# Patient Record
Sex: MALE | Race: ASIAN | NOT HISPANIC OR LATINO | Employment: UNEMPLOYED | URBAN - METROPOLITAN AREA
[De-identification: names, ages, dates, MRNs, and addresses within clinical notes are randomized per-mention and may not be internally consistent; named-entity substitution may affect disease eponyms.]

---

## 2018-07-21 ENCOUNTER — HOSPITAL ENCOUNTER (EMERGENCY)
Facility: HOSPITAL | Age: 48
Discharge: HOME | End: 2018-07-21
Attending: EMERGENCY MEDICINE | Admitting: EMERGENCY MEDICINE
Payer: COMMERCIAL

## 2018-07-21 VITALS
TEMPERATURE: 98.6 F | OXYGEN SATURATION: 97 % | HEART RATE: 97 BPM | RESPIRATION RATE: 18 BRPM | SYSTOLIC BLOOD PRESSURE: 126 MMHG | DIASTOLIC BLOOD PRESSURE: 87 MMHG

## 2018-07-21 DIAGNOSIS — F32.A DEPRESSION: Primary | ICD-10-CM

## 2018-07-21 PROCEDURE — 99284 EMERGENCY DEPT VISIT MOD MDM: CPT

## 2018-07-21 NOTE — DISCHARGE INSTRUCTIONS
Depression, Ambulatory Care   GENERAL INFORMATION:   Depression  is a medical condition that causes feelings of sadness or hopelessness that do not go away  Depression may cause you to lose interest in things you used to enjoy  These feelings may interfere with your daily life  Common symptoms include the following:   · Appetite changes, or weight gain or loss    · Trouble going to sleep or staying asleep, or sleeping too much    · Fatigue or lack of energy    · Feeling restless, irritable, or withdrawn    · Feeling worthless, hopeless, discouraged, or very guilty    · Trouble concentrating, remembering things, doing daily tasks, or making decisions    · Thoughts about hurting or killing yourself  Seek immediate care for the following symptoms:   · You think about harming yourself or someone else  Treatment for depression  may include medicine to improve or balance your mood  Therapy may also be used to treat your depression  A therapist will help you learn to cope with your thoughts and feelings  Therapy can be done alone or in a group  It may also be done with family members or a significant other  Manage depression:   · Get regular physical activity  Try to exercise for 30 minutes, 3 to 5 days a week  Work with your healthcare provider to develop an exercise plan that you enjoy  · Get enough sleep  Create a routine to help you relax before bed  Try to go to bed and wake up at the same time every day  Sleep is important for emotional health  · Eat a variety of healthy foods  Healthy foods include fruits, vegetables, whole-grain breads, low-fat dairy products, beans, lean meats, and fish  A healthy meal plan is low in fat, salt, and added sugar  · Avoid or limit alcohol  Ask your healthcare provider how much alcohol is safe for you to drink  A drink of alcohol is 12 ounces of beer, 5 ounces of wine, or 1½ ounces of liquor  Follow up with your healthcare provider as directed:   You will need to return so your healthcare provider can monitor your progress  Write down your questions so you remember to ask them during your visits  CARE AGREEMENT:   You have the right to help plan your care  Learn about your health condition and how it may be treated  Discuss treatment options with your caregivers to decide what care you want to receive  You always have the right to refuse treatment  The above information is an  only  It is not intended as medical advice for individual conditions or treatments  Talk to your doctor, nurse or pharmacist before following any medical regimen to see if it is safe and effective for you  © 2014 [address] is for End User's use only and may not be sold, redistributed or otherwise used for commercial purposes  All illustrations and images included in CareNotes® are the copyrighted property of [employer] D [employer]  or Dhiraj Simon

## 2018-07-21 NOTE — ED PROVIDER NOTES
History  No chief complaint on file  History provided by:  Patient   used: No    Psychiatric Evaluation   Presenting symptoms: depression    Presenting symptoms: no hallucinations, no homicidal ideas, no paranoid behavior, no suicidal thoughts and no suicidal threats    Patient accompanied by: self  Degree of incapacity (severity):  Mild  Onset quality:  Gradual  Timing:  Intermittent  Progression:  Waxing and waning  Chronicity:  New  Context: alcohol use    Treatment compliance:  Untreated  Relieved by:  Nothing  Worsened by:  Nothing  Ineffective treatments:  None tried      Prior to Admission Medications   Prescriptions Last Dose Informant Patient Reported? Taking?   folic acid (FOLVITE) 1 mg tablet   No No   Sig: Take 1 tablet (1 mg total) by mouth daily Indications: Deficiency of Folic Acid in the Diet  naltrexone (REVIA) 50 mg tablet   No No   Sig: Take 0 5 tablets (25 mg total) by mouth daily Indications: Excessive Use of Alcohol    thiamine 100 MG tablet   No No   Sig: Take 1 tablet (100 mg total) by mouth daily at bedtime Indications: Deficiency in Thiamine or Vitamin B1    topiramate (TOPAMAX) 25 mg tablet   No No   Sig: Take 1 tablet (25 mg total) by mouth 2 (two) times a day Indications: Excessive Use of Alcohol, Bipolar Disorder  Facility-Administered Medications: None       Past Medical History:   Diagnosis Date    Alcohol abuse     Alcoholism ([facility] [address] )     Anxiety     Depression        No past surgical history on file  No family history on file  I have reviewed and agree with the history as documented  Social History   Substance Use Topics    Smoking status: Current Every Day Smoker     Packs/day: 0 50     Years: 5 00    Smokeless tobacco: Not on file    Alcohol use Yes      Comment: 1/2-1 bottle of liquor daily  Review of Systems   Psychiatric/Behavioral: Negative for hallucinations, homicidal ideas, paranoia and suicidal ideas         Physical Exam  Physical Exam    Vital Signs  ED Triage Vitals   Temp Pulse Resp BP SpO2   -- -- -- -- --      Temp src Heart Rate Source Patient Position - Orthostatic VS BP Location FiO2 (%)   -- -- -- -- --      Pain Score       --           There were no vitals filed for this visit  Visual Acuity      ED Medications  Medications - No data to display    Diagnostic Studies  Results Reviewed     None                 No orders to display              Procedures  Procedures       Phone Contacts  ED Phone Contact    ED Course                               [facility]  CritCare Time    Disposition  Final diagnoses:   None     ED Disposition     None      Follow-up Information    None         Patient's Medications   Discharge Prescriptions    No medications on file     No discharge procedures on file      ED Provider  Electronically Signed by

## 2018-07-21 NOTE — ED NOTES
Pt wanting to leave and is denying any suicidal ideations at this time  Pt has called family member to be picked up and is refusing services at this time  Dr Johnnie Cooney to see patient        Rocco Mendosa RN  07/21/18 1070

## 2018-07-21 NOTE — ED NOTES
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 and having various stressors related to not having a settled lifestyle  Patient denies any suicidal ideations at this time, as well as any history of such  Patient denies any mental health history  Patient does attend outpatient services with [facility] 2xweekly as per his [place]  Patient identifies that he has a few more months on parole and has no interest in messing things up at this point  Patient denies homicidal ideations and hallucinations; he expresses hope for the future and his goals and feels safe to return home  Patient confirmed that he has the contact information for county crisis and can reach out to additional supports as needed       ANTHONY Chirinos  07/21/18   0130

## 2018-07-21 NOTE — ED NOTES
Pt  Refusing triage at this time   States "My brother is coming to get me I want to leave"     Karol Brenner, RN  07/21/18 013

## 2018-08-27 NOTE — ED PROVIDER NOTES
History  Chief Complaint   Patient presents with    Personal Problem     Pt  states his family wanted him to come get checked out because he seemed more down lately  Pt denies SI and HI       History provided by:  Patient   used: No    Psychiatric Evaluation   Presenting symptoms: depression    Presenting symptoms: no aggressive behavior, no agitation, no bizarre behavior, no delusions, no disorganized thought process, no hallucinations, no homicidal ideas, no self-mutilation, no suicidal thoughts, no suicidal threats and no suicide attempt    Patient accompanied by: self  Degree of incapacity (severity):  Mild  Onset quality:  Gradual  Timing:  Intermittent  Progression:  Waxing and waning  Chronicity:  New  Context: alcohol use    Treatment compliance:  Some of the time  Relieved by:  Nothing  Worsened by:  Alcohol  Ineffective treatments:  None tried  Associated symptoms: no abdominal pain, no appetite change, no chest pain, no fatigue and no headaches        Prior to Admission Medications   Prescriptions Last Dose Informant Patient Reported? Taking?   folic acid (FOLVITE) 1 mg tablet   No No   Sig: Take 1 tablet (1 mg total) by mouth daily Indications: Deficiency of Folic Acid in the Diet  naltrexone (REVIA) 50 mg tablet   No No   Sig: Take 0 5 tablets (25 mg total) by mouth daily Indications: Excessive Use of Alcohol    thiamine 100 MG tablet   No No   Sig: Take 1 tablet (100 mg total) by mouth daily at bedtime Indications: Deficiency in Thiamine or Vitamin B1    topiramate (TOPAMAX) 25 mg tablet   No No   Sig: Take 1 tablet (25 mg total) by mouth 2 (two) times a day Indications: Excessive Use of Alcohol, Bipolar Disorder  Facility-Administered Medications: None       Past Medical History:   Diagnosis Date    Alcohol abuse     Alcoholism ([facility] [address] )     Anxiety     Depression        History reviewed  No pertinent surgical history  History reviewed  No pertinent family history    I have reviewed and agree with the history as documented  Social History   Substance Use Topics    Smoking status: Current Every Day Smoker     Packs/day: 0 50     Years: 5 00    Smokeless tobacco: Never Used    Alcohol use Yes      Comment: 1/2-1 bottle of liquor daily  Review of Systems   Constitutional: Negative for activity change, appetite change, diaphoresis, fatigue and fever  HENT: Negative for sore throat and trouble swallowing  Eyes: Negative for pain and visual disturbance  Respiratory: Negative for apnea, cough, chest tightness and shortness of breath  Cardiovascular: Negative for chest pain  Gastrointestinal: Negative for abdominal pain, diarrhea, nausea and vomiting  Endocrine: Negative for polyphagia and polyuria  Genitourinary: Negative for dysuria, flank pain, frequency, hematuria and urgency  Musculoskeletal: Negative for back pain, gait problem, neck pain and neck stiffness  Skin: Negative for color change and rash  Allergic/Immunologic: Negative for immunocompromised state  Neurological: Negative for dizziness, speech difficulty, numbness and headaches  Hematological: Does not bruise/bleed easily  Psychiatric/Behavioral: Positive for dysphoric mood  Negative for agitation, confusion, hallucinations, homicidal ideas, self-injury and suicidal ideas  Physical Exam  Physical Exam   Constitutional: He is oriented to person, place, and time  He appears well-developed and well-nourished  HENT:   Head: Normocephalic  Eyes: Conjunctivae and EOM are normal  Pupils are equal, round, and reactive to light  No scleral icterus  Neck: Normal range of motion  Neck supple  No JVD present  Cardiovascular: Normal rate and regular rhythm  Pulmonary/Chest: Effort normal and breath sounds normal  No respiratory distress  Abdominal: Soft  Bowel sounds are normal  He exhibits no distension  There is no tenderness  There is no rebound and no guarding  Musculoskeletal: Normal range of motion  He exhibits no tenderness or deformity  Lymphadenopathy:     He has no cervical adenopathy  Neurological: He is alert and oriented to person, place, and time  Coordination normal    Skin: Skin is warm and dry  He is not diaphoretic  Psychiatric: He has a normal mood and affect  Vitals reviewed  Vital Signs  ED Triage Vitals [07/21/18 0147]   Temperature Pulse Respirations Blood Pressure SpO2   98 6 °F (37 °C) 97 18 126/87 97 %      Temp Source Heart Rate Source Patient Position - Orthostatic VS BP Location FiO2 (%)   Oral Monitor Sitting Right arm --      Pain Score       No Pain           Vitals:    07/21/18 0147   BP: 126/87   Pulse: 97   Patient Position - Orthostatic VS: Sitting       Visual Acuity      ED Medications  Medications - No data to display    Diagnostic Studies  Results Reviewed     None                 No orders to display              Procedures  Procedures       Phone Contacts  ED Phone Contact    ED Course                               MDM  Number of Diagnoses or Management Options  Depression: new and requires workup  Diagnosis management comments: 48 yo M presented for psych eval  Pt with no SI/HI/AH/VH  States he has been feeling a little more "down" recently and admits to sometimes self medicating with alcohol  Pt states that today family noticed that he seemed somewhat depressed and suggested that he "get checked out " pt reports some sources of personal stress/triggers but reports that overall he is optimistic about the future and feels that he has a strong support system  No physical c/o  Was seen also by ED crisis worker, plan is to d/c home with OP resources, return to ED for new/worsening sx          Amount and/or Complexity of Data Reviewed  Clinical lab tests: reviewed and ordered  Review and summarize past medical records: yes      CritCare Time    Disposition  Final diagnoses:   Depression     Time reflects when diagnosis was documented in both MDM as applicable and the Disposition within this note     Time User Action Codes Description Comment    7/21/2018  1:43 AM Yenni Balderrama Add [F32 9] Depression       ED Disposition     ED Disposition Condition Comment    Discharge  [facility] discharge to home/self care  Condition at discharge: Good        Follow-up Information     Follow up With Specialties Details Why Contact Info    PCP   Please arrange for follow-up with your primary care provider and/or counseling team   If you have new or worsening symptoms please return to the emergency department for re-evaluation  Discharge Medication List as of 7/21/2018  1:45 AM      CONTINUE these medications which have NOT CHANGED    Details   folic acid (FOLVITE) 1 mg tablet Take 1 tablet (1 mg total) by mouth daily Indications: Deficiency of Folic Acid in the Diet , Starting 2/29/2016, Until Discontinued, Print      naltrexone (REVIA) 50 mg tablet Take 0 5 tablets (25 mg total) by mouth daily Indications: Excessive Use of Alcohol , Starting 2/29/2016, Until Discontinued, Print      thiamine 100 MG tablet Take 1 tablet (100 mg total) by mouth daily at bedtime Indications: Deficiency in Thiamine or Vitamin B1 , Starting 2/29/2016, Until Discontinued, Print      topiramate (TOPAMAX) 25 mg tablet Take 1 tablet (25 mg total) by mouth 2 (two) times a day Indications: Excessive Use of Alcohol, Bipolar Disorder , Starting 2/29/2016, Until Discontinued, Print           No discharge procedures on file      ED Provider  Electronically Signed by           Ruth Crenshaw MD  08/27/18 7642

## 2018-09-04 ENCOUNTER — HOSPITAL ENCOUNTER (EMERGENCY)
Facility: HOSPITAL | Age: 48
Discharge: HOME | End: 2018-09-04
Attending: EMERGENCY MEDICINE | Admitting: EMERGENCY MEDICINE
Payer: COMMERCIAL

## 2018-09-04 VITALS
HEART RATE: 68 BPM | DIASTOLIC BLOOD PRESSURE: 78 MMHG | RESPIRATION RATE: 16 BRPM | OXYGEN SATURATION: 99 % | TEMPERATURE: 98.4 F | SYSTOLIC BLOOD PRESSURE: 146 MMHG

## 2018-09-04 DIAGNOSIS — F14.10 COCAINE ABUSE (HCC): ICD-10-CM

## 2018-09-04 DIAGNOSIS — F10.10 ALCOHOL ABUSE: Primary | ICD-10-CM

## 2018-09-04 LAB
ALBUMIN SERPL BCP-MCNC: 3.9 G/DL (ref 3.5–5)
ALP SERPL-CCNC: 58 U/L (ref 46–116)
ALT SERPL W P-5'-P-CCNC: 37 U/L (ref 12–78)
AMPHETAMINES SERPL QL SCN: NEGATIVE
ANION GAP SERPL CALCULATED.3IONS-SCNC: 12 MMOL/L (ref 4–13)
APAP SERPL-MCNC: <2 UG/ML (ref 10–30)
AST SERPL W P-5'-P-CCNC: 23 U/L (ref 5–45)
ATRIAL RATE: 82 BPM
BARBITURATES UR QL: NEGATIVE
BASOPHILS # BLD AUTO: 0.07 THOUSANDS/ΜL (ref 0–0.1)
BASOPHILS NFR BLD AUTO: 1 % (ref 0–1)
BENZODIAZ UR QL: NEGATIVE
BILIRUB SERPL-MCNC: 0.2 MG/DL (ref 0.2–1)
BUN SERPL-MCNC: 13 MG/DL (ref 5–25)
CALCIUM SERPL-MCNC: 8.9 MG/DL (ref 8.3–10.1)
CHLORIDE SERPL-SCNC: 110 MMOL/L (ref 100–108)
CO2 SERPL-SCNC: 26 MMOL/L (ref 21–32)
COCAINE UR QL: POSITIVE
CREAT SERPL-MCNC: 1.03 MG/DL (ref 0.6–1.3)
EOSINOPHIL # BLD AUTO: 0.28 THOUSAND/ΜL (ref 0–0.61)
EOSINOPHIL NFR BLD AUTO: 4 % (ref 0–6)
ERYTHROCYTE [DISTWIDTH] IN BLOOD BY AUTOMATED COUNT: 12.6 % (ref 11.6–15.1)
ETHANOL EXG-MCNC: 0 MG/DL
ETHANOL EXG-MCNC: 0.07 MG/DL
ETHANOL SERPL-MCNC: 147 MG/DL (ref 0–3)
GFR SERPL CREATININE-BSD FRML MDRD: 85 ML/MIN/1.73SQ M
GLUCOSE SERPL-MCNC: 105 MG/DL (ref 65–140)
HCT VFR BLD AUTO: 49.8 % (ref 36.5–49.3)
HGB BLD-MCNC: 17.1 G/DL (ref 12–17)
IMM GRANULOCYTES # BLD AUTO: 0 THOUSAND/UL (ref 0–0.2)
IMM GRANULOCYTES NFR BLD AUTO: 0 % (ref 0–2)
LYMPHOCYTES # BLD AUTO: 2.97 THOUSANDS/ΜL (ref 0.6–4.47)
LYMPHOCYTES NFR BLD AUTO: 46 % (ref 14–44)
MCH RBC QN AUTO: 33.6 PG (ref 26.8–34.3)
MCHC RBC AUTO-ENTMCNC: 34.3 G/DL (ref 31.4–37.4)
MCV RBC AUTO: 98 FL (ref 82–98)
METHADONE UR QL: NEGATIVE
MONOCYTES # BLD AUTO: 0.43 THOUSAND/ΜL (ref 0.17–1.22)
MONOCYTES NFR BLD AUTO: 7 % (ref 4–12)
NEUTROPHILS # BLD AUTO: 2.66 THOUSANDS/ΜL (ref 1.85–7.62)
NEUTS SEG NFR BLD AUTO: 42 % (ref 43–75)
NRBC BLD AUTO-RTO: 0 /100 WBCS
OPIATES UR QL SCN: NEGATIVE
P AXIS: 77 DEGREES
PCP UR QL: NEGATIVE
PLATELET # BLD AUTO: 314 THOUSANDS/UL (ref 149–390)
PMV BLD AUTO: 8.3 FL (ref 8.9–12.7)
POTASSIUM SERPL-SCNC: 3.8 MMOL/L (ref 3.5–5.3)
PR INTERVAL: 154 MS
PROT SERPL-MCNC: 7.5 G/DL (ref 6.4–8.2)
QRS AXIS: -56 DEGREES
QRSD INTERVAL: 114 MS
QT INTERVAL: 368 MS
QTC INTERVAL: 429 MS
RBC # BLD AUTO: 5.09 MILLION/UL (ref 3.88–5.62)
SALICYLATES SERPL-MCNC: 3.5 MG/DL (ref 3–20)
SODIUM SERPL-SCNC: 148 MMOL/L (ref 136–145)
T WAVE AXIS: 56 DEGREES
THC UR QL: NEGATIVE
TSH SERPL DL<=0.05 MIU/L-ACNC: 2.52 UIU/ML (ref 0.36–3.74)
VENTRICULAR RATE: 82 BPM
WBC # BLD AUTO: 6.41 THOUSAND/UL (ref 4.31–10.16)

## 2018-09-04 PROCEDURE — 93010 ELECTROCARDIOGRAM REPORT: CPT | Performed by: INTERNAL MEDICINE

## 2018-09-04 PROCEDURE — 80329 ANALGESICS NON-OPIOID 1 OR 2: CPT | Performed by: EMERGENCY MEDICINE

## 2018-09-04 PROCEDURE — 80053 COMPREHEN METABOLIC PANEL: CPT | Performed by: EMERGENCY MEDICINE

## 2018-09-04 PROCEDURE — 36415 COLL VENOUS BLD VENIPUNCTURE: CPT | Performed by: EMERGENCY MEDICINE

## 2018-09-04 PROCEDURE — 85025 COMPLETE CBC W/AUTO DIFF WBC: CPT | Performed by: EMERGENCY MEDICINE

## 2018-09-04 PROCEDURE — 82075 ASSAY OF BREATH ETHANOL: CPT | Performed by: EMERGENCY MEDICINE

## 2018-09-04 PROCEDURE — 99284 EMERGENCY DEPT VISIT MOD MDM: CPT

## 2018-09-04 PROCEDURE — 80320 DRUG SCREEN QUANTALCOHOLS: CPT | Performed by: EMERGENCY MEDICINE

## 2018-09-04 PROCEDURE — 93005 ELECTROCARDIOGRAM TRACING: CPT

## 2018-09-04 PROCEDURE — 80307 DRUG TEST PRSMV CHEM ANLYZR: CPT | Performed by: EMERGENCY MEDICINE

## 2018-09-04 PROCEDURE — 84443 ASSAY THYROID STIM HORMONE: CPT | Performed by: EMERGENCY MEDICINE

## 2018-09-04 RX ORDER — CHLORDIAZEPOXIDE HYDROCHLORIDE 25 MG/1
25 CAPSULE, GELATIN COATED ORAL ONCE
Status: COMPLETED | OUTPATIENT
Start: 2018-09-04 | End: 2018-09-04

## 2018-09-04 RX ADMIN — CHLORDIAZEPOXIDE HYDROCHLORIDE 25 MG: 25 CAPSULE ORAL at 05:13

## 2018-09-04 NOTE — ED NOTES
Pt resting comfortably in bed at this time   Call bell within reach       Jael Langston RN  09/04/18 0056

## 2018-09-04 NOTE — ED NOTES
Patient awake and alert  No distress noted  No further questions upon discharge       Carlitos Ma RN  09/04/18 8753

## 2018-09-04 NOTE — ED PROVIDER NOTES
History  Chief Complaint   Patient presents with    Addiction Problem     Patient seeking help for drug and alcohol addiction  Drug of choice is cocaine  Patient is a 49year old male with worsening cocaine and alcohol abuse since thanksgiving last year and got out of detention last October  Denies depression or SI  No N/V  No fever  Wants detox  Was last seen at [facility] ED on 7/21/18 for depression  [facility] website checked on this patient and last Rx filled was on 3/15/18 for percocet for 2 day supply  States he has not eaten for 2 days  Has tried outpatient tx at [facility] in [facility]  History provided by:  Patient   used: No    Addiction Problem   Associated symptoms: no nausea, no suicidal ideation and no vomiting        None       Past Medical History:   Diagnosis Date    Alcohol abuse     Alcoholism ([facility] [address] )     Anxiety     Depression        History reviewed  No pertinent surgical history  History reviewed  No pertinent family history  I have reviewed and agree with the history as documented  Social History   Substance Use Topics    Smoking status: Current Every Day Smoker     Packs/day: 0 50     Years: 5 00    Smokeless tobacco: Never Used    Alcohol use Yes      Comment: 1/2-1 bottle of liquor daily  Review of Systems   Constitutional: Negative for fever  Gastrointestinal: Negative for nausea and vomiting  Psychiatric/Behavioral: Negative for dysphoric mood and suicidal ideas  All other systems reviewed and are negative  Physical Exam  Physical Exam   Constitutional: He is oriented to person, place, and time  He appears well-developed and well-nourished  He appears distressed (mild)  HENT:   Head: Normocephalic and atraumatic  Mouth/Throat: Oropharynx is clear and moist    Eyes: EOM are normal  Pupils are equal, round, and reactive to light  No scleral icterus  Neck: Normal range of motion  Neck supple     Cardiovascular: Normal rate, regular rhythm and normal heart sounds  No murmur heard  Pulmonary/Chest: Effort normal and breath sounds normal  No stridor  No respiratory distress  Abdominal: Soft  Bowel sounds are normal  There is no tenderness  Musculoskeletal: He exhibits no edema or deformity  Neurological: He is alert and oriented to person, place, and time  Skin: Skin is warm and dry  No rash noted  Psychiatric: He has a normal mood and affect  Nursing note and vitals reviewed  Vital Signs  ED Triage Vitals   Temperature Pulse Respirations Blood Pressure SpO2   09/04/18 0015 09/04/18 0012 09/04/18 0012 09/04/18 0012 09/04/18 0012   98 4 °F (36 9 °C) 95 20 117/71 99 %      Temp Source Heart Rate Source Patient Position - Orthostatic VS BP Location FiO2 (%)   09/04/18 0015 09/04/18 0012 09/04/18 0012 09/04/18 0012 --   Oral Monitor Sitting Left arm       Pain Score       09/04/18 0342       No Pain           Vitals:    09/04/18 0012 09/04/18 0342   BP: 117/71 124/75   Pulse: 95 85   Patient Position - Orthostatic VS: Sitting Sitting       Visual Acuity      ED Medications  Medications   chlordiazePOXIDE (LIBRIUM) capsule 25 mg (25 mg Oral Given 9/4/18 0513)       Diagnostic Studies  Results Reviewed     Procedure Component Value Units Date/Time    POCT alcohol breath test [[id number]]  (Normal) Resulted:  09/04/18 0340    Lab Status:  Final result Updated:  09/04/18 0340     EXTBreath Alcohol 0 066    TSH [[id number]]  (Normal) Collected:  09/04/18 0102    Lab Status:  Final result Specimen:  Blood from Arm, Left Updated:  09/04/18 0139     TSH 3RD GENERATON 2 525 uIU/mL     Narrative:         Patients undergoing fluorescein dye angiography may retain small amounts of fluorescein in the body for 48-72 hours post procedure  Samples containing fluorescein can produce falsely depressed TSH values  If the patient had this procedure,a specimen should be resubmitted post fluorescein clearance      Salicylate level [[id number]] (Normal) Collected:  09/04/18 0102    Lab Status:  Final result Specimen:  Blood from Arm, Left Updated:  66/70/87 1814     Salicylate Lvl 3 5 mg/dL     Acetaminophen level [[id number]]  (Abnormal) Collected:  09/04/18 0102    Lab Status:  Final result Specimen:  Blood from Arm, Left Updated:  09/04/18 0139     Acetaminophen Level <2 (L) ug/mL     Comprehensive metabolic panel [[id number]]  (Abnormal) Collected:  09/04/18 0102    Lab Status:  Final result Specimen:  Blood from Arm, Left Updated:  09/04/18 0130     Sodium 148 (H) mmol/L      Potassium 3 8 mmol/L      Chloride 110 (H) mmol/L      CO2 26 mmol/L      ANION GAP 12 mmol/L      BUN 13 mg/dL      Creatinine 1 03 mg/dL      Glucose 105 mg/dL      Calcium 8 9 mg/dL      AST 23 U/L      ALT 37 U/L      Alkaline Phosphatase 58 U/L      Total Protein 7 5 g/dL      Albumin 3 9 g/dL      Total Bilirubin 0 20 mg/dL      eGFR 85 ml/min/1 73sq m     Narrative:         National Kidney Disease Education Program recommendations are as follows:  GFR calculation is accurate only with a steady state creatinine  Chronic Kidney disease less than 60 ml/min/1 73 sq  meters  Kidney failure less than 15 ml/min/1 73 sq  meters      Ethanol [[id number]]  (Abnormal) Collected:  09/04/18 0102    Lab Status:  Final result Specimen:  Blood from Arm, Left Updated:  09/04/18 0124     Ethanol Lvl 147 (H) mg/dL     CBC and differential [[id number]]  (Abnormal) Collected:  09/04/18 0102    Lab Status:  Final result Specimen:  Blood from Arm, Left Updated:  09/04/18 0108     WBC 6 41 Thousand/uL      RBC 5 09 Million/uL      Hemoglobin 17 1 (H) g/dL      Hematocrit 49 8 (H) %      MCV 98 fL      MCH 33 6 pg      MCHC 34 3 g/dL      RDW 12 6 %      MPV 8 3 (L) fL      Platelets 878 Thousands/uL      nRBC 0 /100 WBCs      Neutrophils Relative 42 (L) %      Immat GRANS % 0 %      Lymphocytes Relative 46 (H) %      Monocytes Relative 7 %      Eosinophils Relative 4 %      Basophils Relative 1 % Neutrophils Absolute 2 66 Thousands/µL      Immature Grans Absolute 0 00 Thousand/uL      Lymphocytes Absolute 2 97 Thousands/µL      Monocytes Absolute 0 43 Thousand/µL      Eosinophils Absolute 0 28 Thousand/µL      Basophils Absolute 0 07 Thousands/µL     Rapid drug screen, urine [[id number]]  (Abnormal) Collected:  09/04/18 0053    Lab Status:  Final result Specimen:  Urine from Urine, Clean Catch Updated:  09/04/18 0108     Amph/Meth UR Negative     Barbiturate Ur Negative     Benzodiazepine Urine Negative     Cocaine Urine Positive (A)     Methadone Urine Negative     Opiate Urine Negative     PCP Ur Negative     THC Urine Negative    Narrative:         Presumptive report  If requested, specimen will be sent to reference lab for confirmation  FOR MEDICAL PURPOSES ONLY  IF CONFIRMATION NEEDED PLEASE CONTACT THE LAB WITHIN 5 DAYS  Drug Screen Cutoff Levels:  AMPHETAMINE/METHAMPHETAMINES  1000 ng/mL  BARBITURATES     200 ng/mL  BENZODIAZEPINES     200 ng/mL  COCAINE      300 ng/mL  METHADONE      300 ng/mL  OPIATES      300 ng/mL  PHENCYCLIDINE     25 ng/mL  THC       50 ng/mL                 No orders to display              Procedures  ECG 12 Lead Documentation  Date/Time: 9/4/2018 12:48 AM  Performed by: Jimbo Cates  Authorized by: Jimbo Cates     Indications / Diagnosis:  Alcohol/cocaine abuse  ECG reviewed by me, the ED Provider: yes    Patient location:  ED  Previous ECG:     Previous ECG:  Compared to current    Comparison ECG info:  6/5/12    Similarity:  No change  Rate:     ECG rate:  82    ECG rate assessment: normal    Rhythm:     Rhythm: sinus rhythm    Ectopy:     Ectopy: none    QRS:     QRS axis:  Left  Conduction:     Conduction: abnormal      Abnormal conduction: incomplete RBBB    ST segments:     ST segments:  Normal  T waves:     T waves: normal    Comments:      Minimal voltage criteria for LVH, may be normal variant              Phone Contacts  ED Phone Contact    ED Course  ED Course as of Sep 04 0841   Tue Sep 04, 2018   [email or web address] d/w patient  Patient ate and drank fluid here  Will BAT at 0330      0342 BAT=0 066 and crisis called  Patient is medically acceptable for crisis evaluation/dispositioning  36 D/w crisis worker who will do HOST referral for patient  0403 Patient wants medication to sleep so librium ordered which should help prevent alcohol withdrawal sx      6475 Signed out to Dr Emmanuelle Rosas this AM that patient is pending HOST evaluation for detox placement  MDM  Number of Diagnoses or Management Options  Diagnosis management comments: DDx including but not limited to: Alcohol intoxication, metabolic abnormality, doubt intracranial process, overdose, substance abuse, withdrawal, request for detox  Amount and/or Complexity of Data Reviewed  Clinical lab tests: ordered and reviewed  Decide to obtain previous medical records or to obtain history from someone other than the patient: yes  Review and summarize past medical records: yes  Independent visualization of images, tracings, or specimens: yes      CritCare Time    Disposition  Final diagnoses:   Alcohol abuse   Cocaine abuse     Time reflects when diagnosis was documented in both MDM as applicable and the Disposition within this note     Time User Action Codes Description Comment    9/4/2018  3:48 AM Carlos Manuel Soirene Add [F10 10] Alcohol abuse     9/4/2018  3:48 AM Carlos Manuel Sovereign Add [F14 10] Cocaine abuse       ED Disposition     None      Follow-up Information     Follow up With Specialties Details Why [address]  Call Follow up with [place] for substance use treatment  [address], [address]    Phone: [phone] ext  [phone]          Patient's Medications   Discharge Prescriptions    No medications on file     No discharge procedures on file      ED Provider  Electronically Signed by Nicholas Merritt MD  09/04/18 1971

## 2018-09-04 NOTE — DISCHARGE INSTRUCTIONS
Abuse of Alcohol   WHAT YOU NEED TO KNOW:   · Alcohol abuse is unhealthy drinking behavior  You may drink too much at one time once a week, or continue to drink too much daily  You continue to drink even though it causes problems  The problems can be alcohol related legal problems, or problems with work or relationships with family  · If you drink too much at one time, you are binge drinking  Binge drinking is when you have a large amount of alcohol in a short time  Your blood alcohol concentrations (NANO) goes above 0 08 g/dLlevel during binge drinking  For men, this usually happens with more than 4 drinks in 2 hours  For women, it is more than 3 drinks in 2 hours  A drink is 12 ounces of beer, 4 ounces of wine, or 1½ ounces of liquor  DISCHARGE INSTRUCTIONS:   Call 911 for the following:   · You have sudden chest pain or trouble breathing  · You have a seizure or have shaking or trembling  · You were in an accident because of alcohol  Seek care immediately if:   · You want to harm yourself or others  · You have hallucinations (you see or hear things that are not real)  · You cannot stop vomiting or you vomit blood  Contact your healthcare provider if:   · You need help to stop drinking alcohol  · You have questions or concerns about your condition or care  Medicines:   · Vitamin supplements  may be given to treat low vitamin levels  Alcohol can make it hard for your body to absorb enough vitamins such as B1  Vitamin supplements may also be given to prevent alcohol related brain damage  · Take your medicine as directed  Contact your healthcare provider if you think your medicine is not helping or if you have side effects  Tell him or her if you are allergic to any medicine  Keep a list of the medicines, vitamins, and herbs you take  Include the amounts, and when and why you take them  Bring the list or the pill bottles to follow-up visits   Carry your medicine list with you in case of an emergency  Treatments or therapies you may need:   · Detoxification (detox) and withdrawal  is a program that helps you to safely get alcohol out of your body  Detox can also help get rid of the physical need to drink  Healthcare providers monitor the physical symptoms of withdrawal  They may give you medicines to help decrease nausea, dehydration, and seizures  Healthcare providers will also monitor your blood pressure, heart and breathing rates, and your temperature  Symptoms of anxiety, depression, and suicidal thoughts are also monitored and managed during detox  Healthcare providers may give you medicines for these symptoms and therapy sessions will be available to you  Detox is usually done at a detox center or in a hospital  Healthcare providers do not recommend that you try to detox at home or by yourself  Withdrawal symptoms may become life-threatening  The center can help you find 12 step programs or an individual therapist to help with emotional support after detox  · Inpatient and outpatient treatment  focus on your personal needs to help you stop drinking  Treatment helps you understand the reasons you abuse alcohol  Counselors and therapists provide you with support and help you find ways to cope instead of drinking  You may need inpatient treatment to provide a controlled environment  You may need outpatient treatment after your inpatient treatment is complete  · Alcohol aversion therapy  takes away the desire to drink by causing a negative reaction when you drink  Healthcare providers may give you medicines that cause nausea and vomiting when you drink alcohol  They may instead give you a medicine that decreases your urge to drink alcohol  These medicines are used to help you stop drinking or reduce the amount you drink  They can also help you avoid relapse  Follow up with your healthcare provider as directed:  Write down your questions so you remember to ask them during your visits    Avoid alcohol:  You should stop drinking entirely  Alcohol can damage your brain, heart, and liver  It also increases your risk for injury, high blood pressure, and certain types of cancer  Alcohol is dangerous when you combine it with certain medicines  Do not drive if you drink alcohol:  Make sure someone who has not been drinking can help you get home  Get support:  Most people need support to stop drinking alcohol  Mental health providers, support groups, rehabilitation centers, and your healthcare provider can provide support  For more information:   · Alcoholics Anonymous  Web Address: http://[employer]/  · Substance Abuse and [place] [address] , [address] [place]  Web Address: https://[employer]/  © 2017 [facility] Information is for End User's use only and may not be sold, redistributed or otherwise used for commercial purposes  All illustrations and images included in CareNotes® are the copyrighted property of A D A M , Inc  or Dhiraj Simon  The above information is an  only  It is not intended as medical advice for individual conditions or treatments  Talk to your doctor, nurse or pharmacist before following any medical regimen to see if it is safe and effective for you  Polysubstance Abuse   WHAT YOU NEED TO KNOW:   Polysubstance abuse is the abuse of 2 or more drugs that cause impairment or distress  Examples include alcohol, nicotine, marijuana, cocaine, heroin, methamphetamine, hallucinogens such as mushrooms, or inhalants such as paint thinner  Prescribed medicines, such as opioids for pain or benzodiazepines for anxiety, are also commonly abused  DISCHARGE INSTRUCTIONS:   Call 911 for any of the following:   · You feel you might harm yourself or others  Return to the emergency department if:   · You have a seizure       · You have chest pain and your heart is beating faster than usual      · You have new shortness of breath  · You are dizzy and lightheaded  Contact your healthcare provider or therapist if:   · You are using drugs and think you are pregnant  · You have withdrawal symptoms and want to start using drugs again  · You have questions or concerns about your condition or care  Risks of polysubstance abuse:   · Drug dependence  is when you continue to use drugs, even when you know the risks  Polysubstance abuse can damage your heart, brain, lungs, liver, and gastrointestinal tract  You continue even when it causes problems with work, school, or relationships  You may have difficulty finding or keeping a job because of your drug dependence  · Drug tolerance  is when you need to use more drugs, or use them more often, to get the effects you want  You may not be able to stop using the drugs  When you try to stop, you may have withdrawal symptoms and strong cravings for the drugs  · Drug overdose  can occur when you take more drugs than your body can handle  This may be a small amount or a large amount  You can lose consciousness or have a seizure or stroke  Your heart can stop beating, or you can stop breathing  You may die from a drug overdose  Medicines:   · Withdrawal medicines  may be given according to the types of drugs you are abusing  Withdrawal from drugs can cause serious, life-threatening side effects  Certain medicines can help decrease your withdrawal symptoms and your desire for the drug  Ask for more information about the withdrawal medicines you may need  · Mood stabilizers  may be given to help prevent or treat depression or anxiety caused by drug abuse and withdrawal      · Take your medicine as directed  Contact your healthcare provider if you think your medicine is not helping or if you have side effects  Tell him or her if you are allergic to any medicine  Keep a list of the medicines, vitamins, and herbs you take   Include the amounts, and when and why you take them  Bring the list or the pill bottles to follow-up visits  Carry your medicine list with you in case of an emergency  Follow up with your healthcare provider as directed: You may be referred to a specialist to treat health conditions caused by your drug use  This includes mental health, heart, or lung specialists  Write down your questions so you remember to ask them during your visits  Therapy:  You may need therapy and support to stop using drugs:  · Cognitive and behavioral therapy  helps you change your thinking and behavior  It can help you develop plans to avoid the situations that make you want to use drugs  It also helps you cope with the feelings of wanting to use drugs  You may have individual or group therapy  · Contingency management  helps you set drug-free goals with a therapist  Adriana Gillette will decide ways to celebrate your success when you reach a goal      · Family therapy and support groups  allow you and your family members to talk to and be encouraged by other people affected by drug abuse  You and your family members may attend together or separately  Ask your healthcare provider for information about programs in your area  How polysubstance abuse affects unborn or  babies:   · If you are pregnant or get pregnant while using drugs, you may have a miscarriage or give birth early  Your baby may be born addicted to the drugs  · Do not breastfeed your baby if you use drugs  Drugs pass from your bloodstream into your breast milk and affect your baby's health  Talk with your healthcare provider if you are using drugs and breastfeeding  For support and more information:   · Alcoholics Anonymous  Web Address: http://[employer]/  · ENRICO Leon on Drug and Alcohol Information  Phone: 3- 174 - 9140741  Web Address: [employer]  · [employer] on Alcoholism and Drug Dependence  [address] [address]  Phone: 6- 401 - 636-9487  Phone: 8- 800 - 632-6828  Web Address: [employer]  org  © 2017 [address]  Information is for End User's use only and may not be sold, redistributed or otherwise used for commercial purposes  All illustrations and images included in CareNotes® are the copyrighted property of A D A M , Inc  or Dhiraj Simon  The above information is an  only  It is not intended as medical advice for individual conditions or treatments  Talk to your doctor, nurse or pharmacist before following any medical regimen to see if it is safe and effective for you

## 2018-09-04 NOTE — ED NOTES
Pt comes to the ER with alcohol and drug abuse issues  His drug of choice is cocaine  Pt denies any psychiatric symptoms, has no SI/HI, and is seeking rehab    HOST referral was done at 4:47 am

## 2018-09-04 NOTE — ED NOTES
CM received call back from Jose at [facility]  There are a few other referrals that must be seen first but Jose is hopeful that patient will be seen prior to noon

## 2018-09-04 NOTE — ED CARE HANDOFF
Emergency Department Sign Out Note        Sign out and transfer of care from Dr Annabelle Barahona  See Separate Emergency Department note  The patient, Pepe Oneil, was evaluated by the previous provider for substance abuse and desire for detox  Workup Completed:  Yes    ED Course / Workup Pending (followup):  Evaluation by HOST representative                       ED Course as of Sep 04 1508   Tue Sep 04, 2018   1505 HOST representative has met with patient  He will be discharged home from the emergency department tonight and will report to location she has designated to him tomorrow morning  Procedures  MDM  CritCare Time      Disposition  Final diagnoses:   Alcohol abuse   Cocaine abuse     Time reflects when diagnosis was documented in both MDM as applicable and the Disposition within this note     Time User Action Codes Description Comment    9/4/2018  3:48 AM Mario Child Add [F10 10] Alcohol abuse     9/4/2018  3:48 AM Mario Child Add [F14 10] Cocaine abuse       ED Disposition     None      Follow-up Information     Follow up With Specialties Details Why [address]  Call Follow up with [place] for substance use treatment  [address] [address]    Phone: [phone] ext  [phone]        Patient's Medications   Discharge Prescriptions    No medications on file     No discharge procedures on file         ED Provider  Electronically Signed by     Shade Hermosillo MD  09/04/18 0396

## 2018-11-10 ENCOUNTER — HOSPITAL ENCOUNTER (EMERGENCY)
Facility: HOSPITAL | Age: 48
Discharge: HOME | End: 2018-11-10
Attending: EMERGENCY MEDICINE
Payer: COMMERCIAL

## 2018-11-10 VITALS
SYSTOLIC BLOOD PRESSURE: 142 MMHG | OXYGEN SATURATION: 99 % | TEMPERATURE: 98.8 F | DIASTOLIC BLOOD PRESSURE: 66 MMHG | HEART RATE: 100 BPM | RESPIRATION RATE: 18 BRPM

## 2018-11-10 DIAGNOSIS — M54.2 NECK PAIN: Primary | ICD-10-CM

## 2018-11-10 PROCEDURE — 99283 EMERGENCY DEPT VISIT LOW MDM: CPT

## 2018-11-10 RX ORDER — PREDNISONE 20 MG/1
40 TABLET ORAL DAILY
Qty: 10 TABLET | Refills: 0 | Status: SHIPPED | OUTPATIENT
Start: 2018-11-10 | End: 2018-11-15

## 2018-11-10 RX ORDER — PREDNISONE 20 MG/1
60 TABLET ORAL ONCE
Status: COMPLETED | OUTPATIENT
Start: 2018-11-10 | End: 2018-11-10

## 2018-11-10 RX ORDER — METHOCARBAMOL 750 MG/1
750 TABLET, FILM COATED ORAL 3 TIMES DAILY
Qty: 9 TABLET | Refills: 0 | Status: SHIPPED | OUTPATIENT
Start: 2018-11-10 | End: 2018-11-14

## 2018-11-10 RX ADMIN — PREDNISONE 60 MG: 20 TABLET ORAL at 19:37

## 2018-11-11 NOTE — ED PROVIDER NOTES
History  Chief Complaint   Patient presents with    Neck Pain     pt reports having pain in neck for "a while", has seen PCP and referred to spine specialist, pt has not made appt with spine specialist yet, neck pain increasing over past few day     47yo male who presents with bilateral neck pain  States this has been ongoing for many months  He has had xrasy done and told it was arthritis, he took NSAIDS without relief  Last week he went for an MRI and he received call referring him to spine specialist  States he has not seen them yet  States his right arm went numb yesterday  Denies chest pain or sob  Denies fever or rash  Denies neck injury  Patient is a   Pain radiates into his head  Pain is worse with movement  States he cannot turn his head completely  History provided by:  Patient      None       Past Medical History:   Diagnosis Date    Alcohol abuse     Alcoholism ([facility] [address] )     Anxiety     Depression        History reviewed  No pertinent surgical history  History reviewed  No pertinent family history  I have reviewed and agree with the history as documented  Social History   Substance Use Topics    Smoking status: Current Every Day Smoker     Packs/day: 0 50     Years: 5 00    Smokeless tobacco: Never Used    Alcohol use Yes      Comment: 1/2-1 bottle of liquor daily  Pt reports 1 pint of liquor a day        Review of Systems   Constitutional: Negative for chills and fever  HENT: Negative for congestion  Respiratory: Negative for shortness of breath  Cardiovascular: Negative for chest pain  Gastrointestinal: Negative for nausea and vomiting  Musculoskeletal: Positive for neck pain and neck stiffness  Negative for back pain  Skin: Negative for rash and wound  Neurological: Positive for numbness and headaches  Physical Exam  Physical Exam   Constitutional: He appears well-developed and well-nourished  HENT:   Head: Normocephalic and atraumatic  Mouth/Throat: Oropharynx is clear and moist    Eyes: Conjunctivae are normal    Neck: Neck supple  Cardiovascular: Normal rate, regular rhythm and normal heart sounds  Pulmonary/Chest: Effort normal and breath sounds normal    Musculoskeletal:        Right shoulder: Normal         Left shoulder: Normal         Cervical back: He exhibits decreased range of motion, tenderness and pain  He exhibits no bony tenderness, no swelling and no deformity  Thoracic back: Normal         Right hand: Normal  Normal sensation noted  Normal strength noted  Left hand: Normal  Normal sensation noted  Normal strength noted  Lymphadenopathy:     He has no cervical adenopathy  Skin: Skin is dry  Psychiatric: He has a normal mood and affect  Nursing note and vitals reviewed  Vital Signs  ED Triage Vitals [11/10/18 1807]   Temperature Pulse Respirations Blood Pressure SpO2   98 8 °F (37 1 °C) 100 18 142/66 99 %      Temp Source Heart Rate Source Patient Position - Orthostatic VS BP Location FiO2 (%)   Oral Monitor Sitting Left arm --      Pain Score       Worst Possible Pain           Vitals:    11/10/18 1807   BP: 142/66   Pulse: 100   Patient Position - Orthostatic VS: Sitting       Visual Acuity      ED Medications  Medications   predniSONE tablet 60 mg (60 mg Oral Given 11/10/18 1937)       Diagnostic Studies  Results Reviewed     None                 No orders to display              Procedures  Procedures       Phone Contacts  ED Phone Contact    ED Course                               MDM  Number of Diagnoses or Management Options  Neck pain:   Risk of Complications, Morbidity, and/or Mortality  General comments: Discussed with patient importance of f/u with spine specialist as he may have bulging disc given recent MRI and referral  He understands and agrees to do so      Patient Progress  Patient progress: stable    CritCare Time    Disposition  Final diagnoses:   Neck pain     Time reflects when diagnosis was documented in both MDM as applicable and the Disposition within this note     Time User Action Codes Description Comment    11/10/2018  7:31 PM Stevo Bagley Add [M54 2] Neck pain       ED Disposition     ED Disposition Condition Comment    Discharge  [facility] discharge to home/self care  Condition at discharge: Good        Follow-up Information     Follow up With Specialties Details Why Contact Info Additional [address] [facility] [place] Radiology In 3 days  [address] [facility], [facility]  [phone]  AN [address], [address] [facility], [address], [place], [address], [address]  Please report to [address] located on the 2nd floor to check in, however if your appointment is for an EMG, please register in Out Patient Registration located on the 1st floor  [facility] Emergency Medicine  If symptoms worsen [address] [facility] [address] AN ED, [address], [place], [address] [facility], [address]          Discharge Medication List as of 11/10/2018  7:33 PM      START taking these medications    Details   methocarbamol (ROBAXIN) 750 mg tablet Take 1 tablet (750 mg total) by mouth 3 (three) times a day for 3 days, Starting Sat 11/10/2018, Until Tue 11/13/2018, Normal      predniSONE 20 mg tablet Take 2 tablets (40 mg total) by mouth daily for 5 days, Starting Sat 11/10/2018, Until Thu 11/15/2018, Normal           No discharge procedures on file      ED Provider  Electronically Signed by           Warden Bryn PA-C  11/10/18 9255

## 2018-11-11 NOTE — DISCHARGE INSTRUCTIONS
Chronic Neck Pain   WHAT YOU NEED TO KNOW:   Chronic neck pain may start to build slowly over time  Neck pain is chronic if it lasts longer than 3 months  The pain may come and go, or be worse with certain movements  The pain may be only in your neck, or it may move to your arms, back, or shoulders  You may have pain that starts in another body area and moves to your neck  You may have neck pain for years  Some types of neck pain can be permanent  DISCHARGE INSTRUCTIONS:   Seek care immediately if:   · You have an injury that causes neck pain and shooting pain down your arms or legs  · Your neck pain suddenly becomes severe  · You have neck pain along with numbness, tingling, or weakness in your arms or legs  · You have a stiff neck, a headache, and a fever  Contact your healthcare provider if:   · You have new or worsening symptoms  · Your symptoms continue even after treatment  · You have questions or concerns about your condition or care  Medicines: You may need any of the following:  · NSAIDs , such as ibuprofen, help decrease swelling, pain, and fever  This medicine is available without a doctor's order  Ask your healthcare provider which medicine to take and how often to take it  Follow directions  NSAIDs can cause stomach bleeding or kidney problems if not taken correctly  If you take blood thinner medicine, always ask if NSAIDs are safe for you  · Acetaminophen  helps decrease pain and fever  Ask your healthcare provider how much to take and how often to take it  Follow directions  Acetaminophen can cause liver damage if not taken correctly  · Steroid medicine  may be used to reduce inflammation  This can help relieve pain caused by swelling  · Take your medicine as directed  Contact your healthcare provider if you think your medicine is not helping or if you have side effects  Tell him or her if you are allergic to any medicine   Keep a list of the medicines, vitamins, and herbs you take  Include the amounts, and when and why you take them  Bring the list or the pill bottles to follow-up visits  Carry your medicine list with you in case of an emergency  Manage or prevent chronic neck pain:   · Rest your neck as directed  Do not make sudden movements, such as turning your head quickly  Your healthcare provider may recommend you wear a cervical collar for a short time  The collar will prevent you from moving your head  This will give your neck time to heal if an injury is causing your neck pain  Ask your healthcare provider when you can return to sports or other normal daily activities  · Apply heat as directed  Heat helps relieve pain and swelling  Use a heat wrap, or soak a small towel in warm water  Wring out the extra water  Apply the heat wrap or towel for 20 minutes every hour, or as directed  · Apply ice as directed  Ice helps relieve pain and swelling, and can help prevent tissue damage  Use an ice pack, or put ice in a bag  Cover the ice pack or back with a towel before you apply it to your neck  Apply the ice pack or ice for 15 minutes every hour, or as directed  Your healthcare provider can tell you how often to apply ice  · Do neck exercises as directed  Neck exercises help strengthen the muscles and increase range of motion  Your healthcare provider will tell you which exercises are right for you  He may give you instructions, or he may recommend that you work with a physical therapist  Your healthcare provider or therapist can make sure you are doing the exercises correctly  · Maintain good posture  Try to keep your head and shoulders lifted when you sit  If you work in front of a computer, make sure the monitor is at the right level  You should not need to look up down to see the screen  You should also not have to lean forward to be able to read what is on the screen   Make sure your keyboard, mouse, and other computer items are placed where you do not have to extend your shoulder to reach them  Get up often if you work in front of a computer or sit for long periods of time  Stretch or walk around to keep your neck muscles loose  Follow up with your healthcare provider as directed: Your healthcare provider may refer you to a specialist if your pain does not get better with treatment  Write down your questions so you remember to ask them during your visits  © 2017 [address] Information is for End User's use only and may not be sold, redistributed or otherwise used for commercial purposes  All illustrations and images included in CareNotes® are the copyrighted property of [employer]  or Dhiraj Simon  The above information is an  only  It is not intended as medical advice for individual conditions or treatments  Talk to your doctor, nurse or pharmacist before following any medical regimen to see if it is safe and effective for you

## 2018-11-14 ENCOUNTER — HOSPITAL ENCOUNTER (EMERGENCY)
Facility: HOSPITAL | Age: 48
Discharge: HOME | End: 2018-11-15
Attending: EMERGENCY MEDICINE | Admitting: EMERGENCY MEDICINE
Payer: COMMERCIAL

## 2018-11-14 DIAGNOSIS — F10.929 ALCOHOL INTOXICATION (HCC): Primary | ICD-10-CM

## 2018-11-14 DIAGNOSIS — F10.20 ALCOHOLISM (HCC): ICD-10-CM

## 2018-11-14 LAB
AMPHETAMINES SERPL QL SCN: NEGATIVE
BARBITURATES UR QL: NEGATIVE
BENZODIAZ UR QL: NEGATIVE
COCAINE UR QL: NEGATIVE
ETHANOL EXG-MCNC: 0.21 MG/DL
METHADONE UR QL: NEGATIVE
OPIATES UR QL SCN: NEGATIVE
PCP UR QL: NEGATIVE
THC UR QL: NEGATIVE

## 2018-11-14 PROCEDURE — 82075 ASSAY OF BREATH ETHANOL: CPT | Performed by: EMERGENCY MEDICINE

## 2018-11-14 PROCEDURE — 80307 DRUG TEST PRSMV CHEM ANLYZR: CPT | Performed by: EMERGENCY MEDICINE

## 2018-11-14 PROCEDURE — 99284 EMERGENCY DEPT VISIT MOD MDM: CPT

## 2018-11-15 VITALS
DIASTOLIC BLOOD PRESSURE: 92 MMHG | BODY MASS INDEX: 25.73 KG/M2 | RESPIRATION RATE: 18 BRPM | OXYGEN SATURATION: 98 % | TEMPERATURE: 98.5 F | HEART RATE: 100 BPM | WEIGHT: 195 LBS | SYSTOLIC BLOOD PRESSURE: 160 MMHG

## 2018-11-15 LAB
ETHANOL EXG-MCNC: 0.1 MG/DL
ETHANOL EXG-MCNC: NORMAL MG/DL

## 2018-11-15 PROCEDURE — 82075 ASSAY OF BREATH ETHANOL: CPT | Performed by: EMERGENCY MEDICINE

## 2018-11-15 RX ORDER — DISULFIRAM 250 MG/1
250 TABLET ORAL DAILY
Qty: 20 TABLET | Refills: 0 | Status: SHIPPED | OUTPATIENT
Start: 2018-11-15 | End: 2019-02-09

## 2018-11-15 NOTE — ED CARE HANDOFF
Emergency Department Sign Out Note        Sign out and transfer of care from Dr Shane Weber al  See Separate Emergency Department note  The patient, Rolanda Webb, was evaluated by the previous provider for alcohol abuse  Workup Completed:  yes    ED Course / Workup Pending (followup):  Pending repeat BAT less than 0 800 and then patient can be discharged since patient drove here and discharge Rx and instructions already written  Repeat BAT=0 so patient discharged  Procedures  MDM  CritCare Time      Disposition  Final diagnoses:   Alcohol intoxication ([facility] [address] )   Alcoholism ([facility] [address] )     Time reflects when diagnosis was documented in both MDM as applicable and the Disposition within this note     Time User Action Codes Description Comment    11/14/2018  9:03 PM Luan Jameson Add [F10 929] Alcohol intoxication ([facility] [address] )     11/14/2018  9:03 PM Luan Jameson Add [F10 20] Alcoholism [facility])       ED Disposition     ED Disposition Condition Comment    Discharge  Rolanda Webb discharge to home/self care  Condition at discharge: Stable        Follow-up Information     Follow up With Specialties Details Why [address] [place] [address] Emergency Department Emergency Medicine  If symptoms worsen [address] [facility]  AN ED, [address], [place], [place], [address] [facility]   [address] [facility] Male [address] [place] [address]  [phone]           Patient's Medications   Discharge Prescriptions    DISULFIRAM (ANTABUSE) 250 MG TABLET    Take 1 tablet (250 mg total) by mouth daily       Start Date: 11/15/2018End Date: --       Order Dose: 250 mg       Quantity: 20 tablet    Refills: 0     No discharge procedures on file         ED Provider  Electronically Signed by     Francia Wright MD  11/15/18 MD Zainab  11/15/18 6309

## 2018-11-15 NOTE — ED NOTES
Pt sitting up on bed eating  Light off  TV off  Will continue to monitor pt        Ralph Olszewski Z Villafane  11/15/18 0111

## 2018-11-15 NOTE — ED NOTES
Report taken from previous RN, Rosezetta Rubinstein  Pt will be re-breathlized in the morning and can go home when no longer intoxicated per MD Miguel Vale, ED Tech, will continue to monitor pt at this time  Pt remains sleeping, no distress noted       Teresa Philip RN  11/15/18 0116

## 2018-11-15 NOTE — ED NOTES
Pt sleeping on bed  Light on and TV off  Will continue to monitor        Douglas Lewis  11/15/18 0010

## 2018-11-15 NOTE — ED PROVIDER NOTES
History  Chief Complaint   Patient presents with    Alcohol Intoxication     Pt is requesting "rehab"  Reports he drinks "too much" alcohol a day and is "done"  Denies drug use  Last drank PTA (arrived with beer in his jacket, disposed of via sink)   Depression     Pt reports feeling "extreme " depression but "would not ever" hurt himself  Patient is a 65-year-old male who presents to the emergency department via private vehicle  He tells me "help" When I enter the room  He then tells me "I am at the end of my rope "  He then shares that "I have no money "  He relates that he has no job, had his vehicle repositioned and no longer is able to pay his rent  He relates that just a short time ago "[place] was at [place] a 1000 dollars a week "  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  He admits to having a problem "with addiction "  He relates that recently he has a problem only with alcohol  When questioned if he had problems with any other substances either recently in the past he denied this  When I mentioned that I had seen cocaine listed from a prior visit he then admitted that he had had a problem with that  He relates that his last use was 2 to 3 weeks ago  He verbalized a desire to enter rehab for his alcohol problem  He denies any desire to harm himself or others  He denies any history of mental health conditions  He does tell me he has chronic neck pain but denies use of any medication for this  He denies having any other medical conditions  He last alcohol use was as he was coming into the hospital     Prior records reviewed  Seen in the ED on 9/24/18  Seen by HOST & arrangements were made for DC from the hospital   Pt  Was to self report for detox/rehab  (He informed me he did not go to the appointment as scheduled)  Prior to Admission Medications   Prescriptions Last Dose Informant Patient Reported?  Taking?   predniSONE 20 mg tablet   No Yes   Sig: Take 2 tablets (40 mg total) by mouth daily for 5 days      Facility-Administered Medications: None       Past Medical History:   Diagnosis Date    Alcohol abuse     Alcoholism ([address] )     Anxiety     Depression        Past Surgical History:   Procedure Laterality Date    HERNIA REPAIR         History reviewed  No pertinent family history  I have reviewed and agree with the history as documented  Social History   Substance Use Topics    Smoking status: Current Every Day Smoker     Packs/day: 0 50     Years: 5 00    Smokeless tobacco: Never Used    Alcohol use Yes      Comment: 1/2-1 bottle of liquor daily  Pt reports 1 pint of liquor a day        Review of Systems   Constitutional: Negative for fever  Respiratory: Negative for shortness of breath  Cardiovascular: Negative for chest pain  Skin: Negative for rash  Neurological: Negative for headaches  All other systems reviewed and are negative  Physical Exam  Physical Exam   Constitutional: He is oriented to person, place, and time  He appears well-developed and well-nourished  Patient resting on stretcher  Food wrappers scattered on floor  He has mild speech slurring/evidence of intoxication   HENT:   Head: Normocephalic  Eyes: EOM are normal    Conjunctiva injected   Cardiovascular: Normal rate and regular rhythm  Pulmonary/Chest: Effort normal and breath sounds normal    Musculoskeletal: Normal range of motion  Neurological: He is alert and oriented to person, place, and time  Skin: Skin is warm and dry  Psychiatric: He is not actively hallucinating  He expresses no homicidal and no suicidal ideation  Nursing note and vitals reviewed        Vital Signs  ED Triage Vitals   Temperature Pulse Respirations Blood Pressure SpO2   11/14/18 1942 11/14/18 1942 11/14/18 1942 11/14/18 1942 11/14/18 1942   98 5 °F (36 9 °C) 102 16 135/91 99 %      Temp Source Heart Rate Source Patient Position - Orthostatic VS BP Location FiO2 (%)   11/14/18 1942 11/15/18 0018 11/15/18 0018 11/15/18 0018 --   Oral Monitor Lying Left arm       Pain Score       11/14/18 1942       No Pain           Vitals:    11/14/18 1942 11/15/18 0018 11/15/18 0655   BP: 135/91 122/60 160/92   Pulse: 102 86 100   Patient Position - Orthostatic VS:  Lying Sitting       Visual Acuity      ED Medications  Medications - No data to display    Diagnostic Studies  Results Reviewed     Procedure Component Value Units Date/Time    POCT alcohol breath test [[id number]]  (Normal) Resulted:  11/15/18 0650    Lab Status:  Final result Updated:  11/15/18 0655     EXTBreath Alcohol 0%    POCT alcohol breath test [[id number]]  (Normal) Resulted:  11/15/18 0018    Lab Status:  Final result Updated:  11/15/18 0018     EXTBreath Alcohol 0 101    Rapid drug screen, urine [[id number]]  (Normal) Collected:  11/14/18 2114    Lab Status:  Final result Specimen:  Urine from Urine, Clean Catch Updated:  11/14/18 2147     Amph/Meth UR Negative     Barbiturate Ur Negative     Benzodiazepine Urine Negative     Cocaine Urine Negative     Methadone Urine Negative     Opiate Urine Negative     PCP Ur Negative     THC Urine Negative    Narrative:         FOR MEDICAL PURPOSES ONLY  IF CONFIRMATION NEEDED PLEASE CONTACT THE LAB WITHIN 5 DAYS  Drug Screen Cutoff Levels:  AMPHETAMINE/METHAMPHETAMINES  1000 ng/mL  BARBITURATES     200 ng/mL  BENZODIAZEPINES     200 ng/mL  COCAINE      300 ng/mL  METHADONE      300 ng/mL  OPIATES      300 ng/mL  PHENCYCLIDINE     25 ng/mL  THC       50 ng/mL    POCT alcohol breath test [[id number]]  (Normal) Resulted:  11/14/18 2005    Lab Status:  Final result Updated:  11/14/18 2114     EXTBreath Alcohol 0 213                 No orders to display              Procedures  Procedures       Phone Contacts  ED Phone Contact    ED Course  ED Course as of Nov 15 1325   Wed Nov 14, 2018   2113 Patient presents with elevated alcohol level    Have a suspicion that this may elevate further as patient consumed alcohol just prior to initiating treatment  Alcohol level will need to be lower prior to pursuit of alcohol detox/rehab  Care has been transferred to [address]  CritCare Time    Disposition  Final diagnoses:   Alcohol intoxication ([facility] [address] )   Alcoholism ([facility] [address] )     Time reflects when diagnosis was documented in both MDM as applicable and the Disposition within this note     Time User Action Codes Description Comment    11/14/2018  9:03 PM Ephriam Risa Add [F10 929] Alcohol intoxication ([facility] [address] )     11/14/2018  9:03 PM Ephriam Risa Add [F10 20] Alcoholism [facility])       ED Disposition     ED Disposition Condition Comment    Discharge  [facility] discharge to home/self care  Condition at discharge: Stable        Follow-up Information     Follow up With Specialties Details Why Contact Info Additional [address] [facility] Emergency Department Emergency Medicine  If symptoms worsen [address] [address]  [phone] AN ED, [address], [place], [place], [facility] [address] [facility]   [address] [facility] Male [address] [facility] [address]  [phone]             Discharge Medication List as of 11/15/2018  1:17 AM      START taking these medications    Details   disulfiram (ANTABUSE) 250 mg tablet Take 1 tablet (250 mg total) by mouth daily, Starting Thu 11/15/2018, Print         CONTINUE these medications which have NOT CHANGED    Details   predniSONE 20 mg tablet Take 2 tablets (40 mg total) by mouth daily for 5 days, Starting Sat 11/10/2018, Until Thu 11/15/2018, Normal           No discharge procedures on file      ED Provider  Electronically Signed by           [address] [place] [address],[address]  11/15/18 4488

## 2018-11-15 NOTE — ED NOTES
Patient sleeping with lights on and tv off  No wants or complaints at this time  Will continue to monitor       Mu Jan 11/14/18 2106

## 2018-11-15 NOTE — DISCHARGE INSTRUCTIONS
Abuse of Alcohol   WHAT YOU NEED TO KNOW:   · Alcohol abuse is unhealthy drinking behavior  You may drink too much at one time once a week, or continue to drink too much daily  You continue to drink even though it causes problems  The problems can be alcohol related legal problems, or problems with work or relationships with family  · If you drink too much at one time, you are binge drinking  Binge drinking is when you have a large amount of alcohol in a short time  Your blood alcohol concentrations (NNAO) goes above 0 08 g/dLlevel during binge drinking  For men, this usually happens with more than 4 drinks in 2 hours  For women, it is more than 3 drinks in 2 hours  A drink is 12 ounces of beer, 4 ounces of wine, or 1½ ounces of liquor  DISCHARGE INSTRUCTIONS:   Call 911 for any of the following:   · You have sudden chest pain or trouble breathing  · You have a seizure or have shaking or trembling  · You were in an accident because of alcohol  Return to the emergency department if:   · You want to harm yourself or others  · You have hallucinations (you see or hear things that are not real)  · You cannot stop vomiting or you vomit blood  Contact your healthcare provider if:   · You need help to stop drinking alcohol  · You have questions or concerns about your condition or care  Medicines:   · Vitamin supplements  may be given to treat low vitamin levels  Alcohol can make it hard for your body to absorb enough vitamins such as B1  Vitamin supplements may also be given to prevent alcohol related brain damage  · Take your medicine as directed  Contact your healthcare provider if you think your medicine is not helping or if you have side effects  Tell him or her if you are allergic to any medicine  Keep a list of the medicines, vitamins, and herbs you take  Include the amounts, and when and why you take them  Bring the list or the pill bottles to follow-up visits   Carry your medicine list with you in case of an emergency  Treatments or therapies you may need:   · Detoxification (detox) and withdrawal  is a program that helps you to safely get alcohol out of your body  Detox can also help get rid of the physical need to drink  Healthcare providers monitor the physical symptoms of withdrawal  They may give you medicines to help decrease nausea, dehydration, and seizures  Healthcare providers will also monitor your blood pressure, heart and breathing rates, and your temperature  Symptoms of anxiety, depression, and suicidal thoughts are also monitored and managed during detox  Healthcare providers may give you medicines for these symptoms and therapy sessions will be available to you  Detox is usually done at a detox center or in a hospital  Healthcare providers do not recommend that you try to detox at home or by yourself  Withdrawal symptoms may become life-threatening  The center can help you find 12 step programs or an individual therapist to help with emotional support after detox  · Inpatient and outpatient treatment  focus on your personal needs to help you stop drinking  Treatment helps you understand the reasons you abuse alcohol  Counselors and therapists provide you with support and help you find ways to cope instead of drinking  You may need inpatient treatment to provide a controlled environment  You may need outpatient treatment after your inpatient treatment is complete  · Alcohol aversion therapy  takes away the desire to drink by causing a negative reaction when you drink  Healthcare providers may give you medicines that cause nausea and vomiting when you drink alcohol  They may instead give you a medicine that decreases your urge to drink alcohol  These medicines are used to help you stop drinking or reduce the amount you drink  They can also help you avoid relapse    Follow up with your healthcare provider as directed:  Write down your questions so you remember to ask them during your visits  Avoid alcohol:  You should stop drinking entirely  Alcohol can damage your brain, heart, and liver  It also increases your risk for injury, high blood pressure, and certain types of cancer  Alcohol is dangerous when you combine it with certain medicines  Do not drive if you have had alcohol:  Make sure someone who has not been drinking can help you get home  Get support:  Most people need support to stop drinking alcohol  Mental health providers, support groups, rehabilitation centers, and your healthcare provider can provide support  For more information:   · Alcoholics Anonymous  Web Address: http://www [employer] [place]  · Substance Abuse and [facility] [address] [place]  Web Address: https://[employer]/  © 2017 [address] Information is for End User's use only and may not be sold, redistributed or otherwise used for commercial purposes  All illustrations and images included in CareNotes® are the copyrighted property of [employer]  or Dhiraj Simon  The above information is an  only  It is not intended as medical advice for individual conditions or treatments  Talk to your doctor, nurse or pharmacist before following any medical regimen to see if it is safe and effective for you

## 2018-11-15 NOTE — ED NOTES
Patient sleeping with lights on and tv off in room  No wants or complaints at this time  Will continue to monitor       Sinai Dailey  11/14/18 3287

## 2018-11-19 ENCOUNTER — HOSPITAL ENCOUNTER (EMERGENCY)
Facility: HOSPITAL | Age: 48
End: 2018-11-20
Attending: EMERGENCY MEDICINE | Admitting: EMERGENCY MEDICINE
Payer: COMMERCIAL

## 2018-11-19 DIAGNOSIS — F10.229: Primary | ICD-10-CM

## 2018-11-19 PROCEDURE — 99285 EMERGENCY DEPT VISIT HI MDM: CPT

## 2018-11-20 VITALS
DIASTOLIC BLOOD PRESSURE: 72 MMHG | RESPIRATION RATE: 16 BRPM | HEART RATE: 82 BPM | TEMPERATURE: 98.8 F | OXYGEN SATURATION: 99 % | SYSTOLIC BLOOD PRESSURE: 128 MMHG

## 2018-11-20 LAB
ALBUMIN SERPL BCP-MCNC: 4.2 G/DL (ref 3.5–5)
ALP SERPL-CCNC: 56 U/L (ref 46–116)
ALT SERPL W P-5'-P-CCNC: 36 U/L (ref 12–78)
AMPHETAMINES SERPL QL SCN: NEGATIVE
ANION GAP SERPL CALCULATED.3IONS-SCNC: 15 MMOL/L (ref 4–13)
APAP SERPL-MCNC: <2 UG/ML (ref 10–30)
APTT PPP: 28 SECONDS (ref 26–38)
AST SERPL W P-5'-P-CCNC: 23 U/L (ref 5–45)
ATRIAL RATE: 83 BPM
BARBITURATES UR QL: NEGATIVE
BASOPHILS # BLD AUTO: 0.12 THOUSANDS/ΜL (ref 0–0.1)
BASOPHILS NFR BLD AUTO: 1 % (ref 0–1)
BENZODIAZ UR QL: NEGATIVE
BILIRUB SERPL-MCNC: 0.5 MG/DL (ref 0.2–1)
BUN SERPL-MCNC: 13 MG/DL (ref 5–25)
CALCIUM SERPL-MCNC: 9.4 MG/DL (ref 8.3–10.1)
CHLORIDE SERPL-SCNC: 103 MMOL/L (ref 100–108)
CO2 SERPL-SCNC: 22 MMOL/L (ref 21–32)
COCAINE UR QL: NEGATIVE
CREAT SERPL-MCNC: 0.87 MG/DL (ref 0.6–1.3)
EOSINOPHIL # BLD AUTO: 0.29 THOUSAND/ΜL (ref 0–0.61)
EOSINOPHIL NFR BLD AUTO: 2 % (ref 0–6)
ERYTHROCYTE [DISTWIDTH] IN BLOOD BY AUTOMATED COUNT: 12.1 % (ref 11.6–15.1)
ETHANOL EXG-MCNC: 0.07 MG/DL
ETHANOL SERPL-MCNC: 193 MG/DL (ref 0–3)
GFR SERPL CREATININE-BSD FRML MDRD: 102 ML/MIN/1.73SQ M
GLUCOSE SERPL-MCNC: 76 MG/DL (ref 65–140)
HCT VFR BLD AUTO: 48.6 % (ref 36.5–49.3)
HGB BLD-MCNC: 17 G/DL (ref 12–17)
IMM GRANULOCYTES # BLD AUTO: 0.04 THOUSAND/UL (ref 0–0.2)
IMM GRANULOCYTES NFR BLD AUTO: 0 % (ref 0–2)
INR PPP: 0.99 (ref 0.86–1.17)
LYMPHOCYTES # BLD AUTO: 4.34 THOUSANDS/ΜL (ref 0.6–4.47)
LYMPHOCYTES NFR BLD AUTO: 37 % (ref 14–44)
MCH RBC QN AUTO: 33.6 PG (ref 26.8–34.3)
MCHC RBC AUTO-ENTMCNC: 35 G/DL (ref 31.4–37.4)
MCV RBC AUTO: 96 FL (ref 82–98)
METHADONE UR QL: NEGATIVE
MONOCYTES # BLD AUTO: 0.79 THOUSAND/ΜL (ref 0.17–1.22)
MONOCYTES NFR BLD AUTO: 7 % (ref 4–12)
NEUTROPHILS # BLD AUTO: 6.3 THOUSANDS/ΜL (ref 1.85–7.62)
NEUTS SEG NFR BLD AUTO: 53 % (ref 43–75)
NRBC BLD AUTO-RTO: 0 /100 WBCS
OPIATES UR QL SCN: NEGATIVE
P AXIS: 71 DEGREES
PCP UR QL: NEGATIVE
PLATELET # BLD AUTO: 326 THOUSANDS/UL (ref 149–390)
PMV BLD AUTO: 8.3 FL (ref 8.9–12.7)
POTASSIUM SERPL-SCNC: 3.8 MMOL/L (ref 3.5–5.3)
PR INTERVAL: 152 MS
PROT SERPL-MCNC: 7.4 G/DL (ref 6.4–8.2)
PROTHROMBIN TIME: 12.8 SECONDS (ref 11.8–14.2)
QRS AXIS: -56 DEGREES
QRSD INTERVAL: 110 MS
QT INTERVAL: 366 MS
QTC INTERVAL: 430 MS
RBC # BLD AUTO: 5.06 MILLION/UL (ref 3.88–5.62)
SALICYLATES SERPL-MCNC: <3 MG/DL (ref 3–20)
SODIUM SERPL-SCNC: 140 MMOL/L (ref 136–145)
T WAVE AXIS: 58 DEGREES
THC UR QL: NEGATIVE
TSH SERPL DL<=0.05 MIU/L-ACNC: 1.81 UIU/ML (ref 0.36–3.74)
VENTRICULAR RATE: 83 BPM
WBC # BLD AUTO: 11.88 THOUSAND/UL (ref 4.31–10.16)

## 2018-11-20 PROCEDURE — 93005 ELECTROCARDIOGRAM TRACING: CPT

## 2018-11-20 PROCEDURE — 96365 THER/PROPH/DIAG IV INF INIT: CPT

## 2018-11-20 PROCEDURE — 80329 ANALGESICS NON-OPIOID 1 OR 2: CPT | Performed by: EMERGENCY MEDICINE

## 2018-11-20 PROCEDURE — 96361 HYDRATE IV INFUSION ADD-ON: CPT

## 2018-11-20 PROCEDURE — 85025 COMPLETE CBC W/AUTO DIFF WBC: CPT | Performed by: EMERGENCY MEDICINE

## 2018-11-20 PROCEDURE — 80307 DRUG TEST PRSMV CHEM ANLYZR: CPT | Performed by: EMERGENCY MEDICINE

## 2018-11-20 PROCEDURE — 82075 ASSAY OF BREATH ETHANOL: CPT | Performed by: EMERGENCY MEDICINE

## 2018-11-20 PROCEDURE — 85610 PROTHROMBIN TIME: CPT | Performed by: EMERGENCY MEDICINE

## 2018-11-20 PROCEDURE — 80053 COMPREHEN METABOLIC PANEL: CPT | Performed by: EMERGENCY MEDICINE

## 2018-11-20 PROCEDURE — 84443 ASSAY THYROID STIM HORMONE: CPT | Performed by: EMERGENCY MEDICINE

## 2018-11-20 PROCEDURE — 85730 THROMBOPLASTIN TIME PARTIAL: CPT | Performed by: EMERGENCY MEDICINE

## 2018-11-20 PROCEDURE — 80320 DRUG SCREEN QUANTALCOHOLS: CPT | Performed by: EMERGENCY MEDICINE

## 2018-11-20 PROCEDURE — 93010 ELECTROCARDIOGRAM REPORT: CPT | Performed by: INTERNAL MEDICINE

## 2018-11-20 PROCEDURE — 36415 COLL VENOUS BLD VENIPUNCTURE: CPT | Performed by: EMERGENCY MEDICINE

## 2018-11-20 RX ORDER — CHLORDIAZEPOXIDE HYDROCHLORIDE 25 MG/1
25 CAPSULE, GELATIN COATED ORAL ONCE
Status: COMPLETED | OUTPATIENT
Start: 2018-11-20 | End: 2018-11-20

## 2018-11-20 RX ORDER — SODIUM CHLORIDE 9 MG/ML
125 INJECTION, SOLUTION INTRAVENOUS CONTINUOUS
Status: DISCONTINUED | OUTPATIENT
Start: 2018-11-20 | End: 2018-11-20 | Stop reason: HOSPADM

## 2018-11-20 RX ADMIN — THIAMINE HYDROCHLORIDE 100 MG: 100 INJECTION, SOLUTION INTRAMUSCULAR; INTRAVENOUS at 01:01

## 2018-11-20 RX ADMIN — SODIUM CHLORIDE 1000 ML: 0.9 INJECTION, SOLUTION INTRAVENOUS at 00:25

## 2018-11-20 RX ADMIN — SODIUM CHLORIDE 125 ML/HR: 0.9 INJECTION, SOLUTION INTRAVENOUS at 01:47

## 2018-11-20 RX ADMIN — CHLORDIAZEPOXIDE HYDROCHLORIDE 25 MG: 25 CAPSULE ORAL at 07:50

## 2018-11-20 NOTE — DISCHARGE INSTRUCTIONS
Alcohol Dependence   WHAT YOU NEED TO KNOW:   Alcohol dependence is the need to drink alcohol often to function in your daily life  You often drink large amounts of alcohol  Alcohol dependence is also known as alcoholism or alcohol use disorder  Alcoholism is a disease that can affect almost every part of your body  DISCHARGE INSTRUCTIONS:   Follow up with your healthcare provider as directed:  Do not try to stop drinking on your own  Your healthcare provider will help you withdraw from alcohol safely  He may need to admit you to the hospital  You may also need any of the following treatments:  · Medicines to decrease your craving for alcohol    · Support groups such as Alcoholics Anonymous     · Psychiatrist or psychologist for therapy     · Admission to an inpatient facility for treatment for severe dependence  Return to the emergency department if:   · Your heart is beating faster than normal     · You have hallucinations  · You cannot remember what happens while you are drinking  · You have seizures  Contact your healthcare provider if:   · You are anxious and have nausea  · Your hands are shaky and you are sweating heavily  · You have questions or concerns about your condition or care  Common behaviors of alcohol dependence:   · You keep drinking alcohol even if you know it increases your risk for health problems  Health problems include liver problems, stomach ulcers, high blood pressure, and stroke  · You develop tolerance for alcohol  Tolerance means the amount of alcohol you usually drink no longer causes the effects you may desire  You may need to drink even more alcohol to get its previous effects  · You put extra effort and time into drinking alcohol  You may often go to events or activities that will include drinking  You may also spend much of your time drinking alcohol or being with people who also drink      · You have withdrawal (physical or mental) symptoms after not drinking for a short period  The same amount of alcohol may be needed to relieve or prevent withdrawal symptoms  You may also have to drink to stop tremors (shakes) or to cure a hangover  · You crave alcohol  You may have a desire to drink more frequently and to drink larger amounts of alcohol  · You have problems decreasing or controlling alcohol use  You are not able to control your drinking habits  You keep going back to drinking even after you quit  · You spend less time doing more important things  You have trouble with social or daily activities at school, work, or home  For more information:   ·   [employer] on Alcoholism and Drug Dependence  Ismashilpa Xuan [address] [address]  Phone: 0- [phone] - [phone]  Phone: 6- 129 - [phone]  Web Address: [employer] network      ·   ¨ Alcoholics Anonymous  Web Address: http://[employer]/  © 2017 [address] Information is for End User's use only and may not be sold, redistributed or otherwise used for commercial purposes  All illustrations and images included in CareNotes® are the copyrighted property of [employer]  or Dhiraj Simon  The above information is an  only  It is not intended as medical advice for individual conditions or treatments  Talk to your doctor, nurse or pharmacist before following any medical regimen to see if it is safe and effective for you  Abuse of Alcohol   WHAT YOU NEED TO KNOW:   What is alcohol abuse? · Alcohol abuse is unhealthy drinking behavior  You may drink too much once a week, or continue to drink too much daily  You continue to drink even though it causes problems  The problems may include legal problems, problems at work, or problems with relationships  · If you drink too much at one time, you are binge drinking  Binge drinking is when you have a large amount of alcohol in a short time   Your blood alcohol concentrations (NANO) goes above 0 08 g/dLlevel during binge drinking  For men, this usually happens with more than 4 drinks in 2 hours  For women, it is more than 3 drinks in 2 hours  A drink is 12 ounces of beer, 4 ounces of wine, or 1½ ounces of liquor  What are the signs and symptoms of alcohol abuse? Each person that abuses alcohol may have different symptoms  The following are common signs and symptoms of alcohol abuse:  · Loss of interest in activities, work, and school    · Decreased interest in family and friends    · Depression    · Constant thoughts about drinking    · Not able to control the amount you drink    · Restlessness, or erratic and violent behavior  What are the long-term effects of alcohol abuse? · Blackouts    · Memory loss    · Dementia    · Liver disease    · Thiamine (vitamin B1) deficiency  What treatments or therapies are used to treat alcohol abuse? · Detoxification (detox) and withdrawal  is a program that helps you to safely get alcohol out of your body  Detox can also help get rid of the physical need to drink  Healthcare providers monitor the physical symptoms of withdrawal  They may give you medicines to help decrease nausea, dehydration, and seizures  Healthcare providers will also monitor your blood pressure, heart and breathing rates, and your temperature  Symptoms of anxiety, depression, and suicidal thoughts are also monitored and managed during detox  Healthcare providers may give you medicines for these symptoms and therapy sessions will be available to you  Detox is usually done at a detox center or in a hospital  Healthcare providers do not recommend that you try to detox at home or by yourself  Withdrawal symptoms may become life-threatening  The center can help you find 12 step programs or an individual therapist to help with emotional support after detox  · Inpatient and outpatient treatment  focus on your personal needs to help you stop drinking   Treatment helps you understand the reasons you abuse alcohol  Counselors and therapists provide you with support and help you find ways to cope instead of drinking  You may need inpatient treatment to provide a controlled environment  You may need outpatient treatment after your inpatient treatment is complete  · Alcohol aversion therapy  takes away the desire to drink by causing a negative reaction when you drink  Healthcare providers may give you medicines that cause nausea and vomiting when you drink alcohol  They may instead give you a medicine that decreases your urge to drink alcohol  These medicines are used to help you stop drinking or reduce the amount you drink  They can also help you avoid relapse  What are the risks of alcohol abuse? Alcohol abuse increases your risk for gastrointestinal cancers, brain damage and problems with your immune system  It also increases your risk for heart, kidney, and lung damage  The risk of stroke increases with alcohol abuse  If you are pregnant and drink alcohol, you and your baby are at risk for serious health problems  Where can I find support and more information? · Alcoholics Anonymous  Web Address: http://[employer]/  · Substance Abuse and [address] [address]  Web Address: https://[employer]/  Call 870 for the following:   · You have sudden chest pain or trouble breathing  · You want to harm yourself or others  · You have a seizure or have shaking or trembling  When should I seek immediate care? · You have hallucinations (you see or hear things that are not real)  · You cannot stop vomiting or you vomit blood  When should I contact my healthcare provider? · You need help to stop drinking alcohol  · You have questions or concerns about your condition or care  CARE AGREEMENT:   You have the right to help plan your care  Learn about your health condition and how it may be treated   Discuss treatment options with your caregivers to decide what care you want to receive  You always have the right to refuse treatment  The above information is an  only  It is not intended as medical advice for individual conditions or treatments  Talk to your doctor, nurse or pharmacist before following any medical regimen to see if it is safe and effective for you  © 2017 [address] Information is for End User's use only and may not be sold, redistributed or otherwise used for commercial purposes  All illustrations and images included in CareNotes® are the copyrighted property of A [employer] Inc  or Dhiraj Simon

## 2018-11-20 NOTE — ED PROVIDER NOTES
History  Chief Complaint   Patient presents with    Alcohol Problem     pt states "I have been abusing alcohol since 2017"  Pt wants to be admitted to inpatient detox/rehab  HPI    Prior to Admission Medications   Prescriptions Last Dose Informant Patient Reported? Taking?   disulfiram (ANTABUSE) 250 mg tablet 11/20/2018 at Unknown time  No Yes   Sig: Take 1 tablet (250 mg total) by mouth daily      Facility-Administered Medications: None       Past Medical History:   Diagnosis Date    Alcohol abuse     Alcoholism ([address] )     Anxiety     Depression        Past Surgical History:   Procedure Laterality Date    HERNIA REPAIR         History reviewed  No pertinent family history  I have reviewed and agree with the history as documented  Social History   Substance Use Topics    Smoking status: Current Every Day Smoker     Packs/day: 0 50     Years: 5 00    Smokeless tobacco: Never Used    Alcohol use Yes      Comment: 1/2-1 bottle of liquor daily   Pt reports 1 pint of liquor a day        Review of Systems    Physical Exam  Physical Exam    Vital Signs  ED Triage Vitals   Temperature Pulse Respirations Blood Pressure SpO2   11/19/18 2333 11/19/18 2333 11/19/18 2333 11/20/18 0007 11/19/18 2333   98 8 °F (37 1 °C) 100 20 130/58 100 %      Temp Source Heart Rate Source Patient Position - Orthostatic VS BP Location FiO2 (%)   11/19/18 2333 11/20/18 0130 11/19/18 2333 11/19/18 2333 --   Oral Monitor Sitting Right arm       Pain Score       11/19/18 2333       No Pain           Vitals:    11/20/18 0130 11/20/18 0200 11/20/18 0300 11/20/18 0645   BP: 132/76 107/67 108/69 130/78   Pulse: 86 78 76 92   Patient Position - Orthostatic VS: Sitting Sitting Lying Lying       Visual Acuity      ED Medications  Medications   sodium chloride 0 9 % infusion (125 mL/hr Intravenous New Bag 11/20/18 0147)   sodium chloride 0 9 % bolus 1,000 mL (0 mL Intravenous Stopped 11/20/18 0145)   thiamine (VITAMIN B1) 100 mg in sodium chloride 0 9 % 50 mL IVPB (0 mg Intravenous Stopped 11/20/18 0145)   chlordiazePOXIDE (LIBRIUM) capsule 25 mg (25 mg Oral Given 11/20/18 2210)       Diagnostic Studies  Results Reviewed     Procedure Component Value Units Date/Time    POCT alcohol breath test [[id number]]  (Normal) Resulted:  11/20/18 0410    Lab Status:  Final result Updated:  11/20/18 0410     EXTBreath Alcohol 0 069    TSH [[id number]]  (Normal) Collected:  11/20/18 0025    Lab Status:  Final result Specimen:  Blood from Arm, Left Updated:  11/20/18 0057     TSH 3RD GENERATON 1 808 uIU/mL     Narrative:         Patients undergoing fluorescein dye angiography may retain small amounts of fluorescein in the body for 48-72 hours post procedure  Samples containing fluorescein can produce falsely depressed TSH values  If the patient had this procedure,a specimen should be resubmitted post fluorescein clearance      Salicylate level [[id number]]  (Abnormal) Collected:  11/20/18 0025    Lab Status:  Final result Specimen:  Blood from Arm, Left Updated:  36/91/32 6855     Salicylate Lvl <3 (L) mg/dL     Acetaminophen level [[id number]]  (Abnormal) Collected:  11/20/18 0025    Lab Status:  Final result Specimen:  Blood from Arm, Left Updated:  11/20/18 0057     Acetaminophen Level <2 (L) ug/mL     Comprehensive metabolic panel [[id number]]  (Abnormal) Collected:  11/20/18 0025    Lab Status:  Final result Specimen:  Blood from Arm, Left Updated:  11/20/18 0053     Sodium 140 mmol/L      Potassium 3 8 mmol/L      Chloride 103 mmol/L      CO2 22 mmol/L      ANION GAP 15 (H) mmol/L      BUN 13 mg/dL      Creatinine 0 87 mg/dL      Glucose 76 mg/dL      Calcium 9 4 mg/dL      AST 23 U/L      ALT 36 U/L      Alkaline Phosphatase 56 U/L      Total Protein 7 4 g/dL      Albumin 4 2 g/dL      Total Bilirubin 0 50 mg/dL      eGFR 102 ml/min/1 73sq m     Narrative:         National Kidney Disease Education Program recommendations are as follows:  GFR calculation is accurate only with a steady state creatinine  Chronic Kidney disease less than 60 ml/min/1 73 sq  meters  Kidney failure less than 15 ml/min/1 73 sq  meters      Protime-INR [[id number]]  (Normal) Collected:  11/20/18 0025    Lab Status:  Final result Specimen:  Blood from Arm, Left Updated:  11/20/18 0044     Protime 12 8 seconds      INR 0 99    APTT [[id number]]  (Normal) Collected:  11/20/18 0025    Lab Status:  Final result Specimen:  Blood from Arm, Left Updated:  11/20/18 0044     PTT 28 seconds     Ethanol [[id number]]  (Abnormal) Collected:  11/20/18 0025    Lab Status:  Final result Specimen:  Blood from Arm, Left Updated:  11/20/18 0044     Ethanol Lvl 193 (H) mg/dL     CBC and differential [[id number]]  (Abnormal) Collected:  11/20/18 0025    Lab Status:  Final result Specimen:  Blood from Arm, Left Updated:  11/20/18 0032     WBC 11 88 (H) Thousand/uL      RBC 5 06 Million/uL      Hemoglobin 17 0 g/dL      Hematocrit 48 6 %      MCV 96 fL      MCH 33 6 pg      MCHC 35 0 g/dL      RDW 12 1 %      MPV 8 3 (L) fL      Platelets 341 Thousands/uL      nRBC 0 /100 WBCs      Neutrophils Relative 53 %      Immat GRANS % 0 %      Lymphocytes Relative 37 %      Monocytes Relative 7 %      Eosinophils Relative 2 %      Basophils Relative 1 %      Neutrophils Absolute 6 30 Thousands/µL      Immature Grans Absolute 0 04 Thousand/uL      Lymphocytes Absolute 4 34 Thousands/µL      Monocytes Absolute 0 79 Thousand/µL      Eosinophils Absolute 0 29 Thousand/µL      Basophils Absolute 0 12 (H) Thousands/µL     Rapid drug screen, urine [[id number]]  (Normal) Collected:  11/20/18 0008    Lab Status:  Final result Specimen:  Urine from Urine, Clean Catch Updated:  11/20/18 0024     Amph/Meth UR Negative     Barbiturate Ur Negative     Benzodiazepine Urine Negative     Cocaine Urine Negative     Methadone Urine Negative     Opiate Urine Negative     PCP Ur Negative     THC Urine Negative    Narrative:         FOR MEDICAL PURPOSES ONLY    IF CONFIRMATION NEEDED PLEASE CONTACT THE LAB WITHIN 5 DAYS  Drug Screen Cutoff Levels:  AMPHETAMINE/METHAMPHETAMINES  1000 ng/mL  BARBITURATES     200 ng/mL  BENZODIAZEPINES     200 ng/mL  COCAINE      300 ng/mL  METHADONE      300 ng/mL  OPIATES      300 ng/mL  PHENCYCLIDINE     25 ng/mL  THC       50 ng/mL                 No orders to display              Procedures  Procedures       Phone Contacts  ED Phone Contact    ED Course  ED Course as of Nov 20 1101   Tue Nov 20, 2018   1059 Patient willBe picked up by host at 3:30 p m  This afternoon  Patient is stable at this time  MDM  CritCare Time    Disposition  Final diagnoses:   Acute alcoholic intoxication in alcoholism (blood level 0 08-0 29) ([address] )     Time reflects when diagnosis was documented in both MDM as applicable and the Disposition within this note     Time User Action Codes Description Comment    11/20/2018  8:19 AM Pavel Mitchell Add [[id number]] Acute alcoholic intoxication in alcoholism (blood level 0 08-0 29) [facility])       ED Disposition     ED Disposition Condition Comment    Discharge  [facility] discharge to home/self care  Condition at discharge: Stable        MD Documentation      Most Recent Value   Sending MD Daysi Luke      Follow-up Information     Follow up With Specialties Details Why [address]  Call Call to self-refer for inpatient mental health treatment [phone] option 2, option [facility]  Call Call to access [facility] [address], [address] [facility]  [phone]       As per Host           Patient's Medications   Discharge Prescriptions    No medications on file     No discharge procedures on file      ED Provider  Electronically Signed by           Vi Melton MD  11/20/18 1101

## 2018-11-20 NOTE — ED NOTES
Pt came to the ED after drinking because he been drinking a bottle of liquor a day  He states he has been drinking more since october of this year  He has never drank this much before  He is requesting to talk to the [facility] program since he had already worked with them previously  Pt denies SI/HI/AH/VH

## 2018-11-20 NOTE — ED NOTES
CM placed call to REBECCAΤΙ at [facility] to follow up on referral  ΣKAREN stated she never received any voicemail or referral re: patient, however, he was assessed at [facility] this weekend  [facility] also has releases signed by patient from a previous assessment at [facility] and are able to utilize these to conduct a bed search for patient  ΣKAREN stated that they have been unable to follow up with patient in the community as he does not have a cell phone at this time  ΣHOWARDELMER provided contact number for [facility] as [phone] option 2, option 1 and stated that patient is also able to self refer himself for their services  ΣCANDIDOΙ will check to see if she needs to come and assess patient or if she can conduct bed search based on previous assessments  CM will continue to follow patient and will update ED treatment team     CM received return call from NAIFΑECTORΙ at [facility]  ΣCANDIDOΙ stated that there is a bed on hold for patient at [facility] and that someone from [facility] will be out shortly to meet with patient to complete funding paperwork, as it appears his Kristy has termed according to [facility]  ΣKAREN stated someone should be out to [facility] within 20 minutes to meet with patient  ED RN made aware  CM will continue to follow patient

## 2018-11-20 NOTE — ED PROVIDER NOTES
History  Chief Complaint   Patient presents with    Alcohol Problem     pt states "I have been abusing alcohol since 2017"  Pt wants to be admitted to inpatient detox/rehab  Patient is a 49year old male with worsening alcohol abuse since 2017 after [facility] of penitent[facility]  Was last seen in this ED on 11/14/18 for alcohol intoxication and abuse  States he was at [facility] this past weekend and lost the HOST referral  (+) depressed  No SI  No N/V  States he drove here  Wants inpatient detox  Wants something to eat and states he has not eaten for past few days  Tried antabuse without success  History provided by:  Patient   used: No    Alcohol Problem   Associated symptoms: no fever, no nausea and no vomiting        Prior to Admission Medications   Prescriptions Last Dose Informant Patient Reported? Taking?   disulfiram (ANTABUSE) 250 mg tablet 11/20/2018 at Unknown time  No Yes   Sig: Take 1 tablet (250 mg total) by mouth daily      Facility-Administered Medications: None       Past Medical History:   Diagnosis Date    Alcohol abuse     Alcoholism ([facility] [address] )     Anxiety     Depression        Past Surgical History:   Procedure Laterality Date    HERNIA REPAIR         History reviewed  No pertinent family history  I have reviewed and agree with the history as documented  Social History   Substance Use Topics    Smoking status: Current Every Day Smoker     Packs/day: 0 50     Years: 5 00    Smokeless tobacco: Never Used    Alcohol use Yes      Comment: 1/2-1 bottle of liquor daily  Pt reports 1 pint of liquor a day        Review of Systems   Constitutional: Negative for fever  Gastrointestinal: Negative for nausea and vomiting  Psychiatric/Behavioral: Positive for dysphoric mood  Negative for suicidal ideas  All other systems reviewed and are negative  Physical Exam  Physical Exam   Constitutional: He is oriented to person, place, and time   He appears distressed (moderate)  HENT:   Head: Normocephalic and atraumatic  Mucous membranes somewhat moist     Eyes: No scleral icterus  Bilateral conjunctival injection   Neck: No tracheal deviation present  Cardiovascular: Regular rhythm and normal heart sounds  No murmur heard  Tachycardia  Pulmonary/Chest: Effort normal and breath sounds normal  No stridor  No respiratory distress  Abdominal: Soft  Bowel sounds are normal  He exhibits no distension  There is no tenderness  Musculoskeletal: He exhibits no edema or deformity  Neurological: He is alert and oriented to person, place, and time  No focal deficits  Skin: Skin is warm and dry  No rash noted  Psychiatric:   Somewhat flat affect  Nursing note and vitals reviewed        Vital Signs  ED Triage Vitals   Temperature Pulse Respirations Blood Pressure SpO2   11/19/18 2333 11/19/18 2333 11/19/18 2333 11/20/18 0007 11/19/18 2333   98 8 °F (37 1 °C) 100 20 130/58 100 %      Temp Source Heart Rate Source Patient Position - Orthostatic VS BP Location FiO2 (%)   11/19/18 2333 11/20/18 0130 11/19/18 2333 11/19/18 2333 --   Oral Monitor Sitting Right arm       Pain Score       11/19/18 2333       No Pain           Vitals:    11/20/18 0130 11/20/18 0200 11/20/18 0300 11/20/18 0645   BP: 132/76 107/67 108/69 130/78   Pulse: 86 78 76 92   Patient Position - Orthostatic VS: Sitting Sitting Lying Lying       Visual Acuity      ED Medications  Medications   sodium chloride 0 9 % infusion (125 mL/hr Intravenous New Bag 11/20/18 0147)   sodium chloride 0 9 % bolus 1,000 mL (0 mL Intravenous Stopped 11/20/18 0145)   thiamine (VITAMIN B1) 100 mg in sodium chloride 0 9 % 50 mL IVPB (0 mg Intravenous Stopped 11/20/18 0145)   chlordiazePOXIDE (LIBRIUM) capsule 25 mg (25 mg Oral Given 11/20/18 0750)       Diagnostic Studies  Results Reviewed     Procedure Component Value Units Date/Time    POCT alcohol breath test [[id number]]  (Normal) Resulted:  11/20/18 0410    Lab Status:  Final result Updated:  11/20/18 0410     EXTBreath Alcohol 0 069    TSH [[id number]]  (Normal) Collected:  11/20/18 0025    Lab Status:  Final result Specimen:  Blood from Arm, Left Updated:  11/20/18 0057     TSH 3RD GENERATON 1 808 uIU/mL     Narrative:         Patients undergoing fluorescein dye angiography may retain small amounts of fluorescein in the body for 48-72 hours post procedure  Samples containing fluorescein can produce falsely depressed TSH values  If the patient had this procedure,a specimen should be resubmitted post fluorescein clearance  Salicylate level [[id number]]  (Abnormal) Collected:  11/20/18 0025    Lab Status:  Final result Specimen:  Blood from Arm, Left Updated:  58/55/38 2128     Salicylate Lvl <3 (L) mg/dL     Acetaminophen level [[id number]]  (Abnormal) Collected:  11/20/18 0025    Lab Status:  Final result Specimen:  Blood from Arm, Left Updated:  11/20/18 0057     Acetaminophen Level <2 (L) ug/mL     Comprehensive metabolic panel [[id number]]  (Abnormal) Collected:  11/20/18 0025    Lab Status:  Final result Specimen:  Blood from Arm, Left Updated:  11/20/18 0053     Sodium 140 mmol/L      Potassium 3 8 mmol/L      Chloride 103 mmol/L      CO2 22 mmol/L      ANION GAP 15 (H) mmol/L      BUN 13 mg/dL      Creatinine 0 87 mg/dL      Glucose 76 mg/dL      Calcium 9 4 mg/dL      AST 23 U/L      ALT 36 U/L      Alkaline Phosphatase 56 U/L      Total Protein 7 4 g/dL      Albumin 4 2 g/dL      Total Bilirubin 0 50 mg/dL      eGFR 102 ml/min/1 73sq m     Narrative:         National Kidney Disease Education Program recommendations are as follows:  GFR calculation is accurate only with a steady state creatinine  Chronic Kidney disease less than 60 ml/min/1 73 sq  meters  Kidney failure less than 15 ml/min/1 73 sq  meters      Protime-INR [[id number]]  (Normal) Collected:  11/20/18 0025    Lab Status:  Final result Specimen:  Blood from Arm, Left Updated:  11/20/18 0044     Protime 12 8 seconds      INR 0 99    APTT [[id number]]  (Normal) Collected:  11/20/18 0025    Lab Status:  Final result Specimen:  Blood from Arm, Left Updated:  11/20/18 0044     PTT 28 seconds     Ethanol [[id number]]  (Abnormal) Collected:  11/20/18 0025    Lab Status:  Final result Specimen:  Blood from Arm, Left Updated:  11/20/18 0044     Ethanol Lvl 193 (H) mg/dL     CBC and differential [[id number]]  (Abnormal) Collected:  11/20/18 0025    Lab Status:  Final result Specimen:  Blood from Arm, Left Updated:  11/20/18 0032     WBC 11 88 (H) Thousand/uL      RBC 5 06 Million/uL      Hemoglobin 17 0 g/dL      Hematocrit 48 6 %      MCV 96 fL      MCH 33 6 pg      MCHC 35 0 g/dL      RDW 12 1 %      MPV 8 3 (L) fL      Platelets 646 Thousands/uL      nRBC 0 /100 WBCs      Neutrophils Relative 53 %      Immat GRANS % 0 %      Lymphocytes Relative 37 %      Monocytes Relative 7 %      Eosinophils Relative 2 %      Basophils Relative 1 %      Neutrophils Absolute 6 30 Thousands/µL      Immature Grans Absolute 0 04 Thousand/uL      Lymphocytes Absolute 4 34 Thousands/µL      Monocytes Absolute 0 79 Thousand/µL      Eosinophils Absolute 0 29 Thousand/µL      Basophils Absolute 0 12 (H) Thousands/µL     Rapid drug screen, urine [[id number]]  (Normal) Collected:  11/20/18 0008    Lab Status:  Final result Specimen:  Urine from Urine, Clean Catch Updated:  11/20/18 0024     Amph/Meth UR Negative     Barbiturate Ur Negative     Benzodiazepine Urine Negative     Cocaine Urine Negative     Methadone Urine Negative     Opiate Urine Negative     PCP Ur Negative     THC Urine Negative    Narrative:         FOR MEDICAL PURPOSES ONLY  IF CONFIRMATION NEEDED PLEASE CONTACT THE LAB WITHIN 5 DAYS      Drug Screen Cutoff Levels:  AMPHETAMINE/METHAMPHETAMINES  1000 ng/mL  BARBITURATES     200 ng/mL  BENZODIAZEPINES     200 ng/mL  COCAINE      300 ng/mL  METHADONE      300 ng/mL  OPIATES      300 ng/mL  PHENCYCLIDINE     25 ng/mL  THC       50 ng/mL                 No orders to display              Procedures  ECG 12 Lead Documentation  Date/Time: 11/20/2018 12:18 AM  Performed by: Sandra Mosley  Authorized by: Sandra Mosley     Indications / Diagnosis:  Alcohol abuse  ECG reviewed by me, the ED Provider: yes    Patient location:  ED  Previous ECG:     Previous ECG:  Compared to current    Comparison ECG info:  9/4/18    Similarity:  No change  Rate:     ECG rate:  83    ECG rate assessment: normal    Rhythm:     Rhythm: sinus rhythm    Ectopy:     Ectopy: none    QRS:     QRS axis:  Left  Conduction:     Conduction: abnormal      Abnormal conduction: incomplete RBBB    ST segments:     ST segments:  Normal  T waves:     T waves: normal    Comments:      I do not agree with computer reading of septal infarct  Phone Contacts  ED Phone Contact    ED Course  ED Course as of Nov 20 0824 Tue Nov 20, 2018   0125 Labs d/w patient  Patient ate and had diarrhea  Will BAT at 0400      0409 BAT=0 069  Patient is medically acceptable for crisis evaluation/dispositioning  1 D/w crisis worker who will do phone interview  1616 Crisis worker wants HOST to evaluate patient in the morning  0818 Librium po ordered to prevent withdrawal      0818 Signed out to Dr January Silver this AM that patient is pending HOST evaluation and dispositioning  MDM  Number of Diagnoses or Management Options  Diagnosis management comments: DDx including but not limited to: Alcohol intoxication, metabolic abnormality, intracranial process, requesting detox, substance abuse          Amount and/or Complexity of Data Reviewed  Clinical lab tests: ordered and reviewed  Decide to obtain previous medical records or to obtain history from someone other than the patient: yes  Review and summarize past medical records: yes  Independent visualization of images, tracings, or specimens: yes      CritCare Time    Disposition  Final diagnoses:   Acute alcoholic intoxication in alcoholism (blood level 0 08-0 29) ([address] )     Time reflects when diagnosis was documented in both MDM as applicable and the Disposition within this note     Time User Action Codes Description Comment    11/20/2018  8:19 AM Alex Enriquez Add [R93 548] Acute alcoholic intoxication in alcoholism (blood level 0 08-0 29) [facility])       ED Disposition     None      MD Documentation      Most Recent Value   Sending MD Leighann Bradford      Follow-up Information    None         Patient's Medications   Discharge Prescriptions    No medications on file     No discharge procedures on file      ED Provider  Electronically Signed by           Lucy Guevara MD  11/20/18 0638

## 2018-11-20 NOTE — ED NOTES
[employer] from crisis on the phone to do phone interview with pt at this time        Isaac Nicholas RN  11/20/18 7795

## 2018-11-20 NOTE — ED NOTES
Pt awaiting HOST program to see him around 0800 per crisis  Pt is aware        Sheryl Lacey RN  11/20/18 5459

## 2018-11-20 NOTE — ED NOTES
CM received call from Jeremi Ugarte at [facility] will be picking patient up at 3:30 pm in the waiting room and patient is aware  ED provider and RN made aware of discharge dispo as well

## 2018-11-20 NOTE — ED NOTES
CM received call from Marty Goldstein with HOST  She stated that they were able to complete paperwork and obtain funding for patient for rehab  Patient is on the phone with [facility] now completing his pre-screen  Marty Goldstein stated that [facility] will be able to transport patient to treatment and she will keep me updated with plan of care  CM will continue to follow patient

## 2019-01-29 ENCOUNTER — APPOINTMENT (EMERGENCY)
Dept: RADIOLOGY | Facility: HOSPITAL | Age: 49
End: 2019-01-29
Payer: COMMERCIAL

## 2019-01-29 ENCOUNTER — HOSPITAL ENCOUNTER (EMERGENCY)
Facility: HOSPITAL | Age: 49
Discharge: HOME | End: 2019-01-30
Attending: EMERGENCY MEDICINE | Admitting: EMERGENCY MEDICINE
Payer: COMMERCIAL

## 2019-01-29 DIAGNOSIS — F10.229: Primary | ICD-10-CM

## 2019-01-29 LAB
AMPHETAMINES SERPL QL SCN: NEGATIVE
BARBITURATES UR QL: NEGATIVE
BENZODIAZ UR QL: NEGATIVE
COCAINE UR QL: NEGATIVE
ETHANOL EXG-MCNC: 0.16 MG/DL
METHADONE UR QL: NEGATIVE
OPIATES UR QL SCN: NEGATIVE
PCP UR QL: NEGATIVE
THC UR QL: NEGATIVE

## 2019-01-29 PROCEDURE — 80307 DRUG TEST PRSMV CHEM ANLYZR: CPT | Performed by: EMERGENCY MEDICINE

## 2019-01-29 PROCEDURE — 82075 ASSAY OF BREATH ETHANOL: CPT | Performed by: EMERGENCY MEDICINE

## 2019-01-29 PROCEDURE — 99285 EMERGENCY DEPT VISIT HI MDM: CPT

## 2019-01-29 PROCEDURE — 73080 X-RAY EXAM OF ELBOW: CPT

## 2019-01-30 VITALS
WEIGHT: 203.71 LBS | RESPIRATION RATE: 18 BRPM | TEMPERATURE: 98.5 F | OXYGEN SATURATION: 94 % | DIASTOLIC BLOOD PRESSURE: 71 MMHG | HEART RATE: 88 BPM | BODY MASS INDEX: 26.88 KG/M2 | SYSTOLIC BLOOD PRESSURE: 122 MMHG

## 2019-01-30 LAB
ETHANOL EXG-MCNC: 0 MG/DL
ETHANOL EXG-MCNC: 0.13 MG/DL

## 2019-01-30 PROCEDURE — 82075 ASSAY OF BREATH ETHANOL: CPT | Performed by: EMERGENCY MEDICINE

## 2019-01-30 RX ORDER — ACETAMINOPHEN 325 MG/1
650 TABLET ORAL ONCE
Status: COMPLETED | OUTPATIENT
Start: 2019-01-30 | End: 2019-01-30

## 2019-01-30 RX ORDER — CHLORDIAZEPOXIDE HYDROCHLORIDE 25 MG/1
25 CAPSULE, GELATIN COATED ORAL ONCE
Status: COMPLETED | OUTPATIENT
Start: 2019-01-30 | End: 2019-01-30

## 2019-01-30 RX ORDER — IBUPROFEN 400 MG/1
400 TABLET ORAL ONCE
Status: COMPLETED | OUTPATIENT
Start: 2019-01-30 | End: 2019-01-30

## 2019-01-30 RX ADMIN — IBUPROFEN 400 MG: 400 TABLET ORAL at 03:33

## 2019-01-30 RX ADMIN — ACETAMINOPHEN 650 MG: 325 TABLET ORAL at 00:51

## 2019-01-30 RX ADMIN — CHLORDIAZEPOXIDE HYDROCHLORIDE 25 MG: 25 CAPSULE ORAL at 06:45

## 2019-01-30 NOTE — ED CARE HANDOFF
Emergency Department Sign Out Note        Sign out and transfer of care from Dr Agustin Butler  See Separate Emergency Department note  The patient, Luis Enrique Cardenas, was evaluated by the previous provider for alcohol intoxication/abuse and wanting detox  Workup Completed:  Yes except pending repeat BAT  ED Course / Workup Pending (followup): When BAT less than 0 100 patient can be discharged and has outpatient detox referral as per Dr Agustin Butler and crisis worker  Repeat BAT=0  Librium ordered to prevent alcohol withdrawal  Patient awaiting ride this AM from brother so patient can go to [address]  discharge instructions written by Dr Agustin Butler already  Procedures  MDM    Disposition  Final diagnoses:   Acute alcohol intoxication in patient with alcoholism with blood alcohol level 0 08 to 0 29 [facility])     Time reflects when diagnosis was documented in both MDM as applicable and the Disposition within this note     Time User Action Codes Description Comment    1/29/2019 11:46 PM Rozina MARMOLEJO Add [F10 [id number]] Acute alcohol intoxication in patient with alcoholism with blood alcohol level 0 08 to 0 29 [facility])       ED Disposition     None      Follow-up Information     Follow up With Specialties Details Why [address] drug and alcohol intake  Go to This morning after 830 for evaluation and treatemnt of your alcohol abuse  Address/ phone number on separate discharge paper highlighted in green         Patient's Medications   Discharge Prescriptions    No medications on file     No discharge procedures on file         ED Provider  Electronically Signed by     Aniyah Ness MD  01/30/19 7433       Aniyah Ness MD  01/30/19 8933

## 2019-01-30 NOTE — ED NOTES
Pt resting comfortably at this time and appears to be in no acute distress  Pt given cup of ice water  Call bell within reach  VS stable  Will continue to monitor pt        Shanna Leyden, RN  01/29/19 3632

## 2019-01-30 NOTE — DISCHARGE INSTRUCTIONS
Abuse of Alcohol   WHAT YOU NEED TO KNOW:   · Alcohol abuse is unhealthy drinking behavior  You may drink too much at one time once a week, or continue to drink too much daily  You continue to drink even though it causes problems  The problems can be alcohol related legal problems, or problems with work or relationships with family  · If you drink too much at one time, you are binge drinking  Binge drinking is when you have a large amount of alcohol in a short time  Your blood alcohol concentrations (NANO) goes above 0 08 g/dLlevel during binge drinking  For men, this usually happens with more than 4 drinks in 2 hours  For women, it is more than 3 drinks in 2 hours  A drink is 12 ounces of beer, 4 ounces of wine, or 1½ ounces of liquor  DISCHARGE INSTRUCTIONS:   Call 911 for the following:   · You have sudden chest pain or trouble breathing  · You have a seizure or have shaking or trembling  · You were in an accident because of alcohol  Seek care immediately if:   · You want to harm yourself or others  · You have hallucinations (you see or hear things that are not real)  · You cannot stop vomiting or you vomit blood  Contact your healthcare provider if:   · You need help to stop drinking alcohol  · You have questions or concerns about your condition or care  Medicines:   · Vitamin supplements  may be given to treat low vitamin levels  Alcohol can make it hard for your body to absorb enough vitamins such as B1  Vitamin supplements may also be given to prevent alcohol related brain damage  · Take your medicine as directed  Contact your healthcare provider if you think your medicine is not helping or if you have side effects  Tell him or her if you are allergic to any medicine  Keep a list of the medicines, vitamins, and herbs you take  Include the amounts, and when and why you take them  Bring the list or the pill bottles to follow-up visits   Carry your medicine list with you in case of an emergency  Treatments or therapies you may need:   · Detoxification (detox) and withdrawal  is a program that helps you to safely get alcohol out of your body  Detox can also help get rid of the physical need to drink  Healthcare providers monitor the physical symptoms of withdrawal  They may give you medicines to help decrease nausea, dehydration, and seizures  Healthcare providers will also monitor your blood pressure, heart and breathing rates, and your temperature  Symptoms of anxiety, depression, and suicidal thoughts are also monitored and managed during detox  Healthcare providers may give you medicines for these symptoms and therapy sessions will be available to you  Detox is usually done at a detox center or in a hospital  Healthcare providers do not recommend that you try to detox at home or by yourself  Withdrawal symptoms may become life-threatening  The center can help you find 12 step programs or an individual therapist to help with emotional support after detox  · Inpatient and outpatient treatment  focus on your personal needs to help you stop drinking  Treatment helps you understand the reasons you abuse alcohol  Counselors and therapists provide you with support and help you find ways to cope instead of drinking  You may need inpatient treatment to provide a controlled environment  You may need outpatient treatment after your inpatient treatment is complete  · Alcohol aversion therapy  takes away the desire to drink by causing a negative reaction when you drink  Healthcare providers may give you medicines that cause nausea and vomiting when you drink alcohol  They may instead give you a medicine that decreases your urge to drink alcohol  These medicines are used to help you stop drinking or reduce the amount you drink  They can also help you avoid relapse  Follow up with your healthcare provider as directed:  Write down your questions so you remember to ask them during your visits    Avoid alcohol:  You should stop drinking entirely  Alcohol can damage your brain, heart, and liver  It also increases your risk for injury, high blood pressure, and certain types of cancer  Alcohol is dangerous when you combine it with certain medicines  Do not drive if you drink alcohol:  Make sure someone who has not been drinking can help you get home  Get support:  Most people need support to stop drinking alcohol  Mental health providers, support groups, rehabilitation centers, and your healthcare provider can provide support  For more information:   · Alcoholics Anonymous  Web Address: http://www [employer]/  · Sub[facility] [address]  Web Address: https://[employer]/  © 2017 [address] Information is for End User's use only and may not be sold, redistributed or otherwise used for commercial purposes  All illustrations and images included in CareNotes® are the copyrighted property of [employer]  or Dhiraj Simon  The above information is an  only  It is not intended as medical advice for individual conditions or treatments  Talk to your doctor, nurse or pharmacist before following any medical regimen to see if it is safe and effective for you

## 2019-01-30 NOTE — ED PROVIDER NOTES
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 "  He describes the alcohol interfering with his ability to accomplish his goals  He does admit to having consumed vodka today  He initially indicated that he only consumes alcohol 2 to 3 days a week  He later admitted that he has been drinking daily  He reports feeling well physically with the exception of right elbow pain  He thinks he may have banged the elbow on something 1 to 2 weeks ago  He gestures over the olecranon as the site of discomfort and states that he thinks he chipped it    He denies having had thoughts harming himself or others  Denies having had any nausea or vomiting  He describes self reflection and discussion with his brother were what made him decide to seek the treatment today  He does admit to daily use of cigarettes  He denies use of any other substances  When questioned when he had last gone to rehab he stated it was when he was in [facility]   He relates that this was 2017  He then tells me that he was here for the same thing for rehab and I commented on the visits having been in November of 2018  He then relates that he had gone to rehab but did not follow up  He states that he needs to follow up afterwards  Prior to Admission Medications   Prescriptions Last Dose Informant Patient Reported?  Taking?   disulfiram (ANTABUSE) 250 mg tablet   No No   Sig: Take 1 tablet (250 mg total) by mouth daily      Facility-Administered Medications: None       Past Medical History:   Diagnosis Date    Alcohol abuse     Alcoholism ([facility] [address] )     Anxiety     Depression        Past Surgical History: Procedure Laterality Date    HERNIA REPAIR         History reviewed  No pertinent family history  I have reviewed and agree with the history as documented  Social History   Substance Use Topics    Smoking status: Current Every Day Smoker     Packs/day: 0 50     Years: 5 00    Smokeless tobacco: Never Used    Alcohol use Yes      Comment: 1/2-1 bottle of liquor daily  Pt reports 1 pint of liquor a day        Review of Systems   All other systems reviewed and are negative  Physical Exam  Physical Exam   Constitutional: He is oriented to person, place, and time  He appears well-developed and well-nourished  Patient with mild to moderate speech slurring  He is tangential in his thoughts/ expressions  Eyes: Conjunctivae and EOM are normal    Cardiovascular: Normal rate and regular rhythm  Pulmonary/Chest: Effort normal and breath sounds normal    Musculoskeletal: Normal range of motion  Slight soft tissue swelling over the right olecranon which is tender  No discoloration or increased warmth  He ranges the elbow well and has full strength in the right upper extremity with biceps and triceps movement against resistance, hand , finger extension, pincer grasp, volar and dorsiflexion  +2 R radial & ulnar pulses  Neurological: He is alert and oriented to person, place, and time  Skin: Skin is warm and dry  Nursing note and vitals reviewed        Vital Signs  ED Triage Vitals   Temperature Pulse Respirations Blood Pressure SpO2   01/29/19 2139 01/29/19 2139 01/29/19 2139 01/29/19 2139 01/29/19 2139   98 5 °F (36 9 °C) 89 18 144/97 97 %      Temp Source Heart Rate Source Patient Position - Orthostatic VS BP Location FiO2 (%)   01/29/19 2139 01/29/19 2139 01/29/19 2139 01/29/19 2139 --   Oral Monitor Sitting Right arm       Pain Score       01/29/19 2300       No Pain           Vitals:    01/30/19 0332 01/30/19 0430 01/30/19 0500 01/30/19 0600   BP: 141/90 148/79 156/80 122/71   Pulse: (!) 110 88 91 88   Patient Position - Orthostatic VS: Sitting Lying Lying Lying       Visual Acuity      ED Medications  Medications   acetaminophen (TYLENOL) tablet 650 mg (650 mg Oral Given 1/30/19 0051)   ibuprofen (MOTRIN) tablet 400 mg (400 mg Oral Given 1/30/19 0333)   chlordiazePOXIDE (LIBRIUM) capsule 25 mg (25 mg Oral Given 1/30/19 0645)       Diagnostic Studies  Results Reviewed     Procedure Component Value Units Date/Time    POCT alcohol breath test [[id number]]  (Normal) Resulted:  01/30/19 0636    Lab Status:  Final result Updated:  01/30/19 0636     EXTBreath Alcohol 0 00    POCT alcohol breath test [[id number]]  (Normal) Resulted:  01/30/19 0002    Lab Status:  Final result Updated:  01/30/19 0003     EXTBreath Alcohol 0 128    Rapid drug screen, urine [[id number]]  (Normal) Collected:  01/29/19 2206    Lab Status:  Final result Specimen:  Urine from Urine, Clean Catch Updated:  01/29/19 2219     Amph/Meth UR Negative     Barbiturate Ur Negative     Benzodiazepine Urine Negative     Cocaine Urine Negative     Methadone Urine Negative     Opiate Urine Negative     PCP Ur Negative     THC Urine Negative    Narrative:         FOR MEDICAL PURPOSES ONLY  IF CONFIRMATION NEEDED PLEASE CONTACT THE LAB WITHIN 5 DAYS  Drug Screen Cutoff Levels:  AMPHETAMINE/METHAMPHETAMINES  1000 ng/mL  BARBITURATES     200 ng/mL  BENZODIAZEPINES     200 ng/mL  COCAINE      300 ng/mL  METHADONE      300 ng/mL  OPIATES      300 ng/mL  PHENCYCLIDINE     25 ng/mL  THC       50 ng/mL    POCT alcohol breath test [[id number]]  (Normal) Resulted:  01/29/19 2158    Lab Status:  Final result Updated:  01/29/19 2158     EXTBreath Alcohol 0 16                 XR elbow 3+ views RIGHT   ED Interpretation by Ally Rubin MD (01/29 5833)   Olcranon spur  No fx  Final Result by Lisa Eng MD (01/30 0732)      No acute osseous abnormality  Please see discussion              Workstation performed: [id number] Procedures  Procedures       Phone Contacts  ED Phone Contact    ED Course  ED Course as of Feb 01 0701   Tue Jan 29, 2019   2317 Care will be transferred at 0000 to Dr Magdy Barone  If pt  Continues to desire rehab after BAT less than 0 1 crisis to be contacted  1 RN received call from Crisis staff  Paper faxed w/ info for the [address]  Pt  May report there at 830 AM for care  He does not have active insurance & transfer to an inpatient center is not possible  Will recheck alcohol level     Wed Jan 30, 2019   0019 Patient alcohol level is still elevated  I did discuss disposition and recommendation to follow up with the [facility]  Patient immediately expressed from iliac artery with this in interest in going there  He did phone his brother who as the patient expected was unable to provide him with a ride this night  Charge nurse aware of situation  Patient can remain in the ED at this point  Upon alcohol lowering should bed P required by another patient he will be discharged to the waiting area  Brother indicated that he would likely be able to provide the patient with a ride in the morning  Patient is aware that he needs to contact him for such ride  Pt  Reports having a HA  Acetaminophen to be ordered  MDM    Disposition  Final diagnoses:   Acute alcohol intoxication in patient with alcoholism with blood alcohol level 0 08 to 0 [facility])     Time reflects when diagnosis was documented in both MDM as applicable and the Disposition within this note     Time User Action Codes Description Comment    1/29/2019 11:46 PM Katharine Phillips A Add [F10 054] Acute alcohol intoxication in patient with alcoholism with blood alcohol level 0 08 to 0 [facility])       ED Disposition     ED Disposition Condition Date/Time Comment    Discharge  Wed Jan 30, 2019  6:42 AM Teddyst Divers discharge to home/self care      Condition at discharge: Stable Follow-up Information     Follow up With Specialties Details Why [address] drug and alcohol intake  Go to This morning after 830 for evaluation and treatemnt of your alcohol abuse  Address/ phone number on separate discharge paper highlighted in green           Discharge Medication List as of 1/30/2019 12:04 AM      CONTINUE these medications which have NOT CHANGED    Details   disulfiram (ANTABUSE) 250 mg tablet Take 1 tablet (250 mg total) by mouth daily, Starting Thu 11/15/2018, Print           No discharge procedures on file      ED Provider  Electronically Signed by           Hayden Michele MD  02/01/19 5651

## 2019-01-30 NOTE — ED NOTES
EVS indicates the patient is no longer eligible for MA, therefore he has no insurance  Faxed contact address, phone and instructions for how, where and when to either schedule an appointment with [facility], or go during walk in hours  Spoke to Chris pierre, patient's RN, to make her aware of all this

## 2019-01-30 NOTE — ED NOTES
Pt resting comfortably at this time and appears to be in no acute distress  VS stable  Pt offered snacks and ginger ale/water  Call bell within reach  Will continue to monitor        Asif Wilson RN  01/30/19 5182

## 2019-02-09 ENCOUNTER — HOSPITAL ENCOUNTER (EMERGENCY)
Facility: HOSPITAL | Age: 49
Discharge: HOME | End: 2019-02-10
Attending: EMERGENCY MEDICINE | Admitting: EMERGENCY MEDICINE
Payer: COMMERCIAL

## 2019-02-09 VITALS
RESPIRATION RATE: 20 BRPM | TEMPERATURE: 98.9 F | DIASTOLIC BLOOD PRESSURE: 102 MMHG | OXYGEN SATURATION: 100 % | HEART RATE: 68 BPM | SYSTOLIC BLOOD PRESSURE: 160 MMHG

## 2019-02-09 DIAGNOSIS — F10.20 ALCOHOLISM (HCC): Primary | ICD-10-CM

## 2019-02-09 LAB
AMPHETAMINES SERPL QL SCN: NEGATIVE
BARBITURATES UR QL: NEGATIVE
BENZODIAZ UR QL: NEGATIVE
COCAINE UR QL: POSITIVE
ETHANOL EXG-MCNC: 0.02 MG/DL
METHADONE UR QL: NEGATIVE
OPIATES UR QL SCN: NEGATIVE
PCP UR QL: NEGATIVE
THC UR QL: NEGATIVE

## 2019-02-09 PROCEDURE — 99284 EMERGENCY DEPT VISIT MOD MDM: CPT

## 2019-02-09 PROCEDURE — 82075 ASSAY OF BREATH ETHANOL: CPT | Performed by: EMERGENCY MEDICINE

## 2019-02-09 PROCEDURE — 80307 DRUG TEST PRSMV CHEM ANLYZR: CPT | Performed by: EMERGENCY MEDICINE

## 2019-02-09 NOTE — ED PROVIDER NOTES
History  Chief Complaint   Patient presents with    Alcohol Problem     Patient presents to ED seeking help for issues with alcohol  Seen at [facility] last night and was supposed to go to [facility], but "I opted out last minute " Last drink at 1500   Psychiatric Evaluation     Patient reports being depressed  "I don't want to kill myself or anything, but I am just depressed " Denies HI  Patient presents to the emergency department seeking alcohol rehab  He is admittedly an alcoholic and states that he desires to be admitted for treatment  He was seen at [facility] last night and he was about to be admitted and he changed his mind  He admittedly drank alcohol this afternoon  He admits to depression but denies suicidal homicidal ideation  He denies other physical complaints at this time  He denies other drug use  None       Past Medical History:   Diagnosis Date    Alcohol abuse     Alcoholism ([facility] [address] )     Anxiety     Depression        Past Surgical History:   Procedure Laterality Date    HERNIA REPAIR         History reviewed  No pertinent family history  I have reviewed and agree with the history as documented  Social History     Tobacco Use    Smoking status: Current Every Day Smoker     Packs/day: 0 50     Years: 5 00     Pack years: 2 50    Smokeless tobacco: Never Used   Substance Use Topics    Alcohol use: Yes     Comment: 1/2-1 bottle of liquor daily  Pt reports 1 pint of liquor a day    Drug use: Yes     Types: Cocaine        Review of Systems   Constitutional: Negative  Negative for activity change, appetite change, fatigue and fever  HENT: Negative  Negative for congestion, drooling, sinus pressure, sneezing, sore throat, trouble swallowing and voice change  Eyes: Negative  Negative for photophobia and visual disturbance  Respiratory: Negative  Negative for cough, choking, chest tightness, shortness of breath, wheezing and stridor  Cardiovascular: Negative  Negative for chest pain, palpitations and leg swelling  Gastrointestinal: Negative  Negative for abdominal distention, abdominal pain, blood in stool, diarrhea, nausea and vomiting  Endocrine: Negative  Genitourinary: Negative  Negative for difficulty urinating, discharge, dysuria, frequency, hematuria, penile swelling, scrotal swelling, testicular pain and urgency  Musculoskeletal: Negative  Negative for back pain, gait problem, neck pain and neck stiffness  Skin: Negative  Negative for rash and wound  Allergic/Immunologic: Negative  Neurological: Negative  Negative for dizziness, tremors, seizures, syncope, facial asymmetry, speech difficulty, weakness, light-headedness, numbness and headaches  Hematological: Negative  Does not bruise/bleed easily  Psychiatric/Behavioral: Positive for dysphoric mood  Negative for agitation, behavioral problems, confusion, hallucinations, self-injury, sleep disturbance and suicidal ideas  The patient is not nervous/anxious and is not hyperactive  Physical Exam  Physical Exam   Constitutional: He is oriented to person, place, and time  He appears well-developed and well-nourished  Nontoxic appearance  No respiratory distress  Patient looks comfortable sitting upright in the stretcher  He is able to engage in normal conversation and answer simple questions appropriately  HENT:   Head: Normocephalic and atraumatic  Right Ear: External ear normal    Left Ear: External ear normal    Mouth/Throat: Oropharynx is clear and moist    Eyes: Pupils are equal, round, and reactive to light  Conjunctivae and EOM are normal    Neck: Normal range of motion  Neck supple  Cardiovascular: Normal rate, regular rhythm, normal heart sounds and intact distal pulses  Pulmonary/Chest: Effort normal and breath sounds normal  No respiratory distress  He has no wheezes  He has no rales  He exhibits no tenderness  Abdominal: Soft  Bowel sounds are normal  He exhibits no distension and no mass  There is no tenderness  There is no rebound and no guarding  No hernia  Musculoskeletal: Normal range of motion  He exhibits no edema, tenderness or deformity  Neurological: He is alert and oriented to person, place, and time  He has normal reflexes  He displays normal reflexes  No cranial nerve deficit or sensory deficit  He exhibits normal muscle tone  Coordination normal    Skin: Skin is warm and dry  No rash noted  No erythema  No pallor  Psychiatric: He has a normal mood and affect  His behavior is normal  Judgment and thought content normal    Nursing note and vitals reviewed  Vital Signs  ED Triage Vitals   Temperature Pulse Respirations Blood Pressure SpO2   02/09/19 1554 02/09/19 1551 02/09/19 1551 02/09/19 1551 02/09/19 1551   98 9 °F (37 2 °C) 68 20 (!) 160/102 100 %      Temp Source Heart Rate Source Patient Position - Orthostatic VS BP Location FiO2 (%)   02/09/19 1554 02/09/19 1551 02/09/19 1551 02/09/19 1551 --   Oral Monitor Sitting Left arm       Pain Score       02/09/19 1834       No Pain           Vitals:    02/09/19 1551   BP: (!) 160/102   Pulse: 68   Patient Position - Orthostatic VS: Sitting       Visual Acuity      ED Medications  Medications - No data to display    Diagnostic Studies  Results Reviewed     Procedure Component Value Units Date/Time    Rapid drug screen, urine [[id number]]  (Abnormal) Collected:  02/09/19 1827    Lab Status:  Final result Specimen:  Urine, Clean Catch Updated:  02/09/19 1842     Amph/Meth UR Negative     Barbiturate Ur Negative     Benzodiazepine Urine Negative     Cocaine Urine Positive     Methadone Urine Negative     Opiate Urine Negative     PCP Ur Negative     THC Urine Negative    Narrative:         Presumptive report  If requested, specimen will be sent to reference lab for confirmation  FOR MEDICAL PURPOSES ONLY     IF CONFIRMATION NEEDED PLEASE CONTACT THE LAB WITHIN 5 DAYS     Drug Screen Cutoff Levels:  AMPHETAMINE/METHAMPHETAMINES  1000 ng/mL  BARBITURATES     200 ng/mL  BENZODIAZEPINES     200 ng/mL  COCAINE      300 ng/mL  METHADONE      300 ng/mL  OPIATES      300 ng/mL  PHENCYCLIDINE     25 ng/mL  THC       50 ng/mL    POCT alcohol breath test [[id number]]  (Normal) Resulted:  02/09/19 1759    Lab Status:  Final result Updated:  02/09/19 1800     EXTBreath Alcohol 0 020                 No orders to display              Procedures  Procedures       Phone Contacts  ED Phone Contact    ED Course  ED Course as of Feb 10 1116   Sat Feb 09, 2019   1820 Crisis will be consulted to see this patient in the emergency department  2358 Host evaluating patient  Disposition pending  MDM    Disposition  Final diagnoses:   Alcoholism ([address] )     Time reflects when diagnosis was documented in both MDM as applicable and the Disposition within this note     Time User Action Codes Description Comment    2/10/2019 11:15 AM Sebastien Rolon Add [F10 20] Alcoholism [facility])       ED Disposition     ED Disposition Condition Date/Time Comment    Discharge  Sun Feb 10, 2019  2:54 AM Rolanda Webb discharge to home/self care  Condition at discharge: Stable        MD Documentation      Most Recent Value   Sending MD Dr Matthew Mar    None         There are no discharge medications for this patient  No discharge procedures on file      ED Provider  Electronically Signed by           Amandeep Awad MD  02/09/19 6872 Barbara Rushing MD  02/10/19 3377

## 2019-02-10 NOTE — ED NOTES
Call placed to HOST, spoke with Re Solis who states that the worker that will be in to meet with the patient is finishing up an evaluation now and will hopefully be in within the hour       ANTHONY Blunt  02/09/19   8108

## 2019-02-10 NOTE — ED NOTES
Pt is a 51 y o  male who was brought to the ED requesting detox treatment for alcohol use  Patient was seen by HOST yesterday while at [facility] and was scheduled to go for treatment, however changed his mind and drank today  Patient states that his drinking has caused him to lose everything and he knows he needs to do something about it  Patient states that he has been in treatment several times, however does not follow up with outpatient treatment  Patient reports drinking about a pint of various alcohol daily, however states that sometimes he will go about a month without drinking and then will think he can have a drink or two and it gets out of hand  Patient also uses cocaine occassionally and says he last used 3 days ago  Patient reports poor sleep and appetite related to his alcohol use  Patient denies suicidal/homicidal ideations and auditory/visual hallucinations  Patient denies any history of withdrawal seizures or DT's  He reports that he typically only feels nauseous when he doesn't drink and had no other withdrawal complaints  Patient does report feeling depressed over his current situation  He states that he cannot return to his house because he hasn't paid rent due to not having a job  He also expressed that his truck is falling apart and he has no money to fix anything  Chief Complaint   Patient presents with    Alcohol Problem     Patient presents to ED seeking help for issues with alcohol  Seen at [facility] last night and was supposed to go to [address] [place] [address], but "I opted out last minute " Last drink at 1500   Psychiatric Evaluation     Patient reports being depressed  "I don't want to kill myself or anything, but I am just depressed " Denies HI        Intake Assessment completed, Safety risk Assessment completed    ANTHONY Zamorano  02/09/19   [id number]

## 2019-04-28 ENCOUNTER — HOSPITAL ENCOUNTER (EMERGENCY)
Facility: HOSPITAL | Age: 49
Discharge: HOME | End: 2019-04-28
Attending: EMERGENCY MEDICINE
Payer: COMMERCIAL

## 2019-04-28 VITALS
HEART RATE: 110 BPM | SYSTOLIC BLOOD PRESSURE: 173 MMHG | DIASTOLIC BLOOD PRESSURE: 125 MMHG | BODY MASS INDEX: 27 KG/M2 | TEMPERATURE: 98.9 F | WEIGHT: 203.71 LBS | HEIGHT: 73 IN | OXYGEN SATURATION: 98 % | RESPIRATION RATE: 18 BRPM

## 2019-04-28 DIAGNOSIS — F10.20 ALCOHOLISM (HCC): Primary | ICD-10-CM

## 2019-04-28 PROCEDURE — 82075 ASSAY OF BREATH ETHANOL: CPT

## 2019-04-28 PROCEDURE — 99283 EMERGENCY DEPT VISIT LOW MDM: CPT | Performed by: EMERGENCY MEDICINE

## 2019-04-28 PROCEDURE — 99283 EMERGENCY DEPT VISIT LOW MDM: CPT

## 2019-05-09 ENCOUNTER — HOSPITAL ENCOUNTER (EMERGENCY)
Facility: HOSPITAL | Age: 49
Discharge: LEFT BEFORE DISCHARGE | End: 2019-05-09
Attending: EMERGENCY MEDICINE | Admitting: EMERGENCY MEDICINE
Payer: COMMERCIAL

## 2019-05-09 VITALS
RESPIRATION RATE: 20 BRPM | TEMPERATURE: 98.2 F | OXYGEN SATURATION: 97 % | HEART RATE: 91 BPM | DIASTOLIC BLOOD PRESSURE: 88 MMHG | SYSTOLIC BLOOD PRESSURE: 153 MMHG

## 2019-05-09 DIAGNOSIS — F10.929 ALCOHOL INTOXICATION (HCC): Primary | ICD-10-CM

## 2019-05-09 LAB — ETHANOL EXG-MCNC: 0.11 MG/DL

## 2019-05-09 PROCEDURE — 82075 ASSAY OF BREATH ETHANOL: CPT | Performed by: EMERGENCY MEDICINE

## 2019-05-09 PROCEDURE — 99284 EMERGENCY DEPT VISIT MOD MDM: CPT

## 2019-05-09 PROCEDURE — 99282 EMERGENCY DEPT VISIT SF MDM: CPT | Performed by: EMERGENCY MEDICINE

## 2019-05-16 ENCOUNTER — APPOINTMENT (EMERGENCY)
Dept: RADIOLOGY | Facility: HOSPITAL | Age: 49
End: 2019-05-16
Payer: COMMERCIAL

## 2019-05-16 ENCOUNTER — HOSPITAL ENCOUNTER (EMERGENCY)
Facility: HOSPITAL | Age: 49
Discharge: HOME | End: 2019-05-16
Attending: EMERGENCY MEDICINE | Admitting: EMERGENCY MEDICINE
Payer: COMMERCIAL

## 2019-05-16 VITALS
SYSTOLIC BLOOD PRESSURE: 168 MMHG | DIASTOLIC BLOOD PRESSURE: 101 MMHG | HEART RATE: 95 BPM | BODY MASS INDEX: 30.48 KG/M2 | TEMPERATURE: 98.4 F | OXYGEN SATURATION: 97 % | RESPIRATION RATE: 16 BRPM | HEIGHT: 72 IN | WEIGHT: 225 LBS

## 2019-05-16 DIAGNOSIS — L03.011 CELLULITIS OF FINGER OF RIGHT HAND: Primary | ICD-10-CM

## 2019-05-16 PROCEDURE — 99283 EMERGENCY DEPT VISIT LOW MDM: CPT

## 2019-05-16 PROCEDURE — 73140 X-RAY EXAM OF FINGER(S): CPT

## 2019-05-16 PROCEDURE — 99283 EMERGENCY DEPT VISIT LOW MDM: CPT | Performed by: PHYSICIAN ASSISTANT

## 2019-05-16 RX ORDER — SULFAMETHOXAZOLE AND TRIMETHOPRIM 800; 160 MG/1; MG/1
1 TABLET ORAL 2 TIMES DAILY
Qty: 14 TABLET | Refills: 0 | Status: SHIPPED | OUTPATIENT
Start: 2019-05-16 | End: 2019-05-23

## 2019-05-16 RX ORDER — CEPHALEXIN 500 MG/1
500 CAPSULE ORAL EVERY 6 HOURS SCHEDULED
Qty: 28 CAPSULE | Refills: 0 | Status: SHIPPED | OUTPATIENT
Start: 2019-05-16 | End: 2019-05-23

## 2019-09-15 ENCOUNTER — APPOINTMENT (EMERGENCY)
Dept: CT IMAGING | Facility: HOSPITAL | Age: 49
End: 2019-09-15
Payer: COMMERCIAL

## 2019-09-15 ENCOUNTER — HOSPITAL ENCOUNTER (EMERGENCY)
Facility: HOSPITAL | Age: 49
Discharge: HOME | End: 2019-09-15
Attending: EMERGENCY MEDICINE | Admitting: EMERGENCY MEDICINE
Payer: COMMERCIAL

## 2019-09-15 VITALS
SYSTOLIC BLOOD PRESSURE: 125 MMHG | HEART RATE: 70 BPM | WEIGHT: 221.78 LBS | OXYGEN SATURATION: 97 % | RESPIRATION RATE: 18 BRPM | BODY MASS INDEX: 30.04 KG/M2 | TEMPERATURE: 98.8 F | HEIGHT: 72 IN | DIASTOLIC BLOOD PRESSURE: 82 MMHG

## 2019-09-15 DIAGNOSIS — M54.2 NECK PAIN: Primary | ICD-10-CM

## 2019-09-15 LAB
ANION GAP SERPL CALCULATED.3IONS-SCNC: 7 MMOL/L (ref 4–13)
ATRIAL RATE: 78 BPM
BASOPHILS # BLD AUTO: 0.05 THOUSANDS/ΜL (ref 0–0.1)
BASOPHILS NFR BLD AUTO: 1 % (ref 0–1)
BUN SERPL-MCNC: 14 MG/DL (ref 5–25)
CALCIUM SERPL-MCNC: 8.9 MG/DL (ref 8.3–10.1)
CHLORIDE SERPL-SCNC: 99 MMOL/L (ref 100–108)
CO2 SERPL-SCNC: 32 MMOL/L (ref 21–32)
CREAT SERPL-MCNC: 0.9 MG/DL (ref 0.6–1.3)
DEPRECATED D DIMER PPP: <270 NG/ML (FEU)
EOSINOPHIL # BLD AUTO: 0.28 THOUSAND/ΜL (ref 0–0.61)
EOSINOPHIL NFR BLD AUTO: 3 % (ref 0–6)
ERYTHROCYTE [DISTWIDTH] IN BLOOD BY AUTOMATED COUNT: 13.2 % (ref 11.6–15.1)
GFR SERPL CREATININE-BSD FRML MDRD: 100 ML/MIN/1.73SQ M
GLUCOSE SERPL-MCNC: 130 MG/DL (ref 65–140)
HCT VFR BLD AUTO: 46.8 % (ref 36.5–49.3)
HGB BLD-MCNC: 16 G/DL (ref 12–17)
IMM GRANULOCYTES # BLD AUTO: 0.02 THOUSAND/UL (ref 0–0.2)
IMM GRANULOCYTES NFR BLD AUTO: 0 % (ref 0–2)
LYMPHOCYTES # BLD AUTO: 2.2 THOUSANDS/ΜL (ref 0.6–4.47)
LYMPHOCYTES NFR BLD AUTO: 24 % (ref 14–44)
MCH RBC QN AUTO: 33.1 PG (ref 26.8–34.3)
MCHC RBC AUTO-ENTMCNC: 34.2 G/DL (ref 31.4–37.4)
MCV RBC AUTO: 97 FL (ref 82–98)
MONOCYTES # BLD AUTO: 0.84 THOUSAND/ΜL (ref 0.17–1.22)
MONOCYTES NFR BLD AUTO: 9 % (ref 4–12)
NEUTROPHILS # BLD AUTO: 5.66 THOUSANDS/ΜL (ref 1.85–7.62)
NEUTS SEG NFR BLD AUTO: 63 % (ref 43–75)
NRBC BLD AUTO-RTO: 0 /100 WBCS
P AXIS: 64 DEGREES
PLATELET # BLD AUTO: 298 THOUSANDS/UL (ref 149–390)
PMV BLD AUTO: 8.4 FL (ref 8.9–12.7)
POTASSIUM SERPL-SCNC: 3.6 MMOL/L (ref 3.5–5.3)
PR INTERVAL: 170 MS
QRS AXIS: -49 DEGREES
QRSD INTERVAL: 112 MS
QT INTERVAL: 358 MS
QTC INTERVAL: 408 MS
RBC # BLD AUTO: 4.84 MILLION/UL (ref 3.88–5.62)
SODIUM SERPL-SCNC: 138 MMOL/L (ref 136–145)
T WAVE AXIS: -42 DEGREES
TROPONIN I SERPL-MCNC: <0.02 NG/ML
VENTRICULAR RATE: 78 BPM
WBC # BLD AUTO: 9.05 THOUSAND/UL (ref 4.31–10.16)

## 2019-09-15 PROCEDURE — 85025 COMPLETE CBC W/AUTO DIFF WBC: CPT | Performed by: EMERGENCY MEDICINE

## 2019-09-15 PROCEDURE — 93005 ELECTROCARDIOGRAM TRACING: CPT

## 2019-09-15 PROCEDURE — 70496 CT ANGIOGRAPHY HEAD: CPT

## 2019-09-15 PROCEDURE — 80048 BASIC METABOLIC PNL TOTAL CA: CPT | Performed by: EMERGENCY MEDICINE

## 2019-09-15 PROCEDURE — 99284 EMERGENCY DEPT VISIT MOD MDM: CPT

## 2019-09-15 PROCEDURE — 96374 THER/PROPH/DIAG INJ IV PUSH: CPT

## 2019-09-15 PROCEDURE — 36415 COLL VENOUS BLD VENIPUNCTURE: CPT | Performed by: EMERGENCY MEDICINE

## 2019-09-15 PROCEDURE — 85379 FIBRIN DEGRADATION QUANT: CPT | Performed by: EMERGENCY MEDICINE

## 2019-09-15 PROCEDURE — 70498 CT ANGIOGRAPHY NECK: CPT

## 2019-09-15 PROCEDURE — 93010 ELECTROCARDIOGRAM REPORT: CPT | Performed by: INTERNAL MEDICINE

## 2019-09-15 PROCEDURE — 96372 THER/PROPH/DIAG INJ SC/IM: CPT

## 2019-09-15 PROCEDURE — 99284 EMERGENCY DEPT VISIT MOD MDM: CPT | Performed by: EMERGENCY MEDICINE

## 2019-09-15 PROCEDURE — 84484 ASSAY OF TROPONIN QUANT: CPT | Performed by: EMERGENCY MEDICINE

## 2019-09-15 RX ORDER — OLANZAPINE 10 MG/1
5 INJECTION, POWDER, LYOPHILIZED, FOR SOLUTION INTRAMUSCULAR ONCE
Status: COMPLETED | OUTPATIENT
Start: 2019-09-15 | End: 2019-09-15

## 2019-09-15 RX ORDER — ACETAMINOPHEN 325 MG/1
650 TABLET ORAL EVERY 6 HOURS PRN
Qty: 30 TABLET | Refills: 0 | Status: SHIPPED | OUTPATIENT
Start: 2019-09-15 | End: 2019-09-20

## 2019-09-15 RX ORDER — ACETAMINOPHEN 325 MG/1
975 TABLET ORAL ONCE
Status: COMPLETED | OUTPATIENT
Start: 2019-09-15 | End: 2019-09-15

## 2019-09-15 RX ORDER — KETOROLAC TROMETHAMINE 30 MG/ML
15 INJECTION, SOLUTION INTRAMUSCULAR; INTRAVENOUS ONCE
Status: COMPLETED | OUTPATIENT
Start: 2019-09-15 | End: 2019-09-15

## 2019-09-15 RX ORDER — IBUPROFEN 400 MG/1
400 TABLET ORAL EVERY 6 HOURS PRN
Qty: 30 TABLET | Refills: 0 | Status: SHIPPED | OUTPATIENT
Start: 2019-09-15 | End: 2019-12-29 | Stop reason: ALTCHOICE

## 2019-09-15 RX ORDER — OLANZAPINE 10 MG/1
5 INJECTION, POWDER, LYOPHILIZED, FOR SOLUTION INTRAMUSCULAR ONCE
Status: DISCONTINUED | OUTPATIENT
Start: 2019-09-15 | End: 2019-09-15

## 2019-09-15 RX ADMIN — KETOROLAC TROMETHAMINE 15 MG: 30 INJECTION, SOLUTION INTRAMUSCULAR at 03:08

## 2019-09-15 RX ADMIN — DICLOFENAC SODIUM 2 G: 10 GEL TOPICAL at 03:15

## 2019-09-15 RX ADMIN — OLANZAPINE 5 MG: 10 INJECTION, POWDER, FOR SOLUTION INTRAMUSCULAR at 03:10

## 2019-09-15 RX ADMIN — WATER 2.1 ML: 1 INJECTION INTRAMUSCULAR; INTRAVENOUS; SUBCUTANEOUS at 03:10

## 2019-09-15 RX ADMIN — ACETAMINOPHEN 975 MG: 325 TABLET ORAL at 03:06

## 2019-09-15 RX ADMIN — IOHEXOL 85 ML: 350 INJECTION, SOLUTION INTRAVENOUS at 04:45

## 2019-09-15 NOTE — DISCHARGE INSTRUCTIONS
YOU MUST FOLLOWUP WITH AN ENT DOCTOR REGARDING YOUR CT SCAN  IMPRESSION:  1  Unremarkable CT of the brain with and without contrast material   2   Unremarkable CTA of the neck and brain  3   Significant pansinusitis  Probable right maxillary sinus polyp, extending into the right nasal passage

## 2019-09-15 NOTE — ED PROVIDER NOTES
History  Chief Complaint   Patient presents with    Neck Pain     Pt complains of severe neck pain that started earlier this morning, Pt also states that he vomited this morning and was nauseous all day  HPI    Patient is a pleasant 51year old male that presents to the ER with neck pain, nausea, vomiting that started earlier today  Notes some chest pain and shortness of breath, mild and assoc with anxiety  No radiation to the back  No tearing pain going to the back  Normal bilat UE pulses  No pleuritic pain  No history of PE  No new unilateral leg swelling  Non exertional  No sweats  No right sided pain  No vomiting  No fever or cough to suggest pneumonia  No  vomiting or subcue crepitus  Equal breath sounds  No skin rash  MDM well appearing 51year old male, history of chronic neck pain, suspect msk pain as he has tenderness and pain with rom  Discussed the incidental findings with the patient as well as gave very strict and specific verbal followup instructions  These are my standard followup instructions given to patients with incidental findings  The patient (and any family present) understand the importance of followup and the devastating medical, social and psychological implications that failure to followup could cause  Patient understands that followup is now in their hands and it is their responsibility to followup  They were able to repeat the instructions back to me  None       Past Medical History:   Diagnosis Date    Alcohol abuse     Alcoholism ([facility] [address] )     Anxiety     Depression        Past Surgical History:   Procedure Laterality Date    HERNIA REPAIR         History reviewed  No pertinent family history  I have reviewed and agree with the history as documented      Social History     Tobacco Use    Smoking status: Current Every Day Smoker     Packs/day: 0 50     Years: 5 00     Pack years: 2 50    Smokeless tobacco: Never Used   Substance Use Topics    Alcohol use: Yes     Comment: Once a week    Drug use: Not Currently        Review of Systems   Musculoskeletal: Positive for neck pain  All other systems reviewed and are negative  Physical Exam  Physical Exam   Constitutional: He is oriented to person, place, and time  He appears well-developed and well-nourished  HENT:   Head: Normocephalic and atraumatic  Eyes: Pupils are equal, round, and reactive to light  EOM are normal    Neck: Normal range of motion  Neck supple  Cardiovascular: Normal rate, regular rhythm and normal heart sounds  No murmur heard  Pulmonary/Chest: Effort normal and breath sounds normal  No respiratory distress  He has no wheezes  Abdominal: Soft  Bowel sounds are normal  He exhibits no distension  There is no tenderness  Musculoskeletal: Normal range of motion  He exhibits no edema or tenderness  Neurological: He is alert and oriented to person, place, and time  No cranial nerve deficit  Coordination normal    Skin: Skin is warm and dry  He is not diaphoretic  No erythema  Psychiatric: He has a normal mood and affect  His behavior is normal    Nursing note and vitals reviewed        Vital Signs  ED Triage Vitals   Temperature Pulse Respirations Blood Pressure SpO2   09/15/19 0220 09/15/19 0216 09/15/19 0216 09/15/19 0216 09/15/19 0216   98 8 °F (37 1 °C) 87 18 (!) 191/113 99 %      Temp Source Heart Rate Source Patient Position - Orthostatic VS BP Location FiO2 (%)   09/15/19 0220 09/15/19 0216 09/15/19 0216 09/15/19 0216 --   Oral Monitor Lying Right arm       Pain Score       09/15/19 0216       Worst Possible Pain           Vitals:    09/15/19 0216 09/15/19 0301 09/15/19 0500   BP: (!) 191/113 (!) 164/107 134/84   Pulse: 87 75 72   Patient Position - Orthostatic VS: Lying Lying Lying         Visual Acuity      ED Medications  Medications   ketorolac (TORADOL) injection 15 mg (15 mg Intravenous Given 9/15/19 0308)   acetaminophen (TYLENOL) tablet 975 mg (975 mg Oral Given 9/15/19 0306)   diclofenac sodium (VOLTAREN) 1 % topical gel 2 g (2 g Topical Given 9/15/19 0315)   OLANZapine (ZyPREXA) IM injection 5 mg (5 mg Intramuscular Given 9/15/19 0310)   sterile water injection **ADS Override Pull** (2 1 mL  Given 9/15/19 0310)   iohexol (OMNIPAQUE) 350 MG/ML injection (SINGLE-DOSE) 85 mL (85 mL Intravenous Given 9/15/19 0445)       Diagnostic Studies  Results Reviewed     Procedure Component Value Units Date/Time    Troponin I [[id number]]  (Normal) Collected:  09/15/19 0303    Lab Status:  Final result Specimen:  Blood from Arm, Left Updated:  09/15/19 0326     Troponin I <0 02 ng/mL     D-dimer, quantitative [[id number]]  (Normal) Collected:  09/15/19 0303    Lab Status:  Final result Specimen:  Blood from Arm, Left Updated:  09/15/19 0326     D-Dimer, Quant <270 ng/ml (FEU)     Basic metabolic panel [[id number]]  (Abnormal) Collected:  09/15/19 0303    Lab Status:  Final result Specimen:  Blood from Arm, Left Updated:  09/15/19 0317     Sodium 138 mmol/L      Potassium 3 6 mmol/L      Chloride 99 mmol/L      CO2 32 mmol/L      ANION GAP 7 mmol/L      BUN 14 mg/dL      Creatinine 0 90 mg/dL      Glucose 130 mg/dL      Calcium 8 9 mg/dL      eGFR 100 ml/min/1 73sq m     Narrative:       [place] guidelines for Chronic Kidney Disease (CKD):     Stage 1 with normal or high GFR (GFR > 90 mL/min/1 73 square meters)    Stage 2 Mild CKD (GFR = 60-89 mL/min/1 73 square meters)    Stage 3A Moderate CKD (GFR = 45-59 mL/min/1 73 square meters)    Stage 3B Moderate CKD (GFR = 30-44 mL/min/1 73 square meters)    Stage 4 Severe CKD (GFR = 15-29 mL/min/1 73 square meters)    Stage 5 End Stage CKD (GFR <15 mL/min/1 73 square meters)  Note: GFR calculation is accurate only with a steady state creatinine    CBC and differential [[id number]]  (Abnormal) Collected:  09/15/19 0303    Lab Status:  Final result Specimen:  Blood from Arm, Left Updated: 09/15/19 0308     WBC 9 05 Thousand/uL      RBC 4 84 Million/uL      Hemoglobin 16 0 g/dL      Hematocrit 46 8 %      MCV 97 fL      MCH 33 1 pg      MCHC 34 2 g/dL      RDW 13 2 %      MPV 8 4 fL      Platelets 471 Thousands/uL      nRBC 0 /100 WBCs      Neutrophils Relative 63 %      Immat GRANS % 0 %      Lymphocytes Relative 24 %      Monocytes Relative 9 %      Eosinophils Relative 3 %      Basophils Relative 1 %      Neutrophils Absolute 5 66 Thousands/µL      Immature Grans Absolute 0 02 Thousand/uL      Lymphocytes Absolute 2 20 Thousands/µL      Monocytes Absolute 0 84 Thousand/µL      Eosinophils Absolute 0 28 Thousand/µL      Basophils Absolute 0 05 Thousands/µL                  CTA head and neck with and without contrast   Final Result by Julia Betancourt DO (09/15 5567)   1  Unremarkable CT of the brain with and without contrast material    2   Unremarkable CTA of the neck and brain  3   Significant pansinusitis  Probable right maxillary sinus polyp, extending into the right nasal passage  4   Degenerative changes of the cervical spine with significant anterior osteophytic spur formation from C3 through C6  This results in displacement of the trachea anteriorly as well as compression of the left piriform sinus  I personally discussed this study with Pauline Matt on 9/15/2019 at 5:16 AM                      Workstation performed: [id number]                    Procedures  Procedures       ED Course                               MDM    Disposition  Final diagnoses:   Neck pain     Time reflects when diagnosis was documented in both MDM as applicable and the Disposition within this note     Time User Action Codes Description Comment    9/15/2019  4:12 AM Mahesh [address] [M54 2] Neck pain       ED Disposition     ED Disposition Condition Date/Time Comment    Discharge Stable Sun Sep 15, 2019  4:12 AM Yaritza Huertas discharge to home/self care              Follow-up Information Follow up With Specialties Details Why Contact Info    Primary Care Doctor  In 2 days            Patient's Medications   Discharge Prescriptions    ACETAMINOPHEN (TYLENOL) 325 MG TABLET    Take 2 tablets (650 mg total) by mouth every 6 (six) hours as needed for mild pain for up to 5 days       Start Date: 9/15/2019 End Date: 9/20/2019       Order Dose: 650 mg       Quantity: 30 tablet    Refills: 0    IBUPROFEN (MOTRIN) 400 MG TABLET    Take 1 tablet (400 mg total) by mouth every 6 (six) hours as needed for mild pain for up to 5 days       Start Date: 9/15/2019 End Date: 9/20/2019       Order Dose: 400 mg       Quantity: 30 tablet    Refills: 0     No discharge procedures on file      ED Provider  Electronically Signed by           Jenni Alexis MD  09/15/19 9440

## 2019-12-29 ENCOUNTER — HOSPITAL ENCOUNTER (EMERGENCY)
Facility: HOSPITAL | Age: 49
Discharge: HOME | End: 2019-12-29
Attending: EMERGENCY MEDICINE | Admitting: EMERGENCY MEDICINE
Payer: COMMERCIAL

## 2019-12-29 ENCOUNTER — APPOINTMENT (EMERGENCY)
Dept: RADIOLOGY | Facility: HOSPITAL | Age: 49
End: 2019-12-29
Payer: COMMERCIAL

## 2019-12-29 VITALS
SYSTOLIC BLOOD PRESSURE: 154 MMHG | RESPIRATION RATE: 16 BRPM | HEART RATE: 89 BPM | DIASTOLIC BLOOD PRESSURE: 97 MMHG | TEMPERATURE: 98.1 F | OXYGEN SATURATION: 98 %

## 2019-12-29 DIAGNOSIS — M54.2 CERVICAL PAIN: Primary | ICD-10-CM

## 2019-12-29 PROCEDURE — 72040 X-RAY EXAM NECK SPINE 2-3 VW: CPT

## 2019-12-29 PROCEDURE — 99283 EMERGENCY DEPT VISIT LOW MDM: CPT

## 2019-12-29 PROCEDURE — 99284 EMERGENCY DEPT VISIT MOD MDM: CPT | Performed by: PHYSICIAN ASSISTANT

## 2019-12-29 PROCEDURE — 96372 THER/PROPH/DIAG INJ SC/IM: CPT

## 2019-12-29 RX ORDER — LIDOCAINE 50 MG/G
1 PATCH TOPICAL ONCE
Status: DISCONTINUED | OUTPATIENT
Start: 2019-12-29 | End: 2019-12-29 | Stop reason: HOSPADM

## 2019-12-29 RX ORDER — KETOROLAC TROMETHAMINE 30 MG/ML
15 INJECTION, SOLUTION INTRAMUSCULAR; INTRAVENOUS ONCE
Status: COMPLETED | OUTPATIENT
Start: 2019-12-29 | End: 2019-12-29

## 2019-12-29 RX ORDER — ACETAMINOPHEN 325 MG/1
650 TABLET ORAL ONCE
Status: COMPLETED | OUTPATIENT
Start: 2019-12-29 | End: 2019-12-29

## 2019-12-29 RX ADMIN — KETOROLAC TROMETHAMINE 15 MG: 30 INJECTION, SOLUTION INTRAMUSCULAR at 02:54

## 2019-12-29 RX ADMIN — ACETAMINOPHEN 650 MG: 325 TABLET, FILM COATED ORAL at 02:54

## 2019-12-29 RX ADMIN — LIDOCAINE 1 PATCH: 50 PATCH TOPICAL at 02:53

## 2019-12-29 NOTE — ED PROVIDER NOTES
History  Chief Complaint   Patient presents with    Neck Pain     Pt presents to the ED with c/o head and neck pain that started about 2 years ago   Headache      Patient is a 30-year-old male with history of alcoholism, anxiety, depression, hernia repair the presents emergency department with dull and achy nonradiating left-sided neck pain for 2 years  Patient denies associated symptomatology  Patient with recent visit to emergency department over [facility] for similar symptomatology; patient reported that x-rays were normal and was discharge with prescriptions of cyclobenzaprine and tramadol; to which patient has not obtained cyclobenzaprine and tramadol prescription medications at this time  Patient denies recent fall or recent trauma  Patient denies numbness, tingling, loss of power  Patient denies palliative factors with provocative factors of head rotation and left-sided neck pain  Patient denies not effective treatment  Patient denies headaches, tinnitus, dizziness, meningeal, and vertiginous symptoms  Patient denies fevers, chills, nausea, vomiting  Patient denies diarrhea constipation urinary symptoms  Patient denies recent fall or recent trauma  Patient denies numbness, tingling, and loss of power  Patient denies chest pain, shortness of breath, and abdominal pain        History provided by:  Patient   used: No    Neck Pain   Pain location:  R side  Quality:  Aching  Pain radiates to:  Does not radiate  Pain severity:  Mild  Pain is:  Same all the time  Onset quality:  Gradual  Duration:  24 months  Timing:  Intermittent  Progression:  Worsening  Chronicity:  Recurrent  Context comment:  Unable to specify  Relieved by:  Nothing  Worsened by:  Position and twisting  Ineffective treatments:  None tried  Associated symptoms: no bladder incontinence, no bowel incontinence, no chest pain, no fever, no headaches, no leg pain, no numbness, no photophobia, no tingling, no visual change and no weakness    Risk factors: no hx of head and neck radiation, no hx of osteoporosis, no hx of spinal trauma, no recent epidural, no recent head injury and no recurrent falls        None       Past Medical History:   Diagnosis Date    Alcohol abuse     Alcoholism ([facility] [address] )     Anxiety     Depression        Past Surgical History:   Procedure Laterality Date    HERNIA REPAIR         History reviewed  No pertinent family history  I have reviewed and agree with the history as documented  Social History     Tobacco Use    Smoking status: Current Every Day Smoker     Packs/day: 0 50     Years: 5 00     Pack years: 2 50    Smokeless tobacco: Never Used   Substance Use Topics    Alcohol use: Yes     Comment: Once a week    Drug use: Not Currently        Review of Systems   Constitutional: Negative for activity change, appetite change, chills and fever  HENT: Negative for congestion, postnasal drip, rhinorrhea, sinus pressure, sinus pain, sore throat and tinnitus  Eyes: Negative for photophobia and visual disturbance  Respiratory: Negative for cough, chest tightness and shortness of breath  Cardiovascular: Negative for chest pain and palpitations  Gastrointestinal: Negative for abdominal pain, bowel incontinence, constipation, diarrhea, nausea and vomiting  Genitourinary: Negative for bladder incontinence, difficulty urinating, dysuria, flank pain, frequency and urgency  Musculoskeletal: Positive for neck pain  Negative for back pain, gait problem and neck stiffness  Skin: Negative for pallor and rash  Allergic/Immunologic: Negative for environmental allergies and food allergies  Neurological: Negative for dizziness, tingling, weakness, numbness and headaches  Psychiatric/Behavioral: Negative for confusion  All other systems reviewed and are negative  Physical Exam  Physical Exam   Constitutional: He is oriented to person, place, and time   He appears well-developed and well-nourished  He is active and cooperative  Non-toxic appearance  He does not have a sickly appearance  He does not appear ill  HENT:   Head: Normocephalic and atraumatic  Right Ear: Hearing and external ear normal  No drainage, swelling or tenderness  No mastoid tenderness  No decreased hearing is noted  Left Ear: Hearing and external ear normal  No drainage, swelling or tenderness  No mastoid tenderness  No decreased hearing is noted  Nose: Nose normal    Mouth/Throat: Uvula is midline, oropharynx is clear and moist and mucous membranes are normal    Eyes: Pupils are equal, round, and reactive to light  Conjunctivae, EOM and lids are normal  Right eye exhibits no discharge  Left eye exhibits no discharge  Neck: Trachea normal, normal range of motion, full passive range of motion without pain and phonation normal  Neck supple  No JVD present  No tracheal tenderness, no spinous process tenderness and no muscular tenderness present  No neck rigidity  No tracheal deviation and normal range of motion present  No tenderness with passive and active cervical ROM with head turning approximately 45 degree angle to the right; with head rotation approximately 25° to the left; left-sided cervical pain reproduced with palpation and active range of motion with left ear to left shoulder flexion  No cervical tenderness with cranial axial loading    Patient has no overlying epidermal rashes, lesions, erythema, ecchymosis, or abrasion cervical region  Skin intact  Cardiovascular: Normal rate, regular rhythm, normal heart sounds, intact distal pulses and normal pulses  Pulses:       Radial pulses are 2+ on the right side, and 2+ on the left side  Posterior tibial pulses are 2+ on the right side, and 2+ on the left side  Pulmonary/Chest: Effort normal and breath sounds normal  No stridor  He has no decreased breath sounds  He has no wheezes  He has no rhonchi  He has no rales   He exhibits no tenderness, no bony tenderness and no crepitus  Abdominal: Soft  Bowel sounds are normal  He exhibits no distension  There is no tenderness  There is no rigidity, no rebound, no guarding and no CVA tenderness  Musculoskeletal: Normal range of motion  Lymphadenopathy:        Head (right side): No submental, no submandibular, no tonsillar, no preauricular, no posterior auricular and no occipital adenopathy present  Head (left side): No submental, no submandibular, no tonsillar, no preauricular, no posterior auricular and no occipital adenopathy present  He has no cervical adenopathy  Right cervical: No superficial cervical, no deep cervical and no posterior cervical adenopathy present  Left cervical: No superficial cervical, no deep cervical and no posterior cervical adenopathy present  Neurological: He is alert and oriented to person, place, and time  He has normal strength and normal reflexes  No sensory deficit  GCS eye subscore is 4  GCS verbal subscore is 5  GCS motor subscore is 6  Reflex Scores:       Patellar reflexes are 2+ on the right side and 2+ on the left side  Skin: Skin is warm and intact  Capillary refill takes less than 2 seconds  He is not diaphoretic  Psychiatric: He has a normal mood and affect  His speech is normal and behavior is normal  Judgment and thought content normal  Cognition and memory are normal    Nursing note and vitals reviewed        Vital Signs  ED Triage Vitals [12/29/19 0137]   Temperature Pulse Respirations Blood Pressure SpO2   98 1 °F (36 7 °C) 89 16 154/97 98 %      Temp Source Heart Rate Source Patient Position - Orthostatic VS BP Location FiO2 (%)   Oral Monitor Sitting Right arm --      Pain Score       --           Vitals:    12/29/19 0137   BP: 154/97   Pulse: 89   Patient Position - Orthostatic VS: Sitting         Visual Acuity      ED Medications  Medications   ketorolac (TORADOL) injection 15 mg (15 mg Intramuscular Given 12/29/19 0465)   acetaminophen (TYLENOL) tablet 650 mg (650 mg Oral Given 12/29/19 0254)       Diagnostic Studies  Results Reviewed     None                 XR spine cervical 2 or 3 vw injury    (Results Pending)              Procedures  Procedures         ED Course  ED Course as of Dec 29 1003   Sun Dec 29, 2019   0401 Patient verbalizes feeling better s/p medication delivery                                  MDM  Number of Diagnoses or Management Options  Cervical pain: new and does not require workup     Amount and/or Complexity of Data Reviewed  Tests in the radiology section of CPT®: ordered and reviewed  Review and summarize past medical records: yes  Independent visualization of images, tracings, or specimens: yes    Risk of Complications, Morbidity, and/or Mortality  Presenting problems: low  Diagnostic procedures: low  Management options: low    Patient Progress  Patient progress: stable      Patient is a 60-year-old male with history of alcoholism, anxiety, depression, hernia repair the presents emergency department with dull and achy nonradiating left-sided neck pain for 2 years  Patient denies associated symptomatology  Patient had negative Brudzinski sign; no fevers; and no nuchal rigidity with meningitis less likely  Left-sided cervical tenderness and pain was reproducible upon physical examination; with no cervical radiculopathy reproduced on physical exam   Normal normal exam  Patient recently treated for similar symptomatology had [facility] yesterday    Patient verbalizes next x-ray was normal in patient was discharge on cyclobenzaprine and tramadol  Cervical spine x-ray with no acute osseous abnormality  Move delivered Tylenol, Toradol, and Lidoderm patch; patient verbalized decrease in neck pain symptomatology status post medication delivery  Counseled patient on resuming prescription medications as indicated by ED [facility] provider  Ambulatory referral to [facility]  Follow-up with [facility]  Follow-up with PCP  Follow up emergency department symptoms persist or exacerbate  Patient demonstrates verbal understanding of all clinical imaging findings, discharge instructions, follow-up with verbalized agreement current treatment plan      Disposition  Final diagnoses:   Cervical pain - left     Time reflects when diagnosis was documented in both MDM as applicable and the Disposition within this note     Time User Action Codes Description Comment    12/29/2019  4:12 AM Toan Cruz Add [M54 2] Cervical pain     12/29/2019  4:12 AM Toan Cruz Modify [M54 2] Cervical pain left      ED Disposition     ED Disposition Condition Date/Time Comment    Discharge Stable Sun Dec 29, 2019  4:08 AM Divina Go discharge to home/self care  Follow-up Information     Follow up With Specialties Details Why Contact Info Additional Information    [facility] Physical Therapy Call in 1 week for further evaluation of symptoms     [facility] Call in 1 week for further evaluation of symptoms [address] [address]  [address] [place], [place], [address] Emergency Department Emergency Medicine Go to  As needed [address]  [phone] [facility] ED, [address], [place], [place], [address]          There are no discharge medications for this patient          ED Provider  Electronically Signed by           Vicente Walsh PA-C  12/29/19 9736

## 2019-12-29 NOTE — DISCHARGE INSTRUCTIONS
Take tramadol and cyclobenzaprine as indicated by [facility] provider  Follow-up with [facility]  Follow-up with PCP  Follow up with emergency department if symptoms persist or exacerbate

## 2019-12-30 ENCOUNTER — TELEPHONE (OUTPATIENT)
Dept: PHYSICAL THERAPY | Facility: OTHER | Age: 49
End: 2019-12-30

## 2019-12-30 ENCOUNTER — NURSE TRIAGE (OUTPATIENT)
Dept: PHYSICAL THERAPY | Facility: OTHER | Age: 49
End: 2019-12-30

## 2019-12-30 DIAGNOSIS — G89.29 CHRONIC NECK PAIN: Primary | ICD-10-CM

## 2019-12-30 DIAGNOSIS — M54.2 CHRONIC NECK PAIN: Primary | ICD-10-CM

## 2019-12-30 NOTE — TELEPHONE ENCOUNTER
Background - Initial Assessment  Clinical complaint: exacerbation of left side upper neck pain  Patient states he has had this pain for approx  1 year  Non radiating  Patient states he was in 3 ER's in the last 3 days  Patient states he was given scripts for Tramadol, Naproxen, and Flexeril  Date of onset: approx  1 year ago but got extremely worse 12/28  Frequency of pain: intermittent  Quality of pain: sharp, shooting and stabbing    Protocols used: [facility] COMPREHENSIVE SPINE PROGRAM PROTOCOL    This RN did review in detail the [facility] and what we can provide for their neck pain  Patient is agreeable to being triaged by this RN and would like to proceed with Physical Therapy  Referral was placed for Physical Therapy at the [place] site  Patients information was sent to the  to make evaluation appointment  Patient made aware that the PT office  will be calling to schedule the appointment  PM&R referral placed with Irlanda GLORIA at the [address] at the [address] [place] [address]  Pt aware that they will be receiving a phone call from that office to schedule their appointment  Pt aware of who they will be seeing and the location of that office  No further questions and/or concerns were voiced by the patient at this time  Patient states understanding of the referral that was placed

## 2020-01-08 ENCOUNTER — HOSPITAL ENCOUNTER (EMERGENCY)
Facility: HOSPITAL | Age: 50
Discharge: HOME | End: 2020-01-08
Attending: EMERGENCY MEDICINE | Admitting: EMERGENCY MEDICINE

## 2020-01-08 VITALS
RESPIRATION RATE: 16 BRPM | TEMPERATURE: 97.9 F | HEIGHT: 72 IN | HEART RATE: 83 BPM | SYSTOLIC BLOOD PRESSURE: 153 MMHG | OXYGEN SATURATION: 98 % | WEIGHT: 213.41 LBS | BODY MASS INDEX: 28.91 KG/M2 | DIASTOLIC BLOOD PRESSURE: 98 MMHG

## 2020-01-08 DIAGNOSIS — F10.10 ALCOHOL ABUSE: Primary | ICD-10-CM

## 2020-01-08 LAB
ALBUMIN SERPL BCP-MCNC: 4.3 G/DL (ref 3.5–5)
ALP SERPL-CCNC: 71 U/L (ref 46–116)
ALT SERPL W P-5'-P-CCNC: 84 U/L (ref 12–78)
AMPHETAMINES SERPL QL SCN: NEGATIVE
ANION GAP SERPL CALCULATED.3IONS-SCNC: 9 MMOL/L (ref 4–13)
APAP SERPL-MCNC: <2 UG/ML (ref 10–20)
APTT PPP: 30 SECONDS (ref 23–37)
AST SERPL W P-5'-P-CCNC: 39 U/L (ref 5–45)
BARBITURATES UR QL: NEGATIVE
BASOPHILS # BLD AUTO: 0.11 THOUSANDS/ΜL (ref 0–0.1)
BASOPHILS NFR BLD AUTO: 1 % (ref 0–1)
BENZODIAZ UR QL: NEGATIVE
BILIRUB SERPL-MCNC: 0.27 MG/DL (ref 0.2–1)
BUN SERPL-MCNC: 10 MG/DL (ref 5–25)
CALCIUM SERPL-MCNC: 9.4 MG/DL (ref 8.3–10.1)
CHLORIDE SERPL-SCNC: 105 MMOL/L (ref 100–108)
CO2 SERPL-SCNC: 28 MMOL/L (ref 21–32)
COCAINE UR QL: NEGATIVE
CREAT SERPL-MCNC: 0.89 MG/DL (ref 0.6–1.3)
EOSINOPHIL # BLD AUTO: 0.57 THOUSAND/ΜL (ref 0–0.61)
EOSINOPHIL NFR BLD AUTO: 7 % (ref 0–6)
ERYTHROCYTE [DISTWIDTH] IN BLOOD BY AUTOMATED COUNT: 13.1 % (ref 11.6–15.1)
ETHANOL EXG-MCNC: 0.07 MG/DL
ETHANOL SERPL-MCNC: 209 MG/DL (ref 0–3)
GFR SERPL CREATININE-BSD FRML MDRD: 100 ML/MIN/1.73SQ M
GLUCOSE SERPL-MCNC: 94 MG/DL (ref 65–140)
HCT VFR BLD AUTO: 52.7 % (ref 36.5–49.3)
HGB BLD-MCNC: 18.2 G/DL (ref 12–17)
IMM GRANULOCYTES # BLD AUTO: 0.02 THOUSAND/UL (ref 0–0.2)
IMM GRANULOCYTES NFR BLD AUTO: 0 % (ref 0–2)
INR PPP: 0.98 (ref 0.84–1.19)
LYMPHOCYTES # BLD AUTO: 3.34 THOUSANDS/ΜL (ref 0.6–4.47)
LYMPHOCYTES NFR BLD AUTO: 43 % (ref 14–44)
MCH RBC QN AUTO: 33.6 PG (ref 26.8–34.3)
MCHC RBC AUTO-ENTMCNC: 34.5 G/DL (ref 31.4–37.4)
MCV RBC AUTO: 97 FL (ref 82–98)
METHADONE UR QL: NEGATIVE
MONOCYTES # BLD AUTO: 0.62 THOUSAND/ΜL (ref 0.17–1.22)
MONOCYTES NFR BLD AUTO: 8 % (ref 4–12)
NEUTROPHILS # BLD AUTO: 3.18 THOUSANDS/ΜL (ref 1.85–7.62)
NEUTS SEG NFR BLD AUTO: 41 % (ref 43–75)
NRBC BLD AUTO-RTO: 0 /100 WBCS
OPIATES UR QL SCN: NEGATIVE
PCP UR QL: NEGATIVE
PLATELET # BLD AUTO: 356 THOUSANDS/UL (ref 149–390)
PMV BLD AUTO: 8 FL (ref 8.9–12.7)
POTASSIUM SERPL-SCNC: 4 MMOL/L (ref 3.5–5.3)
PROT SERPL-MCNC: 7.8 G/DL (ref 6.4–8.2)
PROTHROMBIN TIME: 12.4 SECONDS (ref 11.6–14.5)
RBC # BLD AUTO: 5.42 MILLION/UL (ref 3.88–5.62)
SALICYLATES SERPL-MCNC: <3 MG/DL (ref 3–20)
SODIUM SERPL-SCNC: 142 MMOL/L (ref 136–145)
THC UR QL: NEGATIVE
WBC # BLD AUTO: 7.84 THOUSAND/UL (ref 4.31–10.16)

## 2020-01-08 PROCEDURE — 96365 THER/PROPH/DIAG IV INF INIT: CPT

## 2020-01-08 PROCEDURE — 85025 COMPLETE CBC W/AUTO DIFF WBC: CPT | Performed by: EMERGENCY MEDICINE

## 2020-01-08 PROCEDURE — 96366 THER/PROPH/DIAG IV INF ADDON: CPT

## 2020-01-08 PROCEDURE — 80307 DRUG TEST PRSMV CHEM ANLYZR: CPT | Performed by: EMERGENCY MEDICINE

## 2020-01-08 PROCEDURE — 85610 PROTHROMBIN TIME: CPT | Performed by: EMERGENCY MEDICINE

## 2020-01-08 PROCEDURE — 80329 ANALGESICS NON-OPIOID 1 OR 2: CPT | Performed by: EMERGENCY MEDICINE

## 2020-01-08 PROCEDURE — 80320 DRUG SCREEN QUANTALCOHOLS: CPT | Performed by: EMERGENCY MEDICINE

## 2020-01-08 PROCEDURE — 99285 EMERGENCY DEPT VISIT HI MDM: CPT

## 2020-01-08 PROCEDURE — 85730 THROMBOPLASTIN TIME PARTIAL: CPT | Performed by: EMERGENCY MEDICINE

## 2020-01-08 PROCEDURE — 80053 COMPREHEN METABOLIC PANEL: CPT | Performed by: EMERGENCY MEDICINE

## 2020-01-08 PROCEDURE — 36415 COLL VENOUS BLD VENIPUNCTURE: CPT | Performed by: EMERGENCY MEDICINE

## 2020-01-08 PROCEDURE — 82075 ASSAY OF BREATH ETHANOL: CPT | Performed by: EMERGENCY MEDICINE

## 2020-01-08 PROCEDURE — 96361 HYDRATE IV INFUSION ADD-ON: CPT

## 2020-01-08 PROCEDURE — 99284 EMERGENCY DEPT VISIT MOD MDM: CPT | Performed by: EMERGENCY MEDICINE

## 2020-01-08 RX ORDER — SODIUM CHLORIDE 9 MG/ML
125 INJECTION, SOLUTION INTRAVENOUS CONTINUOUS
Status: DISCONTINUED | OUTPATIENT
Start: 2020-01-08 | End: 2020-01-08 | Stop reason: HOSPADM

## 2020-01-08 RX ADMIN — THIAMINE HYDROCHLORIDE 100 MG: 100 INJECTION, SOLUTION INTRAMUSCULAR; INTRAVENOUS at 02:27

## 2020-01-08 RX ADMIN — SODIUM CHLORIDE 1000 ML: 0.9 INJECTION, SOLUTION INTRAVENOUS at 02:13

## 2020-01-08 RX ADMIN — SODIUM CHLORIDE 125 ML/HR: 0.9 INJECTION, SOLUTION INTRAVENOUS at 04:49

## 2020-01-08 NOTE — ED NOTES
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 of 2019  Patient had attended two IP D&A treatment facilities in [facility] and then transitioned to a [facility] in [place]  Patient moved back locally to [place] in December and reports that he started drinking almost immediately and was triggered by past stressors, regrets and failures  Patient is currently drinking approximately 32 oz vodka/day  Patient denies any other substance use aside from occasional marijuana  Patient denies [address] history but reports he'd be interested in getting established with PCP for medication management  CM discussed resident clinic and agreed to place contact information on AVS  Patient reports he had insurance which terminated on 1/1/20; [facility] yesterday and picked up an [place] application  Patient is interested in IP D&A treatment again  CM discussed [facility] program and patient would like CM to make referral to them for assessment  Medical release form for [facility] signed by patient; copy provided to patient and original placed for scanning to medical records  LETY placed call to [facility] and spoke with Virginie Browne; he reports a clinician will be out within 1 5 hrs to assess patient  CM faxed clinical to [facility]  Charge RN and ED provider aware  CM will continue to follow patient

## 2020-01-08 NOTE — ED NOTES
CM received call from Ena Bloompoppatti at [place] [address]; she met with patient and patient reports he'd like to go home to shower and pack  [place] provided patient with [facility] contact info and told him they'd follow up with him at home within the next few hours to provide update re: bed search status  Patient to discharge home today  ED provider aware and in agreement  Charge RN also aware   CM also placed [facility] contact information on AVS

## 2020-01-08 NOTE — ED PROVIDER NOTES
History  Chief Complaint   Patient presents with    Alcohol Problem     Pt reports seeking rehab for alcohol problem, denies any drug use  Denies any SI, HI  States he has been doing well with cutting back on drinking and does not want to fall off track  Last drink was approx 2 hrs ago, drank about 5 "small bottles of 99" Denies any complaints      Patient is a 47year old male with increased alcohol use since 1/1/20  Has h/o alcoholism and states he was sober from June to December last year and then moved back to the area here from [place]  Denies SI/HI  No fever  No N/V  Denies shakiness  Wants rehab for alcohol abuse before it "gets out of control " Was last seen in this ED on 12/29/19 for cervical pain  [facility] website checked on this patient and last Rx filled was on 12/30/19 for tramadol for 2 day supply  History provided by:  Patient   used: No    Alcohol Problem   Associated symptoms: no fever, no nausea and no vomiting        None       Past Medical History:   Diagnosis Date    Alcohol abuse     Alcoholism ([facility] [address] )     Anxiety     Depression        Past Surgical History:   Procedure Laterality Date    HERNIA REPAIR         History reviewed  No pertinent family history  I have reviewed and agree with the history as documented  Social History     Tobacco Use    Smoking status: Current Every Day Smoker     Packs/day: 0 50     Years: 5 00     Pack years: 2 50    Smokeless tobacco: Never Used   Substance Use Topics    Alcohol use: Yes     Comment: Once a week    Drug use: Not Currently        Review of Systems   Constitutional: Negative for fever  Gastrointestinal: Negative for nausea and vomiting  Neurological: Negative for tremors  Psychiatric/Behavioral: Negative for suicidal ideas  All other systems reviewed and are negative  Physical Exam  Physical Exam   Constitutional: He is oriented to person, place, and time   He appears well-developed and well-nourished  He appears distressed (mild)  HENT:   Head: Normocephalic and atraumatic  Mouth/Throat: Oropharynx is clear and moist    Eyes: No scleral icterus  Neck: No JVD present  No tracheal deviation present  Cardiovascular: Normal rate, regular rhythm and normal heart sounds  No murmur heard  Pulmonary/Chest: Effort normal and breath sounds normal  No respiratory distress  Abdominal: Soft  Bowel sounds are normal  He exhibits no distension  There is no tenderness  Musculoskeletal: He exhibits no edema or deformity  Neurological: He is alert and oriented to person, place, and time  Skin: Skin is warm and dry  No rash noted  Psychiatric: He has a normal mood and affect  Nursing note and vitals reviewed        Vital Signs  ED Triage Vitals   Temperature Pulse Respirations Blood Pressure SpO2   01/08/20 0143 01/08/20 0139 01/08/20 0139 01/08/20 0139 01/08/20 0139   97 9 °F (36 6 °C) 97 17 141/100 97 %      Temp Source Heart Rate Source Patient Position - Orthostatic VS BP Location FiO2 (%)   01/08/20 0139 01/08/20 0139 01/08/20 0139 01/08/20 0139 --   Oral Monitor Sitting Right arm       Pain Score       01/08/20 0139       No Pain           Vitals:    01/08/20 0139 01/08/20 0300 01/08/20 0400 01/08/20 0700   BP: 141/100 112/72 118/80 124/70   Pulse: 97 78 80 82   Patient Position - Orthostatic VS: Sitting Lying           Visual Acuity      ED Medications  Medications   sodium chloride 0 9 % infusion (125 mL/hr Intravenous New Bag 1/8/20 0449)   sodium chloride 0 9 % bolus 1,000 mL (0 mL Intravenous Stopped 1/8/20 0454)   thiamine (VITAMIN B1) 100 mg in sodium chloride 0 9 % 50 mL IVPB (0 mg Intravenous Stopped 1/8/20 0454)       Diagnostic Studies  Results Reviewed     Procedure Component Value Units Date/Time    POCT alcohol breath test [[id number]]  (Normal) Resulted:  01/08/20 0708    Lab Status:  Final result Updated:  01/08/20 0708     EXTBreath Alcohol 1 437    Salicylate level [[id number]]  (Abnormal) Collected:  01/08/20 0212    Lab Status:  Final result Specimen:  Blood from Arm, Right Updated:  22/93/72 1153     Salicylate Lvl <3 mg/dL     Acetaminophen level-If concentration is detectable, please discuss with medical  on call   [[id number]]  (Abnormal) Collected:  01/08/20 0212    Lab Status:  Final result Specimen:  Blood from Arm, Right Updated:  01/08/20 0236     Acetaminophen Level <2 ug/mL     Comprehensive metabolic panel [[id number]]  (Abnormal) Collected:  01/08/20 0212    Lab Status:  Final result Specimen:  Blood from Arm, Right Updated:  01/08/20 0236     Sodium 142 mmol/L      Potassium 4 0 mmol/L      Chloride 105 mmol/L      CO2 28 mmol/L      ANION GAP 9 mmol/L      BUN 10 mg/dL      Creatinine 0 89 mg/dL      Glucose 94 mg/dL      Calcium 9 4 mg/dL      AST 39 U/L      ALT 84 U/L      Alkaline Phosphatase 71 U/L      Total Protein 7 8 g/dL      Albumin 4 3 g/dL      Total Bilirubin 0 27 mg/dL      eGFR 100 ml/min/1 73sq m     Narrative:       [place] guidelines for Chronic Kidney Disease (CKD):     Stage 1 with normal or high GFR (GFR > 90 mL/min/1 73 square meters)    Stage 2 Mild CKD (GFR = 60-89 mL/min/1 73 square meters)    Stage 3A Moderate CKD (GFR = 45-59 mL/min/1 73 square meters)    Stage 3B Moderate CKD (GFR = 30-44 mL/min/1 73 square meters)    Stage 4 Severe CKD (GFR = 15-29 mL/min/1 73 square meters)    Stage 5 End Stage CKD (GFR <15 mL/min/1 73 square meters)  Note: GFR calculation is accurate only with a steady state creatinine    Ethanol [[id number]]  (Abnormal) Collected:  01/08/20 0212    Lab Status:  Final result Specimen:  Blood from Arm, Right Updated:  01/08/20 0231     Ethanol Lvl 209 mg/dL     Protime-INR [[id number]]  (Normal) Collected:  01/08/20 0212    Lab Status:  Final result Specimen:  Blood from Arm, Right Updated:  01/08/20 0230     Protime 12 4 seconds      INR 0 98    APTT [[id number]]  (Normal) Collected:  01/08/20 5330    Lab Status:  Final result Specimen:  Blood from Arm, Right Updated:  01/08/20 0230     PTT 30 seconds     Rapid drug screen, urine [[id number]]  (Normal) Collected:  01/08/20 2445    Lab Status:  Final result Specimen:  Urine, Clean Catch Updated:  01/08/20 0228     Amph/Meth UR Negative     Barbiturate Ur Negative     Benzodiazepine Urine Negative     Cocaine Urine Negative     Methadone Urine Negative     Opiate Urine Negative     PCP Ur Negative     THC Urine Negative    Narrative:       FOR MEDICAL PURPOSES ONLY  IF CONFIRMATION NEEDED PLEASE CONTACT THE LAB WITHIN 5 DAYS  Drug Screen Cutoff Levels:  AMPHETAMINE/METHAMPHETAMINES  1000 ng/mL  BARBITURATES     200 ng/mL  BENZODIAZEPINES     200 ng/mL  COCAINE      300 ng/mL  METHADONE      300 ng/mL  OPIATES      300 ng/mL  PHENCYCLIDINE     25 ng/mL  THC       50 ng/mL      CBC and differential [[id number]]  (Abnormal) Collected:  01/08/20 0212    Lab Status:  Final result Specimen:  Blood from Arm, Right Updated:  01/08/20 0220     WBC 7 84 Thousand/uL      RBC 5 42 Million/uL      Hemoglobin 18 2 g/dL      Hematocrit 52 7 %      MCV 97 fL      MCH 33 6 pg      MCHC 34 5 g/dL      RDW 13 1 %      MPV 8 0 fL      Platelets 559 Thousands/uL      nRBC 0 /100 WBCs      Neutrophils Relative 41 %      Immat GRANS % 0 %      Lymphocytes Relative 43 %      Monocytes Relative 8 %      Eosinophils Relative 7 %      Basophils Relative 1 %      Neutrophils Absolute 3 18 Thousands/µL      Immature Grans Absolute 0 02 Thousand/uL      Lymphocytes Absolute 3 34 Thousands/µL      Monocytes Absolute 0 62 Thousand/µL      Eosinophils Absolute 0 57 Thousand/µL      Basophils Absolute 0 11 Thousands/µL                  No orders to display              Procedures  Procedures         ED Course  ED Course as of Jan 08 0837   Wed Jan 08, 2020   0240 Patient sleeping  0710 Labs d/w patient         4445 Signed out to Dr Ramon Felipe this AM that disposition pending crisis evaluation for detox placement  MDM  Number of Diagnoses or Management Options  Diagnosis management comments: DDx including but not limited to: Alcohol intoxication, wanting alcohol detox (rehab) metabolic abnormality, doubt intracranial process, doubt overdose, substance abuse  Amount and/or Complexity of Data Reviewed  Clinical lab tests: ordered and reviewed  Decide to obtain previous medical records or to obtain history from someone other than the patient: yes  Review and summarize past medical records: yes  Discuss the patient with other providers: yes  Independent visualization of images, tracings, or specimens: yes          Disposition  Final diagnoses:   Alcohol abuse     Time reflects when diagnosis was documented in both MDM as applicable and the Disposition within this note     Time User Action Codes Description Comment    1/8/2020  3:18 AM Ethan Campoverde Add [F10 10] Alcohol abuse       ED Disposition     None      Follow-up Information    None         Patient's Medications    No medications on file     No discharge procedures on file      ED Provider  Electronically Signed by           Phillip Lin MD  01/08/20 8633

## 2020-01-08 NOTE — ED NOTES
Walking to BR without difficulty, given breakfast tray   Waiting for crisis     Joie Klein, SERINA  01/08/20 5874

## 2020-01-08 NOTE — ED NOTES
Pt sleeping comfortably at this time with no signs of distress noted  Call bell within reach, VS stable  Will continue to monitor        Librado Mcdonough RN  01/08/20 2224

## 2020-01-08 NOTE — ED CARE HANDOFF
Emergency Department Sign Out Note        Sign out and transfer of care from Dr Madeline Cheema  See Separate Emergency Department note  The patient, Cintia Morales, was evaluated by the previous provider for ETOH abuse  Workup Completed:  yes    ED Course / Workup Pending (followup): Patient seen by HOST  Wants to go home to shower and pack prior to rehab  Stable for d/c  Calling for a ride  Procedures  MDM    Disposition  Final diagnoses:   Alcohol abuse     Time reflects when diagnosis was documented in both MDM as applicable and the Disposition within this note     Time User Action Codes Description Comment    1/8/2020  3:18 AM Mckenna Chen Add [F10 10] Alcohol abuse       ED Disposition     ED Disposition Condition Date/Time Comment    Discharge Stable Wed Jan 8, 2020 10:17 AM Cintia Morales discharge to home/self care  Follow-up Information     Follow up With Specialties Details Why Contact Info    [facility]  Follow up Call to arrange PCP follow up  They will work with you on sliding scale while you don't have insurance  [address] [address], [address]  Phone: [phone]    [facility]  Follow up Follow up re: D&A treatment [facility] [address]        Patient's Medications    No medications on file     No discharge procedures on file         ED Provider  Electronically Signed by     Tierney Pal DO  01/08/20 7850

## 2020-02-14 ENCOUNTER — HOSPITAL ENCOUNTER (EMERGENCY)
Facility: HOSPITAL | Age: 50
Discharge: HOME | End: 2020-02-14
Attending: EMERGENCY MEDICINE | Admitting: EMERGENCY MEDICINE
Payer: COMMERCIAL

## 2020-02-14 ENCOUNTER — APPOINTMENT (EMERGENCY)
Dept: RADIOLOGY | Facility: HOSPITAL | Age: 50
End: 2020-02-14
Payer: COMMERCIAL

## 2020-02-14 VITALS
SYSTOLIC BLOOD PRESSURE: 128 MMHG | TEMPERATURE: 98.6 F | DIASTOLIC BLOOD PRESSURE: 88 MMHG | WEIGHT: 230 LBS | HEART RATE: 100 BPM | BODY MASS INDEX: 31.15 KG/M2 | RESPIRATION RATE: 16 BRPM | HEIGHT: 72 IN | OXYGEN SATURATION: 98 %

## 2020-02-14 DIAGNOSIS — F41.9 ANXIETY: Primary | ICD-10-CM

## 2020-02-14 DIAGNOSIS — R07.89 ATYPICAL CHEST PAIN: ICD-10-CM

## 2020-02-14 LAB
ANION GAP SERPL CALCULATED.3IONS-SCNC: 10 MMOL/L (ref 4–13)
ATRIAL RATE: 92 BPM
BASOPHILS # BLD AUTO: 0.07 THOUSANDS/ΜL (ref 0–0.1)
BASOPHILS NFR BLD AUTO: 1 % (ref 0–1)
BUN SERPL-MCNC: 9 MG/DL (ref 5–25)
CALCIUM SERPL-MCNC: 8.8 MG/DL (ref 8.3–10.1)
CHLORIDE SERPL-SCNC: 102 MMOL/L (ref 100–108)
CO2 SERPL-SCNC: 26 MMOL/L (ref 21–32)
CREAT SERPL-MCNC: 0.78 MG/DL (ref 0.6–1.3)
EOSINOPHIL # BLD AUTO: 0.22 THOUSAND/ΜL (ref 0–0.61)
EOSINOPHIL NFR BLD AUTO: 4 % (ref 0–6)
ERYTHROCYTE [DISTWIDTH] IN BLOOD BY AUTOMATED COUNT: 12.4 % (ref 11.6–15.1)
ETHANOL SERPL-MCNC: 185 MG/DL (ref 0–3)
GFR SERPL CREATININE-BSD FRML MDRD: 105 ML/MIN/1.73SQ M
GLUCOSE SERPL-MCNC: 98 MG/DL (ref 65–140)
HCT VFR BLD AUTO: 48.6 % (ref 36.5–49.3)
HGB BLD-MCNC: 16.8 G/DL (ref 12–17)
IMM GRANULOCYTES # BLD AUTO: 0.01 THOUSAND/UL (ref 0–0.2)
IMM GRANULOCYTES NFR BLD AUTO: 0 % (ref 0–2)
LYMPHOCYTES # BLD AUTO: 2.12 THOUSANDS/ΜL (ref 0.6–4.47)
LYMPHOCYTES NFR BLD AUTO: 35 % (ref 14–44)
MCH RBC QN AUTO: 33.5 PG (ref 26.8–34.3)
MCHC RBC AUTO-ENTMCNC: 34.6 G/DL (ref 31.4–37.4)
MCV RBC AUTO: 97 FL (ref 82–98)
MONOCYTES # BLD AUTO: 0.44 THOUSAND/ΜL (ref 0.17–1.22)
MONOCYTES NFR BLD AUTO: 7 % (ref 4–12)
NEUTROPHILS # BLD AUTO: 3.22 THOUSANDS/ΜL (ref 1.85–7.62)
NEUTS SEG NFR BLD AUTO: 53 % (ref 43–75)
NRBC BLD AUTO-RTO: 0 /100 WBCS
P AXIS: 58 DEGREES
PLATELET # BLD AUTO: 278 THOUSANDS/UL (ref 149–390)
PMV BLD AUTO: 8.2 FL (ref 8.9–12.7)
POTASSIUM SERPL-SCNC: 3.7 MMOL/L (ref 3.5–5.3)
PR INTERVAL: 164 MS
QRS AXIS: -57 DEGREES
QRSD INTERVAL: 118 MS
QT INTERVAL: 342 MS
QTC INTERVAL: 422 MS
RBC # BLD AUTO: 5.02 MILLION/UL (ref 3.88–5.62)
SODIUM SERPL-SCNC: 138 MMOL/L (ref 136–145)
T WAVE AXIS: 42 DEGREES
TROPONIN I SERPL-MCNC: <0.02 NG/ML
TROPONIN I SERPL-MCNC: <0.02 NG/ML
TSH SERPL DL<=0.05 MIU/L-ACNC: 1.5 UIU/ML (ref 0.36–3.74)
VENTRICULAR RATE: 92 BPM
WBC # BLD AUTO: 6.08 THOUSAND/UL (ref 4.31–10.16)

## 2020-02-14 PROCEDURE — 85025 COMPLETE CBC W/AUTO DIFF WBC: CPT | Performed by: EMERGENCY MEDICINE

## 2020-02-14 PROCEDURE — 84443 ASSAY THYROID STIM HORMONE: CPT | Performed by: EMERGENCY MEDICINE

## 2020-02-14 PROCEDURE — 93005 ELECTROCARDIOGRAM TRACING: CPT

## 2020-02-14 PROCEDURE — 99283 EMERGENCY DEPT VISIT LOW MDM: CPT | Performed by: EMERGENCY MEDICINE

## 2020-02-14 PROCEDURE — 71046 X-RAY EXAM CHEST 2 VIEWS: CPT

## 2020-02-14 PROCEDURE — 80320 DRUG SCREEN QUANTALCOHOLS: CPT | Performed by: EMERGENCY MEDICINE

## 2020-02-14 PROCEDURE — 80048 BASIC METABOLIC PNL TOTAL CA: CPT | Performed by: EMERGENCY MEDICINE

## 2020-02-14 PROCEDURE — 84484 ASSAY OF TROPONIN QUANT: CPT | Performed by: EMERGENCY MEDICINE

## 2020-02-14 PROCEDURE — 93010 ELECTROCARDIOGRAM REPORT: CPT | Performed by: INTERNAL MEDICINE

## 2020-02-14 PROCEDURE — 99284 EMERGENCY DEPT VISIT MOD MDM: CPT

## 2020-02-14 PROCEDURE — 36415 COLL VENOUS BLD VENIPUNCTURE: CPT | Performed by: EMERGENCY MEDICINE

## 2020-02-14 RX ORDER — DIAZEPAM 5 MG/1
5 TABLET ORAL 2 TIMES DAILY
Qty: 20 TABLET | Refills: 0 | Status: SHIPPED | OUTPATIENT
Start: 2020-02-14 | End: 2020-02-23

## 2020-02-14 RX ORDER — SODIUM CHLORIDE 9 MG/ML
3 INJECTION INTRAVENOUS AS NEEDED
Status: DISCONTINUED | OUTPATIENT
Start: 2020-02-14 | End: 2020-02-14 | Stop reason: HOSPADM

## 2020-02-14 NOTE — ED NOTES
Patient states "shon been drinking and to be honest, I probably shouldn't of drove "     Ronan Black  02/14/20 3444

## 2020-02-14 NOTE — ED PROVIDER NOTES
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  Denies exertional chest pain, changes in exercise tolerance  Reports h/o chronic neck pain that is unchanged but occasionally has throat closing sensation while watching TV  No difficulty eating/drinking, initiation or completion swallowing  Denies f/c/n/v/d  Request medication for anxiety  History provided by:  Medical records and patient   used: No    Shortness of Breath   Severity:  Moderate  Onset quality:  Sudden  Timing:  Intermittent  Progression:  Worsening  Chronicity:  New  Ineffective treatments:  None tried  Associated symptoms: no abdominal pain, no chest pain, no fever and no rash    Risk factors: alcohol use    Risk factors: no hx of PE/DVT, no oral contraceptive use, no prolonged immobilization and no recent surgery        None       Past Medical History:   Diagnosis Date    Alcohol abuse     Alcoholism ([facility] [address] )     Anxiety     Depression        Past Surgical History:   Procedure Laterality Date    HERNIA REPAIR         History reviewed  No pertinent family history  I have reviewed and agree with the history as documented  Social History     Tobacco Use    Smoking status: Current Every Day Smoker     Packs/day: 0 50     Years: 5 00     Pack years: 2 50    Smokeless tobacco: Never Used   Substance Use Topics    Alcohol use: Yes     Comment: Once a week    Drug use: Not Currently       Review of Systems   Constitutional: Negative for fever  Respiratory: Positive for shortness of breath  Cardiovascular: Negative for chest pain  Gastrointestinal: Negative for abdominal pain     Skin: Negative for rash  All other systems reviewed and are negative  Physical Exam  Physical Exam   Constitutional: He is oriented to person, place, and time  He appears well-developed and well-nourished  HENT:   Head: Normocephalic and atraumatic  Mouth/Throat: Uvula is midline, oropharynx is clear and moist and mucous membranes are normal  No tonsillar abscesses  Eyes: Conjunctivae are normal    Neck: Trachea normal and normal range of motion  Neck supple  No muscular tenderness present  No thyroid mass and no thyromegaly present  Cardiovascular: Normal rate, regular rhythm, normal heart sounds and intact distal pulses  No murmur heard  Pulmonary/Chest: Effort normal and breath sounds normal  No respiratory distress  Abdominal: Soft  Musculoskeletal: Normal range of motion  He exhibits no deformity  Neurological: He is alert and oriented to person, place, and time  Skin: Skin is warm and dry  He is not diaphoretic  Psychiatric: He has a normal mood and affect  His behavior is normal  Judgment and thought content normal    Nursing note and vitals reviewed        Vital Signs  ED Triage Vitals   Temperature Pulse Respirations Blood Pressure SpO2   02/14/20 1554 02/14/20 1553 02/14/20 1553 02/14/20 1553 02/14/20 1553   98 6 °F (37 °C) (!) 107 16 (!) 144/102 98 %      Temp Source Heart Rate Source Patient Position - Orthostatic VS BP Location FiO2 (%)   02/14/20 1554 -- -- -- --   Oral          Pain Score       --                  Vitals:    02/14/20 1553   BP: (!) 144/102   Pulse: (!) 107         Visual Acuity      ED Medications  Medications - No data to display    Diagnostic Studies  Results Reviewed     None                 No orders to display              Procedures  ECG 12 Lead Documentation Only  Date/Time: 2/14/2020 4:27 PM  Performed by: Vilma Gannon MD  Authorized by: Vilma Gannon MD     Indications / Diagnosis:  SOB  ECG reviewed by me, the ED Provider: yes    Patient location: ED  Previous ECG:     Previous ECG:  Compared to current    Comparison ECG info:  9/15/19    Similarity:  Changes noted    Comparison to cardiac monitor: Yes    Interpretation:     Interpretation: non-specific    Rate:     ECG rate:  92 BPM    ECG rate assessment: normal    Rhythm:     Rhythm: sinus rhythm    Ectopy:     Ectopy: none    QRS:     QRS axis:  Left  Conduction:     Conduction: normal    ST segments:     ST segments:  Normal  T waves:     T waves: non-specific    Comments:      No acute ischemia, no STEMI             ED Course  ED Course as of Feb 14 2353 Fri Feb 14, 2020   1732 EKG without acute ischemia  No signs vol overload on exam or CXR  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  Reports he continues to drink, but not daily, reports "1-2 glasses of wine today "  Denies w/d symptoms or DTs  Work up reassuring  MDM  Number of Diagnoses or Management Options  Anxiety:   Atypical chest pain:      Amount and/or Complexity of Data Reviewed  Clinical lab tests: ordered and reviewed  Tests in the radiology section of CPT®: ordered and reviewed  Tests in the medicine section of CPT®: ordered and reviewed  Review and summarize past medical records: yes  Independent visualization of images, tracings, or specimens: yes    Risk of Complications, Morbidity, and/or Mortality  Presenting problems: moderate  Diagnostic procedures: moderate  Management options: moderate    Patient Progress  Patient progress: improved        Disposition  Final diagnoses:   None     ED Disposition     None      Follow-up Information    None         Patient's Medications    No medications on file     No discharge procedures on file      PDMP Review       Value Time User    PDMP Reviewed  Yes 1/8/2020  1:43 AM Kae Gomez MD          ED Provider  Electronically Signed by           Uli Apodaca MD  02/14/20 4891

## 2020-02-15 LAB
ATRIAL RATE: 83 BPM
P AXIS: 58 DEGREES
PR INTERVAL: 156 MS
QRS AXIS: -57 DEGREES
QRSD INTERVAL: 114 MS
QT INTERVAL: 344 MS
QTC INTERVAL: 404 MS
T WAVE AXIS: 7 DEGREES
VENTRICULAR RATE: 83 BPM

## 2020-02-15 PROCEDURE — 93010 ELECTROCARDIOGRAM REPORT: CPT | Performed by: INTERNAL MEDICINE

## 2020-02-15 NOTE — DISCHARGE INSTRUCTIONS
Your testing was reassuring as was your exam   Please try to stop drinking completely  I have prescribed valium which should help present withdrawal as well as help with your anxiety  Please follow up WITHIN one week with a primary care doctor as well as cardiology  Return to the ER if you feeling worse

## 2020-02-23 ENCOUNTER — HOSPITAL ENCOUNTER (EMERGENCY)
Facility: HOSPITAL | Age: 50
Discharge: HOME | End: 2020-02-24
Attending: EMERGENCY MEDICINE | Admitting: EMERGENCY MEDICINE
Payer: COMMERCIAL

## 2020-02-23 DIAGNOSIS — F10.20 ALCOHOLISM (HCC): ICD-10-CM

## 2020-02-23 DIAGNOSIS — F10.920 ALCOHOLIC INTOXICATION WITHOUT COMPLICATION (HCC): Primary | ICD-10-CM

## 2020-02-23 LAB
BASOPHILS # BLD AUTO: 0.09 THOUSANDS/ΜL (ref 0–0.1)
BASOPHILS NFR BLD AUTO: 1 % (ref 0–1)
EOSINOPHIL # BLD AUTO: 0.42 THOUSAND/ΜL (ref 0–0.61)
EOSINOPHIL NFR BLD AUTO: 5 % (ref 0–6)
ERYTHROCYTE [DISTWIDTH] IN BLOOD BY AUTOMATED COUNT: 12.9 % (ref 11.6–15.1)
ETHANOL EXG-MCNC: 0.19 MG/DL
HCT VFR BLD AUTO: 49.3 % (ref 36.5–49.3)
HGB BLD-MCNC: 17.2 G/DL (ref 12–17)
IMM GRANULOCYTES # BLD AUTO: 0.02 THOUSAND/UL (ref 0–0.2)
IMM GRANULOCYTES NFR BLD AUTO: 0 % (ref 0–2)
LYMPHOCYTES # BLD AUTO: 3.15 THOUSANDS/ΜL (ref 0.6–4.47)
LYMPHOCYTES NFR BLD AUTO: 39 % (ref 14–44)
MCH RBC QN AUTO: 34.1 PG (ref 26.8–34.3)
MCHC RBC AUTO-ENTMCNC: 34.9 G/DL (ref 31.4–37.4)
MCV RBC AUTO: 98 FL (ref 82–98)
MONOCYTES # BLD AUTO: 0.54 THOUSAND/ΜL (ref 0.17–1.22)
MONOCYTES NFR BLD AUTO: 7 % (ref 4–12)
NEUTROPHILS # BLD AUTO: 3.81 THOUSANDS/ΜL (ref 1.85–7.62)
NEUTS SEG NFR BLD AUTO: 48 % (ref 43–75)
NRBC BLD AUTO-RTO: 0 /100 WBCS
PLATELET # BLD AUTO: 328 THOUSANDS/UL (ref 149–390)
PMV BLD AUTO: 8.7 FL (ref 8.9–12.7)
RBC # BLD AUTO: 5.04 MILLION/UL (ref 3.88–5.62)
WBC # BLD AUTO: 8.03 THOUSAND/UL (ref 4.31–10.16)

## 2020-02-23 PROCEDURE — 85025 COMPLETE CBC W/AUTO DIFF WBC: CPT | Performed by: EMERGENCY MEDICINE

## 2020-02-23 PROCEDURE — 96374 THER/PROPH/DIAG INJ IV PUSH: CPT

## 2020-02-23 PROCEDURE — 36415 COLL VENOUS BLD VENIPUNCTURE: CPT | Performed by: EMERGENCY MEDICINE

## 2020-02-23 PROCEDURE — 99285 EMERGENCY DEPT VISIT HI MDM: CPT

## 2020-02-23 PROCEDURE — 99283 EMERGENCY DEPT VISIT LOW MDM: CPT | Performed by: EMERGENCY MEDICINE

## 2020-02-23 PROCEDURE — 82075 ASSAY OF BREATH ETHANOL: CPT | Performed by: EMERGENCY MEDICINE

## 2020-02-23 PROCEDURE — 96361 HYDRATE IV INFUSION ADD-ON: CPT

## 2020-02-23 RX ORDER — SODIUM CHLORIDE, SODIUM LACTATE, POTASSIUM CHLORIDE, CALCIUM CHLORIDE 600; 310; 30; 20 MG/100ML; MG/100ML; MG/100ML; MG/100ML
1000 INJECTION, SOLUTION INTRAVENOUS CONTINUOUS
Status: DISCONTINUED | OUTPATIENT
Start: 2020-02-23 | End: 2020-02-24 | Stop reason: HOSPADM

## 2020-02-23 RX ORDER — ONDANSETRON 2 MG/ML
4 INJECTION INTRAMUSCULAR; INTRAVENOUS ONCE
Status: COMPLETED | OUTPATIENT
Start: 2020-02-23 | End: 2020-02-23

## 2020-02-23 RX ORDER — ONDANSETRON 4 MG/1
8 TABLET, ORALLY DISINTEGRATING ORAL ONCE
Status: DISCONTINUED | OUTPATIENT
Start: 2020-02-23 | End: 2020-02-24 | Stop reason: HOSPADM

## 2020-02-23 RX ORDER — NALTREXONE HYDROCHLORIDE 50 MG/1
50 TABLET, FILM COATED ORAL DAILY
Status: DISCONTINUED | OUTPATIENT
Start: 2020-02-24 | End: 2020-02-24 | Stop reason: HOSPADM

## 2020-02-23 RX ORDER — DIAZEPAM 5 MG/1
5 TABLET ORAL ONCE
Status: DISCONTINUED | OUTPATIENT
Start: 2020-02-23 | End: 2020-02-24 | Stop reason: HOSPADM

## 2020-02-23 RX ORDER — NICOTINE 21 MG/24HR
21 PATCH, TRANSDERMAL 24 HOURS TRANSDERMAL ONCE
Status: DISCONTINUED | OUTPATIENT
Start: 2020-02-23 | End: 2020-02-24 | Stop reason: HOSPADM

## 2020-02-23 RX ADMIN — ONDANSETRON 4 MG: 2 INJECTION INTRAMUSCULAR; INTRAVENOUS at 21:09

## 2020-02-23 RX ADMIN — SODIUM CHLORIDE, SODIUM LACTATE, POTASSIUM CHLORIDE, AND CALCIUM CHLORIDE 1000 ML: .6; .31; .03; .02 INJECTION, SOLUTION INTRAVENOUS at 21:05

## 2020-02-23 RX ADMIN — NICOTINE 21 MG: 21 PATCH, EXTENDED RELEASE TRANSDERMAL at 21:08

## 2020-02-24 VITALS
WEIGHT: 240 LBS | SYSTOLIC BLOOD PRESSURE: 111 MMHG | BODY MASS INDEX: 32.51 KG/M2 | OXYGEN SATURATION: 96 % | TEMPERATURE: 98.8 F | HEART RATE: 96 BPM | RESPIRATION RATE: 16 BRPM | DIASTOLIC BLOOD PRESSURE: 60 MMHG | HEIGHT: 72 IN

## 2020-02-24 LAB — ETHANOL EXG-MCNC: 0.08 MG/DL

## 2020-02-24 RX ORDER — DIAZEPAM 5 MG/1
5 TABLET ORAL EVERY 8 HOURS PRN
Qty: 20 TABLET | Refills: 0 | Status: SHIPPED | OUTPATIENT
Start: 2020-02-24 | End: 2020-08-11 | Stop reason: HOSPADM

## 2020-02-24 NOTE — ED NOTES
HOST arrived, went to patient's bedside -- woke patient up and stated they were here to do his drug & alcohol evaluation  When asked if he wanted to go to rehab, he stated no       Bruno Guadarrama RN  02/23/20 5582

## 2020-02-24 NOTE — DISCHARGE INSTRUCTIONS
DIAGNOSIS; ALCOHOLISM  WITH ACUTE ALCOHOL INTOXICATION     - PLEASE BRENNAN HOST NUMBER IN MORNING TO GET REHAB PLACEMENT    - VALIUM 1 TABLET 3 TIMES A DAY AS NEEDED      - NALTREXONE- 1 TABLET ONCE A DAY - HELPS PREVENT ALCOHOL CRAVINGS     -  IF YOU START TO FEEL WORSE PLEASE RETURN TO  THE ER

## 2020-02-24 NOTE — ED NOTES
Called HOST at physician's direction and spoke with Regine Montiel, provided patient information -- HOST representative states she will be out to see patient       Brody Ramirez RN  02/23/20 6045

## 2020-02-24 NOTE — ED NOTES
Pt on phone lenaaring   Informed pt that he needs to keep it down and stay appropriate as we have lots of sick people here     Graham Ma RN  02/23/20 2021

## 2020-02-24 NOTE — ED NOTES
After speaking with patient again, he agreed to speak with HOST representatives  They returned to bedside at this time       Santo Evangelista, SERINA  02/23/20 7764

## 2020-02-27 ENCOUNTER — HOSPITAL ENCOUNTER (EMERGENCY)
Facility: HOSPITAL | Age: 50
Discharge: HOME | End: 2020-02-28
Attending: EMERGENCY MEDICINE | Admitting: EMERGENCY MEDICINE
Payer: COMMERCIAL

## 2020-02-27 DIAGNOSIS — F10.10 ALCOHOL ABUSE: Primary | ICD-10-CM

## 2020-02-27 LAB — ETHANOL SERPL-MCNC: 195 MG/DL (ref 0–3)

## 2020-02-27 PROCEDURE — 36415 COLL VENOUS BLD VENIPUNCTURE: CPT | Performed by: EMERGENCY MEDICINE

## 2020-02-27 PROCEDURE — 80320 DRUG SCREEN QUANTALCOHOLS: CPT | Performed by: EMERGENCY MEDICINE

## 2020-02-27 PROCEDURE — 99285 EMERGENCY DEPT VISIT HI MDM: CPT

## 2020-02-27 PROCEDURE — 99282 EMERGENCY DEPT VISIT SF MDM: CPT | Performed by: EMERGENCY MEDICINE

## 2020-02-28 VITALS
RESPIRATION RATE: 18 BRPM | TEMPERATURE: 99.5 F | OXYGEN SATURATION: 97 % | BODY MASS INDEX: 32.55 KG/M2 | WEIGHT: 240 LBS | DIASTOLIC BLOOD PRESSURE: 90 MMHG | HEART RATE: 99 BPM | SYSTOLIC BLOOD PRESSURE: 131 MMHG

## 2020-02-28 NOTE — ED NOTES
2/28/20 @ 0715:  PES met with 47year-old male, who self-presented to ED seeking treatment for ETOH abuse  Patient was recently at another [facility]  [facility] and set up with the "HOST" program   Patient thought that by coming to [place], the process could proceed faster, which patient was informed, was not the case  Patient admits to drinking at least a pint a day of "anything with alcohol; I drink it straight all day from morning until night; I don't even want to eat anymore; I would rather drink; I love it, but I know it's killing me "  Patient is an  and says, "I have a ton of work; I make more money than you," which of course is true  Patient has been to ETOH rehabilitation 12 times; longest sobriety is 5 years, but usually starts drinking with a few weeks after completion of rehab    Patient says he should go to "sober living after completion of rehab this time "  Patient denies any mental health issues and will be discharged home and encouraged to contact HOST program     Alcohol intoxication with severe use disorder: F10 941    Angi MS

## 2020-02-28 NOTE — ED PROVIDER NOTES
History  Chief Complaint   Patient presents with    Alcohol Intoxication     Pt  presents to the ED with complaints of alcohol intoxication as well as requesting rehab       49 YR MALE- ALCOHOLIC--  DAILY ALCOHOL; USE HAS BEEN IN  REHAB MUTLTPLE TIMES=- [address]-- IN ER RECENTLY FOR THE SAME -  C/O NAUSEA- FEELING ANXIOUS  WITH DARK STOOLS- NO OTHER COMPS- NO SI/ PLAN/ INTENT- NO ILLEGAL DRUG INDU- NO FEVERS/ NEW COUGH /HEMOPTYSIS/ HEMATEMSIS- HEAD TRAUMA/SZ'S      History provided by:  Patient  Alcohol Intoxication   Timing:  Constant  Associated symptoms: nausea    Associated symptoms: no abdominal pain, no agitation, no confusion, no hallucinations, no suicidal ideation and no vomiting        None       Past Medical History:   Diagnosis Date    Alcohol abuse     Alcoholism ([facility] [address] )     Anxiety     Depression        Past Surgical History:   Procedure Laterality Date    HERNIA REPAIR         History reviewed  No pertinent family history  I have reviewed and agree with the history as documented  E-Cigarette/Vaping     E-Cigarette/Vaping Substances     Social History     Tobacco Use    Smoking status: Current Every Day Smoker     Packs/day: 0 50     Years: 5 00     Pack years: 2 50    Smokeless tobacco: Never Used   Substance Use Topics    Alcohol use: Yes     Comment: 1 bottle of vodka daily    Drug use: Not Currently       Review of Systems   Constitutional: Negative  HENT: Negative  Eyes: Negative  Respiratory: Negative  Cardiovascular: Negative  Gastrointestinal: Positive for blood in stool and nausea  Negative for abdominal distention, abdominal pain, anal bleeding, constipation, diarrhea, rectal pain and vomiting  Endocrine: Negative  Genitourinary: Negative  Musculoskeletal: Negative  Skin: Negative for color change, pallor, rash and wound  Allergic/Immunologic: Negative  Neurological: Negative  Hematological: Negative  Psychiatric/Behavioral: Negative for agitation, behavioral problems, confusion, decreased concentration, dysphoric mood, hallucinations, self-injury, sleep disturbance and suicidal ideas  The patient is nervous/anxious  The patient is not hyperactive  Physical Exam  Physical Exam   Constitutional: He is oriented to person, place, and time  He appears well-developed and well-nourished  No distress  AVSS--  PULSE OX 96 % ON RA- INTERPRETATION IS NORMAL- NO INTERV ENTION- IN NAD- ACTIVELY EATIGN IN ROOM    HENT:   Head: Normocephalic and atraumatic  Right Ear: External ear normal    Left Ear: External ear normal    Nose: Nose normal    Mouth/Throat: Oropharynx is clear and moist  No oropharyngeal exudate  NO SCALP TENDERNESS/ CONTUSION/ HEAMTOMA   Eyes: Pupils are equal, round, and reactive to light  Conjunctivae and EOM are normal  Right eye exhibits no discharge  Left eye exhibits no discharge  No scleral icterus  MM PIN K   Neck: Normal range of motion  Neck supple  No JVD present  No tracheal deviation present  No thyromegaly present  NO PMT C//TL/S SPINE   Cardiovascular: Normal rate, regular rhythm, normal heart sounds and intact distal pulses  Exam reveals no gallop and no friction rub  No murmur heard  Pulmonary/Chest: Effort normal and breath sounds normal  No stridor  No respiratory distress  He has no wheezes  He has no rales  He exhibits no tenderness  Abdominal: Soft  Bowel sounds are normal  He exhibits no distension and no mass  There is no tenderness  There is no rebound and no guarding  No hernia  NT/ND- NO CVA TENDERNESS/ NO HSM- NO PERITONEEAL SIGNS- NO PULSATILE ABD MASS/BRUIT/ TENDERNESS   Musculoskeletal: Normal range of motion  He exhibits edema  He exhibits no tenderness or deformity  TRACE BEL PRETIBNIAL EDEMA- NTY- NO ASYM/ ERYTHEMA- EQUAL BILATERAL RADIAL/DP PULSES   Lymphadenopathy:     He has no cervical adenopathy     Neurological: He is alert and oriented to person, place, and time  No cranial nerve deficit or sensory deficit  He exhibits normal muscle tone  Coordination normal    NO NYSTAGMUS- NEG TEST OF SCKEW- NORMAL FINGER TO NOSE ALL BILATERALLY    Skin: Skin is warm  Capillary refill takes less than 2 seconds  No rash noted  He is not diaphoretic  No erythema  No pallor  Psychiatric: He has a normal mood and affect  His behavior is normal  Judgment and thought content normal    Nursing note and vitals reviewed        Vital Signs  ED Triage Vitals [02/23/20 1947]   Temperature Pulse Respirations Blood Pressure SpO2   98 8 °F (37 1 °C) (!) 110 20 130/82 96 %      Temp Source Heart Rate Source Patient Position - Orthostatic VS BP Location FiO2 (%)   Oral Monitor Sitting Left arm --      Pain Score       9           Vitals:    02/23/20 1947 02/23/20 2105 02/23/20 2130 02/24/20 0100   BP: 130/82 115/67 111/79 111/60   Pulse: (!) 110 79 92 96   Patient Position - Orthostatic VS: Sitting Lying Lying Lying         Visual Acuity      ED Medications  Medications   ondansetron (ZOFRAN) injection 4 mg (4 mg Intravenous Given 2/23/20 2109)       Diagnostic Studies  Results Reviewed     Procedure Component Value Units Date/Time    POCT alcohol breath test [[id number]]  (Normal) Resulted:  02/24/20 0110    Lab Status:  Final result Updated:  02/24/20 0112     EXTBreath Alcohol 0 077    CBC and differential [[id number]]  (Abnormal) Collected:  02/23/20 2056    Lab Status:  Final result Specimen:  Blood from Arm, Right Updated:  02/23/20 2106     WBC 8 03 Thousand/uL      RBC 5 04 Million/uL      Hemoglobin 17 2 g/dL      Hematocrit 49 3 %      MCV 98 fL      MCH 34 1 pg      MCHC 34 9 g/dL      RDW 12 9 %      MPV 8 7 fL      Platelets 974 Thousands/uL      nRBC 0 /100 WBCs      Neutrophils Relative 48 %      Immat GRANS % 0 %      Lymphocytes Relative 39 %      Monocytes Relative 7 %      Eosinophils Relative 5 %      Basophils Relative 1 % Neutrophils Absolute 3 81 Thousands/µL      Immature Grans Absolute 0 02 Thousand/uL      Lymphocytes Absolute 3 15 Thousands/µL      Monocytes Absolute 0 54 Thousand/µL      Eosinophils Absolute 0 42 Thousand/µL      Basophils Absolute 0 09 Thousands/µL     POCT alcohol breath test [[id number]]  (Normal) Resulted:  02/23/20 2008    Lab Status:  Final result Updated:  02/23/20 2008     EXTBreath Alcohol 0 189                 No orders to display              Procedures  Procedures         ED Course  ED Course as of Feb 27 1918   Zia Mcgowan Feb 23, 2020 2035 - er md note- recen labs reviewed by er md      2036 Er md note- ano-rectal exam- normal anal exam- brown - heme pos stools       2349 - ER MD NOTE- PT- RE-EVALUATED- SOUND ASLEEP        Mon Feb 24, 2020 0027 ER MD NOTE- PT SEEN BY HOST- WILL TRY TO PLACE IN MORNING- PT OK WITH D/C AND WILL CALL HOST IN AM -- WILL D/C                                  MDM      Disposition  Final diagnoses:   Alcoholic intoxication without complication ([facility] [address] )   Alcoholism ([facility] [address] )     Time reflects when diagnosis was documented in both MDM as applicable and the Disposition within this note     Time User Action Codes Description Comment    2/24/2020 12:28 AM Erma MONTERO Add [X11 681] Alcoholic intoxication without complication ([facility] [address] )     9/86/4067 12:28 AM Verner Holland Add [F10 20] Alcoholism [facility])       ED Disposition     ED Disposition Condition Date/Time Comment    Discharge Stable Mon Feb 24, 2020 12:28 AM Divina Florida discharge to home/self care  Follow-up Information    None         Discharge Medication List as of 2/24/2020 12:32 AM      START taking these medications    Details   diazepam (Valium) 5 mg tablet Take 1 tablet (5 mg total) by mouth every 8 (eight) hours as needed for anxiety for up to 20 doses, Starting Mon 2/24/2020, Print           No discharge procedures on file      PDMP Review       Value Time User    PDMP Reviewed  Yes 1/8/2020  1:43 AM Eusebia Rolle MD          ED Provider  Electronically Signed by           Rancho Mars MD  02/27/20 8548

## 2020-02-28 NOTE — ED NOTES
Pt sleeping soundly and snoring  Pulseox noted to be 88-90% on RA  Entered room to start O2 via NC and pt awakened and pulseox increased to 97% immediately  Nasal cannula in place without O2 on to facilitate rapid intervention if his pulseox decreases again  Will continue to monitor pt status       Melvina Perez RN  02/27/20 5936

## 2020-02-28 NOTE — ED NOTES
Pt sleeping/snoring  Respirations easy and unlabored  Repositions self in bed   No distress noted     Jorge Lugo RN  02/28/20 0125

## 2020-02-28 NOTE — DISCHARGE INSTRUCTIONS
Return to the ER for further concerns or worsening symptoms  Follow up with your primary care physician in 1-2 days  Follow up with the [facility] program as scheduled

## 2020-02-28 NOTE — ED NOTES
Pt sleeping soundly  No apparent distress noted  Will continue to monitor       Princess Mathew RN  02/27/20 1651

## 2020-03-13 ENCOUNTER — HOSPITAL ENCOUNTER (EMERGENCY)
Facility: HOSPITAL | Age: 50
Discharge: HOME | End: 2020-03-13
Attending: EMERGENCY MEDICINE | Admitting: EMERGENCY MEDICINE
Payer: COMMERCIAL

## 2020-03-13 VITALS
HEART RATE: 102 BPM | OXYGEN SATURATION: 97 % | TEMPERATURE: 99.2 F | HEIGHT: 72 IN | BODY MASS INDEX: 29.86 KG/M2 | SYSTOLIC BLOOD PRESSURE: 141 MMHG | WEIGHT: 220.46 LBS | DIASTOLIC BLOOD PRESSURE: 88 MMHG | RESPIRATION RATE: 14 BRPM

## 2020-03-13 DIAGNOSIS — F10.220 ALCOHOL INTOXICATION IN ACTIVE ALCOHOLIC WITHOUT COMPLICATION (HCC): Primary | ICD-10-CM

## 2020-03-13 PROCEDURE — 99283 EMERGENCY DEPT VISIT LOW MDM: CPT | Performed by: EMERGENCY MEDICINE

## 2020-03-13 PROCEDURE — 99283 EMERGENCY DEPT VISIT LOW MDM: CPT

## 2020-03-13 NOTE — ED PROVIDER NOTES
History  Chief Complaint   Patient presents with    Alcohol Problem     Pt referred to ED by , wants to be in an alcohol rehab  Pt reports currently drinking approx 1/2 pint/day  27-year-old male long standing history of alcohol abuse presents today stating he spoke to the host program and was told to come in to be place  Patient states he drank a large amount of alcohol just prior to arrival and drove himself here  Stating to me I know I am over the legal limit  but I do not feel drunk "  Breathalyzer here 0 2 patient denies any other drugs or alcohol , denies want to harm himself or anybody else  No falls no injury no headache no neck pain  No fever no cough no nausea no vomiting          Prior to Admission Medications   Prescriptions Last Dose Informant Patient Reported? Taking?   diazepam (Valium) 5 mg tablet   No No   Sig: Take 1 tablet (5 mg total) by mouth every 8 (eight) hours as needed for anxiety for up to 20 doses   Patient taking differently: Take 5 mg by mouth every 8 (eight) hours as needed for anxiety States " I ran out "      Facility-Administered Medications: None       Past Medical History:   Diagnosis Date    Alcohol abuse     Alcoholism ([facility] [address] )     Anxiety     Arthritis     Depression        Past Surgical History:   Procedure Laterality Date    HERNIA REPAIR         History reviewed  No pertinent family history  I have reviewed and agree with the history as documented      E-Cigarette/Vaping    E-Cigarette Use Never User      E-Cigarette/Vaping Substances     Social History     Tobacco Use    Smoking status: Current Every Day Smoker     Packs/day: 0 50     Years: 5 00     Pack years: 2 50    Smokeless tobacco: Never Used   Substance Use Topics    Alcohol use: Yes     Frequency: 4 or more times a week     Drinks per session: 10 or more     Binge frequency: Daily or almost daily     Comment: 1 bottle of vodka daily    Drug use: Not Currently       Review of Systems Constitutional: Negative for activity change, chills, diaphoresis and fever  HENT: Negative for congestion, sinus pressure and sore throat  Eyes: Negative for pain and visual disturbance  Respiratory: Negative for cough, chest tightness, shortness of breath, wheezing and stridor  Cardiovascular: Negative for chest pain and palpitations  Gastrointestinal: Negative for abdominal distention, abdominal pain, constipation, diarrhea, nausea and vomiting  Genitourinary: Negative for dysuria and frequency  Musculoskeletal: Negative for neck pain and neck stiffness  Skin: Negative for rash  Neurological: Negative for dizziness, speech difficulty, light-headedness, numbness and headaches  Physical Exam  Physical Exam   Constitutional: He is oriented to person, place, and time  He appears well-developed  No distress  HENT:   Head: Normocephalic and atraumatic  Eyes: Pupils are equal, round, and reactive to light  Neck: Normal range of motion  Neck supple  No tracheal deviation present  Cardiovascular: Normal rate, regular rhythm, normal heart sounds and intact distal pulses  No murmur heard  Pulmonary/Chest: Effort normal and breath sounds normal  No stridor  No respiratory distress  Abdominal: Soft  He exhibits no distension  There is no tenderness  There is no rebound and no guarding  Musculoskeletal: Normal range of motion  Normal gait    Neurological: He is alert and oriented to person, place, and time  Speaking full clear sentences not slurring words   Skin: Skin is warm and dry  He is not diaphoretic  No erythema  No pallor  Psychiatric: He has a normal mood and affect  Vitals reviewed        Vital Signs  ED Triage Vitals [03/13/20 1655]   Temperature Pulse Respirations Blood Pressure SpO2   99 2 °F (37 3 °C) 102 14 141/88 97 %      Temp Source Heart Rate Source Patient Position - Orthostatic VS BP Location FiO2 (%)   Oral -- -- -- --      Pain Score       No Pain Vitals:    03/13/20 1655   BP: 141/88   Pulse: 102         Visual Acuity      ED Medications  Medications - No data to display    Diagnostic Studies  Results Reviewed     None                 No orders to display              Procedures  Procedures         ED Course  ED Course as of Mar 13 1746   Fri Mar 13, 2020   1744 Notified by nursing staff that patient left, it was made very clear to patient multiple times that he needed a sober ride as he was over the legal limit  Patient at my time of evaluation was clinically not intoxicated he was speaking full clear sentences able ambulate without any difficulty, this is in the setting of a chronic alcoholism  Patient did drive himself here, we did notify security in the attempting to look for patient in the parking lot as he did leave prior to us visualizing his sober ride home                                    MDM  Number of Diagnoses or Management Options  Alcohol intoxication in active alcoholic without complication [facility]): new and requires workup  Diagnosis management comments:       Initial ED assessment:  50-year-old male presents intoxicated requesting rehab  Initial ED plan:   As we are currently in the middle of a COVID-19 pandemic, I offered the patient to wait in a hallway bed until he is sober to be evaluated by host as he does not need a full medical room and I do not want discharge him to the waiting room due to him being over the legal limit, and telling me he drove here and I do fear that he will just simply drive home  Patient did not want wait no hallway, patient is electing to be discharged, says he will call a friend pick him up              Disposition  Final diagnoses:   Alcohol intoxication in active alcoholic without complication [facility])     Time reflects when diagnosis was documented in both MDM as applicable and the Disposition within this note     Time User Action Codes Description Comment    3/13/2020  5:09 PM Camelia Joyner Add [F10 220] Alcohol intoxication in active alcoholic without complication [facility])       ED Disposition     ED Disposition Condition Date/Time Comment    Discharge Stable Fri Mar 13, 2020  5:09 PM Rayna Tran discharge to home/self care  Follow-up Information    None         Patient's Medications   Discharge Prescriptions    No medications on file     No discharge procedures on file      PDMP Review       Value Time User    PDMP Reviewed  Yes 1/8/2020  1:43 AM Derrick Curiel MD          ED Provider  Electronically Signed by           Jeaneth Rubalcava DO  03/13/20 7082

## 2020-03-13 NOTE — ED NOTES
Patient left ER while I was in another patients room   He was witnessed getting in his car and driving out of the parking lot  I called the non emergent [place] PD to report patient leaving while intoxicated        Brenda Tracy RN  03/13/20 1936

## 2020-03-13 NOTE — ED NOTES
Patient was placed in hallway bed to sober or find sober ride home  He was not happy with being placed in the hallway  He wanted a room and a meal until host arrived  It was explained that host would not see patient until sober        Bakari Thompson RN  03/13/20 8772

## 2020-03-20 ENCOUNTER — HOSPITAL ENCOUNTER (EMERGENCY)
Facility: HOSPITAL | Age: 50
Discharge: HOME | End: 2020-03-20
Attending: EMERGENCY MEDICINE | Admitting: EMERGENCY MEDICINE
Payer: COMMERCIAL

## 2020-03-20 VITALS
HEIGHT: 72 IN | DIASTOLIC BLOOD PRESSURE: 91 MMHG | TEMPERATURE: 98.1 F | WEIGHT: 220.68 LBS | SYSTOLIC BLOOD PRESSURE: 139 MMHG | OXYGEN SATURATION: 99 % | HEART RATE: 88 BPM | RESPIRATION RATE: 18 BRPM | BODY MASS INDEX: 29.89 KG/M2

## 2020-03-20 DIAGNOSIS — F10.10 ALCOHOL ABUSE: Primary | ICD-10-CM

## 2020-03-20 DIAGNOSIS — F10.929 ALCOHOL INTOXICATION (HCC): ICD-10-CM

## 2020-03-20 LAB — ETHANOL EXG-MCNC: 0.22 MG/DL

## 2020-03-20 PROCEDURE — 99282 EMERGENCY DEPT VISIT SF MDM: CPT | Performed by: EMERGENCY MEDICINE

## 2020-03-20 PROCEDURE — 82075 ASSAY OF BREATH ETHANOL: CPT | Performed by: EMERGENCY MEDICINE

## 2020-03-20 PROCEDURE — 99285 EMERGENCY DEPT VISIT HI MDM: CPT

## 2020-03-20 NOTE — ED PROVIDER NOTES
History  Chief Complaint   Patient presents with    Alcohol Problem     Pt presents to the ED seeking help for his alcohol addiction  Spoke with someone from [facility]OST 3 hours ago, pt was told to come to the ED and they would come here to speak to him  Pt drove here himself, reports he "did drink today but is not intoxicated "     Patient referred to the emergency department by Host   Patient is admitted alcoholic and states that he cannot longer live the way he is living his life without call playing such an important part of his daily life  He admittedly drank a bottle of wine today  He denies other drug use  He denies suicidal or homicidal ideation  He admits to mild depression over his drinking  He denies physical complaints  Prior to Admission Medications   Prescriptions Last Dose Informant Patient Reported? Taking?   diazepam (Valium) 5 mg tablet More than a month at Unknown time  No No   Sig: Take 1 tablet (5 mg total) by mouth every 8 (eight) hours as needed for anxiety for up to 20 doses   Patient taking differently: Take 5 mg by mouth every 8 (eight) hours as needed for anxiety States " I ran out "      Facility-Administered Medications: None       Past Medical History:   Diagnosis Date    Alcohol abuse     Alcoholism ([facility] [address] )     Anxiety     Arthritis     Depression        Past Surgical History:   Procedure Laterality Date    HERNIA REPAIR         History reviewed  No pertinent family history  I have reviewed and agree with the history as documented      E-Cigarette/Vaping    E-Cigarette Use Never User      E-Cigarette/Vaping Substances     Social History     Tobacco Use    Smoking status: Current Every Day Smoker     Packs/day: 0 50     Years: 5 00     Pack years: 2 50    Smokeless tobacco: Never Used   Substance Use Topics    Alcohol use: Yes     Frequency: 4 or more times a week     Drinks per session: 10 or more     Binge frequency: Daily or almost daily     Comment: 1 bottle of vodka daily    Drug use: Not Currently       Review of Systems   Constitutional: Negative  Negative for activity change and appetite change  HENT: Negative  Negative for congestion, drooling, ear discharge, ear pain, rhinorrhea, sinus pressure, sinus pain, sore throat, trouble swallowing and voice change  Eyes: Negative  Negative for photophobia and visual disturbance  Respiratory: Negative  Negative for cough, choking, chest tightness, shortness of breath, wheezing and stridor  Cardiovascular: Negative  Negative for chest pain, palpitations and leg swelling  Gastrointestinal: Negative  Negative for abdominal distention, abdominal pain, blood in stool, constipation, diarrhea, nausea and vomiting  Endocrine: Negative  Genitourinary: Negative  Negative for decreased urine volume, discharge, dysuria, flank pain, frequency, penile pain, scrotal swelling and testicular pain  Musculoskeletal: Negative  Negative for arthralgias, back pain, neck pain and neck stiffness  Skin: Negative  Negative for rash and wound  Allergic/Immunologic: Negative  Neurological: Negative  Negative for dizziness, tremors, seizures, syncope, facial asymmetry, speech difficulty, weakness, light-headedness, numbness and headaches  Hematological: Negative  Does not bruise/bleed easily  Psychiatric/Behavioral: Negative  Negative for agitation, behavioral problems, confusion, decreased concentration, dysphoric mood, hallucinations, self-injury and suicidal ideas  The patient is not nervous/anxious  Physical Exam  Physical Exam   Constitutional: He is oriented to person, place, and time  He appears well-developed and well-nourished  No distress  Nontoxic appearance  No respiratory distress  There is alcohol on his breath  He can engage in normal conversation answer simple questions appropriately  He follows all commands and moves all extremities spontaneously and to command     HENT:   Head: Normocephalic and atraumatic  Right Ear: External ear normal    Left Ear: External ear normal    Nose: Nose normal    Mouth/Throat: Oropharynx is clear and moist  No oropharyngeal exudate  Eyes: Pupils are equal, round, and reactive to light  Conjunctivae and EOM are normal    Neck: Normal range of motion  Neck supple  Cardiovascular: Normal rate, regular rhythm, normal heart sounds and intact distal pulses  Pulmonary/Chest: Effort normal and breath sounds normal  No stridor  No respiratory distress  He has no wheezes  He has no rales  He exhibits no tenderness  Abdominal: Soft  Bowel sounds are normal  He exhibits no distension and no mass  There is no tenderness  There is no rebound and no guarding  No hernia  Musculoskeletal: Normal range of motion  He exhibits no edema, tenderness or deformity  Neurological: He is alert and oriented to person, place, and time  He has normal reflexes  He displays normal reflexes  No cranial nerve deficit or sensory deficit  He exhibits normal muscle tone  Coordination normal    Skin: Skin is warm and dry  Capillary refill takes less than 2 seconds  No rash noted  He is not diaphoretic  No erythema  No pallor  Psychiatric: He has a normal mood and affect  His behavior is normal  Judgment and thought content normal    Nursing note and vitals reviewed        Vital Signs  ED Triage Vitals   Temperature Pulse Respirations Blood Pressure SpO2   03/20/20 1539 03/20/20 1539 03/20/20 1539 03/20/20 1539 03/20/20 1539   98 1 °F (36 7 °C) 88 16 137/87 97 %      Temp Source Heart Rate Source Patient Position - Orthostatic VS BP Location FiO2 (%)   03/20/20 1539 03/20/20 1539 03/20/20 2155 03/20/20 1539 --   Oral Monitor Sitting Right arm       Pain Score       03/20/20 2155       No Pain           Vitals:    03/20/20 1539 03/20/20 2155   BP: 137/87 139/91   Pulse: 88 88   Patient Position - Orthostatic VS:  Sitting         Visual Acuity      ED Medications  Medications - No data to display    Diagnostic Studies  Results Reviewed     Procedure Component Value Units Date/Time    POCT alcohol breath test [[id number]]  (Normal) Resulted:  03/20/20 1626    Lab Status:  Final result Updated:  03/20/20 1626     EXTBreath Alcohol 0 217    Rapid drug screen, urine [[id number]]     Lab Status:  No result Specimen:  Urine                  No orders to display              Procedures  Procedures         ED Course  ED Course as of Mar 20 2330   Fri Mar 20, 2020   1641 Crisis aware of consult in the emergency department  They are communicating with Host for alcohol rehab placement  Patient is stable and cooperative with an unremarkable examination at this time  2147 Patient be picked in waiting room by Host   Patient is stable  Neurological intact and able  MDM      Disposition  Final diagnoses:   Alcohol abuse   Alcohol intoxication ([facility] [address] )     Time reflects when diagnosis was documented in both MDM as applicable and the Disposition within this note     Time User Action Codes Description Comment    3/20/2020  9:47 PM Marita Berry Add [F10 10] Alcohol abuse     3/20/2020  9:47 PM Marita Berry Add [F10 929] Alcohol intoxication [facility])       ED Disposition     ED Disposition Condition Date/Time Comment    Discharge Stable Fri Mar 20, 2020  9:47 PM Thaddeus Clifton discharge to home/self care  Follow-up Information     Follow up With Specialties Details Why Lebron Boyer MD Family Medicine Schedule an appointment as soon as possible for a visit   [address] [facility] Male [address] [place] [id number]  [phone]            Discharge Medication List as of 3/20/2020  9:48 PM      CONTINUE these medications which have NOT CHANGED    Details   diazepam (Valium) 5 mg tablet Take 1 tablet (5 mg total) by mouth every 8 (eight) hours as needed for anxiety for up to 20 doses, Starting Mon 2/24/2020, Print           No discharge procedures on file      PDMP Review       Value Time User    PDMP Reviewed  Yes 1/8/2020  1:43 AM Cale Bruno MD          ED Provider  Electronically Signed by           Surjit Dockery MD  03/20/20 0002

## 2020-03-31 ENCOUNTER — HOSPITAL ENCOUNTER (EMERGENCY)
Facility: HOSPITAL | Age: 50
Discharge: HOME | End: 2020-03-31
Attending: EMERGENCY MEDICINE | Admitting: EMERGENCY MEDICINE
Payer: COMMERCIAL

## 2020-03-31 VITALS
RESPIRATION RATE: 19 BRPM | SYSTOLIC BLOOD PRESSURE: 178 MMHG | BODY MASS INDEX: 31.15 KG/M2 | WEIGHT: 230 LBS | DIASTOLIC BLOOD PRESSURE: 107 MMHG | HEART RATE: 87 BPM | HEIGHT: 72 IN | OXYGEN SATURATION: 97 % | TEMPERATURE: 97.7 F

## 2020-03-31 DIAGNOSIS — F10.10 ALCOHOL ABUSE: Primary | ICD-10-CM

## 2020-03-31 LAB
AMPHETAMINES SERPL QL SCN: NEGATIVE
BARBITURATES UR QL: NEGATIVE
BENZODIAZ UR QL: POSITIVE
COCAINE UR QL: POSITIVE
ETHANOL EXG-MCNC: 0 MG/DL
METHADONE UR QL: NEGATIVE
OPIATES UR QL SCN: NEGATIVE
PCP UR QL: NEGATIVE
THC UR QL: NEGATIVE

## 2020-03-31 PROCEDURE — 80307 DRUG TEST PRSMV CHEM ANLYZR: CPT | Performed by: EMERGENCY MEDICINE

## 2020-03-31 PROCEDURE — 99284 EMERGENCY DEPT VISIT MOD MDM: CPT | Performed by: EMERGENCY MEDICINE

## 2020-03-31 PROCEDURE — 99284 EMERGENCY DEPT VISIT MOD MDM: CPT

## 2020-03-31 PROCEDURE — 82075 ASSAY OF BREATH ETHANOL: CPT | Performed by: EMERGENCY MEDICINE

## 2020-04-15 ENCOUNTER — HOSPITAL ENCOUNTER (EMERGENCY)
Facility: HOSPITAL | Age: 50
Discharge: HOME | End: 2020-04-15
Attending: EMERGENCY MEDICINE | Admitting: EMERGENCY MEDICINE
Payer: COMMERCIAL

## 2020-04-15 VITALS
SYSTOLIC BLOOD PRESSURE: 137 MMHG | HEART RATE: 95 BPM | TEMPERATURE: 98.6 F | RESPIRATION RATE: 18 BRPM | OXYGEN SATURATION: 95 % | BODY MASS INDEX: 30.59 KG/M2 | DIASTOLIC BLOOD PRESSURE: 85 MMHG | WEIGHT: 225.53 LBS

## 2020-04-15 DIAGNOSIS — F10.20 ALCOHOLISM (HCC): Primary | ICD-10-CM

## 2020-04-15 LAB
ETHANOL EXG-MCNC: 0.07 MG/DL
ETHANOL EXG-MCNC: 0.12 MG/DL

## 2020-04-15 PROCEDURE — 99282 EMERGENCY DEPT VISIT SF MDM: CPT | Performed by: EMERGENCY MEDICINE

## 2020-04-15 PROCEDURE — 99284 EMERGENCY DEPT VISIT MOD MDM: CPT

## 2020-04-15 PROCEDURE — 82075 ASSAY OF BREATH ETHANOL: CPT | Performed by: EMERGENCY MEDICINE

## 2020-08-04 ENCOUNTER — HOSPITAL ENCOUNTER (EMERGENCY)
Facility: HOSPITAL | Age: 50
End: 2020-08-05
Attending: EMERGENCY MEDICINE | Admitting: EMERGENCY MEDICINE
Payer: COMMERCIAL

## 2020-08-04 DIAGNOSIS — F32.A DEPRESSION WITH SUICIDAL IDEATION: Primary | ICD-10-CM

## 2020-08-04 DIAGNOSIS — R45.851 DEPRESSION WITH SUICIDAL IDEATION: Primary | ICD-10-CM

## 2020-08-04 DIAGNOSIS — F10.10 ALCOHOL ABUSE: ICD-10-CM

## 2020-08-04 LAB
ALBUMIN SERPL BCP-MCNC: 3.7 G/DL (ref 3.5–5)
ALP SERPL-CCNC: 50 U/L (ref 46–116)
ALT SERPL W P-5'-P-CCNC: 31 U/L (ref 12–78)
ANION GAP SERPL CALCULATED.3IONS-SCNC: 9 MMOL/L (ref 4–13)
AST SERPL W P-5'-P-CCNC: 17 U/L (ref 5–45)
BASOPHILS # BLD AUTO: 0.08 THOUSANDS/ΜL (ref 0–0.1)
BASOPHILS NFR BLD AUTO: 1 % (ref 0–1)
BILIRUB SERPL-MCNC: 0.55 MG/DL (ref 0.2–1)
BUN SERPL-MCNC: 10 MG/DL (ref 5–25)
CALCIUM SERPL-MCNC: 8.2 MG/DL (ref 8.3–10.1)
CHLORIDE SERPL-SCNC: 106 MMOL/L (ref 100–108)
CO2 SERPL-SCNC: 26 MMOL/L (ref 21–32)
CREAT SERPL-MCNC: 0.92 MG/DL (ref 0.6–1.3)
EOSINOPHIL # BLD AUTO: 0.41 THOUSAND/ΜL (ref 0–0.61)
EOSINOPHIL NFR BLD AUTO: 5 % (ref 0–6)
ERYTHROCYTE [DISTWIDTH] IN BLOOD BY AUTOMATED COUNT: 13.6 % (ref 11.6–15.1)
ETHANOL EXG-MCNC: 0.11 MG/DL
ETHANOL SERPL-MCNC: 210 MG/DL (ref 0–3)
GFR SERPL CREATININE-BSD FRML MDRD: 97 ML/MIN/1.73SQ M
GLUCOSE SERPL-MCNC: 122 MG/DL (ref 65–140)
HCT VFR BLD AUTO: 48.4 % (ref 36.5–49.3)
HGB BLD-MCNC: 16.3 G/DL (ref 12–17)
IMM GRANULOCYTES # BLD AUTO: 0.02 THOUSAND/UL (ref 0–0.2)
IMM GRANULOCYTES NFR BLD AUTO: 0 % (ref 0–2)
LYMPHOCYTES # BLD AUTO: 3.07 THOUSANDS/ΜL (ref 0.6–4.47)
LYMPHOCYTES NFR BLD AUTO: 39 % (ref 14–44)
MCH RBC QN AUTO: 32.7 PG (ref 26.8–34.3)
MCHC RBC AUTO-ENTMCNC: 33.7 G/DL (ref 31.4–37.4)
MCV RBC AUTO: 97 FL (ref 82–98)
MONOCYTES # BLD AUTO: 0.53 THOUSAND/ΜL (ref 0.17–1.22)
MONOCYTES NFR BLD AUTO: 7 % (ref 4–12)
NEUTROPHILS # BLD AUTO: 3.68 THOUSANDS/ΜL (ref 1.85–7.62)
NEUTS SEG NFR BLD AUTO: 48 % (ref 43–75)
NRBC BLD AUTO-RTO: 0 /100 WBCS
PLATELET # BLD AUTO: 267 THOUSANDS/UL (ref 149–390)
PMV BLD AUTO: 8.5 FL (ref 8.9–12.7)
POTASSIUM SERPL-SCNC: 3.4 MMOL/L (ref 3.5–5.3)
PROT SERPL-MCNC: 7.1 G/DL (ref 6.4–8.2)
RBC # BLD AUTO: 4.98 MILLION/UL (ref 3.88–5.62)
SARS-COV-2 RNA RESP QL NAA+PROBE: NEGATIVE
SODIUM SERPL-SCNC: 141 MMOL/L (ref 136–145)
TSH SERPL DL<=0.05 MIU/L-ACNC: 1.42 UIU/ML (ref 0.36–3.74)
WBC # BLD AUTO: 7.79 THOUSAND/UL (ref 4.31–10.16)

## 2020-08-04 PROCEDURE — 99285 EMERGENCY DEPT VISIT HI MDM: CPT

## 2020-08-04 PROCEDURE — 82075 ASSAY OF BREATH ETHANOL: CPT | Performed by: EMERGENCY MEDICINE

## 2020-08-04 PROCEDURE — 87635 SARS-COV-2 COVID-19 AMP PRB: CPT | Performed by: EMERGENCY MEDICINE

## 2020-08-04 PROCEDURE — 36415 COLL VENOUS BLD VENIPUNCTURE: CPT | Performed by: EMERGENCY MEDICINE

## 2020-08-04 PROCEDURE — 99284 EMERGENCY DEPT VISIT MOD MDM: CPT | Performed by: EMERGENCY MEDICINE

## 2020-08-04 PROCEDURE — 80053 COMPREHEN METABOLIC PANEL: CPT | Performed by: EMERGENCY MEDICINE

## 2020-08-04 PROCEDURE — 93005 ELECTROCARDIOGRAM TRACING: CPT

## 2020-08-04 PROCEDURE — 84443 ASSAY THYROID STIM HORMONE: CPT | Performed by: EMERGENCY MEDICINE

## 2020-08-04 PROCEDURE — 80320 DRUG SCREEN QUANTALCOHOLS: CPT | Performed by: EMERGENCY MEDICINE

## 2020-08-04 PROCEDURE — 85025 COMPLETE CBC W/AUTO DIFF WBC: CPT | Performed by: EMERGENCY MEDICINE

## 2020-08-04 RX ORDER — NICOTINE 21 MG/24HR
21 PATCH, TRANSDERMAL 24 HOURS TRANSDERMAL ONCE
Status: DISCONTINUED | OUTPATIENT
Start: 2020-08-04 | End: 2020-08-05 | Stop reason: HOSPADM

## 2020-08-04 RX ADMIN — NICOTINE 21 MG: 21 PATCH TRANSDERMAL at 21:49

## 2020-08-04 NOTE — LETTER
Section I - General Information    Name of Patient: Suzi Thompson                 : 1970    Medicare #:____________________  Transport Date: 20 (PCS is valid for round trips on this date and for all repetitive trips in the 60-day range as noted below )  Origin: [employer]                                                         Destination:_______SLL_________________________________________  Is the pt's stay covered under Medicare Part A (PPS/DRG)     (_) YES  (X) NO  Closest appropriate facility? (X) YES  (_) NO  If no, why is transport to more distant facility required?________________________  If hosp-hosp transfer, describe services needed at 2nd facility not available at 1st facility? _____IP behavioral health treatment _____  If hospice pt, is this transport related to pt's terminal illness? (_) YES (X) NO Describe____________________________________    Section II - Medical Necessity Questionnaire  Ambulance transportation is medically necessary only if other means of transport are contraindicated or would be potentially harmful to the patient  To meet this requirement, the patient must either be "bed confined" or suffer from a condition such that transport by means other than ambulance is contraindicated by the patient's condition   The following questions must be answered by the medical professional signing below for this form to be valid:    1)  Describe the MEDICAL CONDITION (physical and/or mental) of this patient AT [address] [place] [address] that requires the patient to be transported in an ambulance and why transport by other means is contraindicated by the patient's condition:________________IP Behavioral health treatment ________________________________________________  2) Is the patient "bed confined" as defined below?     (_) YES  (X) NO  To be "be confined" the patient must satisfy all three of the following conditions: (1) unable to get up from bed without Assistance; AND (2) unable to ambulate; AND (3) unable to sit in a chair or wheelchair  3) Can this patient safely be transported by car or wheelchair van (i e , seated during transport without a medical attendant or monitoring)? (X) YES  (_) NO    4) In addition to completing questions 1-3 above, please check any of the following conditions that apply*:  *Note: supporting documentation for any boxes checked must be maintained in the patient's medical records  (_)Contractures   (_)Non-Healed Fractures  (_)Patient is confused (_)Patient is comatose (_)Moderate/severe pain on movement (_)Danger to self/others  (_)IV meds/fluids required (_)Patient is combative(_)Need or possible need for restraints (_)DVT requires elevation of lower extremity  (X)Medical attendant required (_)Requires oxygen-unable to self administer (_)Special handling/isolation/infection control precautions required (_)Unable to tolerate seated position for time needed to transport (_)Hemodynamic monitoring required en route (_)Unable to sit in a chair or wheelchair due to decubitus ulcers or other wounds (_)Cardiac monitoring required en route (_)Morbid obesity requires additional personnel/equipment to safely handle patient (_)Orthopedic device (backboard, halo, pins, traction, brace, wedge, etc,) requiring special handling during transport (_)Other(specify)_______________________________________________    Section III - Signature of Physician or Healthcare Professional  I certify that the above information is true and correct based on my evaluation of this patient, and represent that the patient requires transport by ambulance and that other forms of transport are contraindicated   I understand that this information will be used by the Centers for Medicare and Medicaid Services (CMS) to support the determination of medical necessity for ambulance services, and I represent that I have personal knowledge of the patient's condition at time of transport  (_) If this box is checked, I also certify that the patient is physically or mentally incapable of signing the ambulance service's claim and that the institution with which I am affiliated has furnished care, services, or assistance to the patient  My signature below is made on behalf of the patient pursuant to 42 CFR §424 36(b)(4)  In accordance with 42 CFR §424 37, the specific reason(s) that the patient is physically or mentally incapable of signing the claim form is as follows: _________________________________________________________________________________________________________      Signature of Physician* or Healthcare Professional______________________________________________________________  Signature Date 08/05/20 (For scheduled repetitive transports, this form is not valid for transports performed more than 60 days after this date)    Printed Name & Credentials of Physician or Healthcare Professional (MD, DO, RN, etc )__Ivania Winchester ____________  *Form must be signed by patient's attending physician for scheduled, repetitive transports   For non-repetitive, unscheduled ambulance transports, if unable to obtain the signature of the attending physician, any of the following may sign (choose appropriate option below)  (_) Physician Assistant (_)  Clinical Nurse Specialist (_)  Registered Nurse  (_)  Nurse Practitioner  (X) Discharge Planner

## 2020-08-04 NOTE — ED PROVIDER NOTES
History  Chief Complaint   Patient presents with    Psychiatric Evaluation     Pt is depressed, looking for in patient psych services, also consumed a significant amount of vodka today     62-year-old male presents to the emergency department for psychiatric evaluation  Patient states that he has been feeling depressed and suicidal   He has vague suicidal thoughts and states to me "I am emmanuel that I do not have a gun" and gestures with his hand as if he is shooting himself in the head  Patient admits to drinking alcohol on a near daily basis  He had been sober for 3 months after a hospitalization  He was given an injection of Vivitrol and states that it helped control his cravings though as it wore off he began to crave alcohol and started drinking again  He states that he was recently evicted from his estate  He is currently without a job  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  He denies drug use  He admits to suicidal thoughts but denies homicidal thoughts  Current stressors include homelessness and loss of job  History provided by:  Patient, medical records and EMS personnel   used: No    Psychiatric Evaluation   Presenting symptoms: agitation, depression and suicidal thoughts    Presenting symptoms: no homicidal ideas    Degree of incapacity (severity):  Severe  Onset quality:  Gradual  Timing:  Constant  Progression:  Unchanged  Chronicity:  Recurrent  Context: alcohol use and stressful life event    Treatment compliance:  Untreated  Relieved by:  Nothing  Worsened by:  Nothing  Ineffective treatments:  None tried  Associated symptoms: anxiety and feelings of worthlessness    Associated symptoms: no abdominal pain, no appetite change, no chest pain, no insomnia and no weight change    Risk factors: no hx of suicide attempts        Prior to Admission Medications   Prescriptions Last Dose Informant Patient Reported? Taking?    Naltrexone (VIVITROL IM) More than a month at Unknown time  Yes No   Sig: Inject into a muscle   diazepam (Valium) 5 mg tablet Not Taking at Unknown time  No No   Sig: Take 1 tablet (5 mg total) by mouth every 8 (eight) hours as needed for anxiety for up to 20 doses   Patient not taking: Reported on 8/5/2020      Facility-Administered Medications: None       Past Medical History:   Diagnosis Date    Addiction to drug ([place] [address] )     Alcohol abuse     Alcoholism ([place] [address] )     Anxiety     Arthritis     Bipolar disorder ([place] [address] )     Depression     Lung disease     31+ yr smoking hx    Sleep difficulties     Substance abuse ([place] [address] )     Tobacco abuse        Past Surgical History:   Procedure Laterality Date    HERNIA REPAIR  03/2018    MASTECTOMY  03/2018    subcutaneous per [place]    WISDOM TOOTH EXTRACTION         Family History   Problem Relation Age of Onset    Heart disease Mother     No Known Problems Father      I have reviewed and agree with the history as documented  E-Cigarette/Vaping    E-Cigarette Use Never User      E-Cigarette/Vaping Substances     Social History     Tobacco Use    Smoking status: Current Every Day Smoker     Packs/day: 0 50     Years: 31 00     Pack years: 15 50     Types: Cigarettes    Smokeless tobacco: Current User    Tobacco comment: 1 PPD per [place]   Substance Use Topics    Alcohol use: Yes     Frequency: 4 or more times a week     Drinks per session: 10 or more     Binge frequency: Daily or almost daily     Comment: 1 bottle of vodka daily    Drug use: Not Currently       Review of Systems   Constitutional: Negative for appetite change  Cardiovascular: Negative for chest pain  Gastrointestinal: Negative for abdominal pain  Neurological: Negative for dizziness and weakness  Psychiatric/Behavioral: Positive for agitation, dysphoric mood and suicidal ideas  Negative for homicidal ideas  The patient is nervous/anxious  The patient does not have insomnia          Physical Exam  Physical Exam  Vitals signs and nursing note reviewed  Exam conducted with a chaperone present  Constitutional:       General: He is not in acute distress  Appearance: Normal appearance  He is well-developed  He is not ill-appearing, toxic-appearing or diaphoretic  HENT:      Head: Normocephalic and atraumatic  Eyes:      Conjunctiva/sclera: Conjunctivae normal       Pupils: Pupils are equal, round, and reactive to light  Neck:      Musculoskeletal: Normal range of motion and neck supple  Cardiovascular:      Rate and Rhythm: Regular rhythm  Tachycardia present  Heart sounds: Normal heart sounds  Pulmonary:      Effort: Pulmonary effort is normal  No accessory muscle usage or respiratory distress  Breath sounds: Normal breath sounds  Chest:      Chest wall: No tenderness  Abdominal:      General: Bowel sounds are normal  There is no distension  Palpations: Abdomen is soft  Tenderness: There is no abdominal tenderness  There is no guarding or rebound  Musculoskeletal: Normal range of motion  General: No tenderness or deformity  Lymphadenopathy:      Cervical: No cervical adenopathy  Skin:     General: Skin is warm and dry  Coloration: Skin is not jaundiced  Findings: No rash  Neurological:      General: No focal deficit present  Mental Status: He is alert and oriented to person, place, and time  Motor: No weakness  Coordination: Coordination normal       Deep Tendon Reflexes: Reflexes are normal and symmetric  Psychiatric:         Attention and Perception: Attention normal          Mood and Affect: Mood is anxious and depressed  Speech: Speech is rapid and pressured  Behavior: Behavior normal  Behavior is cooperative  Thought Content: Thought content includes suicidal ideation  Thought content includes suicidal plan           Cognition and Memory: Cognition and memory normal          Judgment: Judgment normal  Vital Signs  ED Triage Vitals   Temperature Pulse Respirations Blood Pressure SpO2   08/04/20 1959 08/04/20 1850 08/04/20 1850 08/04/20 1850 08/04/20 1850   98 6 °F (37 °C) (!) 107 18 116/62 100 %      Temp Source Heart Rate Source Patient Position - Orthostatic VS BP Location FiO2 (%)   08/05/20 0907 08/04/20 1850 08/04/20 1850 08/04/20 1850 --   Oral Monitor Lying Right arm       Pain Score       08/04/20 1850       No Pain           Vitals:    08/04/20 1850 08/05/20 0048 08/05/20 0907   BP: 116/62 110/69 140/98   Pulse: (!) 107 98 (!) 116   Patient Position - Orthostatic VS: Lying Sitting          Visual Acuity      ED Medications  Medications   nicotine (NICODERM CQ) 21 mg/24 hr TD 24 hr patch 21 mg (21 mg Transdermal Medication Applied 8/4/20 2149)   LORazepam (ATIVAN) tablet 1 mg (1 mg Oral Given 8/5/20 0317)       Diagnostic Studies  Results Reviewed     Procedure Component Value Units Date/Time    Rapid drug screen, urine [[id number]]  (Normal) Collected:  08/05/20 0312    Lab Status:  Final result Specimen:  Urine, Clean Catch Updated:  08/05/20 0328     Amph/Meth UR Negative     Barbiturate Ur Negative     Benzodiazepine Urine Negative     Cocaine Urine Negative     Methadone Urine Negative     Opiate Urine Negative     PCP Ur Negative     THC Urine Negative     Oxycodone Urine Negative    Narrative:       FOR MEDICAL PURPOSES ONLY  IF CONFIRMATION NEEDED PLEASE CONTACT THE LAB WITHIN 5 DAYS  Drug Screen Cutoff Levels:  AMPHETAMINE/METHAMPHETAMINES  1000 ng/mL  BARBITURATES     200 ng/mL  BENZODIAZEPINES     200 ng/mL  COCAINE      300 ng/mL  METHADONE      300 ng/mL  OPIATES      300 ng/mL  PHENCYCLIDINE     25 ng/mL  THC       50 ng/mL  OXYCODONE      100 ng/mL    Novel Coronavirus [facility] [[id number]]  (Normal) Collected:  08/04/20 2152    Lab Status:  Final result Specimen:  Nares from Nose Updated:  08/04/20 2305     SARS-CoV-2 Negative    Narrative:        The specimen collection materials, transport medium, and/or testing methodology utilized in the production of these test results have been proven to be reliable in a limited validation with an abbreviated program under the Emergency Utilization Authorization provided by the FDA  Testing reported as "Presumptive positive" will be confirmed with secondary testing with a reference laboratory to ensure result accuracy  Clinical caution and judgement should be used with the interpretation of these results with consideration of the clinical impression and other laboratory testing  Testing reported as "Positive" or "Negative" has been proven to be accurate according to standard laboratory validation requirements  All testing is performed with control materials showing appropriate reactivity at standard intervals  POCT alcohol breath test [[id number]]  (Normal) Resulted:  08/04/20 2153    Lab Status:  Final result Updated:  08/04/20 2153     EXTBreath Alcohol 0 108    TSH [[id number]]  (Normal) Collected:  08/04/20 1923    Lab Status:  Final result Specimen:  Blood from Arm, Left Updated:  08/04/20 1951     TSH 3RD GENERATON 1 415 uIU/mL     Narrative:       Patients undergoing fluorescein dye angiography may retain small amounts of fluorescein in the body for 48-72 hours post procedure  Samples containing fluorescein can produce falsely depressed TSH values  If the patient had this procedure,a specimen should be resubmitted post fluorescein clearance        Ethanol [[id number]]  (Abnormal) Collected:  08/04/20 1923    Lab Status:  Final result Specimen:  Blood from Arm, Left Updated:  08/04/20 1950     Ethanol Lvl 210 mg/dL     Comprehensive metabolic panel [[id number]]  (Abnormal) Collected:  08/04/20 1923    Lab Status:  Final result Specimen:  Blood from Arm, Left Updated:  08/04/20 1943     Sodium 141 mmol/L      Potassium 3 4 mmol/L      Chloride 106 mmol/L      CO2 26 mmol/L      ANION GAP 9 mmol/L      BUN 10 mg/dL Creatinine 0 92 mg/dL      Glucose 122 mg/dL      Calcium 8 2 mg/dL      AST 17 U/L      ALT 31 U/L      Alkaline Phosphatase 50 U/L      Total Protein 7 1 g/dL      Albumin 3 7 g/dL      Total Bilirubin 0 55 mg/dL      eGFR 97 ml/min/1 73sq m     Narrative:       National Kidney Disease Foundation guidelines for Chronic Kidney Disease (CKD):     Stage 1 with normal or high GFR (GFR > 90 mL/min/1 73 square meters)    Stage 2 Mild CKD (GFR = 60-89 mL/min/1 73 square meters)    Stage 3A Moderate CKD (GFR = 45-59 mL/min/1 73 square meters)    Stage 3B Moderate CKD (GFR = 30-44 mL/min/1 73 square meters)    Stage 4 Severe CKD (GFR = 15-29 mL/min/1 73 square meters)    Stage 5 End Stage CKD (GFR <15 mL/min/1 73 square meters)  Note: GFR calculation is accurate only with a steady state creatinine    CBC and differential [[id number]]  (Abnormal) Collected:  08/04/20 1923    Lab Status:  Final result Specimen:  Blood from Arm, Left Updated:  08/04/20 1929     WBC 7 79 Thousand/uL      RBC 4 98 Million/uL      Hemoglobin 16 3 g/dL      Hematocrit 48 4 %      MCV 97 fL      MCH 32 7 pg      MCHC 33 7 g/dL      RDW 13 6 %      MPV 8 5 fL      Platelets 996 Thousands/uL      nRBC 0 /100 WBCs      Neutrophils Relative 48 %      Immat GRANS % 0 %      Lymphocytes Relative 39 %      Monocytes Relative 7 %      Eosinophils Relative 5 %      Basophils Relative 1 %      Neutrophils Absolute 3 68 Thousands/µL      Immature Grans Absolute 0 02 Thousand/uL      Lymphocytes Absolute 3 07 Thousands/µL      Monocytes Absolute 0 53 Thousand/µL      Eosinophils Absolute 0 41 Thousand/µL      Basophils Absolute 0 08 Thousands/µL                  No orders to display              Procedures  ECG 12 Lead Documentation Only    Date/Time: 8/4/2020 7:39 PM  Performed by: Eduardo Champagne DO  Authorized by: Eduardo Champagne DO     Indications / Diagnosis:  Crisis eval  ECG reviewed by me, the ED Provider: yes    Patient location:  ED  Previous ECG: Previous ECG:  Compared to current    Comparison ECG info:  2/14/2020    Similarity:  No change  Interpretation:     Interpretation: non-specific    Rate:     ECG rate:  84    ECG rate assessment: normal    Rhythm:     Rhythm: sinus rhythm    Ectopy:     Ectopy: none    QRS:     QRS axis:  Left  Conduction:     Conduction: abnormal      Abnormal conduction: non-specific intraventricular conduction delay    ST segments:     ST segments:  Normal  T waves:     T waves: normal               ED Course  ED Course as of Aug 05 1158   Wed Aug 05, 2020   0020 Patient resting comfortably on one-to-one observation  COVID is negative  Patient to be evaluated by crisis in am           US AUDIT      Most Recent Value   Initial Alcohol Screen: US AUDIT-C    1  How often do you have a drink containing alcohol? 5 Filed at: 08/04/2020 1834   2  How many drinks containing alcohol do you have on a typical day you are drinking? 3 Filed at: 08/04/2020 1834   3a  Male UNDER 65: How often do you have five or more drinks on one occasion? 3 Filed at: 08/04/2020 1834   Audit-C Score  (!) 11 Filed at: 08/04/2020 1834                  ABIOLA/DAST-10      Most Recent Value   How many times in the past year have you    Used an illegal drug or used a prescription medication for non-medical reasons?   Never Filed at: 08/04/2020 1835                                MDM  Number of Diagnoses or Management Options  Alcohol abuse: new and requires workup  Depression with suicidal ideation: new and requires workup     Amount and/or Complexity of Data Reviewed  Clinical lab tests: ordered and reviewed  Tests in the medicine section of CPT®: ordered and reviewed  Decide to obtain previous medical records or to obtain history from someone other than the patient: yes  Discuss the patient with other providers: yes  Independent visualization of images, tracings, or specimens: yes    Patient Progress  Patient progress: stable        Disposition  Final diagnoses:   Depression with suicidal ideation   Alcohol abuse     Time reflects when diagnosis was documented in both MDM as applicable and the Disposition within this note     Time User Action Codes Description Comment    8/5/2020 12:17 AM Chestine Kelly Add [R45 851] Suicidal ideations     8/5/2020 12:17 AM WhiteRowan Add [F32 9,  R45 851] Depression with suicidal ideation     8/5/2020 12:17 AM WhiteRowan Modify [F32 9,  R45 851] Depression with suicidal ideation     8/5/2020 12:17 AM WhiteRowan Remove [R45 851] Suicidal ideations     8/5/2020 12:17 AM Rowan Fernandez Add [F10 10] Alcohol abuse       ED Disposition     ED Disposition Condition Date/Time Comment    Transfer to [facility] 7066 Aug 5, 2020 11:58 AM Meg Wen should be transferred out to Behavioral healthand has been medically cleared  MD Documentation      Most Recent Value   Sending MD Petty Dong MD      Follow-up Information    None         Patient's Medications   Discharge Prescriptions    No medications on file     No discharge procedures on file      PDMP Review       Value Time User    PDMP Reviewed  Yes 1/8/2020  1:43 AM Jhonny Mcgowan MD          ED Provider  Electronically Signed by           Alicia Nelson DO  08/05/20 [id number]

## 2020-08-04 NOTE — ED NOTES
Pt also has nail cliper two razor that is outside his room for safety precautions       Tiera Moe  08/04/20 1954       Tiera Moe  08/04/20 2000

## 2020-08-05 ENCOUNTER — HOSPITAL ENCOUNTER (INPATIENT)
Facility: HOSPITAL | Age: 50
LOS: 6 days | Discharge: HOME | DRG: 751 | End: 2020-08-11
Attending: PSYCHIATRY & NEUROLOGY | Admitting: PSYCHIATRY & NEUROLOGY
Payer: COMMERCIAL

## 2020-08-05 VITALS
SYSTOLIC BLOOD PRESSURE: 140 MMHG | DIASTOLIC BLOOD PRESSURE: 98 MMHG | TEMPERATURE: 97.9 F | HEART RATE: 116 BPM | RESPIRATION RATE: 16 BRPM | OXYGEN SATURATION: 96 %

## 2020-08-05 DIAGNOSIS — F31.81 BIPOLAR 2 DISORDER (HCC): Primary | Chronic | ICD-10-CM

## 2020-08-05 DIAGNOSIS — R45.851 DEPRESSION WITH SUICIDAL IDEATION: ICD-10-CM

## 2020-08-05 DIAGNOSIS — F32.A DEPRESSION WITH SUICIDAL IDEATION: ICD-10-CM

## 2020-08-05 DIAGNOSIS — F10.20 ALCOHOL USE DISORDER, SEVERE, DEPENDENCE (HCC): ICD-10-CM

## 2020-08-05 PROBLEM — E87.6 HYPOKALEMIA: Status: ACTIVE | Noted: 2020-08-05

## 2020-08-05 PROBLEM — Z72.0 TOBACCO ABUSE: Chronic | Status: ACTIVE | Noted: 2020-08-05

## 2020-08-05 PROBLEM — Z00.8 MEDICAL CLEARANCE FOR PSYCHIATRIC ADMISSION: Status: ACTIVE | Noted: 2020-08-05

## 2020-08-05 LAB
AMPHETAMINES SERPL QL SCN: NEGATIVE
BARBITURATES UR QL: NEGATIVE
BENZODIAZ UR QL: NEGATIVE
COCAINE UR QL: NEGATIVE
METHADONE UR QL: NEGATIVE
OPIATES UR QL SCN: NEGATIVE
OXYCODONE+OXYMORPHONE UR QL SCN: NEGATIVE
PCP UR QL: NEGATIVE
THC UR QL: NEGATIVE

## 2020-08-05 PROCEDURE — 99254 IP/OBS CNSLTJ NEW/EST MOD 60: CPT | Performed by: PHYSICIAN ASSISTANT

## 2020-08-05 PROCEDURE — 80307 DRUG TEST PRSMV CHEM ANLYZR: CPT | Performed by: EMERGENCY MEDICINE

## 2020-08-05 PROCEDURE — 99285 EMERGENCY DEPT VISIT HI MDM: CPT | Performed by: EMERGENCY MEDICINE

## 2020-08-05 RX ORDER — THIAMINE MONONITRATE (VIT B1) 100 MG
100 TABLET ORAL
Status: CANCELLED | OUTPATIENT
Start: 2020-08-05

## 2020-08-05 RX ORDER — ACETAMINOPHEN 325 MG/1
650 TABLET ORAL EVERY 4 HOURS PRN
Status: CANCELLED | OUTPATIENT
Start: 2020-08-05

## 2020-08-05 RX ORDER — FOLIC ACID 1 MG/1
1 TABLET ORAL DAILY
Status: DISCONTINUED | OUTPATIENT
Start: 2020-08-05 | End: 2020-08-11 | Stop reason: HOSPADM

## 2020-08-05 RX ORDER — LORAZEPAM 2 MG/ML
2 INJECTION INTRAMUSCULAR EVERY 4 HOURS PRN
Status: CANCELLED | OUTPATIENT
Start: 2020-08-05

## 2020-08-05 RX ORDER — HYDROXYZINE HYDROCHLORIDE 25 MG/1
50 TABLET, FILM COATED ORAL EVERY 4 HOURS PRN
Status: CANCELLED | OUTPATIENT
Start: 2020-08-05

## 2020-08-05 RX ORDER — HALOPERIDOL 5 MG
5 TABLET ORAL EVERY 6 HOURS PRN
Status: DISCONTINUED | OUTPATIENT
Start: 2020-08-05 | End: 2020-08-11 | Stop reason: HOSPADM

## 2020-08-05 RX ORDER — HYDROXYZINE HYDROCHLORIDE 25 MG/1
50 TABLET, FILM COATED ORAL EVERY 4 HOURS PRN
Status: DISCONTINUED | OUTPATIENT
Start: 2020-08-05 | End: 2020-08-11 | Stop reason: HOSPADM

## 2020-08-05 RX ORDER — NICOTINE 21 MG/24HR
1 PATCH, TRANSDERMAL 24 HOURS TRANSDERMAL DAILY
Status: DISCONTINUED | OUTPATIENT
Start: 2020-08-05 | End: 2020-08-11 | Stop reason: HOSPADM

## 2020-08-05 RX ORDER — OLANZAPINE 10 MG/1
10 INJECTION, POWDER, LYOPHILIZED, FOR SOLUTION INTRAMUSCULAR
Status: CANCELLED | OUTPATIENT
Start: 2020-08-05

## 2020-08-05 RX ORDER — FOLIC ACID 1 MG/1
1 TABLET ORAL DAILY
Status: CANCELLED | OUTPATIENT
Start: 2020-08-05

## 2020-08-05 RX ORDER — GABAPENTIN 300 MG/1
300 CAPSULE ORAL 3 TIMES DAILY
Status: CANCELLED | OUTPATIENT
Start: 2020-08-05

## 2020-08-05 RX ORDER — THIAMINE MONONITRATE (VIT B1) 100 MG
100 TABLET ORAL
Status: DISCONTINUED | OUTPATIENT
Start: 2020-08-05 | End: 2020-08-11 | Stop reason: HOSPADM

## 2020-08-05 RX ORDER — LORAZEPAM 1 MG/1
1 TABLET ORAL EVERY 4 HOURS PRN
Status: DISCONTINUED | OUTPATIENT
Start: 2020-08-05 | End: 2020-08-11 | Stop reason: HOSPADM

## 2020-08-05 RX ORDER — HALOPERIDOL 5 MG/ML
5 INJECTION INTRAMUSCULAR EVERY 6 HOURS PRN
Status: CANCELLED | OUTPATIENT
Start: 2020-08-05

## 2020-08-05 RX ORDER — RISPERIDONE 1 MG/1
2 TABLET, ORALLY DISINTEGRATING ORAL
Status: CANCELLED | OUTPATIENT
Start: 2020-08-05

## 2020-08-05 RX ORDER — TRAZODONE HYDROCHLORIDE 100 MG/1
100 TABLET ORAL
Status: CANCELLED | OUTPATIENT
Start: 2020-08-05

## 2020-08-05 RX ORDER — GABAPENTIN 300 MG/1
300 CAPSULE ORAL 3 TIMES DAILY
Status: DISCONTINUED | OUTPATIENT
Start: 2020-08-05 | End: 2020-08-11 | Stop reason: HOSPADM

## 2020-08-05 RX ORDER — ACETAMINOPHEN 325 MG/1
650 TABLET ORAL EVERY 4 HOURS PRN
Status: DISCONTINUED | OUTPATIENT
Start: 2020-08-05 | End: 2020-08-11 | Stop reason: HOSPADM

## 2020-08-05 RX ORDER — ACETAMINOPHEN 325 MG/1
650 TABLET ORAL EVERY 6 HOURS PRN
Status: CANCELLED | OUTPATIENT
Start: 2020-08-05

## 2020-08-05 RX ORDER — HYDROXYZINE HYDROCHLORIDE 25 MG/1
25 TABLET, FILM COATED ORAL EVERY 4 HOURS PRN
Status: DISCONTINUED | OUTPATIENT
Start: 2020-08-05 | End: 2020-08-11 | Stop reason: HOSPADM

## 2020-08-05 RX ORDER — LORAZEPAM 2 MG/ML
2 INJECTION INTRAMUSCULAR EVERY 4 HOURS PRN
Status: DISCONTINUED | OUTPATIENT
Start: 2020-08-05 | End: 2020-08-11 | Stop reason: HOSPADM

## 2020-08-05 RX ORDER — ACETAMINOPHEN 325 MG/1
650 TABLET ORAL EVERY 6 HOURS PRN
Status: DISCONTINUED | OUTPATIENT
Start: 2020-08-05 | End: 2020-08-11 | Stop reason: HOSPADM

## 2020-08-05 RX ORDER — NICOTINE 21 MG/24HR
1 PATCH, TRANSDERMAL 24 HOURS TRANSDERMAL DAILY
Status: CANCELLED | OUTPATIENT
Start: 2020-08-05

## 2020-08-05 RX ORDER — OLANZAPINE 10 MG/1
10 INJECTION, POWDER, LYOPHILIZED, FOR SOLUTION INTRAMUSCULAR
Status: DISCONTINUED | OUTPATIENT
Start: 2020-08-05 | End: 2020-08-11 | Stop reason: HOSPADM

## 2020-08-05 RX ORDER — LORAZEPAM 1 MG/1
1 TABLET ORAL ONCE
Status: COMPLETED | OUTPATIENT
Start: 2020-08-05 | End: 2020-08-05

## 2020-08-05 RX ORDER — HALOPERIDOL 5 MG
5 TABLET ORAL EVERY 6 HOURS PRN
Status: CANCELLED | OUTPATIENT
Start: 2020-08-05

## 2020-08-05 RX ORDER — RISPERIDONE 1 MG/1
2 TABLET, ORALLY DISINTEGRATING ORAL
Status: DISCONTINUED | OUTPATIENT
Start: 2020-08-05 | End: 2020-08-11 | Stop reason: HOSPADM

## 2020-08-05 RX ORDER — POTASSIUM CHLORIDE 750 MG/1
20 TABLET, EXTENDED RELEASE ORAL ONCE
Status: COMPLETED | OUTPATIENT
Start: 2020-08-05 | End: 2020-08-05

## 2020-08-05 RX ORDER — TRAZODONE HYDROCHLORIDE 50 MG/1
100 TABLET ORAL
Status: DISCONTINUED | OUTPATIENT
Start: 2020-08-05 | End: 2020-08-11 | Stop reason: HOSPADM

## 2020-08-05 RX ORDER — ACETAMINOPHEN 325 MG/1
975 TABLET ORAL EVERY 6 HOURS PRN
Status: CANCELLED | OUTPATIENT
Start: 2020-08-05

## 2020-08-05 RX ORDER — HYDROXYZINE HYDROCHLORIDE 25 MG/1
25 TABLET, FILM COATED ORAL EVERY 4 HOURS PRN
Status: CANCELLED | OUTPATIENT
Start: 2020-08-05

## 2020-08-05 RX ORDER — LORAZEPAM 1 MG/1
1 TABLET ORAL EVERY 4 HOURS PRN
Status: CANCELLED | OUTPATIENT
Start: 2020-08-05

## 2020-08-05 RX ORDER — HALOPERIDOL 5 MG/ML
5 INJECTION INTRAMUSCULAR EVERY 6 HOURS PRN
Status: DISCONTINUED | OUTPATIENT
Start: 2020-08-05 | End: 2020-08-11 | Stop reason: HOSPADM

## 2020-08-05 RX ORDER — ACETAMINOPHEN 325 MG/1
975 TABLET ORAL EVERY 6 HOURS PRN
Status: DISCONTINUED | OUTPATIENT
Start: 2020-08-05 | End: 2020-08-11 | Stop reason: HOSPADM

## 2020-08-05 RX ADMIN — TRAZODONE HYDROCHLORIDE 100 MG: 50 TABLET ORAL at 21:41

## 2020-08-05 RX ADMIN — GABAPENTIN 300 MG: 300 CAPSULE ORAL at 16:46

## 2020-08-05 RX ADMIN — NICOTINE 1 PATCH: 21 PATCH, EXTENDED RELEASE TRANSDERMAL at 16:45

## 2020-08-05 RX ADMIN — FOLIC ACID 1 MG: 1 TABLET ORAL at 16:45

## 2020-08-05 RX ADMIN — LORAZEPAM 1 MG: 1 TABLET ORAL at 03:17

## 2020-08-05 RX ADMIN — THIAMINE HCL TAB 100 MG 100 MG: 100 TAB at 21:14

## 2020-08-05 RX ADMIN — POTASSIUM CHLORIDE 20 MEQ: 750 TABLET, EXTENDED RELEASE ORAL at 17:29

## 2020-08-05 RX ADMIN — Medication 1 TABLET: at 16:45

## 2020-08-05 RX ADMIN — GABAPENTIN 300 MG: 300 CAPSULE ORAL at 21:14

## 2020-08-05 NOTE — ASSESSMENT & PLAN NOTE
· Patient was medically cleared for inpatient behavior health admission in the emergency room at [facility]  · Patient remains medically cleared for inpatient behavior health admission  · Patient signed a 201 for treatment

## 2020-08-05 NOTE — EMTALA/ACUTE CARE TRANSFER
[address] [place] [address]  Dept: [phone]      EMTALA TRANSFER CONSENT    NAME Erik Lim                                         1970                              MRN [id number]    I have been informed of my rights regarding examination, treatment, and transfer   by Dr Favian Canada MD    Benefits: Continuity of care    Risks:        Transfer Request   I acknowledge that my medical condition has been evaluated and explained to me by the emergency department physician or other qualified medical person and/or my attending physician who has recommended and offered to me further medical examination and treatment  I understand the Hospital's obligation with respect to the treatment and stabilization of my emergency medical condition  I nevertheless request to be transferred  I release the Hospital, the doctor, and any other persons caring for me from all responsibility or liability for any injury or ill effects that may result from my transfer and agree to accept all responsibility for the consequences of my choice to transfer, rather than receive stabilizing treatment at the Hospital  I understand that because the transfer is my request, my insurance may not provide reimbursement for the services  The Hospital will assist and direct me and my family in how to make arrangements for transfer, but the hospital is not liable for any fees charged by the transport service  In spite of this understanding, I refuse to consent to further medical examination and treatment which has been offered to me, and request transfer to  [facility] Name, [address] : GEOVANNA,John GLORIA  I authorize the performance of emergency medical procedures and treatments upon me in both transit and upon arrival at the receiving facility    Additionally, I authorize the release of any and all medical records to the receiving facility and request they be transported with me, if possible  I authorize the performance of emergency medical procedures and treatments upon me in both transit and upon arrival at the receiving facility  Additionally, I authorize the release of any and all medical records to the receiving facility and request they be transported with me, if possible  I understand that the safest mode of transportation during a medical emergency is an ambulance and that the Hospital advocates the use of this mode of transport  Risks of traveling to the receiving facility by car, including absence of medical control, life sustaining equipment, such as oxygen, and medical personnel has been explained to me and I fully understand them  (ONUR CORRECT BOX BELOW)  [  ]  I consent to the stated transfer and to be transported by ambulance/helicopter  [  ]  I consent to the stated transfer, but refuse transportation by ambulance and accept full responsibility for my transportation by car  I understand the risks of non-ambulance transfers and I exonerate the Hospital and its staff from any deterioration in my condition that results from this refusal     X___________________________________________    DATE  20  TIME________  Signature of patient or legally responsible individual signing on patient behalf           RELATIONSHIP TO PATIENT_________________________          Provider Certification    NAME Meg Wen                                        VELMA 1970                              MRN [id number]    A medical screening exam was performed on the above named patient  Based on the examination:    Condition Necessitating Transfer The primary encounter diagnosis was Depression with suicidal ideation  A diagnosis of Alcohol abuse was also pertinent to this visit      Patient Condition: The patient has been stabilized such that within reasonable medical probability, no material deterioration of the patient condition or the condition of the unborn child(marlene) is likely to result from the transfer    Reason for Transfer: Level of Care needed not available at this facility    Transfer Requirements: Facility [facility]   · Space available and qualified personnel available for treatment as acknowledged by [employer] [phone]  · Agreed to accept transfer and to provide appropriate medical treatment as acknowledged by       Jean Hernandez  · Appropriate medical records of the examination and treatment of the patient are provided at the time of transfer   [address] [facility], [address] _______  · Transfer will be performed by qualified personnel from    and appropriate transfer equipment as required, including the use of necessary and appropriate life support measures  Provider Certification: I have examined the patient and explained the following risks and benefits of being transferred/refusing transfer to the patient/family:  The patient is stable for psychiatric transfer because they are medically stable, and is protected from harming him/herself or others during transport, General risk, such as traffic hazards, adverse weather conditions, rough terrain or turbulence, possible failure of equipment (including vehicle or aircraft), or consequences of actions of persons outside the control of the transport personnel      Based on these reasonable risks and benefits to the patient and/or the unborn child(marlene), and based upon the information available at the time of the patients examination, I certify that the medical benefits reasonably to be expected from the provision of appropriate medical treatments at another medical facility outweigh the increasing risks, if any, to the individuals medical condition, and in the case of labor to the unborn child, from effecting the transfer      X____________________________________________ DATE 08/05/20        TIME_______      ORIGINAL - SEND TO MEDICAL RECORDS   COPY - SEND WITH PATIENT DURING TRANSFER

## 2020-08-05 NOTE — ED PROVIDER NOTES
History  Chief Complaint   Patient presents with    Psychiatric Evaluation     Pt is depressed, looking for in patient psych services, also consumed a significant amount of vodka today     HPI    Prior to Admission Medications   Prescriptions Last Dose Informant Patient Reported? Taking? Naltrexone (VIVITROL IM) More than a month at Unknown time  Yes No   Sig: Inject into a muscle   diazepam (Valium) 5 mg tablet Not Taking at Unknown time  No No   Sig: Take 1 tablet (5 mg total) by mouth every 8 (eight) hours as needed for anxiety for up to 20 doses   Patient not taking: Reported on 8/5/2020      Facility-Administered Medications: None       Past Medical History:   Diagnosis Date    Addiction [facility] drug ([facility] )     Alcohol abuse     Alcoholism ([facility] )     Anxiety     Arthritis     Bipolar disorder ([facility] )     Depression     Lung disease     31+ yr smoking hx    Sleep difficulties     Substance abuse ([place] [address] )     Tobacco abuse        Past Surgical History:   Procedure Laterality Date    HERNIA REPAIR  03/2018    MASTECTOMY  03/2018    subcutaneous per Roseann Delaney    WISDOM TOOTH EXTRACTION         Family History   Problem Relation Age of Onset    Heart disease Mother     No Known Problems Father      I have reviewed and agree with the history as documented      E-Cigarette/Vaping    E-Cigarette Use Never User      E-Cigarette/Vaping Substances     Social History     Tobacco Use    Smoking status: Current Every Day Smoker     Packs/day: 0 50     Years: 31 00     Pack years: 15 50     Types: Cigarettes    Smokeless tobacco: Current User    Tobacco comment: 1 PPD per Roseann Delaney   Substance Use Topics    Alcohol use: Yes     Frequency: 4 or more times a week     Drinks per session: 10 or more     Binge frequency: Daily or almost daily     Comment: 1 bottle of vodka daily    Drug use: Not Currently       Review of Systems    Physical Exam  Physical Exam    Vital Signs  ED Triage Vitals   Temperature Pulse Respirations Blood Pressure SpO2   08/04/20 1959 08/04/20 1850 08/04/20 1850 08/04/20 1850 08/04/20 1850   98 6 °F (37 °C) (!) 107 18 116/62 100 %      Temp Source Heart Rate Source Patient Position - Orthostatic VS BP Location FiO2 (%)   08/05/20 0907 08/04/20 1850 08/04/20 1850 08/04/20 1850 --   Oral Monitor Lying Right arm       Pain Score       08/04/20 1850       No Pain           Vitals:    08/04/20 1850 08/05/20 0048 08/05/20 0907   BP: 116/62 110/69 140/98   Pulse: (!) 107 98 (!) 116   Patient Position - Orthostatic VS: Lying Sitting          Visual Acuity      ED Medications  Medications   nicotine (NICODERM CQ) 21 mg/24 hr TD 24 hr patch 21 mg (21 mg Transdermal Medication Applied 8/4/20 2149)   LORazepam (ATIVAN) tablet 1 mg (1 mg Oral Given 8/5/20 0317)       Diagnostic Studies  Results Reviewed     Procedure Component Value Units Date/Time    Rapid drug screen, urine [[id number]]  (Normal) Collected:  08/05/20 0312    Lab Status:  Final result Specimen:  Urine, Clean Catch Updated:  08/05/20 0328     Amph/Meth UR Negative     Barbiturate Ur Negative     Benzodiazepine Urine Negative     Cocaine Urine Negative     Methadone Urine Negative     Opiate Urine Negative     PCP Ur Negative     THC Urine Negative     Oxycodone Urine Negative    Narrative:       FOR MEDICAL PURPOSES ONLY  IF CONFIRMATION NEEDED PLEASE CONTACT THE LAB WITHIN 5 DAYS  Drug Screen Cutoff Levels:  AMPHETAMINE/METHAMPHETAMINES  1000 ng/mL  BARBITURATES     200 ng/mL  BENZODIAZEPINES     200 ng/mL  COCAINE      300 ng/mL  METHADONE      300 ng/mL  OPIATES      300 ng/mL  PHENCYCLIDINE     25 ng/mL  THC       50 ng/mL  OXYCODONE      100 ng/mL    Novel Coronavirus Y[facility] [[id number]]  (Normal) Collected:  08/04/20 2152    Lab Status:  Final result Specimen:  Nares from Nose Updated:  08/04/20 2305     SARS-CoV-2 Negative    Narrative:        The specimen collection materials, transport medium, and/or testing methodology utilized in the production of these test results have been proven to be reliable in a limited validation with an abbreviated program under the Emergency Utilization Authorization provided by the FDA  Testing reported as "Presumptive positive" will be confirmed with secondary testing with a reference laboratory to ensure result accuracy  Clinical caution and judgement should be used with the interpretation of these results with consideration of the clinical impression and other laboratory testing  Testing reported as "Positive" or "Negative" has been proven to be accurate according to standard laboratory validation requirements  All testing is performed with control materials showing appropriate reactivity at standard intervals  POCT alcohol breath test [[id number]]  (Normal) Resulted:  08/04/20 2153    Lab Status:  Final result Updated:  08/04/20 2153     EXTBreath Alcohol 0 108    TSH [[id number]]  (Normal) Collected:  08/04/20 1923    Lab Status:  Final result Specimen:  Blood from Arm, Left Updated:  08/04/20 1951     TSH 3RD GENERATON 1 415 uIU/mL     Narrative:       Patients undergoing fluorescein dye angiography may retain small amounts of fluorescein in the body for 48-72 hours post procedure  Samples containing fluorescein can produce falsely depressed TSH values  If the patient had this procedure,a specimen should be resubmitted post fluorescein clearance        Ethanol [[id number]]  (Abnormal) Collected:  08/04/20 1923    Lab Status:  Final result Specimen:  Blood from Arm, Left Updated:  08/04/20 1950     Ethanol Lvl 210 mg/dL     Comprehensive metabolic panel [[id number]]  (Abnormal) Collected:  08/04/20 1923    Lab Status:  Final result Specimen:  Blood from Arm, Left Updated:  08/04/20 1943     Sodium 141 mmol/L      Potassium 3 4 mmol/L      Chloride 106 mmol/L      CO2 26 mmol/L      ANION GAP 9 mmol/L      BUN 10 mg/dL      Creatinine 0 92 mg/dL      Glucose 122 mg/dL      Calcium 8 2 mg/dL      AST 17 U/L ALT 31 U/L      Alkaline Phosphatase 50 U/L      Total Protein 7 1 g/dL      Albumin 3 7 g/dL      Total Bilirubin 0 55 mg/dL      eGFR 97 ml/min/1 73sq m     Narrative:       [place] guidelines for Chronic Kidney Disease (CKD):     Stage 1 with normal or high GFR (GFR > 90 mL/min/1 73 square meters)    Stage 2 Mild CKD (GFR = 60-89 mL/min/1 73 square meters)    Stage 3A Moderate CKD (GFR = 45-59 mL/min/1 73 square meters)    Stage 3B Moderate CKD (GFR = 30-44 mL/min/1 73 square meters)    Stage 4 Severe CKD (GFR = 15-29 mL/min/1 73 square meters)    Stage 5 End Stage CKD (GFR <15 mL/min/1 73 square meters)  Note: GFR calculation is accurate only with a steady state creatinine    CBC and differential [[id number]]  (Abnormal) Collected:  08/04/20 1923    Lab Status:  Final result Specimen:  Blood from Arm, Left Updated:  08/04/20 1929     WBC 7 79 Thousand/uL      RBC 4 98 Million/uL      Hemoglobin 16 3 g/dL      Hematocrit 48 4 %      MCV 97 fL      MCH 32 7 pg      MCHC 33 7 g/dL      RDW 13 6 %      MPV 8 5 fL      Platelets 370 Thousands/uL      nRBC 0 /100 WBCs      Neutrophils Relative 48 %      Immat GRANS % 0 %      Lymphocytes Relative 39 %      Monocytes Relative 7 %      Eosinophils Relative 5 %      Basophils Relative 1 %      Neutrophils Absolute 3 68 Thousands/µL      Immature Grans Absolute 0 02 Thousand/uL      Lymphocytes Absolute 3 07 Thousands/µL      Monocytes Absolute 0 53 Thousand/µL      Eosinophils Absolute 0 41 Thousand/µL      Basophils Absolute 0 08 Thousands/µL                  No orders to display              Procedures  Procedures         ED Course       US AUDIT      Most Recent Value   Initial Alcohol Screen: US AUDIT-C    1  How often do you have a drink containing alcohol? 5 Filed at: 08/04/2020 1834   2  How many drinks containing alcohol do you have on a typical day you are drinking? 3 Filed at: 08/04/2020 1834   3a  Male UNDER 65:  How often do you have five or more drinks on one occasion? 3 Filed at: 08/04/2020 1834   Audit-C Score  (!) 11 Filed at: 08/04/2020 1834                  ABIOLA/DAST-10      Most Recent Value   How many times in the past year have you    Used an illegal drug or used a prescription medication for non-medical reasons? Never Filed at: 08/04/2020 1835                                MDM      Disposition  Final diagnoses:   Depression with suicidal ideation   Alcohol abuse     Time reflects when diagnosis was documented in both MDM as applicable and the Disposition within this note     Time User Action Codes Description Comment    8/5/2020 12:17 AM Barabara Cedrick Add [R45 851] Suicidal ideations     8/5/2020 12:17 AM Rowan Fernandez Add [F32 9,  R45 851] Depression with suicidal ideation     8/5/2020 12:17 AM Rowan Fernandez Modify [F32 9,  R45 851] Depression with suicidal ideation     8/5/2020 12:17 AM Rowan Fernandez Remove [R45 851] Suicidal ideations     8/5/2020 12:17 AM Rowan Fernandez Add [F10 10] Alcohol abuse       ED Disposition     ED Disposition Condition Date/Time Comment    Transfer to [facility] 7066 Aug 5, 2020 11:58 AM Suzi Thompson should be transferred out to Behavioral healthand has been medically cleared          MD Documentation      Most Recent Value   Patient Condition  The patient has been stabilized such that within reasonable medical probability, no material deterioration of the patient condition or the condition of the unborn child(marlene) is likely to result from the transfer   Reason for Transfer  Level of Care needed not available at this facility   Benefits of Transfer  Continuity of care   Accepting Physician  Rosalina [address] Name, Tia [address] PA    (Name & Tel number)  [facility] [phone]   Sending MD Osmar Jorge MD   Provider Certification  The patient is stable for psychiatric transfer because they are medically stable, and is protected from harming him/herself or others during transport, General risk, such as traffic hazards, adverse weather conditions, rough terrain or turbulence, possible failure of equipment (including vehicle or aircraft), or consequences of actions of persons outside the control of the transport personnel      RN Documentation      [address] Name, [address]    (Name & Tel number)  [facility] [phone]   Report Given to  call [phone]   Transport Mode  Ambulance   Level of Care  Basic life support   Transfer Date  08/05/20      Follow-up Information    None         Patient's Medications   Discharge Prescriptions    No medications on file     No discharge procedures on file      PDMP Review       Value Time User    PDMP Reviewed  Yes 1/8/2020  1:43 AM Gilberto Leyden, MD          ED Provider  Electronically Signed by           Ángel Macedo MD  08/05/20 8110

## 2020-08-05 NOTE — ED NOTES
Pt is eating a snack and drink water per request by pt        Gi Vaughn  08/04/20 2022       Gi Vaughn  08/04/20 2023

## 2020-08-05 NOTE — PROGRESS NOTES
Patient admitted onto the unit from [facility] ED  Pleasant and cooperative with assessment  Denies S/I H/I anxiety and depression  Showered and had a snack  Pt admitted onto the unit for increase in alcohol consumption, increased depression and thoughts of suicide with no plan  Recently lost his job and is homeless  Denies alcohol addiction, this is a relapse for him, he feels he does not need rehab or an alcohol problem    Given tour of the unit  Out for dinner, complaint with medications, socializing with peers  Continual monitoring maintained

## 2020-08-05 NOTE — ED NOTES
Left with CTS ,report called to receiving RN at [address] [place] [address], RN  08/05/20 9939

## 2020-08-05 NOTE — ED NOTES
Patient is resting comfortably  Hospital sitter at bedside for patient safety       Walter Juarez RN  08/04/20 6543

## 2020-08-05 NOTE — ED NOTES
Patient sleeping and in no distress at this time  Will continue to monitor       [address] [place] [address]  08/04/20 2873

## 2020-08-05 NOTE — ED NOTES
Left with multiple belonging bags from [facility] room     Dianne Bullock RN  08/05/20 3911

## 2020-08-05 NOTE — ED NOTES
Patient is accepted at [facility]  Patient is accepted by [facility]  Transportation is arranged with [place] is scheduled for [address] CTS  Patient may go to the floor after 3 PM         Nurse report is to be called to [phone] prior to patient transfer

## 2020-08-05 NOTE — ED NOTES
curently speaking with [facility] heart/crisis via telephone     Teresa Herrera RN  08/05/20 5627

## 2020-08-05 NOTE — ED NOTES
Insurance Authorization for admission:   Phone call placed to [facility]  Phone number: [phone]  Spoke to Re Solis  5 days approved, LCD 8/9  Level of care: IP   Review on 8/10  Authorization # [id number]  EVS (Eligibility Verification System) called - [phone]  Automated system indicates: Active  Recipient #[id number]

## 2020-08-05 NOTE — ED NOTES
Patient still sleeping and in no distress at this time  Will continue to monitor       [address] [place] [address]  08/04/20 0903

## 2020-08-05 NOTE — CONSULTS
Consult- Vanessa Form 1970, 47 y o  male MRN: [id number]    Unit/Bed#: [facility] 247-02 Encounter: [id number]    Primary Care Provider: Danny Aase, MD   Date and time admitted to hospital: 8/5/2020  3:27 PM      Inpatient consult for Medical Clearance for [facility] patient  Consult performed by: Rosa Steele PA-C  Consult ordered by: JEMAL Cunha        Medical clearance for psychiatric admission  Assessment & Plan  · Patient was medically cleared for inpatient behavior health admission in the emergency room at [facility]  · Patient remains medically cleared for inpatient behavior health admission  · Patient signed a 201 for treatment    Tobacco abuse  Assessment & Plan  · Nicotine patch    Hypokalemia  Assessment & Plan  · Will give 1 dose of potassium    Alcohol dependence ([address] )  Assessment & Plan  · CIWA protocol    Bipolar 2 disorder [facility])  Assessment & Plan  · Management per inpatient behavior health      Recommendations for Discharge:  · Per Primary Service    History of Present Illness:  Vanessa Sood is a 47 y o  male who was originally evaluated at [facility] emergency room secondary to depression, report of suicidal ideation and daily alcohol use  Patient is increased personal stressors he is homeless and he has lost his job  He requested help  Patient was medically cleared for inpatient behavior health admission remains medically cleared for inpatient behavior health admission  Patient signed a 61 51 81 for treatment  Will see the patient as needed please call if there is any issues or concerns    Review of Systems:  Review of Systems   Constitutional: Negative for chills and fever  HENT: Negative for rhinorrhea, sore throat and trouble swallowing  Respiratory: Negative for cough and shortness of breath  Cardiovascular: Negative for chest pain and palpitations  Gastrointestinal: Negative for abdominal pain, diarrhea, nausea and vomiting     Genitourinary: Negative for difficulty urinating  Nocturia   Musculoskeletal: Negative for arthralgias and neck pain  Skin: Negative for rash and wound  Neurological: Negative for dizziness, weakness and headaches  Psychiatric/Behavioral: Positive for dysphoric mood  Past Medical and Surgical History:   Past Medical History:   Diagnosis Date    Addiction to drug ([place] [address] )     Alcohol abuse     Alcoholism ([place] [address] )     Anxiety     Arthritis     Bipolar disorder ([place] [address] )     Depression     Lung disease     31+ yr smoking hx    Sleep difficulties     Substance abuse ([place] [address] )     Tobacco abuse        Past Surgical History:   Procedure Laterality Date    HERNIA REPAIR  03/2018    MASTECTOMY  03/2018    subcutaneous per [place]    WISDOM TOOTH EXTRACTION         Meds/Allergies:  all medications and allergies reviewed    Allergies: No Known Allergies    Social History:     Marital Status: Single    Substance Use History:   Social History     Substance and Sexual Activity   Alcohol Use Yes    Frequency: 4 or more times a week    Drinks per session: 10 or more    Binge frequency: Daily or almost daily    Comment: 1 bottle of vodka daily     Social History     Tobacco Use   Smoking Status Current Every Day Smoker    Packs/day: 0 50    Years: 31 00    Pack years: 15 50    Types: Cigarettes   Smokeless Tobacco Current User   Tobacco Comment    1 PPD per [place]     Social History     Substance and Sexual Activity   Drug Use Not Currently       Family History:  I have reviewed the patients family history    Physical Exam:   Vitals:   Blood Pressure: 152/89 (08/05/20 1545)  Pulse: 102 (08/05/20 1545)  Temperature: (!) 97 2 °F (36 2 °C) (08/05/20 1545)  Temp Source: Temporal (08/05/20 1545)  Respirations: 18 (08/05/20 1545)  Height: 6' (182 9 cm) (08/05/20 1545)  Weight - Scale: 102 kg (225 lb) (08/05/20 1545)  SpO2: 96 % (08/05/20 1545)    Physical Exam   Constitutional: He is oriented to person, place, and time   He appears well-developed  HENT:   Head: Normocephalic and atraumatic  Eyes: Conjunctivae are normal  Right eye exhibits no discharge  Left eye exhibits no discharge  Neck: Normal range of motion  No tracheal deviation present  Cardiovascular: Normal rate, regular rhythm and normal heart sounds  Exam reveals no gallop and no friction rub  No murmur heard  Pulmonary/Chest: Effort normal and breath sounds normal  No respiratory distress  He has no wheezes  He has no rales  Abdominal: Soft  Bowel sounds are normal  He exhibits no distension and no mass  There is no abdominal tenderness  There is no guarding  Musculoskeletal: Normal range of motion  General: No tenderness or deformity  Neurological: He is alert and oriented to person, place, and time  No cranial nerve deficit  Skin: Skin is warm and dry  No rash noted  No erythema  No pallor  Psychiatric: His behavior is normal    Nursing note and vitals reviewed  Additional Data:   Lab Results: I have personally reviewed pertinent reports  Results from last 7 days   Lab Units 08/04/20  1923   WBC Thousand/uL 7 79   HEMOGLOBIN g/dL 16 3   HEMATOCRIT % 48 4   PLATELETS Thousands/uL 267   NEUTROS PCT % 48   LYMPHS PCT % 39   MONOS PCT % 7   EOS PCT % 5     Results from last 7 days   Lab Units 08/04/20 1923   SODIUM mmol/L 141   POTASSIUM mmol/L 3 4*   CHLORIDE mmol/L 106   CO2 mmol/L 26   BUN mg/dL 10   CREATININE mg/dL 0 92   CALCIUM mg/dL 8 2*   ALK PHOS U/L 50   ALT U/L 31   AST U/L 17       ** Please Note: This note has been constructed using a voice recognition system   **

## 2020-08-05 NOTE — ED NOTES
Patient was self-referred to [facility] ED for increased depression, anxiety and suicidal thoughts  He denies a history of treatment, although per history he had been diagnosed as having bipolar disorder  Patient reports multiple stressors including relapse on alcohol in early July, unemployment and a chaotic, extremely dysfunctional living situation which he has chosen to leave  Patient stated he has been contemplating suicide and that it was fortunate he did not have a gun, indicating that he would have shot himself  BAL was 210 upon arrival 8/4 at 1923  Patient stated he had been doing well after receiving a Vivitrol injection; however, he was unable to locate a physicain to continue too prescribe it  Once it wore off, he began to experience cravings again  Patient stated that coping with his landlord has been extremely depressing  He deescribes the individual as a hoarder who has accumulated 30 vehicles on the property  Patient had been working on the vehicles in exchange for rent and earning some cash but it was not reliable  The man just had an ex-girlfriend move in and she is a junkie  The patient stated he felt himself becoming more and more withdrawn to the point of becoming suicidal  Patient describes poor sleep, anxiety and ruminating about past relationships  Patient stated he does suffer from low self-esteem although he knows he has talent and a potentially good life  Patient stated he has some concern about the need for a urologist as he has been waking to urinate multiple times a night  Patient stated he does not use street drugs  His first alcohol use was age 23  He reports he has little ability to stop at a few drinks so will binge to excess  He stated he has periods of sobriety that can be fairly long  The longest was 2012 to 2017  No history of DT's or seizures associated with alcohol withdrawal He associates periods of sobriety with being in a relationship   Alcohol use has impacted his relationships in the past

## 2020-08-05 NOTE — ED NOTES
ALL PATIENT BELONGINGS TAKEN BY SECURITY TO DECON ROOM DUE TO TO MUCH TO FIT IN LOCKERS  THIS IS ALSO NOTED IN BELONGING SECTION OF CHART       Shana Johnson  08/04/20 6955

## 2020-08-06 PROBLEM — F33.1 MDD (MAJOR DEPRESSIVE DISORDER), RECURRENT EPISODE, MODERATE (HCC): Status: ACTIVE | Noted: 2020-08-06

## 2020-08-06 LAB
ATRIAL RATE: 84 BPM
P AXIS: 67 DEGREES
PR INTERVAL: 160 MS
QRS AXIS: -49 DEGREES
QRSD INTERVAL: 124 MS
QT INTERVAL: 378 MS
QTC INTERVAL: 446 MS
T WAVE AXIS: 65 DEGREES
VENTRICULAR RATE: 84 BPM

## 2020-08-06 PROCEDURE — 93010 ELECTROCARDIOGRAM REPORT: CPT | Performed by: INTERNAL MEDICINE

## 2020-08-06 PROCEDURE — 99221 1ST HOSP IP/OBS SF/LOW 40: CPT | Performed by: PSYCHIATRY & NEUROLOGY

## 2020-08-06 RX ORDER — SERTRALINE HYDROCHLORIDE 25 MG/1
25 TABLET, FILM COATED ORAL DAILY
Status: DISCONTINUED | OUTPATIENT
Start: 2020-08-06 | End: 2020-08-10

## 2020-08-06 RX ORDER — NALTREXONE HYDROCHLORIDE 50 MG/1
25 TABLET, FILM COATED ORAL DAILY
Status: DISCONTINUED | OUTPATIENT
Start: 2020-08-06 | End: 2020-08-10

## 2020-08-06 RX ADMIN — NICOTINE 1 PATCH: 21 PATCH, EXTENDED RELEASE TRANSDERMAL at 08:51

## 2020-08-06 RX ADMIN — FOLIC ACID 1 MG: 1 TABLET ORAL at 08:52

## 2020-08-06 RX ADMIN — GABAPENTIN 300 MG: 300 CAPSULE ORAL at 21:14

## 2020-08-06 RX ADMIN — Medication 1 TABLET: at 08:52

## 2020-08-06 RX ADMIN — GABAPENTIN 300 MG: 300 CAPSULE ORAL at 08:52

## 2020-08-06 RX ADMIN — GABAPENTIN 300 MG: 300 CAPSULE ORAL at 16:17

## 2020-08-06 RX ADMIN — TRAZODONE HYDROCHLORIDE 100 MG: 50 TABLET ORAL at 21:19

## 2020-08-06 RX ADMIN — NALTREXONE HYDROCHLORIDE 25 MG: 50 TABLET, FILM COATED ORAL at 11:54

## 2020-08-06 RX ADMIN — SERTRALINE HYDROCHLORIDE 25 MG: 25 TABLET ORAL at 11:54

## 2020-08-06 RX ADMIN — THIAMINE HCL TAB 100 MG 100 MG: 100 TAB at 21:14

## 2020-08-06 NOTE — PROGRESS NOTES
08/06/20 1600   Team Meeting   Meeting Type Tx Team Meeting   Initial Conference Date 08/06/20   Next Conference Date 08/13/20   Team Members Present   Team Members Present Physician;Nurse;   Physician Team Member Dr Kelley Mccormick Team Member Keshia Hatch, RN   Social Work Team Member Lily Nicholas   Patient/Family Present   Patient Present Yes   Patient's Family Present No     Treatment plan reviewed with patient  Patient expressed understanding and signed the document  Patient was provided copy and was placed in discharge envelope  Treatment team will continue to monitor patient's progress and adjust treatment plan as needed

## 2020-08-06 NOTE — H&P
Psychiatric Evaluation - [facility] 47 y o  male MRN: [id number]  Unit/Bed#: [facility] 792-83 Encounter: [id number]    Assessment/Plan   Principal Problem:    MDD (major depressive disorder), recurrent episode, moderate (HCC)  Active Problems:    Bipolar 2 disorder ([facility] [address] )    Alcohol use disorder, severe, dependence ([facility] )    Medical clearance for psychiatric admission    Tobacco abuse    Hypokalemia    Plan:   Risks, benefits and possible side effects of Medications:   Risks, benefits, and possible side effects of medications explained to patient and patient verbalizes understanding  1  Admit to acute psychiatric level care for safety and stability  2  Start Zoloft and naltrexone to help with depressive symptoms and alcohol use  3  Individual, group, and milieu therapy  4  Safety and discharge planning    Chief Complaint: "I don't want to go on living like this"    History of Present Illness     Patient is a 47 y o  male presents with history of depression and severe alcohol use who reports that recently he has been getting more depressed, feeling hopeless and helpless  Patient reports becoming more irritable on frustrated especially with his landlord  Patient reports that he was having suicidal thoughts and if he had a gun he would of shot himself  Patient also reports that after several months of being abstinent from drinking alcohol he relapsed and feeling very remorseful a more depressed  Patient denies having any type of hallucination or delusion  This is his 2nd psychiatric hospitalization  He denies any history of suicide attempts  Patient reports he has been drinking alcohol since age 23 and he has been to drug and alcohol rehab about 10 times  Patient has history of 3 DUIs  He reports he experimented with cocaine occasionally but has not used it recently    Patient reports the last time he was in rehab earlier this year he was given Vivitrol injection and that seemed to help but he was not able to follow-up due to the pandemic  Psychiatric Review Of Systems:  sleep: yes  appetite changes: no  weight changes: yes  energy/anergy: no  interest/pleasure/anhedonia: yes  somatic symptoms: no  anxiety/panic: yes  claire: no  guilty/hopeless: yes  self injurious behavior/risky behavior: no    Historical Information     Past Psychiatric History:   Dual drug/alcohol    Substance Abuse History:  E-Cigarette/Vaping    E-Cigarette Use Never User       E-Cigarette/Vaping Substances       Social History     Tobacco History     Smoking Status  Current Every Day Smoker Smoking Frequency  0 5 packs/day for 31 years (15 5 pk yrs) Smoking Tobacco Type  Cigarettes    Smokeless Tobacco Use  Current User    Tobacco Comment  1 PPD per [facility]          Alcohol History     Alcohol Use Status  Yes Comment  1 bottle of vodka daily          Drug Use     Drug Use Status  Not Currently          Sexual Activity     Sexually Active  Not Asked          Activities of Daily Living    Not Asked               Additional Substance Use Detail     Questions Responses    Problems Due to Past Use of Alcohol? Yes    Problems Due to Past Use of Substances? No    Substance Use Assessment Denies substance use within the past 12 months    Alcohol Use Frequency Daily    Cannabis frequency Denies    Heroin Frequency Denies    Alcohol Drink of Choice "liquor"    1st Use of Alcohol age of 23    Last Use of Alcohol & Amount 2/23/2016 "a few drinks"    Longest Abstinence from Alcohol 4 years       Cocaine frequency Denies    Methamphetamine Frequency Denies    Narcotic Frequency Denies    Benzodiazepine Frequency Denies    Amphetamine frequency Denies    Barbituate Frequency Denies    Inhalant frequency Denies    Hallucinogen frequency Denies    Ecstasy frequency Denies    Other drug frequency Denies    Last reviewed by Rosa Steele PA-C on 8/5/2020        I have assessed this patient for substance use within the past 12 months    Family Psychiatric History:   Psychiatric Illness Brother Illness: alcohol use    Social History:  Education: [employer]      Past Medical History:   Diagnosis Date    Addiction to drug ([place] [address] )     Alcohol abuse     Alcoholism ([place] [address] )     Anxiety     Arthritis     Bipolar disorder ([place] [address] )     Depression     Lung disease     31+ yr smoking hx    Sleep difficulties     Substance abuse ([place] [address] )     Tobacco abuse        Medical Review Of Systems:  Pertinent items are noted in HPI  Meds/Allergies   all current active meds have been reviewed  No Known Allergies    Objective   Vital signs in last 24 hours:  Temp:  [97 2 °F (36 2 °C)-98 1 °F (36 7 °C)] 98 1 °F (36 7 °C)  HR:  [] 95  Resp:  [18] 18  BP: (152-161)/(73-89) 161/73    No intake or output data in the 24 hours ending 08/06/20 1437    Mental Status Evaluation:  Appearance:  casually dressed   Behavior:  guarded   Speech:  soft   Mood:  sad   Affect:  mood-congruent   Language: repeating phrases   Thought Process:  circumstantial   Thought Content:  normal   Perceptual Disturbances: None   Risk Potential: Suicidal Ideations without plan  Homicidal Ideations none  Potential for Aggression No   Sensorium:  person and place   Memory:  recent and remote memory grossly intact   Consciousness:  awake    Attention: attention span appeared shorter than expected for age   Intellect: normal   Fund of Knowledge: vocabulary: fgair   Insight:  limited   Judgment: limited   Muscle Strength and Tone: face and neck   Gait/Station: slow   Motor Activity: no abnormal movements     Lab Results: I have personally reviewed all pertinent laboratory/tests results     Admission Labs:   Admission on 08/04/2020, Discharged on 08/05/2020   Component Date Value    WBC 08/04/2020 7 79     RBC 08/04/2020 4 98     Hemoglobin 08/04/2020 16 3     Hematocrit 08/04/2020 48 4     MCV 08/04/2020 97     MCH 08/04/2020 32 7     MCHC 08/04/2020 33 7     RDW 08/04/2020 13 6     MPV 08/04/2020 8 5*    Platelets 50/47/7226 267     nRBC 08/04/2020 0     Neutrophils Relative 08/04/2020 48     Immat GRANS % 08/04/2020 0     Lymphocytes Relative 08/04/2020 39     Monocytes Relative 08/04/2020 7     Eosinophils Relative 08/04/2020 5     Basophils Relative 08/04/2020 1     Neutrophils Absolute 08/04/2020 3 68     Immature Grans Absolute 08/04/2020 0 02     Lymphocytes Absolute 08/04/2020 3 07     Monocytes Absolute 08/04/2020 0 53     Eosinophils Absolute 08/04/2020 0 41     Basophils Absolute 08/04/2020 0 08     Sodium 08/04/2020 141     Potassium 08/04/2020 3 4*    Chloride 08/04/2020 106     CO2 08/04/2020 26     ANION GAP 08/04/2020 9     BUN 08/04/2020 10     Creatinine 08/04/2020 0 92     Glucose 08/04/2020 122     Calcium 08/04/2020 8 2*    AST 08/04/2020 17     ALT 08/04/2020 31     Alkaline Phosphatase 08/04/2020 50     Total Protein 08/04/2020 7 1     Albumin 08/04/2020 3 7     Total Bilirubin 08/04/2020 0 55     eGFR 08/04/2020 97     Amph/Meth UR 08/05/2020 Negative     Barbiturate Ur 08/05/2020 Negative     Benzodiazepine Urine 08/05/2020 Negative     Cocaine Urine 08/05/2020 Negative     Methadone Urine 08/05/2020 Negative     Opiate Urine 08/05/2020 Negative     PCP Ur 08/05/2020 Negative     THC Urine 08/05/2020 Negative     Oxycodone Urine 08/05/2020 Negative     TSH 3RD GENERATON 08/04/2020 1 415     Ethanol Lvl 08/04/2020 210*    EXTBreath Alcohol 08/04/2020 0  108     SARS-CoV-2 08/04/2020 Negative     Ventricular Rate 08/04/2020 84     Atrial Rate 08/04/2020 84     TN Interval 08/04/2020 160     QRSD Interval 08/04/2020 124     QT Interval 08/04/2020 378     QTC Interval 08/04/2020 446     P Axis 08/04/2020 67     QRS Axis 08/04/2020 -52     T Wave Axis 08/04/2020 65         Patient Strengths/Assets: ability for insight, special hobby/interest, work skills    Patient Barriers/Limitations: self-care deficit, substance abuse    Counseling / Coordination of Care  Total floor / unit time spent today 60 minutes  Greater than 50% of total time was spent with the patient and / or family counseling and / or coordination of care   A description of the counseling / coordination of care:

## 2020-08-06 NOTE — PROGRESS NOTES
08/06/20 1300   Activity/Group Checklist   Group Admission/Discharge   Attendance Attended   Attendance Duration (min) 16-30   Interactions Interacted appropriately   Affect/Mood Appropriate;Bright;Calm   Goals Achieved Identified feelings; Identified triggers; Discussed coping strategies; Identified resources and support systems; Able to reflect/comment on own behavior;Able to manage/cope with feelings; Able to self-disclose   AT met with PT to complete self assessment and relapse prevention plan  PT was pleasant and cooperative, displayed a bright mood and affect and maintained good eye contact, with good insight  PT reported that his biggest stressors leading to this admission is dealing with his ex-landlord, relapsing on alcohol and his place of residence  PT reported that these stressors affect his quality of life some of the time  What he likes most about life is his talents, work ethic, drive to succeed and family [place] and what he likes least about his life is regrets that he made in the past and being alone (no partner)  PT completed high school and attended [employer] school for [employer], he is currently unemployed, however worked most recently as an   PT stated that he enjoys reading, music, playing the drums, sports, working on cars, hiking and nature  PT would like to work on coping with the symptoms of his illness, alcohol relapse prevention, knowledge about his medications and healthy lifestyles while he is here and stated that he will know he is ready for discharge when he can assert a new living arrangement and be more consistent with his medications  PT also completed RP plan where he stated that his warning signs to indicate that he might need to reach out for help as being laziness, wanting to drink and depression   PT identified both his brother and a sister as his positive support system and spending time with family, working and hobbies as his healthy coping skills of choice

## 2020-08-06 NOTE — PROGRESS NOTES
08/06/20 1000   Activity/Group Checklist   Group   ([employer] Processing)   Attendance Attended   Attendance Duration (min) Greater than 60   Interactions Interacted appropriately   Affect/Mood Appropriate;Bright;Calm   Goals Achieved Identified feelings; Identified triggers; Discussed coping strategies; Identified resources and support systems; Able to listen to others; Able to engage in interactions

## 2020-08-06 NOTE — PLAN OF CARE
Problem: DISCHARGE PLANNING  Goal: Discharge to home or other facility with appropriate resources  Description: INTERVENTIONS:  - Identify barriers to discharge w/patient and caregiver  - Arrange for needed discharge resources and transportation as appropriate  - Identify discharge learning needs (meds, wound care, etc )  - Arrange for interpretive services to assist at discharge as needed  - Refer to Case Management Department for coordinating discharge planning if the patient needs post-hospital services based on physician/advanced practitioner order or complex needs related to functional status, cognitive ability, or social support system  Outcome: Progressing

## 2020-08-06 NOTE — PROGRESS NOTES
Pt has been visible on the unit, pt has been calm and cooperative on the unit  Pt has been seen in the social areas of the unit and has been social with the other patients on the unit  Pt denies any current si/hi at this time  Pt remains medication and meal compliant at this time

## 2020-08-06 NOTE — PROGRESS NOTES
Patient bright, alert, awake, ate breakfast in dining room, social with peers  Denies si hi and hallucinations  Talking to this nurse about drinking alcohol in excess this past week but had been sober for 3 months prior to that    Talking about his landlord where he has been residing for 5 years and how he needed to move out to prevent from harming the landlord, related to his difficult personality  He is calm and pleasant but slightly pressured  Will maintain on safety precautions and continual monitoring  No needs identified

## 2020-08-06 NOTE — PROGRESS NOTES
08/06/20 0730   Activity/Group Checklist   Group   (Goal Setting and Communications)   Attendance Attended   Attendance Duration (min) 46-60   Interactions Interacted appropriately   Affect/Mood Appropriate;Bright   Goals Achieved Identified feelings; Discussed coping strategies; Able to listen to others; Able to engage in interactions   PT attended morning group, was pleasant, bright and cooperative and easily shared with peers   PT identified the goals that he would like to accomplish today as being:  Stop smoking  Make a phone to my brother  Evaluate and diagnose how and why I am here

## 2020-08-06 NOTE — PROGRESS NOTES
08/06/20 1622   Patient Intake   Living Arrangement Other (Comment)  (Patient is currently moving out of his rental and plans to move in temporarily with his brother, Anai Meyer, in [place], [place] )   Can patient return home? 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   Work History Unemployed  (Patient is unemployed currently, however, will be starting a new job with [employer] in about 2 weeks )   School Grade/Year Other (Comment)  (Patient reports he has an associates degress in automotive and music theory )   Unemployed / MA applicant: Patient reports he is currently collecting [employer] [place] ($200/month)  Admission Status   Status of Admission [facility] 55 79 N/A   Patient History   Treatment History None   Currently in Treatment No   Medical Problems See medical consult   Legal Issues Denies   Substance Abuse Yes (See [facility] History section for detail)  (Patient reports an on/off history of ETOH abuse  Patient just recently began drinking again about 1 week ago )   Crisis Info   Release of Information Signed Yes  (SEE signed for brother, Reyna Banda )     CM met and spoke with patient this afternoon to complete psychosocial assessment  Patient was pleasant and cooperative for the interview  Patient reports he came to the hospital for treatment due to relapsing and started drinking about 1 week ago  Patient reports he became very frustrated with his ex-landlord and became more depressed about his life which lead him to start drinking again  Patient reports he was recently at inpatient rehab with [facility] in [place], [place] back in March and had been sober since   Patient reports he believes he was psychiatrically hospitalized once about 5 years ago but is unsure about the details  He believes it was related to his ETOH abuse at that time as well  Patient denies SI, HI, AH, VH, paranoia and delusions  Patient denies access to fire arms in the community and does not feel he is at risk of harming himself or others at this time  Patient also denies any current or past trauma/domestic violence at home/in the community  Patient reports he has been drinking alcohol on and off since he was 23years old  Patient reports the longest he's been sober is 5+ years but notes his last relapse was in February 2020 with him entering inpatient rehab in March 2020  Patient did admit to having to pay fines regarding his lapsed car registration and believes he still has about $50 left to pay  Patient is single, never  and has no children  Patient reports he will not be returning to his home and is hoping to be able to move in with his brother, Yadiel Prabhakar, temporarily until he is able to find a new home  Patient reports he will be calling his brother tonight to discuss this  Patient reports if he is not able to live with his brother he would like to look into going to an [address] in the [facility] if possible  Patient identifies his brother as his main support in the community but does note that his sister, Emily Hernandez, and brother-in-law, Елена, are supportive of him as well  Patient does have a pet [place] [address], [address] [facility] [address] [facility], whom is currently being cared for by his brother Yadiel Prabhakar  Patient reports his PCP is Dr Bere Recinos in [place], [place]  Patient is agreeable in being set up with a psychiatrist in the [facility] if possible  Patient reports music is his main coping skill but also identifies cooking and spending time with his family as coping skills as well  CM will continue to follow patient's progress and assist with discharge planning needs

## 2020-08-06 NOTE — PROGRESS NOTES
08/06/20 0900   Team Meeting   Meeting Type Daily Rounds   Initial Conference Date 08/06/20   Team Members Present   Team Members Present Physician;Nurse;   Physician Team Member Dr Ofe Quinonez; JEMAL Bañuelos   Nursing Team Member Vicky Gutierrez RN   Social Work Team Member СВЕТЛАНА Harmon; Morelia Moyer [place]   Patient/Family Present   Patient Present No   Patient's Family Present No     New admission  201  History of ETOH abuse; drinking vodka daily  Reports he is homeless because he was kicked out of his "estate"  Reported SI in the ED  On the unit he is visible and cooperative  Treatment team recommending rehab if he is agreeable

## 2020-08-06 NOTE — TREATMENT PLAN
Treatment Plan [address] [facility] 47 y o  male MRN: [id number]    [address] [facility] Encounter: [id number]          Admit Date/Time:  8/5/2020  3:27 PM    Treatment Team: Attending Provider: Moe Ricks MD; Consulting Physician: Alka Watts MD; Patient Care Assistant: Vicente Castañeda; Recreational Therapist: Mykel Bundy; Care Manager: Fidencio Card RN; : Ashley Patel;  Registered Nurse: Fred Cardoza RN; Patient Care Assistant: Latonya Michaels    Diagnosis: Principal Problem:    MDD (major depressive disorder), recurrent episode, moderate ([facility] )  Active Problems:    [facility] )    Alcohol use disorder, severe, dependence ([facility] )    Medical clearance for psychiatric admission    Tobacco abuse    Hypokalemia      Patient Strengths: ability for insight, special hobby/interest, supportive family/friends, work skills     Patient Barriers: substance abuse    Progress Toward Goals: ongoing    Recommended Treatment: discharge planning     Treatment Frequency: 1-2 times per week     Expected Discharge Date:     Discharge Plan: referrals as indicated     Treatment Plan Created/Updated By: Moe Ricks MD

## 2020-08-06 NOTE — PROGRESS NOTES
Patient has been sleeping well through the night  Came out once for a drink and snack and went right back to bed  No c/o  No s/s withdrawal  Q 7 minute safety checks maintained

## 2020-08-07 LAB
CHOLEST SERPL-MCNC: 166 MG/DL (ref 0–200)
HDLC SERPL-MCNC: 50 MG/DL
LDLC SERPL CALC-MCNC: 78 MG/DL (ref 0–100)
NONHDLC SERPL-MCNC: 116 MG/DL
RPR SER QL: NORMAL
TRIGL SERPL-MCNC: 188 MG/DL (ref 44–166)
TSH SERPL DL<=0.05 MIU/L-ACNC: 1.9 UIU/ML (ref 0.45–5.33)

## 2020-08-07 PROCEDURE — 80061 LIPID PANEL: CPT | Performed by: NURSE PRACTITIONER

## 2020-08-07 PROCEDURE — 99232 SBSQ HOSP IP/OBS MODERATE 35: CPT | Performed by: NURSE PRACTITIONER

## 2020-08-07 PROCEDURE — 84443 ASSAY THYROID STIM HORMONE: CPT | Performed by: NURSE PRACTITIONER

## 2020-08-07 PROCEDURE — 86592 SYPHILIS TEST NON-TREP QUAL: CPT | Performed by: NURSE PRACTITIONER

## 2020-08-07 RX ADMIN — Medication 1 TABLET: at 08:36

## 2020-08-07 RX ADMIN — GABAPENTIN 300 MG: 300 CAPSULE ORAL at 17:25

## 2020-08-07 RX ADMIN — GABAPENTIN 300 MG: 300 CAPSULE ORAL at 21:37

## 2020-08-07 RX ADMIN — TRAZODONE HYDROCHLORIDE 100 MG: 50 TABLET ORAL at 21:37

## 2020-08-07 RX ADMIN — THIAMINE HCL TAB 100 MG 100 MG: 100 TAB at 21:37

## 2020-08-07 RX ADMIN — GABAPENTIN 300 MG: 300 CAPSULE ORAL at 08:35

## 2020-08-07 RX ADMIN — SERTRALINE HYDROCHLORIDE 25 MG: 25 TABLET ORAL at 08:36

## 2020-08-07 RX ADMIN — NALTREXONE HYDROCHLORIDE 25 MG: 50 TABLET, FILM COATED ORAL at 08:35

## 2020-08-07 RX ADMIN — FOLIC ACID 1 MG: 1 TABLET ORAL at 08:35

## 2020-08-07 RX ADMIN — NICOTINE 1 PATCH: 21 PATCH, EXTENDED RELEASE TRANSDERMAL at 08:36

## 2020-08-07 NOTE — PROGRESS NOTES
08/07/20 1000   Activity/Group Checklist   Group   (Self Awareness and Art Therapy Processing)   Attendance Attended   Attendance Duration (min) Greater than 60   Interactions Interacted appropriately   Affect/Mood Appropriate;Bright;Calm   Goals Achieved Identified feelings; Discussed coping strategies; Identified resources and support systems; Able to listen to others; Able to engage in interactions

## 2020-08-07 NOTE — PROGRESS NOTES
08/07/20 8923   Team Meeting   Meeting Type Daily Rounds   Initial Conference Date 08/07/20   Patient/Family Present   Patient Present No   Patient's Family Present No       Team Members Present:   MD Mya Ahuja PA-C Student  SERINA Ha, [facility]    Grandiose and compliant  Lost wife, house  Alcohol problems    Declines rehab referral   Wants to rent a room

## 2020-08-07 NOTE — PROGRESS NOTES
08/07/20 0772   Activity/Group Checklist   Group   (Goal Setting and Communications)   Attendance Did not attend  (AT group offered, pt elected to remain in room)

## 2020-08-07 NOTE — PROGRESS NOTES
Pt visible on unit, social with peers and staff  Attending groups  Pleasant and cooperative  Reports feeling much better since admission  No signs or symptoms of WD  Complinat with meals and meds  Monitoring continues

## 2020-08-07 NOTE — PROGRESS NOTES
Patient awoke once during night for a snack and then when back to bed  Pt has otherwise slept without incident  q7 min checks maintained, will continue to monitor

## 2020-08-07 NOTE — DISCHARGE INSTR - OTHER ORDERS
You are being discharged to your home at [address] [place] [address], [address] [place] [address], [address] [place] [address]; Phone: [phone]  Triggers you have identified during your hospitalization that led to your admission with suicidal ideation and distressed mood include environmental and financial stressors  Coping skills you have identified during your hospitalization include music, cooking and spending time with family  If you are unable to deal with your distressed mood alone please contact your brother, Gordo Mcguire  If that is not effective and you continue to have suicidal ideation and/or distressed mood, please contact Martin Clement at [phone], dial [phone] or go to the nearest emergency center  *National Suicide Prevention Lifeline:  8-440.221.8467  *Alcohol Anonymous: [address] [facility]ness ([place]) HELPLINE: 740.993.5716/Website: www luisito org  *Substance Abuse and [address] [facility] [employer], which is a confidential, free, 24-hour-a-day, 365-day-a-year, information service for individuals and family members facing mental health and/or substance use disorders  This service provides referrals to local treatment facilities, support groups, and community-based organizations  Callers can also order free publications and other information  Call 3-918.984.8908/Website: www [facility] gov  *[facility] 2-1-1: This is a toll free, confidential, 24-hour-a-day service which connects you to a community  in your area who can help you find services and resources that are available to you locally and provide critical services that can improve and save lives  Call: 80  /Website: https://emersonGenophenpavan goldstein/      What you need to know aboutcoronavirus disease 2019 (COVID-19)     What is coronavirus disease 2019 (COVID-19)? Coronavirus disease 2019 (COVID-19) is a respiratory illness that can spread from person to person   The virus that causes COVID-19 is a novel coronavirus that was first identified during an investigation into an outbreak in [place], [place]  Can people in the [place]  get COVID-19? Yes  COVID-19 is spreading from person to person in parts of the [place]  Risk of infection with COVID-19 is higher for people who are close contacts of someone known to have COVID-19, for example healthcare workers, or household members  Other people at higher risk for infection are those who live in or have recently been in an area with ongoing spread of COVID-19  Learn more about places with ongoing spread at   [facility]  html#geographic  Have there been cases of COVID-19 in the [place] ?   Yes  The first case of COVID-19 in the [place] was reported on January 21, 2020  The current count of cases of COVID-19 in the [place] is available on [employer] at [facility]  How does COVID-19 spread? The virus that causes COVID-19 probably emerged from an animal source, but is now spreading from person to person  The virus is thought to spread mainly between people who are in close contact with one another (within about 6 feet) through respiratory droplets produced when an infected person coughs or sneezes  It also may be possible that a person can get COVID-19 by touching a surface or object that has the virus on it and then touching their own mouth, nose, or possibly their eyes, but this is not thought to be the main way the virus spreads  Learn what is known about the spread of newly emerged coronaviruses at [facility]  What are the symptoms of COVID-19? Patients with COVID-19 have had mild to severe respiratory illness with symptoms of   fever   cough   shortness of breath  What are severe complications from this virus? Some patients have pneumonia in both lungs, multi-organ failure and in some cases death  How can I help protect myself? People can help protect themselves from respiratory illness with everyday preventive actions  Avoid close contact with people who are sick  Avoid touching your eyes, nose, and mouth withunwashed hands  Wash your hands often with soap and water for at least 20 seconds  Use an alcohol-based hand  that contains at least 60% alcohol if soap and water are not available  If you are sick, to keep from spreading respiratory illness to others, you should   Stay home when you are sick  Cover your cough or sneeze with a tissue, then throw the tissue in the trash  Clean and disinfect frequently touched objectsand surfaces  What should I do if I recently traveled from an area with ongoing spread of COVID-19? If you have traveled from an affected area, there may be restrictions on your movements for up to 2 weeks  If you develop symptoms during that period (fever, cough, trouble breathing), seek medical advice  Call the office of your health care provider before you go, and tell them about your travel and your symptoms  They will give you instructions on how to get care without exposing other people to your illness  While sick, avoid contact with people, don't go out and delay any travel to reduce the possibility of spreading illness to others  Is there a vaccine? There is currently no vaccine to protect against COVID-19  The best way to prevent infection is to take everyday preventive actions, like avoiding close contact with people who are sick and washing your hands often  Is there a treatment? There is no specific antiviral treatment for COVID-19  People with COVID-19 can seek medical care to helprelieve symptoms    For more information: www cdc gov/DUFMR47TB 483511-D 03/03/2020       What to do if you are sick withcoronavirus disease 2019 (COVID-19)     If you are sick with COVID-19 or suspect you are infected with the virus that causes COVID-19, follow the steps below to help prevent the disease from spreading to people in your home and community  Stay home except to get medical care   You should restrict activities outside your home, except for getting medical care  Do not go to work, school, or public areas  Avoid using public transportation, ride-sharing, or taxis  Separate yourself from other people and animals inyour home  People: As much as possible, you should stay in a specific room and away from other people in your home  Also, you should use a separate bathroom, if available  Animals: Do not handle pets or other animals while sick  See COVID-19 and Animals for more information  Call ahead before visiting your doctor   If you have a medical appointment, call the healthcare provider and tell them that you have or may have COVID-19  This will help the healthcare provider's office take steps to keep other people from getting infected or exposed  Wear a facemask  You should wear a facemask when you are around other people (e g , sharing a room or vehicle) or pets and before you enter a healthcare provider's office  If you are not able to wear a facemask (for example, because it causes trouble breathing), then people who live with you should not stay in the same room with you, or they should wear a facemask if they enteryour room  Cover your coughs and sneezes   Cover your mouth and nose with a tissue when you cough or sneeze  Throw used tissues in a lined trash can; immediately wash your hands with soap and water for at least 20 seconds or clean your hands with an alcohol-based hand  that contains at least 60 to 95% alcohol, covering all surfaces of your hands and rubbing them together until they feel dry  Soap and water should be used preferentially if hands are visibly dirty  Avoid sharing personal household items   You should not share dishes, drinking glasses, cups, eating utensils, towels, or bedding with other people or pets in your home  After using these items, they should be washed thoroughly with soap and water  Clean your hands often  Wash your hands often with soap and water for at least 20 seconds  If soap and water are not available, clean your hands with an alcohol-based hand  that contains at least 60% alcohol, covering all surfaces of your hands and rubbing them together until they feel dry  Soap and water should be used preferentially if hands are visibly dirty  Avoid touching your eyes, nose, and mouth with unwashed hands  Clean all "high-touch" surfaces every day  High touch surfaces include counters, tabletops, doorknobs, bathroom fixtures, toilets, phones, keyboards, tablets, and bedside tables  Also, clean any surfaces that may have blood, stool, or body fluids on them  Use a household cleaning spray or wipe, according to the label instructions  Labels contain instructions for safe and effective use of the cleaning product including precautions you should take when applying the product, such as wearing gloves and making sure you have good ventilation during use of the product  Monitor your symptoms  Seek prompt medical attention if your illness is worsening (e g , difficulty breathing)  Before seeking care, call your healthcare provider and tell them that you have, or are being evaluated for, COVID-19  Put on a facemask before you enter the facility  These steps will help the healthcare provider's office to keep other people in the office or waiting room from getting infectedor exposed  Ask your healthcare provider to call the local or [facility] health department  Persons who are placed under active monitoring or facilitated self-monitoring should follow instructions provided by their local health department or occupational health professionals, as appropriate  If you have a medical emergency and need to call 911, notify the dispatch personnel that you have, or are being evaluated for COVID-19   If possible, put on a facemask before emergency medical services arrive  Discontinuing home isolation  Patients with confirmed COVID-19 should remain under home isolation precautions until the risk of secondary transmission to others is thought to be low  The decision to discontinue home isolation precautions should be made on a case-by-case basis, in consultation with healthcare providers and state and local health departments  For more information: www cdc gov/MMDTS92KQ 854545-T 02/24/2020       Stay home when you are sick,except to get medical care  Wash your hands often with soap and water for at least 20 seconds  Cover your cough or sneeze with a tissue, then throw the tissue in the trash  Clean and disinfect frequently touched objects and surfaces  Avoid touching your eyes, nose, and mouth  STOP THE SPREAD OF GERMS  For more information: www cdc gov/COVID19 Avoid close contact with people who are sick  Help prevent the spread of respiratory diseases like COVID-19

## 2020-08-07 NOTE — PROGRESS NOTES
08/07/20 0900   Activity/Group Checklist   Group Impromptu group (Comment)  (Question Cards )   Attendance Attended   Attendance Duration (min) 16-30   Interactions Interacted appropriately   Affect/Mood Appropriate;Normal range;Calm;Bright   Goals Achieved Identified feelings; Able to listen to others; Able to engage in interactions; Able to self-disclose

## 2020-08-07 NOTE — PROGRESS NOTES
Progress Note - [address] 47 y o  male MRN: [id number]   Unit/Bed#: [facility] 036-59 Encounter: [id number]    Behavior over the last 24 hours: Mandeep Jefferson was seen for an inpatient follow-up psychiatric visit this date  At today's visit, he relates he is no longer feeling hopeless and helpless and his irritability has decreased  He believes being back on his medication is helpful  Per psychiatric rounds, he remains somewhat grandiose but his behavior is controlled on the unit  He is taking his medications as prescribed and denies any side effects  He was somewhat hyperverbal but pleasant and cooperative during assessment  ROS: no complaints, all other systems are negative    Mental Status Evaluation:    Appearance:  casually dressed, dressed appropriately   Behavior:  pleasant, cooperative   Speech:  pressured, hypertalkative   Mood:  improved   Affect:  brighter   Thought Process:  organized, linear   Associations: intact associations   Thought Content:  normal, no overt delusions   Perceptual Disturbances: none   Risk Potential: Suicidal ideation - None  Homicidal ideation - None  Potential for aggression - No   Sensorium:  oriented to person, place and time/date   Memory:  recent and remote memory grossly intact   Consciousness:  alert and awake   Attention: decreased concentration and decreased attention span   Insight:  improving   Judgment: improving   Gait/Station: normal gait/station, normal balance   Motor Activity: no abnormal movements     Vital signs in last 24 hours:    Temp:  [97 2 °F (36 2 °C)-97 9 °F (36 6 °C)] 97 2 °F (36 2 °C)  HR:  [71-82] 82  Resp:  [18] 18  BP: (138-143)/(81-98) 143/81    Laboratory results:  I have personally reviewed all pertinent laboratory/tests results      Progress Toward Goals: progressing    Assessment/Plan   Principal Problem:    MDD (major depressive disorder), recurrent episode, moderate (HCC)  Active Problems:    Bipolar 2 disorder ([facility] ) Alcohol use disorder, severe, dependence ([facility] )    Medical clearance for psychiatric admission    Tobacco abuse    Hypokalemia    Recommended Treatment:     Continue current medications as prescribed  Continue to monitor  Discharge disposition and planning are ongoing  All current active medications have been reviewed  Encourage group therapy, milieu therapy and occupational therapy  Behavioral Health checks every 7 minutes    acetaminophen, 650 mg, Oral, Q6H PRN, Alma Mayics, CRNP  acetaminophen, 650 mg, Oral, Q4H PRN, Alma Mayics, CRNP  acetaminophen, 975 mg, Oral, Q6H PRN, Alma Mayics, CRNP  folic acid, 1 mg, Oral, Daily, Alma Eng, IRISHNP  gabapentin, 300 mg, Oral, TID, Alma Eng, IRISHNP  haloperidol, 5 mg, Oral, Q6H PRN, Alma Eng, CRNP  haloperidol lactate, 5 mg, Intramuscular, Q6H PRN, Alma Eng, CRNP  hydrOXYzine HCL, 25 mg, Oral, Q4H PRN, Alma Mayics, IRISHNP  hydrOXYzine HCL, 50 mg, Oral, Q4H PRN, Alma Mayics, CRNP  LORazepam, 2 mg, Intramuscular, Q4H PRN, Alma Mayics, CRNP  LORazepam, 1 mg, Oral, Q4H PRN, Alma Eng, CRNP  multivitamin-minerals, 1 tablet, Oral, Daily, Alma Eng, CRNP  naltrexone, 25 mg, Oral, Daily, Kaitlin Godfrey MD  nicotine, 1 patch, Transdermal, Daily, Alma Eng, IRISHNP  OLANZapine, 10 mg, Intramuscular, Q3H PRN, Alma Mayics, CRNP  risperiDONE, 2 mg, Oral, Q3H PRN, Alma Mayics, CRNP  sertraline, 25 mg, Oral, Daily, Aria Bhatt MD  thiamine, 100 mg, Oral, HS, Alma Eng, IRISHNP  traZODone, 100 mg, Oral, HS PRN, Alma Eng, CRNP        Risks / Benefits of Treatment:    Risks, benefits, and possible side effects of medications explained to patient and patient verbalizes understanding and agreement for treatment  Counseling / Coordination of Care:      Patient's progress discussed with staff in treatment team meeting    Medications, treatment progress and treatment plan reviewed with patient

## 2020-08-07 NOTE — DISCHARGE INSTR - APPOINTMENTS
A referral has been made on your behalf for psychiatric management with [facility], [address] 45 [facility], [address] [facility] [place]  Phone: [phone]  Your initial appointment is scheduled for ________  Please bring your photo ID and insurance card to your first appointment  Your discharge will be faxed to this provider for continuity of care  You have identified Dr Faustino Baez MD as your primary care provider but you have declined a scheduled follow-up appointment on your behalf  Please schedule as needed  Your discharge will be faxed to this provider for continuity of care  Prosper Ellington 835 will be calling you on the phone number that you provided within 72 hours of your discharge  She will be available as an additional support if needed  If you wish to speak with her, you may contact Yeimy Sorto at [phone]  Due to being admitted positive for alcohol use, the treatment team recommends ongoing substance abuse   You declined referrals for inpatient and outpatient services at this time  If you wish to seek this service in the future, please contact [address] at [phone] or contact     [facility] [address] [place] [address], [address] [place], [place] [address]one [place]  [phone]  Hours  9:00 am to 5:00 pm Monday thru Friday    Abstinence from addiction is only the beginning  [facility] residents are encouraged to pursue meaningful lives with purpose at the [facility] on [address]  We provide a safe and sober environment which is staffed by people in recovery, who share similar experiences, and are willing to listen and assist people in early recovery  It takes a coÃmunity to support the recovery process  This [address] [place] [address] recognizes and supports the efforts and investment of those personally in recovery as well as those supporting their efforts      Our [place]  [facility] on [address] offers a safe environment to those seeking recovery and/or who are part of the treatment continuum  Although we are not a treatment facility, we are able to assist those in need, refer members of the center to community resources as needed  We encourage and guide members to pursue meaningful lives with purpose at the [facility] on [address]   Our hope is that they will find inspiration, encouragement, support through the examples of our volunteers and staff at the center  It takes effort and a dedicated community to support the recovery process       AA Meetings Near You:   Sunday Prosper Frankel 238, [address]   Sunday C/Allen Cordero Final, R Mary 65   Monday [place] [address] Mid Day Sobriety Hour, [facility], [address] [facility]  Monday 7p - Monday Night Men's Meeting - [address], [facility] 88  Tuesday 12p - Online & In-Person Mid Day Sobriety Hour, [address]  Tuesday [facility], [facility], [place] Wednesday 12p - Online & In-Person Mid Day Sobriety Hour, [facility], Thanhposmellisa [address], Zack  Wednesday 730p - [facility], [address] [facility], [place]   Thursday [address] [facility]  Thursday 12p - Online & In-Person Mid Day Sobriety Hour, [address]  Friday 12p - Online & In-Person Mid Day Sobriety Hour, [address]  Friday 730p - Outside [facility] [facility] Tat[facility]  Saturday 730p - One Day at a Time [address], [address]

## 2020-08-07 NOTE — PROGRESS NOTES
Pt has been overall pleasant and cooperative with staff  Pt has been seen spending his time out in the community socializing with others  Pt denies any current si/hi at this time  Pt denies any current issues medically at this time   Pt is currently not showing any s/s of etoh withdrawal

## 2020-08-08 PROBLEM — Z00.8 MEDICAL CLEARANCE FOR PSYCHIATRIC ADMISSION: Status: RESOLVED | Noted: 2020-08-05 | Resolved: 2020-08-08

## 2020-08-08 PROCEDURE — 99232 SBSQ HOSP IP/OBS MODERATE 35: CPT | Performed by: NURSE PRACTITIONER

## 2020-08-08 RX ADMIN — THIAMINE HCL TAB 100 MG 100 MG: 100 TAB at 21:21

## 2020-08-08 RX ADMIN — GABAPENTIN 300 MG: 300 CAPSULE ORAL at 21:21

## 2020-08-08 RX ADMIN — SERTRALINE HYDROCHLORIDE 25 MG: 25 TABLET ORAL at 08:55

## 2020-08-08 RX ADMIN — NALTREXONE HYDROCHLORIDE 25 MG: 50 TABLET, FILM COATED ORAL at 08:55

## 2020-08-08 RX ADMIN — GABAPENTIN 300 MG: 300 CAPSULE ORAL at 08:55

## 2020-08-08 RX ADMIN — NICOTINE 1 PATCH: 21 PATCH, EXTENDED RELEASE TRANSDERMAL at 08:58

## 2020-08-08 RX ADMIN — TRAZODONE HYDROCHLORIDE 100 MG: 50 TABLET ORAL at 21:24

## 2020-08-08 RX ADMIN — GABAPENTIN 300 MG: 300 CAPSULE ORAL at 16:42

## 2020-08-08 RX ADMIN — FOLIC ACID 1 MG: 1 TABLET ORAL at 08:55

## 2020-08-08 RX ADMIN — Medication 1 TABLET: at 08:55

## 2020-08-08 NOTE — PLAN OF CARE
Problem: Alteration in Thoughts and Perception  Goal: Verbalize thoughts and feelings  Description: Interventions:  - Promote a nonjudgmental and trusting relationship with the patient through active listening and therapeutic communication  - Assess patient's level of functioning, behavior and potential for risk  - Engage patient in 1 on 1 interactions  - Encourage patient to express fears, feelings, frustrations, and discuss symptoms    - [place] patient to reality, help patient recognize reality-based thinking   - Administer medications as ordered and assess for potential side effects  - Provide the patient education related to the signs and symptoms of the illness and desired effects of prescribed medications  Outcome: Completed  Goal: Refrain from acting on delusional thinking/internal stimuli  Description: Interventions:  - Monitor patient closely, per order   - Utilize least restrictive measures   - Set reasonable limits, give positive feedback for acceptable   - Administer medications as ordered and monitor of potential side effects  Outcome: Completed  Goal: Agree to be compliant with medication regime, as prescribed and report medication side effects  Description: Interventions:  - Offer appropriate PRN medication and supervise ingestion; conduct AIMS, as needed   Outcome: Completed  Goal: Attend and participate in unit activities, including therapeutic, recreational, and educational groups  Description: Interventions:  -Encourage Visitation and family involvement in care  8/8/2020 1801 by Mu Olson RN  Outcome: Progressing  8/8/2020 1759 by Mu Olson RN  Outcome: Progressing  Goal: Recognize dysfunctional thoughts, communicate reality-based thoughts at the time of discharge  Description: Interventions:  - Provide medication and psycho-education to assist patient in compliance and developing insight into his/her illness   8/8/2020 1801 by Mu Olson RN  Outcome: Completed  8/8/2020 1759 by Elysia Bay Antonella Parada RN  Outcome: Progressing  Goal: Complete daily ADLs, including personal hygiene independently, as able  Description: Interventions:  - Observe, teach, and assist patient with ADLS  - Monitor and promote a balance of rest/activity, with adequate nutrition and elimination   Outcome: Completed     Problem: Ineffective Coping  Goal: Cooperates with admission process  Description: Interventions:   - Complete admission process  Outcome: Completed  Goal: Identifies ineffective coping skills  Outcome: Progressing  Goal: Identifies healthy coping skills  Outcome: Progressing  Goal: Demonstrates healthy coping skills  Outcome: Progressing  Goal: Patient/Family participate in treatment and DC plans  Description: Interventions:  - Provide therapeutic environment  Outcome: Progressing  Goal: Patient/Family verbalizes awareness of resources  Outcome: Progressing  Goal: Understands least restrictive measures  Description: Interventions:  - Utilize least restrictive behavior  Outcome: Completed  Goal: Free from restraint events  Description: - Utilize least restrictive measures   - Provide behavioral interventions   - Redirect inappropriate behaviors   Outcome: Completed

## 2020-08-08 NOTE — PROGRESS NOTES
[id number]  Patient visible out and about in the community  He is pleasant and appropriate in conversation  When asked about his mood, he responded, "I feel like I'm evening out," and reported minimal depression or anxiety at this time  Denies suicidal thoughts  Was compliant with medications  Will continue monitoring  Date of Service:  1/23/2020    Chief Complaint: BPH, left hydrocele    History of Present Illness: The patient is a 74 year old male who was last seen in the office on 07/24/2019.     He has a history of BPH which has been present for several years. He was on Flomax and Proscar. Dr. Mobley stopped Flomax and patient denied any changes to his voiding off the medication. However, he restarted Flomax once daily in March 2019. He noticed no change whether he was on or off the Flomax. There is a family history of prostate cancer in his brother. No history of urinary tract infections. Previous PSA's are 0.2 from 02/12/2019 and 0.2 from 10/02/2015. He also has a history of prostatitis.      He also has a history of microscopic hematuria with occasional episodes of gross hematuria. He had intermittent gross hematuria in July 2018. A cystoscopy on 09/14/2018 did not demonstrate evidence of urethral tumor, bladder lesion etc. He was found to have lateral lobe hyperplasia with obstruction with a couple varices, most likely the source of bleeding. He also has a history of left hydrocele. Last visit, he reported the hydrocele was occasionally bothersome.      He returns to the office today with worsened nocturia and post-void dribbling. He continues on Flomax 0.4 mg twice daily and Finasteride 5 mg daily. He is now interested in possible procedure to treat his hydrocele. He states the hydrocele is becoming \"awkward.\" His current voiding complaints include incomplete emptying, frequency, intermittency, urgency, and nocturia x4. His AUA score is 19 with a bother score of 4.    American Urological Association:  How often have you had a sensation of incomplete emptying? 3    How often have you had to urinate again, in less than 2 hours? 5    How often have you stopped & started several times during urination? 2    How often have you found it difficult to postpone urintation? 5    How often have you had a weak stream? 0    How often  have you had to push or strain to begin unrination? 0    How many times do you typically get up to urinate during the night? 4    Enter the patient's total score. 19    How would you feel if your present condition continued for life? 4=MOSTLY DISSATISFIED        PFSH:   Past Medical History:   Past Medical History:   Diagnosis Date   • Asthma    • Diabetes mellitus (CMS/HCC)    • Encysted hydrocele    • Family history of malignant neoplasm of prostate    • Heart disease    • Heart disease    • Hyperlipidemia    • Hyperplasia of prostate with lower urinary tract symptoms (LUTS)    • Hypertension    • Kidney stone    • Urinary tract infection        Family History:  Family History   Problem Relation Age of Onset   • Cancer, Colon Father    • Cancer, Prostate Brother        Surgical History:  Past Surgical History:   Procedure Laterality Date   • Cardiac surgery     :    Social History:  Social History     Socioeconomic History   • Marital status: /Civil Union     Spouse name: Not on file   • Number of children: Not on file   • Years of education: Not on file   • Highest education level: Not on file   Occupational History   • Not on file   Social Needs   • Financial resource strain: Not on file   • Food insecurity:     Worry: Not on file     Inability: Not on file   • Transportation needs:     Medical: Not on file     Non-medical: Not on file   Tobacco Use   • Smoking status: Never Smoker   • Smokeless tobacco: Never Used   Substance and Sexual Activity   • Alcohol use: Yes     Comment: OCC   • Drug use: Not on file   • Sexual activity: Not on file   Lifestyle   • Physical activity:     Days per week: Not on file     Minutes per session: Not on file   • Stress: Not on file   Relationships   • Social connections:     Talks on phone: Not on file     Gets together: Not on file     Attends Yazdanism service: Not on file     Active member of club or organization: Not on file     Attends meetings of clubs or  organizations: Not on file     Relationship status: Not on file   • Intimate partner violence:     Fear of current or ex partner: Not on file     Emotionally abused: Not on file     Physically abused: Not on file     Forced sexual activity: Not on file   Other Topics Concern   • Not on file   Social History Narrative   • Not on file       Allergies:  ALLERGIES:   Allergen Reactions   • Lisinopril Other (See Comments)   • Losartan Other (See Comments)   • Zyban [Bupropion] Other (See Comments)       Medications:  Current Outpatient Medications   Medication Sig Dispense Refill   • tamsulosin (FLOMAX) 0.4 MG Cap TAKE 1 CAPSULE BY MOUTH TWICE DAILY 180 capsule 3   • finasteride (PROSCAR) 5 MG tablet TAKE 1 TABLET BY MOUTH EVERY DAY 90 tablet 3   • aspirin 81 MG tablet Take 81 mg by mouth daily.     • warfarin (COUMADIN) 2 MG tablet Take 2 mg by mouth.     • rosuvastatin (CRESTOR) 20 MG tablet Take 20 mg by mouth.     • hydrochlorothiazide (HYDRODIURIL) 12.5 MG tablet Take 12.5 mg by mouth.     • loratadine (CLARITIN) 10 MG tablet Take 10 mg by mouth.     • Psyllium (METAMUCIL FIBER PO)      • polyethylene glycol (MIRALAX) powder Take 17 g by mouth.     • omeprazole (PRILOSEC) 20 MG capsule Take 20 mg by mouth daily.     • albuterol 108 (90 Base) MCG/ACT inhaler      • budesonide-formoterol (SYMBICORT) 160-4.5 MCG/ACT inhaler      • dilTIAZem (TAZTIA XT) 300 MG 24 hr capsule Take 300 mg by mouth.       No current facility-administered medications for this visit.        Review of Systems:  Constitutional:  Patient denies fever, chills or headache.  Eyes:  Patient denies blurred vision, double vision, pain, or glaucoma.  Allergic/Immunologic:  Patient denies hay fever or drug allergies.  Neurological:  Patient denies tremors, dizzy spells, numbness/tingling or seizures.  Endocrine:  Patient denies excessive thirst, too hot, too cold, tired/sluggish or thyroid disease.  Gastrointestinal:  Patient denies abdominal pain,  nausea, vomiting, indigestion/heartburn, diarrhea or constipation.  Cardiovascular:  Patient denies chest pain, varicose veins, high blood pressure or heart valve problems.  Integumentary:  Patient denies skin rash, boils or persistent itch.  Musculoskeletal:  Patient denies joint pain, neck pain, back pain or joint replacement.  Ear/Nose/Throat/Mouth:  Patient denies ear infection, sore throat or sinus problems.  Genitourinary:  See symptoms listed in HPI.  Respiratory:  Patient denies wheezing, frequent coughing, shortness of breath or chronic obstructive pulmonary disease.  Hematologic/Lymphatic:  Patient denies swollen glands or blood clotting problem.  Psychologic:  Patient states satisfaction with life and denies depression or suicidal ideations.     Physical Exam:  Visit Vitals  /84   Pulse 96   Ht 5' 10\" (1.778 m)   Wt 128.8 kg   BMI 40.75 kg/m²     Constitutional:  Well developed, well nourished, afebrile.  Respiratory:  Lungs are clear to auscultation with good breath sounds bilaterally.  Normal respiratory effort.  No wheezing.   Cardiovascular:  Heart: Normal rate and rhythm.  No murmurs noted.  No definite enlargement.   Abdomen:  No masses are palpated.  There is no tenderness.  Liver and spleen appear to be normal.  No abdominal wall herniae noted.   Inguinal:  No abnormalities in either inguinal area.   Genitourinary:    Penis:  The penis appears to be essentially normal and is circumcised.  No external lesions or masses are present.  There are no internal masses  or lesions noted.  There is no tenderness.    Urethral Meatus:  The meatus is normal in size and location.    Scrotum:  No erythema or edema of scrotal skin.  No evidence of scrotal  cysts or rash.  No inguinal hernia noted.    Testicles:  Both testicles are normal to palpation.  No masses or tenderness noted.  Size normal. Large left testicle displacing the shaft of penis to the right.   Epididymides/Vas:  Both epididymides and vas  deferens are normal to  palpation. No masses or tenderness noted.    Skin:  No abnormal skin lesions noted.   Musculoskeletal:  No clubbing, no cyanosis, no edema.  Neurological:  Cranial nerves II through XII grossly intact, moves all extremities.  Psychiatric:  Oriented to person, place and time.  Mood indicates no abnormalities.  Doesn't appear to be depressed or agitated.   Lymphatic:  No abnormal nodes palpated in the left neck area.  No abnormal nodes palpated in the right neck area.  No abnormal nodes palpated in the left inguinal area.  No abnormal nodes palpated in the right inguinal area.    UROFLOWMETRY    Reason for Uroflow: BPH     Amount Voided: 15 cc   Time to Maximum Flow: 4.3 sec   Flow Time: 10.8 sec   Maximum Flow Rate: 5.7 cc/sec   Average Flow Rate: 1.4 cc/sec   Representative Voiding?: Yes  UFR Tracing: bell   Residual Urine: 45 cc    In-Office Testing  Results for orders placed or performed in visit on 01/23/20   ELECTRO-UROFLOWMETRY, FIRST   Result Value    Urine Flow Time 10.8 s    Urine Peak Time 4.3 s    Urine Peak Flow 5.7 ml/s    Urine Avg Flow 1.4 ml/s    Urine URO Volume 15 ml   POST VOID RES URINE/BL BY US   Result Value    BLDR Scan mLs 45 ml   POCT URINE DIP AUTO   Result Value    POCT Color Yellow    POCT Appearance Clear    POCT Glucose Urine Negative    POCT Bilirubin Small    POCT Ketones Negative    POCT Specific Gravity 1.020    POCT Occult Blood Moderate    POCT pH 7.0    POCT Protein 300 mg/dL    POCT Urobilinogen 1.0    Urine Nitrite Negative    WBC (Leukocyte) Esterase POC Negative     PSA:  Lab Results   Component Value Date    PSA 0.20 02/12/2019    PSA 0.20 10/02/2017     Assessment and Plan:      Left hydrocele- Moderate to large and becoming more symptomatic. It may be causing some of his voiding complaints. Discussed options. Patient now came in early because he desires to have the hydrocele fixed. Schedule for left hydrocelectomy in the near future.     Left  Hydrocelectomy   I explained the options concerning surgery versus observation versus percutaneous drainage. Specifically, I explained the alternatives, benefits, risks of the procedure. Risks include but are not limited to bleeding, infection, recurrent or persistant hydrocele,  chronic pain and testicular ischemia and/or loss. The patient voiced understanding told above and wish to proceed.  Educational materials concerning the proposed surgical procedure were supplied to the patient.     Benign prostatic hyperplasia/ Lower urinary tract symptoms- nocturia and post-void dribbling more bothersome despite Flomax 0.4 mg BID and Finasteride 5 mg daily. Uroflow borderline obstructed but patient only voided a small amount. Bladder with 45 cc residual. Advised patient he may need to undergo procedure to improve his voiding after hydrocele is treated. For now, continue Flomax 0.4 mg BID and Finasteride 5 mg daily.      Family history of prostate cancer- Prostate felt benign on prior exam. Most recent Prostate specific antigen stable at 0.2.      Blood from penis- cystoscopy on 09/14/2018 revealed lateral lobe hyperplasia with obstruction with a couple varices, most likely source of bleeding. No recent episodes of bleeding    I had a brief counseling session with the patient concerning the diagnosis and treatment options.     I performed a brief review of the patient's medical records.     Data review:  Symptom Score Reviewed  I reviewed the patient's AUA symptom score and reviewed the findings with the patient including diagnosis, prognosis, treatment  Urinalysis Review  I reviewed the Urinalysis data with patient, including diagnosis, prognosis, and treatment.    Urodynamics Review: Uroflow  I reviewed the Urodynamic examination with patient, including diagnosis, prognosis, and treatment.   Bladder scan reviewed     On 1/23/2020, IStefania scribed the services personally performed by Jani Huizar Jr.,  MD    The documentation recorded by the scribe accurately and completely reflects the service(s) I personally performed and the decisions made by me.      Jani Huizar Jr. MD

## 2020-08-08 NOTE — PROGRESS NOTES
Pt presents as pleasant and bright  Pt reports that anxiety continues to get better on a daily basis and has improved since he first got admitted to the unit  No c/o pain  Denies any alcohol WD S/S  Compliant with medications and meals  Pt has been visible on the unit and socializing with peers  Attending groups  Pt verbalizes that he had better outcomes when he attended rehab and plans on remaining sober after D/C  Behaviors appropriate  Calm and controlled  Denies SI/HI  Cooperative with staff  Will continue to monitor and assess

## 2020-08-08 NOTE — PROGRESS NOTES
Progress Note - [address] [facility] 47 y o  male MRN: [id number]  Unit/Bed#: Taj Conti 137-32 Encounter: [id number]    The patient was seen for continuing care and reviewed with treatment team   Venkatesh Unger presents in a positive mood, stating he spoke to his brother who is going to speak to [facility] stepfather about possibly returning home  He may also have the option to stay with his brother in [place] and will find out about either option on Monday  He states he is tolerating his medications well and was sober from late Feb through July until he relapsed due to overwhelming stress and aggravation from his stepfather  He denies SI/HI today  He is sleeping well and appetite has been normal for him  No overt concerns in regards to mental health today and verbalizes the want to remain sober  Side effects denied      Mental Status Evaluation:  Appearance:  Adequate hygiene and grooming and Good eye contact   Behavior:  calm, cooperative and friendly   Fund of knowledge  vocabulary Average   Speech:   Language: Normal rate and Normal volume  No overt abnormality   Mood:  euthymic   Affect:   Associations: appropriate  Tightly connected   Thought Process:  Goal directed and coherent   Thought Content:  Does not verbalize delusional material   Perceptual Disturbances: Denies hallucinations and does not appear to be responding to internal stimuli   Risk Potential: No suicidal or homicidal ideation   Orientation  Oriented x 3   Memory No deficits   Attention/Concentration attention span and concentration were age appropriate   Insight:  Good insight   Judgment: Good judgment   Gait/Station: normal gait/station   Motor Activity: No abnormal movement noted     Vitals:    08/08/20 0700   BP: 140/90   Pulse: 94   Resp: 18   Temp: 98 4 °F (36 9 °C)   SpO2: 98%     Progress Toward Goals: medication adherent, interested in sobriety, hopes to return home or with brother early in week    Assessment/Plan    Principal Problem:    MDD (major depressive disorder), recurrent episode, moderate (HCC)  Active Problems:    Bipolar 2 disorder ([facility])    Alcohol use disorder, severe, dependence ([facility] [address] )    Tobacco abuse    Hypokalemia    Plan:   Continue q 7 minute safety checks  Continue current medications  Encourage group and milieu therapy  Discharge planning and disposition ongoing    Recommended Treatment: Continue with pharmacotherapy, group therapy, milieu therapy and occupational therapy    The patient will be maintained on the following medications:  Current Facility-Administered Medications   Medication Dose Route Frequency Provider Last Rate    acetaminophen  650 mg Oral Q6H PRN Rikki Drew Lodics, CRNP      acetaminophen  650 mg Oral Q4H PRN Rikki Drew Lodics, CRNP      acetaminophen  975 mg Oral Q6H PRN Alma Mayics, CRNP      folic acid  1 mg Oral Daily Alma Eng, CRNP      gabapentin  300 mg Oral TID Rikki Drew Lodics, CRNP      haloperidol  5 mg Oral Q6H PRN Rikki Drew Lodics, CRNP      haloperidol lactate  5 mg Intramuscular Q6H PRN Alma Eng, CRNP      hydrOXYzine HCL  25 mg Oral Q4H PRN Alma Eng, CRNP      hydrOXYzine HCL  50 mg Oral Q4H PRN Alma Eng, CRNP      LORazepam  2 mg Intramuscular Q4H PRN Alma Eng, CRNP      LORazepam  1 mg Oral Q4H PRN Rikki Drew Lodics, CRNP      multivitamin-minerals  1 tablet Oral Daily Alma Eng, CRNP      naltrexone  25 mg Oral Daily Kulwant Solomon MD      nicotine  1 patch Transdermal Daily Alma Eng, CRNP      OLANZapine  10 mg Intramuscular Q3H PRN Alma Eng, CRNP      risperiDONE  2 mg Oral Q3H PRN Alma Eng, CRNP      sertraline  25 mg Oral Daily Kulwant Solomon MD      thiamine  100 mg Oral HS Alma Eng, CRNP      traZODone  100 mg Oral HS PRN Alma Eng, CRNP         Risks, benefits and possible side effects of Medications:   Risks, benefits, and possible side effects of medications explained to patient and patient verbalizes understanding        JEMAL Oglesby  8/8/2020

## 2020-08-09 PROCEDURE — 99232 SBSQ HOSP IP/OBS MODERATE 35: CPT | Performed by: NURSE PRACTITIONER

## 2020-08-09 RX ADMIN — GABAPENTIN 300 MG: 300 CAPSULE ORAL at 17:00

## 2020-08-09 RX ADMIN — ACETAMINOPHEN 650 MG: 325 TABLET ORAL at 05:38

## 2020-08-09 RX ADMIN — GLYCERIN, HYPROMELLOSE, POLYETHYLENE GLYCOL 1 DROP: .2; .2; 1 LIQUID OPHTHALMIC at 17:15

## 2020-08-09 RX ADMIN — Medication 1 TABLET: at 08:38

## 2020-08-09 RX ADMIN — GABAPENTIN 300 MG: 300 CAPSULE ORAL at 21:15

## 2020-08-09 RX ADMIN — SERTRALINE HYDROCHLORIDE 25 MG: 25 TABLET ORAL at 08:38

## 2020-08-09 RX ADMIN — FOLIC ACID 1 MG: 1 TABLET ORAL at 08:38

## 2020-08-09 RX ADMIN — NICOTINE 1 PATCH: 21 PATCH, EXTENDED RELEASE TRANSDERMAL at 08:40

## 2020-08-09 RX ADMIN — TRAZODONE HYDROCHLORIDE 100 MG: 50 TABLET ORAL at 21:15

## 2020-08-09 RX ADMIN — NALTREXONE HYDROCHLORIDE 25 MG: 50 TABLET, FILM COATED ORAL at 08:38

## 2020-08-09 RX ADMIN — GABAPENTIN 300 MG: 300 CAPSULE ORAL at 08:38

## 2020-08-09 RX ADMIN — THIAMINE HCL TAB 100 MG 100 MG: 100 TAB at 21:15

## 2020-08-09 NOTE — PROGRESS NOTES
Progress Note - [address] [facility] 47 y o  male MRN: [id number]  Unit/Bed#: Jeremi Agarwal 487-29 Encounter: [id number]    The patient was seen for continuing care and reviewed with treatment team   Eva Leos presents with bright affect, stating he has good news  He is able to return to his previous home which he is happy about, stating he spoke to his landlord who is going to make some positive personal changes to make it more habitable and safe for Eva Leos, as per Eva Leos his landlord's change in attitude and expectations led to his relapse  He states he feels good about this and feels like his relationship with his landlord can improve  He feels good with his current medications and denies side effects from them  He is sleeping well  He denies SI/HI       Mental Status Evaluation:  Appearance:  Adequate hygiene and grooming and Good eye contact   Behavior:  calm, cooperative and friendly   Fund of knowledge  vocabulary Average   Speech:   Language: Normal rate and Normal volume  No overt abnormality   Mood:  euthymic   Affect:   Associations: appropriate  Tightly connected   Thought Process:  Goal directed and coherent   Thought Content:  Does not verbalize delusional material   Perceptual Disturbances: Denies hallucinations and does not appear to be responding to internal stimuli   Risk Potential: No suicidal or homicidal ideation   Orientation  Oriented x 3   Memory No deficits   Attention/Concentration attention span and concentration were age appropriate   Insight:  Good insight   Judgment: Good judgment   Gait/Station: normal gait/station   Motor Activity: No abnormal movement noted     Vitals:    08/09/20 0700   BP: 144/90   Pulse: 98   Resp: 18   Temp: 97 6 °F (36 4 °C)   SpO2: 96%     Progress Toward Goals: medication adherent, denies side effects, plans to return home post-discharge    Assessment/Plan    Principal Problem:    MDD (major depressive disorder), recurrent episode, moderate [facility])  Active Problems:    Bipolar 2 disorder (HCC)    Alcohol use disorder, severe, dependence ([address] )    Tobacco abuse    Hypokalemia    Plan:   Plan:   Continue q 7 minute safety checks  Encourage group and milieu therapy  Continue current medications  Discharge planning and disposition ongoing    Recommended Treatment: Continue with pharmacotherapy, group therapy, milieu therapy and occupational therapy    The patient will be maintained on the following medications:  Current Facility-Administered Medications   Medication Dose Route Frequency Provider Last Rate    acetaminophen  650 mg Oral Q6H PRN [place] [facility] [employer], CRNP      acetaminophen  650 mg Oral Q4H PRN [place] [employer], CRNP      acetaminophen  975 mg Oral Q6H PRN Alma Mayics, CRNP      folic acid  1 mg Oral Daily Alma Eng, CRNP      gabapentin  300 mg Oral TID [place] Roni Mayics, CRNP      haloperidol  5 mg Oral Q6H PRN [place] Roni Mayics, CRNP      haloperidol lactate  5 mg Intramuscular Q6H PRN Alma Eng, CRNP      hydrOXYzine HCL  25 mg Oral Q4H PRN Alma Eng, CRNP      hydrOXYzine HCL  50 mg Oral Q4H PRN Alma Eng, CRNP      LORazepam  2 mg Intramuscular Q4H PRN Alma Eng, CRNP      LORazepam  1 mg Oral Q4H PRN Alma Eng, CRNP      multivitamin-minerals  1 tablet Oral Daily Alma Eng, CRNP      naltrexone  25 mg Oral Daily Trinna Canavan, MD      nicotine  1 patch Transdermal Daily Alma Eng, CRNP      OLANZapine  10 mg Intramuscular Q3H PRN Alma Eng, CRNP      risperiDONE  2 mg Oral Q3H PRN Alma Eng, CRNP      sertraline  25 mg Oral Daily Trinna Canavan, MD      thiamine  100 mg Oral HS Alma Eng, CRNP      traZODone  100 mg Oral HS PRN Alma Eng, CRNP         Risks, benefits and possible side effects of Medications:   Risks, benefits, and possible side effects of medications explained to patient and patient verbalizes understanding        JEMAL Lopez  8/9/2020

## 2020-08-09 NOTE — PLAN OF CARE
Problem: Alteration in Thoughts and Perception  Goal: Attend and participate in unit activities, including therapeutic, recreational, and educational groups  Description: Interventions:  -Encourage Visitation and family involvement in care  Outcome: Completed     Problem: Ineffective Coping  Goal: Identifies ineffective coping skills  Outcome: Progressing  Goal: Identifies healthy coping skills  Outcome: Progressing  Goal: Demonstrates healthy coping skills  Outcome: Progressing  Goal: Patient/Family participate in treatment and DC plans  Description: Interventions:  - Provide therapeutic environment  Outcome: Progressing  Goal: Patient/Family verbalizes awareness of resources  Outcome: Progressing     Problem: Depression  Goal: Refrain from harming self  Description: Interventions:  - Monitor patient closely, per order   - Supervise medication ingestion, monitor effects and side effects   Outcome: Completed  Goal: Refrain from isolation  Description: Interventions:  - Develop a trusting relationship   - Encourage socialization   Outcome: Completed     Problem: Anxiety  Goal: Anxiety is at manageable level  Description: Interventions:  - Assess and monitor patient's anxiety level  - Monitor for signs and symptoms (heart palpitations, chest pain, shortness of breath, headaches, nausea, feeling jumpy, restlessness, irritable, apprehensive)  - Collaborate with interdisciplinary team and initiate plan and interventions as ordered    - [place] patient to unit/surroundings  - Explain treatment plan  - Encourage participation in care  - Encourage verbalization of concerns/fears  - Identify coping mechanisms  - Assist in developing anxiety-reducing skills  - Administer/offer alternative therapies  - Limit or eliminate stimulants  Outcome: Progressing     Problem: Risk for Violence/Aggression Toward Others  Goal: Control angry outbursts  Description: Interventions:  - Monitor patient closely, per order  - Ensure early verbal de-escalation  - Monitor prn medication needs  - Set reasonable/therapeutic limits, outline behavioral expectations, and consequences   - Provide a non-threatening milieu, utilizing the least restrictive interventions   Outcome: Progressing  Goal: Identify appropriate positive anger management techniques  Description: Interventions:  - Offer anger management and coping skills groups   - Staff will provide positive feedback for appropriate anger control  Outcome: Progressing     Problem: Alteration in Orientation  Goal: Allow medical examinations, as recommended  Description: Interventions:  - Provide physical/neurological exams and/or referrals, per provider   Outcome: Completed     Problem: SUBSTANCE USE/ABUSE  Goal: By discharge, will develop insight into their chemical dependency and sustain motivation to continue in recovery  Description: INTERVENTIONS:  - Attends all daily group sessions and scheduled AA groups  - Actively practices coping skills through participation in the therapeutic community and adherence to program rules  - Reviews and completes assignments from individual treatment plan  - Assist patient development of understanding of their personal cycle of addiction and relapse triggers  Outcome: Progressing  Goal: By discharge, patient will have ongoing treatment plan addressing chemical dependency  Description: INTERVENTIONS:  - Assist patient with resources and/or appointments for ongoing recovery based living  Outcome: Progressing     Problem: DISCHARGE PLANNING  Goal: Discharge to home or other facility with appropriate resources  Description: INTERVENTIONS:  - Identify barriers to discharge w/patient and caregiver  - Arrange for needed discharge resources and transportation as appropriate  - Identify discharge learning needs (meds, wound care, etc )  - Arrange for interpretive services to assist at discharge as needed  - Refer to Case Management Department for coordinating discharge planning if the patient needs post-hospital services based on physician/advanced practitioner order or complex needs related to functional status, cognitive ability, or social support system  Outcome: Progressing

## 2020-08-09 NOTE — NURSING NOTE
Pf has been resting comfortably in bed with eyes closed; respirations easy and non-labored  Continue to monitor for safety

## 2020-08-09 NOTE — PROGRESS NOTES
Pt presents as pleasant and bright  Pt reports having pain/irritation in his L eye  Thinks that he may have scratched it  Requesting medical to look at this eye and for eye drops  Message sent via I and love and you to [address] [place] [address]  Compliant with medications and meals  Pt has been visible on the unit  Socializing with peers  Smiling and happy  Pt able to return to the original place he was staying at  Denies anxiety or depression  Behaviors appropriate  Calm and controlled  Cooperative with staff  Denies SI/HI  Will continue to monitor and assess

## 2020-08-10 PROCEDURE — 99232 SBSQ HOSP IP/OBS MODERATE 35: CPT | Performed by: PSYCHIATRY & NEUROLOGY

## 2020-08-10 RX ORDER — NALTREXONE HYDROCHLORIDE 50 MG/1
50 TABLET, FILM COATED ORAL DAILY
Status: DISCONTINUED | OUTPATIENT
Start: 2020-08-11 | End: 2020-08-11 | Stop reason: HOSPADM

## 2020-08-10 RX ADMIN — NALTREXONE HYDROCHLORIDE 25 MG: 50 TABLET, FILM COATED ORAL at 08:23

## 2020-08-10 RX ADMIN — TRAZODONE HYDROCHLORIDE 100 MG: 50 TABLET ORAL at 23:51

## 2020-08-10 RX ADMIN — THIAMINE HCL TAB 100 MG 100 MG: 100 TAB at 21:09

## 2020-08-10 RX ADMIN — Medication 1 TABLET: at 08:24

## 2020-08-10 RX ADMIN — GABAPENTIN 300 MG: 300 CAPSULE ORAL at 21:09

## 2020-08-10 RX ADMIN — GLYCERIN, HYPROMELLOSE, POLYETHYLENE GLYCOL 1 DROP: .2; .2; 1 LIQUID OPHTHALMIC at 18:01

## 2020-08-10 RX ADMIN — SERTRALINE HYDROCHLORIDE 25 MG: 25 TABLET ORAL at 08:24

## 2020-08-10 RX ADMIN — NICOTINE 1 PATCH: 21 PATCH, EXTENDED RELEASE TRANSDERMAL at 08:24

## 2020-08-10 RX ADMIN — GABAPENTIN 300 MG: 300 CAPSULE ORAL at 16:40

## 2020-08-10 RX ADMIN — FOLIC ACID 1 MG: 1 TABLET ORAL at 08:24

## 2020-08-10 RX ADMIN — GABAPENTIN 300 MG: 300 CAPSULE ORAL at 08:24

## 2020-08-10 NOTE — PLAN OF CARE
Problem: Alteration in Thoughts and Perception  Goal: Treatment Goal: Gain control of psychotic behaviors/thinking, reduce/eliminate presenting symptoms and demonstrate improved reality functioning upon discharge  Outcome: Progressing     Problem: Ineffective Coping  Goal: Identifies ineffective coping skills  Outcome: Progressing  Goal: Identifies healthy coping skills  Outcome: Progressing  Goal: Demonstrates healthy coping skills  Outcome: Progressing  Goal: Patient/Family participate in treatment and DC plans  Description: Interventions:  - Provide therapeutic environment  Outcome: Progressing  Goal: Patient/Family verbalizes awareness of resources  Outcome: Progressing     Problem: Risk for Self Injury/Neglect  Goal: Treatment Goal: Remain safe during length of stay, learn and adopt new coping skills, and be free of self-injurious ideation, impulses and acts at the time of discharge  Outcome: Progressing  Goal: Recognize maladaptive responses and adopt new coping mechanisms  Outcome: Progressing     Problem: Depression  Goal: Treatment Goal: Demonstrate behavioral control of depressive symptoms, verbalize feelings of improved mood/affect, and adopt new coping skills prior to discharge  Outcome: Progressing  Goal: Refrain from self-neglect  Outcome: Progressing     Problem: Anxiety  Goal: Anxiety is at manageable level  Description: Interventions:  - Assess and monitor patient's anxiety level  - Monitor for signs and symptoms (heart palpitations, chest pain, shortness of breath, headaches, nausea, feeling jumpy, restlessness, irritable, apprehensive)  - Collaborate with interdisciplinary team and initiate plan and interventions as ordered    - [place] patient to unit/surroundings  - Explain treatment plan  - Encourage participation in care  - Encourage verbalization of concerns/fears  - Identify coping mechanisms  - Assist in developing anxiety-reducing skills  - Administer/offer alternative therapies  - Limit or eliminate stimulants  Outcome: Progressing     Problem: Risk for Violence/Aggression Toward Others  Goal: Treatment Goal: Refrain from acts of violence/aggression during length of stay, and demonstrate improved impulse control at the time of discharge  Outcome: Progressing  Goal: Refrain from harming others  Outcome: Progressing  Goal: Refrain from destructive acts on the environment or property  Outcome: Progressing  Goal: Control angry outbursts  Description: Interventions:  - Monitor patient closely, per order  - Ensure early verbal de-escalation  - Monitor prn medication needs  - Set reasonable/therapeutic limits, outline behavioral expectations, and consequences   - Provide a non-threatening milieu, utilizing the least restrictive interventions   Outcome: Progressing  Goal: Identify appropriate positive anger management techniques  Description: Interventions:  - Offer anger management and coping skills groups   - Staff will provide positive feedback for appropriate anger control  Outcome: Progressing     Problem: Alteration in Orientation  Goal: Treatment Goal: Demonstrate a reduction of confusion and improved orientation to person, place, time and/or situation upon discharge, according to optimum baseline/ability  Outcome: Progressing     Problem: SUBSTANCE USE/ABUSE  Goal: By discharge, will develop insight into their chemical dependency and sustain motivation to continue in recovery  Description: INTERVENTIONS:  - Attends all daily group sessions and scheduled AA groups  - Actively practices coping skills through participation in the therapeutic community and adherence to program rules  - Reviews and completes assignments from individual treatment plan  - Assist patient development of understanding of their personal cycle of addiction and relapse triggers  Outcome: Progressing  Goal: By discharge, patient will have ongoing treatment plan addressing chemical dependency  Description: INTERVENTIONS:  - Assist patient with resources and/or appointments for ongoing recovery based living  Outcome: Progressing     Problem: Ineffective Coping  Goal: Participates in unit activities  Description: Interventions:  - Provide therapeutic environment   - Provide required programming   - Redirect inappropriate behaviors   Outcome: Progressing     Problem: DISCHARGE PLANNING  Goal: Discharge to home or other facility with appropriate resources  Description: INTERVENTIONS:  - Identify barriers to discharge w/patient and caregiver  - Arrange for needed discharge resources and transportation as appropriate  - Identify discharge learning needs (meds, wound care, etc )  - Arrange for interpretive services to assist at discharge as needed  - Refer to Case Management Department for coordinating discharge planning if the patient needs post-hospital services based on physician/advanced practitioner order or complex needs related to functional status, cognitive ability, or social support system  Outcome: Progressing

## 2020-08-10 NOTE — PROGRESS NOTES
08/10/20 1000   Activity/Group Checklist   Group Other (Comment)  (Goal Planning )   Attendance Attended   Attendance Duration (min) Greater than 60   Interactions Interacted appropriately   Affect/Mood Appropriate;Bright;Normal range   Goals Achieved Identified feelings; Able to listen to others;Discussed coping strategies; Discussed discharge plans; Able to engage in interactions; Able to self-disclose

## 2020-08-10 NOTE — PROGRESS NOTES
08/10/20 0900   Team Meeting   Meeting Type Daily Rounds   Initial Conference Date 08/10/20   Team Members Present   Team Members Present Physician;Nurse;   Physician Team Member Dr Claire Rios; Pavel Sharpe, [facility]   Nursing Team Member Christal Barclay, RN   Social Work Team Member Serenity Nunez, [facility]   Patient/Family Present   Patient Present No   Patient's Family Present No     Spoke with landlord and he is agreeable to having patient return to his apartment  Anxiety is improving  No interested in D&A rehab  Has been visible and social  Med compliant

## 2020-08-10 NOTE — SOCIAL WORK
RYAN placed phone calls to the following med management providers to schedule f/u appt     Dr Ibarra Serve - [phone] - does not accept [facility]swers - [phone] - left message on voicemail - awaiting response     Pt declined inpt/outpt D&A services    Pt declined PCP appointment scheduled on his behalf

## 2020-08-10 NOTE — PROGRESS NOTES
08/10/20 0754   Activity/Group Checklist   Group Community meeting   Attendance Attended   Attendance Duration (min) 46-60   Interactions Interacted appropriately   Affect/Mood Appropriate;Bright;Normal range   Goals Achieved Identified feelings; Discussed coping strategies; Able to listen to others; Able to engage in interactions

## 2020-08-10 NOTE — NURSING NOTE
Pt has been resting in bed with eyes closed; respirations easy and non-labored  Continue to monitor for safety

## 2020-08-10 NOTE — PROGRESS NOTES
Patient bright, alert, awake, visible on unit, social and pleasant with staff and peers  Smiling, denies si hi and hallucinations  Compliant with all scheduled medications, aware of regimen  No s/s or complaints of withdraw  Pt tells this nurse he plans to maintain his sobriety after disharge with [facility] and AA  Will be discharging back to his previous residence  He Had a conversation with his landlord and they were able to have a positive conversation about him coming back  Will maintain on safety precautions and continual monitoring  No needs identified

## 2020-08-11 VITALS
HEART RATE: 85 BPM | RESPIRATION RATE: 18 BRPM | WEIGHT: 225 LBS | BODY MASS INDEX: 30.48 KG/M2 | SYSTOLIC BLOOD PRESSURE: 116 MMHG | DIASTOLIC BLOOD PRESSURE: 70 MMHG | OXYGEN SATURATION: 95 % | HEIGHT: 72 IN | TEMPERATURE: 98.6 F

## 2020-08-11 PROCEDURE — 99238 HOSP IP/OBS DSCHRG MGMT 30/<: CPT | Performed by: NURSE PRACTITIONER

## 2020-08-11 RX ORDER — TRAZODONE HYDROCHLORIDE 100 MG/1
100 TABLET ORAL
Qty: 30 TABLET | Refills: 0 | Status: SHIPPED | OUTPATIENT
Start: 2020-08-11 | End: 2020-10-09

## 2020-08-11 RX ORDER — GABAPENTIN 300 MG/1
300 CAPSULE ORAL 3 TIMES DAILY
Qty: 90 CAPSULE | Refills: 0 | Status: SHIPPED | OUTPATIENT
Start: 2020-08-11 | End: 2020-10-09

## 2020-08-11 RX ORDER — NALTREXONE HYDROCHLORIDE 50 MG/1
50 TABLET, FILM COATED ORAL DAILY
Qty: 30 TABLET | Refills: 0 | Status: SHIPPED | OUTPATIENT
Start: 2020-08-12 | End: 2020-10-09

## 2020-08-11 RX ADMIN — SERTRALINE HYDROCHLORIDE 50 MG: 50 TABLET ORAL at 08:34

## 2020-08-11 RX ADMIN — NICOTINE 1 PATCH: 21 PATCH, EXTENDED RELEASE TRANSDERMAL at 08:34

## 2020-08-11 RX ADMIN — GABAPENTIN 300 MG: 300 CAPSULE ORAL at 08:34

## 2020-08-11 RX ADMIN — Medication 1 TABLET: at 08:34

## 2020-08-11 RX ADMIN — NALTREXONE HYDROCHLORIDE 50 MG: 50 TABLET, FILM COATED ORAL at 08:34

## 2020-08-11 RX ADMIN — FOLIC ACID 1 MG: 1 TABLET ORAL at 08:34

## 2020-08-11 NOTE — PLAN OF CARE
Patient attends and participates in group settings  Engages and social with other [place] and staff on unit  Pleasant, cooperative and does not deny issues with alcohol and depression  Patient able to identify coping strategies and community supports

## 2020-08-11 NOTE — PROGRESS NOTES
CM called and left VM for [facility] regarding needing call back to arrange follow up appointments for patient  CM will continue to follow patient's progress and assist with discharge planning needs

## 2020-08-11 NOTE — DISCHARGE SUMMARY
Discharge Summary - [facility] 47 y o  male MRN: [id number]  Unit/Bed#: TAWANNA Claunch 095-28 Encounter: [id number]     Admission Date: 8/5/2020         Discharge Date: No discharge date for patient encounter  Attending Psychiatrist: Moraima Miranda MD    Reason for Admission/HPI:     Principal Problem:    MDD (major depressive disorder), recurrent episode, moderate ([facility] )  Active Problems:    Bipolar 2 disorder ([facility] )    Alcohol use disorder, severe, dependence ([facility] )    Tobacco abuse    Zora Hankins is a 77-year-old male patient admitted on a voluntary 201 commitment basis to the adult behavioral health unit due to depression with suicidal ideation  Per initial emergency room evaluation completed by Dr Sofie Garrison:    77-year-old male presents to the emergency department for psychiatric evaluation  Patient states that he has been feeling depressed and suicidal   He has vague suicidal thoughts and states to me "I am emmanuel that I do not have a gun" and gestures with his hand as if he is shooting himself in the head  Patient admits to drinking alcohol on a near daily basis  He had been sober for 3 months after a hospitalization  He was given an injection of Vivitrol and states that it helped control his cravings though as it wore off he began to crave alcohol and started drinking again  He states that he was recently evicted from his estate  He is currently without a job  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  He denies drug use  He admits to suicidal thoughts but denies homicidal thoughts  Current stressors include homelessness and loss of job  Per initial psychiatric evaluation completed by Dr Moraima Miranda:    Patient is a 47 y o  male presents with history of depression and severe alcohol use who reports that recently he has been getting more depressed, feeling hopeless and helpless    Patient reports becoming more irritable on frustrated especially with his landlord  Patient reports that he was having suicidal thoughts and if he had a gun he would of shot himself  Patient also reports that after several months of being abstinent from drinking alcohol he relapsed and feeling very remorseful a more depressed  Patient denies having any type of hallucination or delusion  This is his 2nd psychiatric hospitalization  He denies any history of suicide attempts  Patient reports he has been drinking alcohol since age 23 and he has been to drug and alcohol rehab about 10 times  Patient has history of 3 DUIs  He reports he experimented with cocaine occasionally but has not used it recently  Patient reports the last time he was in rehab earlier this year he was given Vivitrol injection and that seemed to help but he was not able to follow-up due to the pandemic  Hospital Course: The patient was admitted to the inpatient psychiatric unit and started on every 7 minutes precautions  During the hospitalization the patient was attending individual therapy, group therapy, milieu therapy and occupational therapy  Psychiatric medications were titrated over the hospital stay  To address depressive symptoms and anxiety symptoms the patient was started on antidepressant Zoloft and anxiolytic medication Neurontin  Naltrexone was also initiated to reduce craving for alcohol  Medication doses were titrated during the hospital course  Prior to beginning of treatment medications risks and benefits and possible side effects including risk of suicidality and serotonin syndrome related to treatment with antidepressants were reviewed with the patient  The patient verbalized understanding and agreement for treatment  Patient's symptoms improved gradually over the hospital course  At the end of treatment the patient was doing well  Mood was stable at the time of discharge   The patient denied suicidal ideation, intent or plan at the time of discharge and denied homicidal ideation, intent or plan at the time of discharge  There was no overt psychosis at the time of discharge  Sleep and appetite were improved  The patient was tolerating medications and was not reporting any significant side effects at the time of discharge  Since Latonya Nielson was doing well at the end of the hospitalization, treatment team felt that he could be safely discharged to outpatient care  The outpatient follow up with therapist and psychiatrist at [place] [id number] was arranged by the unit  upon discharge  Mental Status at time of Discharge:     Appearance:  casually dressed   Behavior:  normal   Speech:  normal pitch and normal volume   Mood:  normal   Affect:  normal   Thought Process:  normal   Thought Content:  normal   Perceptual Disturbances: None   Risk Potential: Patient denies any suicidal or homicidal ideations     Sensorium:  person, place, time/date and situation   Cognition:  recent and remote memory grossly intact   Consciousness:  alert and awake    Attention: attention span and concentration were age appropriate   Insight:  fair   Judgment: fair   Gait/Station: normal gait/station   Motor Activity: no abnormal movements     Admission Diagnosis:Major depressive disorder, single episode, severe without psychotic features [F32 2]    Discharge Diagnosis:   Principal Problem:    MDD (major depressive disorder), recurrent episode, moderate ([facility] )  Active Problems:    [facility]    Alcohol use disorder, severe, dependence ([facility] )    Tobacco abuse    Hypokalemia  Resolved Problems:    Medical clearance for psychiatric admission        Lab results:  Admission on 08/05/2020   Component Date Value    Cholesterol 08/07/2020 166     Triglycerides 08/07/2020 188*    HDL, Direct 08/07/2020 50     LDL Calculated 08/07/2020 78     Non-HDL-Chol (CHOL-HDL) 08/07/2020 116     TSH 3RD GENERATON 08/07/2020 1 900     RPR 08/07/2020 Non-Reactive        Discharge Medications:  Current Discharge Medication List      START taking these medications    Details   gabapentin (NEURONTIN) 300 mg capsule Take 1 capsule (300 mg total) by mouth 3 (three) times a day  Qty: 90 capsule, Refills: 0    Associated Diagnoses: Bipolar 2 disorder ([facility])      naltrexone (REVIA) 50 mg tablet Take 1 tablet (50 mg total) by mouth daily  Qty: 30 tablet, Refills: 0    Associated Diagnoses: Alcohol use disorder, severe, dependence ([facility])      sertraline (ZOLOFT) 50 mg tablet Take 1 tablet (50 mg total) by mouth daily  Qty: 30 tablet, Refills: 0    Associated Diagnoses: Bipolar 2 disorder ([facility])      traZODone (DESYREL) 100 mg tablet Take 1 tablet (100 mg total) by mouth daily at bedtime as needed for sleep  Qty: 30 tablet, Refills: 0    Associated Diagnoses: Bipolar 2 disorder ([facility] )            Current Discharge Medication List      STOP taking these medications       diazepam (Valium) 5 mg tablet Comments:   Reason for Stopping:         Naltrexone (VIVITROL IM) Comments:   Reason for Stopping:              Current Discharge Medication List         Current Discharge Medication List           Discharge instructions/Information to patient and family:   See after visit summary for information provided to patient and family  Provisions for Follow-Up Care:  See after visit summary for information related to follow-up care and any pertinent home health orders  Discharge Statement   I spent 30 minutes discharging the patient  This time was spent on the day of discharge  I had direct contact with the patient on the day of discharge  Additional documentation is required if more than 30 minutes were spent on discharge

## 2020-08-11 NOTE — PROGRESS NOTES
08/11/20 0931   Team Meeting   Meeting Type Daily Rounds   Initial Conference Date 08/11/20   Patient/Family Present   Patient Present No   Patient's Family Present No     Team Members Present:   MD Brandyn Lopez PA-C Dora Hail, RN Bobetta Najjar, SERINA Gautam LCSW DC today via landlord at 11 am   Will go to [facility] for services

## 2020-08-11 NOTE — PROGRESS NOTES
08/11/20 1000   Activity/Group Checklist   Group Other (Comment)  (Morning Group/Discharge Ready )   Attendance Attended   Attendance Duration (min) Greater than 60   Interactions Interacted appropriately   Affect/Mood Appropriate;Bright;Calm;Normal range   Goals Achieved Identified feelings; Identified triggers; Discussed coping strategies; Able to listen to others; Able to engage in interactions; Able to reflect/comment on own behavior;Able to self-disclose fall

## 2020-08-11 NOTE — PROGRESS NOTES
Patient calm and cooperative on the unit  Pt pleasant during all interactions and state he feels well  Patient denies SI/HI and hallucinations  Will continue to monitor

## 2020-08-11 NOTE — BH TRANSITION RECORD
Contact Information: If you have any questions, concerns, pended studies, tests and/or procedures, or emergencies regarding your inpatient behavioral health visit  Please contact [facility] behavioral health unit [phone] and ask to speak to a , nurse or physician  A contact is available 24 hours/ 7 days a week at this number  Summary of Procedures Performed During your Stay:  Below is a list of major procedures performed during your hospital stay and a summary of results:  - No major procedures performed  Pending Studies (From admission, onward)    None        If studies are pending at discharge, follow up with your PCP and/or referring provider

## 2020-08-11 NOTE — PROGRESS NOTES
LETY received phone call from Banner with [facility] ([phone]) stating she received this CM's VM and she will work on getting patient appointment  CM asked that she call this CM once an appointment is arranged  CM will continue to follow patient's progress and assist with discharge planning needs

## 2020-08-11 NOTE — PROGRESS NOTES
08/11/20 0715   Activity/Group Checklist   Group Community meeting   Attendance Attended   Attendance Duration (min) 46-60   Interactions Interacted appropriately   Affect/Mood Appropriate;Bright;Normal range   Goals Achieved Identified feelings; Able to listen to others; Able to engage in interactions; Able to self-disclose

## 2020-08-11 NOTE — PROGRESS NOTES
Patient slept through the night without incident  q 7min checks maintained  Will continue to monitor

## 2020-08-11 NOTE — PROGRESS NOTES
Pt up ad chino & gait is steady  Pt denies any depression or anxiety  Q 7 min checks maintained to monitor pt's behavior & safety  Pt for tentative discharge home today  Pt denies any hallucinations, suicidal or homicidal ideations  Pt is pleasant & cooperative

## 2020-08-11 NOTE — PROGRESS NOTES
Pt is due to discharge today  Floor staff has reviewed pt belongings with the pt  Pt has verified that all belongings are present and/or accounted for as of this time   The following is a complete list of the pt belongings:    t-shirt x 3  Pants x 3  Pajama pants x 1  Underwear x 2  Pair of socks x 4  Sweater x 1  Pair of shoes x 1             t-shirt x 19  Sleeveless shirt x 2  Pants x 2  Pajama pants x 2  Bag of Papers/Documents x 1  Shorts x 2  Pair of socks x 8  1 unmatched socks x 1  Sweates x 6  Pack of cigarettes (10 individual)  Lighters x 2  Clothing Hangers x 20  Dufflebag x 1                $5 13 USD  Pa DL x 1  SSN Card x 1  [employer] card x 1  Visa Debit Card x 1  Business&Gift Cards

## 2020-08-11 NOTE — NURSING NOTE
Pt instructed on discharge instructions & medications; & verbalized understanding of instructions  Pt discharged via main entrance of hospital via ambulation accompanied by MHT & belongings sent with pt  Pt transported home via car with his landlord

## 2020-08-11 NOTE — PROGRESS NOTES
Progress Note - [address] [facility] 47 y o  male MRN: [id number]  Unit/Bed#: [facility] 388-20 Encounter: [id number]    Assessment/Plan   Principal Problem:    MDD (major depressive disorder), recurrent episode, moderate ([facility])  Active Problems:    Bipolar 2 disorder ([facility] )    Alcohol use disorder, severe, dependence ([facility] )    Tobacco abuse    Hypokalemia      Behavior over the last 24 hours:  Improved  Patient seems to be doing well  He has been pleasant and cooperative  He reports that he reconciled with his landlord and he will go back to his old place after they communicated  Sleep: normal  Appetite: normal  Medication side effects: No  ROS: no complaints    Mental Status Evaluation:  Appearance:  casually dressed   Behavior:  normal   Speech:  normal pitch   Mood:  constricted   Affect:  constricted   Thought Process:  circumstantial   Thought Content:  normal   Perceptual Disturbances: None   Risk Potential: Suicidal Ideations none  Homicidal Ideations none  Potential for Aggression No   Sensorium:  person and place   Memory:  recent and remote memory grossly intact   Consciousness:  awake    Attention: attention span appeared shorter than expected for age   Insight:  limited   Judgment: limited   Gait/Station: normal gait/station   Motor Activity: no abnormal movements     Progress Toward Goals: ongoing    Recommended Treatment: Continue with group therapy, milieu therapy and occupational therapy  Risks, benefits and possible side effects of Medications:   Risks, benefits, and possible side effects of medications explained to patient and patient verbalizes understanding  Medications: all current active meds have been reviewed  Labs: I have personally reviewed all pertinent laboratory/tests results  Counseling / Coordination of Care  Total floor / unit time spent today 25 minutes   Greater than 50% of total time was spent with the patient and / or family counseling and / or coordination of care   A description of the counseling / coordination of care:

## 2020-08-30 ENCOUNTER — APPOINTMENT (EMERGENCY)
Dept: RADIOLOGY | Facility: HOSPITAL | Age: 50
End: 2020-08-30
Payer: COMMERCIAL

## 2020-08-30 ENCOUNTER — HOSPITAL ENCOUNTER (EMERGENCY)
Facility: HOSPITAL | Age: 50
Discharge: HOME | End: 2020-08-30
Attending: EMERGENCY MEDICINE | Admitting: EMERGENCY MEDICINE
Payer: COMMERCIAL

## 2020-08-30 VITALS
OXYGEN SATURATION: 98 % | DIASTOLIC BLOOD PRESSURE: 88 MMHG | SYSTOLIC BLOOD PRESSURE: 131 MMHG | RESPIRATION RATE: 18 BRPM | TEMPERATURE: 98.7 F | WEIGHT: 221.12 LBS | BODY MASS INDEX: 29.99 KG/M2 | HEART RATE: 89 BPM

## 2020-08-30 DIAGNOSIS — F10.10 ALCOHOL ABUSE: Primary | ICD-10-CM

## 2020-08-30 LAB
ALBUMIN SERPL BCP-MCNC: 4.2 G/DL (ref 3.5–5)
ALP SERPL-CCNC: 69 U/L (ref 46–116)
ALT SERPL W P-5'-P-CCNC: 42 U/L (ref 12–78)
AMPHETAMINES SERPL QL SCN: NEGATIVE
ANION GAP SERPL CALCULATED.3IONS-SCNC: 10 MMOL/L (ref 4–13)
APTT PPP: 30 SECONDS (ref 23–37)
AST SERPL W P-5'-P-CCNC: 24 U/L (ref 5–45)
BARBITURATES UR QL: NEGATIVE
BASOPHILS # BLD AUTO: 0.06 THOUSANDS/ΜL (ref 0–0.1)
BASOPHILS NFR BLD AUTO: 1 % (ref 0–1)
BENZODIAZ UR QL: NEGATIVE
BILIRUB SERPL-MCNC: 0.36 MG/DL (ref 0.2–1)
BUN SERPL-MCNC: 12 MG/DL (ref 5–25)
CALCIUM SERPL-MCNC: 8.8 MG/DL (ref 8.3–10.1)
CHLORIDE SERPL-SCNC: 105 MMOL/L (ref 100–108)
CO2 SERPL-SCNC: 28 MMOL/L (ref 21–32)
COCAINE UR QL: NEGATIVE
CREAT SERPL-MCNC: 0.94 MG/DL (ref 0.6–1.3)
EOSINOPHIL # BLD AUTO: 0.12 THOUSAND/ΜL (ref 0–0.61)
EOSINOPHIL NFR BLD AUTO: 2 % (ref 0–6)
ERYTHROCYTE [DISTWIDTH] IN BLOOD BY AUTOMATED COUNT: 14.6 % (ref 11.6–15.1)
ETHANOL EXG-MCNC: 0.09 MG/DL
ETHANOL SERPL-MCNC: 204 MG/DL (ref 0–3)
GFR SERPL CREATININE-BSD FRML MDRD: 94 ML/MIN/1.73SQ M
GLUCOSE SERPL-MCNC: 98 MG/DL (ref 65–140)
HCT VFR BLD AUTO: 51.8 % (ref 36.5–49.3)
HGB BLD-MCNC: 17.5 G/DL (ref 12–17)
IMM GRANULOCYTES # BLD AUTO: 0.01 THOUSAND/UL (ref 0–0.2)
IMM GRANULOCYTES NFR BLD AUTO: 0 % (ref 0–2)
INR PPP: 1.02 (ref 0.84–1.19)
LYMPHOCYTES # BLD AUTO: 2.01 THOUSANDS/ΜL (ref 0.6–4.47)
LYMPHOCYTES NFR BLD AUTO: 27 % (ref 14–44)
MCH RBC QN AUTO: 33.1 PG (ref 26.8–34.3)
MCHC RBC AUTO-ENTMCNC: 33.8 G/DL (ref 31.4–37.4)
MCV RBC AUTO: 98 FL (ref 82–98)
METHADONE UR QL: NEGATIVE
MONOCYTES # BLD AUTO: 0.58 THOUSAND/ΜL (ref 0.17–1.22)
MONOCYTES NFR BLD AUTO: 8 % (ref 4–12)
NEUTROPHILS # BLD AUTO: 4.56 THOUSANDS/ΜL (ref 1.85–7.62)
NEUTS SEG NFR BLD AUTO: 62 % (ref 43–75)
NRBC BLD AUTO-RTO: 0 /100 WBCS
OPIATES UR QL SCN: NEGATIVE
OXYCODONE+OXYMORPHONE UR QL SCN: NEGATIVE
PCP UR QL: NEGATIVE
PLATELET # BLD AUTO: 321 THOUSANDS/UL (ref 149–390)
PMV BLD AUTO: 8.5 FL (ref 8.9–12.7)
POTASSIUM SERPL-SCNC: 3.4 MMOL/L (ref 3.5–5.3)
PROT SERPL-MCNC: 8 G/DL (ref 6.4–8.2)
PROTHROMBIN TIME: 13.5 SECONDS (ref 11.6–14.5)
RBC # BLD AUTO: 5.28 MILLION/UL (ref 3.88–5.62)
SODIUM SERPL-SCNC: 143 MMOL/L (ref 136–145)
THC UR QL: NEGATIVE
TSH SERPL DL<=0.05 MIU/L-ACNC: 0.62 UIU/ML (ref 0.36–3.74)
WBC # BLD AUTO: 7.34 THOUSAND/UL (ref 4.31–10.16)

## 2020-08-30 PROCEDURE — 99285 EMERGENCY DEPT VISIT HI MDM: CPT | Performed by: EMERGENCY MEDICINE

## 2020-08-30 PROCEDURE — 36415 COLL VENOUS BLD VENIPUNCTURE: CPT | Performed by: EMERGENCY MEDICINE

## 2020-08-30 PROCEDURE — 85610 PROTHROMBIN TIME: CPT | Performed by: EMERGENCY MEDICINE

## 2020-08-30 PROCEDURE — 85025 COMPLETE CBC W/AUTO DIFF WBC: CPT | Performed by: EMERGENCY MEDICINE

## 2020-08-30 PROCEDURE — 85730 THROMBOPLASTIN TIME PARTIAL: CPT | Performed by: EMERGENCY MEDICINE

## 2020-08-30 PROCEDURE — 82075 ASSAY OF BREATH ETHANOL: CPT | Performed by: EMERGENCY MEDICINE

## 2020-08-30 PROCEDURE — 80053 COMPREHEN METABOLIC PANEL: CPT | Performed by: EMERGENCY MEDICINE

## 2020-08-30 PROCEDURE — 71045 X-RAY EXAM CHEST 1 VIEW: CPT

## 2020-08-30 PROCEDURE — 80307 DRUG TEST PRSMV CHEM ANLYZR: CPT | Performed by: EMERGENCY MEDICINE

## 2020-08-30 PROCEDURE — 80320 DRUG SCREEN QUANTALCOHOLS: CPT | Performed by: EMERGENCY MEDICINE

## 2020-08-30 PROCEDURE — 93005 ELECTROCARDIOGRAM TRACING: CPT

## 2020-08-30 PROCEDURE — 99285 EMERGENCY DEPT VISIT HI MDM: CPT

## 2020-08-30 PROCEDURE — 84443 ASSAY THYROID STIM HORMONE: CPT | Performed by: EMERGENCY MEDICINE

## 2020-08-30 PROCEDURE — 96361 HYDRATE IV INFUSION ADD-ON: CPT

## 2020-08-30 PROCEDURE — 96360 HYDRATION IV INFUSION INIT: CPT

## 2020-08-30 RX ADMIN — SODIUM CHLORIDE 1000 ML: 0.9 INJECTION, SOLUTION INTRAVENOUS at 14:50

## 2020-08-30 NOTE — ED PROVIDER NOTES
History  Chief Complaint   Patient presents with    Black or Bloody Stool     Patient reports he wants help for alcohol addiction  Last drink 1 hour ago  Had blood in stool a few hours ago and vomited blood last night  Also states, "I think I had a seizure "       39-year-old gentleman comes in for alcohol intoxication and abuse  Patient states he has been a long-term alcoholic and has been to rehab multiple times  Patient states he recently had a psychiatric stay but after he was discharged started drinking again  States he is drinking heavily 1-2 pt of vodka per day  Became concerned when last night he had an episode of vomiting he thinks there may have been blood mixed in with the vomit  He also had 1 bowel movement that was blood mixed with stool  Patient was out driving today when he began to have bilateral numbness and tingling in his arms and chest tightness  Thought he was going to [facility] a seizure" patient is describing what sounds like a panic attack  Patient denies SI or HI but does state that he wants to go inpatient to be able to detox and stop drinking  History provided by:  Patient   used: No    Black or Bloody Stool   Quality:  Bright red  Amount:  Scant  Chronicity:  New  Context: defecation    Similar prior episodes: no    Associated symptoms: no abdominal pain, no dizziness, no light-headedness and no loss of consciousness    Risk factors: no anticoagulant use and no NSAID use        Prior to Admission Medications   Prescriptions Last Dose Informant Patient Reported?  Taking?   gabapentin (NEURONTIN) 300 mg capsule   No Yes   Sig: Take 1 capsule (300 mg total) by mouth 3 (three) times a day   naltrexone (REVIA) 50 mg tablet   No Yes   Sig: Take 1 tablet (50 mg total) by mouth daily   sertraline (ZOLOFT) 50 mg tablet   No Yes   Sig: Take 1 tablet (50 mg total) by mouth daily   traZODone (DESYREL) 100 mg tablet   No Yes   Sig: Take 1 tablet (100 mg total) by mouth daily at bedtime as needed for sleep      Facility-Administered Medications: None       Past Medical History:   Diagnosis Date    Addiction to drug ([place] [address] )     Alcohol abuse     Alcoholism ([place] [address] )     Anxiety     Arthritis     Bipolar disorder ([place] [address] )     Depression     Lung disease     31+ yr smoking hx    Sleep difficulties     Substance abuse ([place] [address] )     Tobacco abuse        Past Surgical History:   Procedure Laterality Date    HERNIA REPAIR  03/2018    Ing      MASTECTOMY  03/2018    subcutaneous per [employer]    WISDOM TOOTH EXTRACTION         Family History   Problem Relation Age of Onset    Heart disease Mother     Stroke Mother     Stroke Father      I have reviewed and agree with the history as documented  E-Cigarette/Vaping    E-Cigarette Use Never User      E-Cigarette/Vaping Substances     Social History     Tobacco Use    Smoking status: Current Every Day Smoker     Packs/day: 0 50     Years: 31 00     Pack years: 15 50     Types: Cigarettes    Smokeless tobacco: Current User    Tobacco comment: 1 PPD per [employer]   Substance Use Topics    Alcohol use: Yes     Frequency: 4 or more times a week     Drinks per session: 10 or more     Binge frequency: Daily or almost daily     Comment: 1 bottle of vodka daily    Drug use: Not Currently       Review of Systems   Constitutional: Negative for activity change and appetite change  HENT: Negative for congestion and facial swelling  Eyes: Negative for discharge and redness  Respiratory: Negative for cough and wheezing  Cardiovascular: Negative for chest pain and leg swelling  Gastrointestinal: Positive for blood in stool  Negative for abdominal distention and abdominal pain  Endocrine: Negative for cold intolerance and polydipsia  Genitourinary: Negative for difficulty urinating and hematuria  Musculoskeletal: Negative for arthralgias and gait problem  Skin: Negative for color change and rash     Allergic/Immunologic: Negative for food allergies and immunocompromised state  Neurological: Negative for dizziness, seizures, loss of consciousness, light-headedness and headaches  Hematological: Negative for adenopathy  Does not bruise/bleed easily  Psychiatric/Behavioral: Negative for agitation, confusion, decreased concentration and suicidal ideas  The patient is nervous/anxious  All other systems reviewed and are negative  Physical Exam  Physical Exam  Vitals signs and nursing note reviewed  Constitutional:       Appearance: He is well-developed  He is not toxic-appearing  HENT:      Head: Normocephalic and atraumatic  Right Ear: Tympanic membrane normal       Left Ear: Tympanic membrane normal       Nose: Nose normal    Eyes:      General: Lids are normal       Conjunctiva/sclera: Conjunctivae normal       Pupils: Pupils are equal, round, and reactive to light  Neck:      Musculoskeletal: Normal range of motion and neck supple  Vascular: No carotid bruit or JVD  Trachea: Trachea normal    Cardiovascular:      Rate and Rhythm: Normal rate and regular rhythm  No extrasystoles are present  Heart sounds: Normal heart sounds  Pulmonary:      Effort: Pulmonary effort is normal       Breath sounds: No decreased breath sounds, wheezing, rhonchi or rales  Chest:      Chest wall: No deformity or tenderness  Abdominal:      General: Bowel sounds are normal       Palpations: Abdomen is soft  Tenderness: There is no abdominal tenderness  There is no guarding or rebound  Musculoskeletal:      Right shoulder: He exhibits normal range of motion, no tenderness, no swelling and no deformity  Cervical back: Normal  He exhibits normal range of motion, no tenderness, no bony tenderness and no deformity  Lymphadenopathy:      Cervical: No cervical adenopathy  Skin:     General: Skin is warm and dry  Neurological:      Mental Status: He is alert and oriented to person, place, and time        Cranial Nerves: No cranial nerve deficit  Sensory: No sensory deficit  Deep Tendon Reflexes: Reflexes are normal and symmetric  Psychiatric:         Speech: Speech normal          Behavior: Behavior normal          Thought Content: Thought content normal          Judgment: Judgment normal          Vital Signs  ED Triage Vitals [08/30/20 1241]   Temperature Pulse Respirations Blood Pressure SpO2   98 7 °F (37 1 °C) 86 18 141/97 97 %      Temp Source Heart Rate Source Patient Position - Orthostatic VS BP Location FiO2 (%)   Oral Monitor Lying Right arm --      Pain Score       --           Vitals:    08/30/20 1300 08/30/20 1315 08/30/20 1408 08/30/20 1510   BP:   169/81 (!) 144/101   Pulse: 90 76 84 92   Patient Position - Orthostatic VS:   Sitting Lying         Visual Acuity      ED Medications  Medications   sodium chloride 0 9 % bolus 1,000 mL (1,000 mL Intravenous New Bag 8/30/20 1450)       Diagnostic Studies  Results Reviewed     Procedure Component Value Units Date/Time    POCT alcohol breath test [[id number]]  (Normal) Resulted:  08/30/20 1509    Lab Status:  Final result Updated:  08/30/20 1510     EXTBreath Alcohol 0 093    Rapid drug screen, urine [[id number]]  (Normal) Collected:  08/30/20 1406    Lab Status:  Final result Specimen:  Urine, Clean Catch Updated:  08/30/20 1438     Amph/Meth UR Negative     Barbiturate Ur Negative     Benzodiazepine Urine Negative     Cocaine Urine Negative     Methadone Urine Negative     Opiate Urine Negative     PCP Ur Negative     THC Urine Negative     Oxycodone Urine Negative    Narrative:       FOR MEDICAL PURPOSES ONLY  IF CONFIRMATION NEEDED PLEASE CONTACT THE LAB WITHIN 5 DAYS      Drug Screen Cutoff Levels:  AMPHETAMINE/METHAMPHETAMINES  1000 ng/mL  BARBITURATES     200 ng/mL  BENZODIAZEPINES     200 ng/mL  COCAINE      300 ng/mL  METHADONE      300 ng/mL  OPIATES      300 ng/mL  PHENCYCLIDINE     25 ng/mL  THC       50 ng/mL  OXYCODONE      100 ng/mL    TSH [[id number]]  (Normal) Collected:  08/30/20 1302    Lab Status:  Final result Specimen:  Blood from Arm, Left Updated:  08/30/20 1334     TSH 3RD GENERATON 0 623 uIU/mL     Narrative:       Patients undergoing fluorescein dye angiography may retain small amounts of fluorescein in the body for 48-72 hours post procedure  Samples containing fluorescein can produce falsely depressed TSH values  If the patient had this procedure,a specimen should be resubmitted post fluorescein clearance        Comprehensive metabolic panel [[id number]]  (Abnormal) Collected:  08/30/20 1302    Lab Status:  Final result Specimen:  Blood from Arm, Left Updated:  08/30/20 1325     Sodium 143 mmol/L      Potassium 3 4 mmol/L      Chloride 105 mmol/L      CO2 28 mmol/L      ANION GAP 10 mmol/L      BUN 12 mg/dL      Creatinine 0 94 mg/dL      Glucose 98 mg/dL      Calcium 8 8 mg/dL      AST 24 U/L      ALT 42 U/L      Alkaline Phosphatase 69 U/L      Total Protein 8 0 g/dL      Albumin 4 2 g/dL      Total Bilirubin 0 36 mg/dL      eGFR 94 ml/min/1 73sq m     Narrative:       [facility] guidelines for Chronic Kidney Disease (CKD):     Stage 1 with normal or high GFR (GFR > 90 mL/min/1 73 square meters)    Stage 2 Mild CKD (GFR = 60-89 mL/min/1 73 square meters)    Stage 3A Moderate CKD (GFR = 45-59 mL/min/1 73 square meters)    Stage 3B Moderate CKD (GFR = 30-44 mL/min/1 73 square meters)    Stage 4 Severe CKD (GFR = 15-29 mL/min/1 73 square meters)    Stage 5 End Stage CKD (GFR <15 mL/min/1 73 square meters)  Note: GFR calculation is accurate only with a steady state creatinine    Ethanol [[id number]]  (Abnormal) Collected:  08/30/20 1302    Lab Status:  Final result Specimen:  Blood from Arm, Left Updated:  08/30/20 1321     Ethanol Lvl 204 mg/dL     Protime-INR [[id number]]  (Normal) Collected:  08/30/20 1302    Lab Status:  Final result Specimen:  Blood from Arm, Left Updated:  08/30/20 1320     Protime 13 5 seconds      INR 1 02    APTT [[id number]]  (Normal) Collected:  08/30/20 1302    Lab Status:  Final result Specimen:  Blood from Arm, Left Updated:  08/30/20 1320     PTT 30 seconds     CBC and differential [[id number]]  (Abnormal) Collected:  08/30/20 1302    Lab Status:  Final result Specimen:  Blood from Arm, Left Updated:  08/30/20 1309     WBC 7 34 Thousand/uL      RBC 5 28 Million/uL      Hemoglobin 17 5 g/dL      Hematocrit 51 8 %      MCV 98 fL      MCH 33 1 pg      MCHC 33 8 g/dL      RDW 14 6 %      MPV 8 5 fL      Platelets 764 Thousands/uL      nRBC 0 /100 WBCs      Neutrophils Relative 62 %      Immat GRANS % 0 %      Lymphocytes Relative 27 %      Monocytes Relative 8 %      Eosinophils Relative 2 %      Basophils Relative 1 %      Neutrophils Absolute 4 56 Thousands/µL      Immature Grans Absolute 0 01 Thousand/uL      Lymphocytes Absolute 2 01 Thousands/µL      Monocytes Absolute 0 58 Thousand/µL      Eosinophils Absolute 0 12 Thousand/µL      Basophils Absolute 0 06 Thousands/µL                  XR chest 1 view portable    (Results Pending)              Procedures  Procedures         ED Course                                             MDM  Number of Diagnoses or Management Options  Alcohol abuse: established and worsening     Amount and/or Complexity of Data Reviewed  Clinical lab tests: ordered and reviewed  Tests in the radiology section of CPT®: ordered and reviewed  Tests in the medicine section of CPT®: ordered and reviewed  Discuss the patient with other providers: yes (Discussed with host)  Independent visualization of images, tracings, or specimens: yes    Risk of Complications, Morbidity, and/or Mortality  General comments: Patient is medically cleared  He has not had any vomiting or bloody diarrhea while is here his labs are within normal limits  He is cleared for inpatient drug and alcohol substance use disorder care  Host is working on getting him a bed today or tomorrow      Patient Progress  Patient progress: stable        Disposition  Final diagnoses:   Alcohol abuse     Time reflects when diagnosis was documented in both MDM as applicable and the Disposition within this note     Time User Action Codes Description Comment    8/30/2020  6:18 PM Ale OJEDA Add [F10 10] Alcohol abuse       ED Disposition     None      Follow-up Information    None         Patient's Medications   Discharge Prescriptions    No medications on file     No discharge procedures on file      PDMP Review       Value Time User    PDMP Reviewed  Yes 8/6/2020 11:00 AM Kulwant Solomon MD          ED Provider  Electronically Signed by           [address] [place] [address]   09/02/20 1037

## 2020-08-30 NOTE — ED NOTES
Followed up with [facility] who stated they had everything they needed and were working on getting patient a bed a [facility] tonight and would follow up with ED on times

## 2020-08-30 NOTE — ED CARE HANDOFF
Emergency Department Sign Out Note        Sign out and transfer of care from Dr Suella Kawasaki  See Separate Emergency Department note  The patient, Nely Zaman, was evaluated by the previous provider for ETOH abuse  Workup Completed:  yes    ED Course / Workup Pending (followup): Waiting for placement from [facility]                              Procedures  MDM  Number of Diagnoses or Management Options  Alcohol abuse: new and requires workup  Diagnosis management comments: 8:30 PM  HOST called back and said they will have a bed available on Tuesday 9/1/20  Patient informed that he will be discharged and HOST will communicate with him for  on Tuesday  Patient discharged  Risk of Complications, Morbidity, and/or Mortality  Presenting problems: high  Diagnostic procedures: high  Management options: high    Patient Progress  Patient progress: stable      Disposition  Final diagnoses:   Alcohol abuse     Time reflects when diagnosis was documented in both MDM as applicable and the Disposition within this note     Time User Action Codes Description Comment    8/30/2020  6:18 PM Author Few Add [F10 10] Alcohol abuse       ED Disposition     ED Disposition Condition Date/Time Comment    Discharge Stable Sun Aug 30, 2020  8:36 PM Nely Zaman discharge to home/self care  Follow-up Information     Follow up With [facility] [place] [facility]   someone from [facility] will be in contact with you to get you admitted to rehab    Your bed will be available on Tuesday 9/1         Discharge Medication List as of 8/30/2020  8:39 PM      CONTINUE these medications which have NOT CHANGED    Details   gabapentin (NEURONTIN) 300 mg capsule Take 1 capsule (300 mg total) by mouth 3 (three) times a day, Starting Tue 8/11/2020, Until Thu 9/10/2020, Print      naltrexone (REVIA) 50 mg tablet Take 1 tablet (50 mg total) by mouth daily, Starting Wed 8/12/2020, Until Fri 9/11/2020, Print      sertraline (ZOLOFT) 50 mg tablet Take 1 tablet (50 mg total) by mouth daily, Starting Wed 8/12/2020, Until Fri 9/11/2020, Print      traZODone (DESYREL) 100 mg tablet Take 1 tablet (100 mg total) by mouth daily at bedtime as needed for sleep, Starting Tue 8/11/2020, Until Thu 9/10/2020, Print           No discharge procedures on file         ED Provider  Electronically Signed by     Ronnie Pace DO  08/30/20 6518

## 2020-08-30 NOTE — ED NOTES
Pt  Aware urine sample is needed, will provide one when able       Jennifer Chatman RN  08/30/20 5765

## 2020-08-30 NOTE — ED NOTES
Telephone call with  and Nelson  Successful interview with nelson ([phone] Option 2) over the phone  Patient accepted at [facility] facility in [facility]  Host will followup in the morning in regards to transport and pickup time  Nelson to arrange transport for patient   Contact info for host: Marsha Oglesby RN  08/30/20 1985

## 2020-08-30 NOTE — ED NOTES
Phone call placed to [facility], they will be calling [facility] shortly to complete intake  Requested information faxed at this time

## 2020-08-31 LAB
ATRIAL RATE: 79 BPM
P AXIS: 76 DEGREES
PR INTERVAL: 154 MS
QRS AXIS: -64 DEGREES
QRSD INTERVAL: 114 MS
QT INTERVAL: 372 MS
QTC INTERVAL: 419 MS
T WAVE AXIS: 38 DEGREES
VENTRICULAR RATE: 76 BPM

## 2020-08-31 PROCEDURE — 93010 ELECTROCARDIOGRAM REPORT: CPT | Performed by: INTERNAL MEDICINE

## 2020-08-31 NOTE — ED NOTES
Patient updated with plan that he has bed at [facility] rehab on Tuesday and that he will be d/c'd home from ED  Dr Clem Delgado aware also       Néstor Sood RN  08/30/20 2020

## 2020-09-21 ENCOUNTER — HOSPITAL ENCOUNTER (OUTPATIENT)
Facility: HOSPITAL | Age: 50
Setting detail: OBSERVATION
End: 2020-09-22
Attending: EMERGENCY MEDICINE | Admitting: INTERNAL MEDICINE
Payer: COMMERCIAL

## 2020-09-21 ENCOUNTER — APPOINTMENT (EMERGENCY)
Dept: RADIOLOGY | Facility: HOSPITAL | Age: 50
End: 2020-09-21
Payer: COMMERCIAL

## 2020-09-21 DIAGNOSIS — F10.20 ALCOHOL USE DISORDER, SEVERE, DEPENDENCE (HCC): ICD-10-CM

## 2020-09-21 DIAGNOSIS — R07.9 CHEST PAIN: ICD-10-CM

## 2020-09-21 DIAGNOSIS — F10.10 ALCOHOL ABUSE: Primary | ICD-10-CM

## 2020-09-21 DIAGNOSIS — R07.9 CHEST PAIN, UNSPECIFIED TYPE: ICD-10-CM

## 2020-09-21 DIAGNOSIS — Z72.0 TOBACCO ABUSE: Chronic | ICD-10-CM

## 2020-09-21 LAB
ALBUMIN SERPL BCP-MCNC: 4.5 G/DL (ref 3.4–4.8)
ALP SERPL-CCNC: 48.9 U/L (ref 10–129)
ALT SERPL W P-5'-P-CCNC: 23 U/L (ref 5–63)
ANION GAP SERPL CALCULATED.3IONS-SCNC: 10 MMOL/L (ref 4–13)
APTT PPP: 29 SECONDS (ref 23–31)
AST SERPL W P-5'-P-CCNC: 26 U/L (ref 15–41)
BASOPHILS # BLD AUTO: 0.07 THOUSANDS/ΜL (ref 0–0.1)
BASOPHILS NFR BLD AUTO: 1 % (ref 0–1)
BILIRUB SERPL-MCNC: 0.52 MG/DL (ref 0.3–1.2)
BUN SERPL-MCNC: 9 MG/DL (ref 6–20)
CALCIUM SERPL-MCNC: 9.4 MG/DL (ref 8.4–10.2)
CHLORIDE SERPL-SCNC: 106 MMOL/L (ref 96–108)
CO2 SERPL-SCNC: 26 MMOL/L (ref 22–33)
CREAT SERPL-MCNC: 0.76 MG/DL (ref 0.5–1.2)
EOSINOPHIL # BLD AUTO: 0.2 THOUSAND/ΜL (ref 0–0.61)
EOSINOPHIL NFR BLD AUTO: 3 % (ref 0–6)
ERYTHROCYTE [DISTWIDTH] IN BLOOD BY AUTOMATED COUNT: 13.7 % (ref 11.6–15.1)
ETHANOL SERPL-MCNC: 275.2 MG/DL
GFR SERPL CREATININE-BSD FRML MDRD: 106 ML/MIN/1.73SQ M
GLUCOSE SERPL-MCNC: 100 MG/DL (ref 65–140)
HCT VFR BLD AUTO: 50.2 % (ref 36.5–49.3)
HGB BLD-MCNC: 17.4 G/DL (ref 12–17)
IMM GRANULOCYTES # BLD AUTO: 0.02 THOUSAND/UL (ref 0–0.2)
IMM GRANULOCYTES NFR BLD AUTO: 0 % (ref 0–2)
INR PPP: 0.99 (ref 0.9–1.1)
LIPASE SERPL-CCNC: 21 U/L (ref 13–60)
LYMPHOCYTES # BLD AUTO: 2.64 THOUSANDS/ΜL (ref 0.6–4.47)
LYMPHOCYTES NFR BLD AUTO: 39 % (ref 14–44)
MCH RBC QN AUTO: 32.8 PG (ref 26.8–34.3)
MCHC RBC AUTO-ENTMCNC: 34.7 G/DL (ref 31.4–37.4)
MCV RBC AUTO: 95 FL (ref 82–98)
MONOCYTES # BLD AUTO: 0.46 THOUSAND/ΜL (ref 0.17–1.22)
MONOCYTES NFR BLD AUTO: 7 % (ref 4–12)
NEUTROPHILS # BLD AUTO: 3.37 THOUSANDS/ΜL (ref 1.85–7.62)
NEUTS SEG NFR BLD AUTO: 50 % (ref 43–75)
PLATELET # BLD AUTO: 348 THOUSANDS/UL (ref 149–390)
PMV BLD AUTO: 8.7 FL (ref 8.9–12.7)
POTASSIUM SERPL-SCNC: 3.4 MMOL/L (ref 3.5–5)
PROT SERPL-MCNC: 7.3 G/DL (ref 6.4–8.3)
PROTHROMBIN TIME: 10.5 SECONDS (ref 9.5–12.1)
RBC # BLD AUTO: 5.31 MILLION/UL (ref 3.88–5.62)
SODIUM SERPL-SCNC: 142 MMOL/L (ref 133–145)
TROPONIN I SERPL-MCNC: <0.03 NG/ML (ref 0–0.07)
WBC # BLD AUTO: 6.76 THOUSAND/UL (ref 4.31–10.16)

## 2020-09-21 PROCEDURE — 85730 THROMBOPLASTIN TIME PARTIAL: CPT | Performed by: EMERGENCY MEDICINE

## 2020-09-21 PROCEDURE — 80320 DRUG SCREEN QUANTALCOHOLS: CPT | Performed by: EMERGENCY MEDICINE

## 2020-09-21 PROCEDURE — 94640 AIRWAY INHALATION TREATMENT: CPT

## 2020-09-21 PROCEDURE — 83735 ASSAY OF MAGNESIUM: CPT | Performed by: INTERNAL MEDICINE

## 2020-09-21 PROCEDURE — 84484 ASSAY OF TROPONIN QUANT: CPT | Performed by: EMERGENCY MEDICINE

## 2020-09-21 PROCEDURE — 83036 HEMOGLOBIN GLYCOSYLATED A1C: CPT | Performed by: INTERNAL MEDICINE

## 2020-09-21 PROCEDURE — 96361 HYDRATE IV INFUSION ADD-ON: CPT

## 2020-09-21 PROCEDURE — 96360 HYDRATION IV INFUSION INIT: CPT

## 2020-09-21 PROCEDURE — 94760 N-INVAS EAR/PLS OXIMETRY 1: CPT

## 2020-09-21 PROCEDURE — 84100 ASSAY OF PHOSPHORUS: CPT | Performed by: INTERNAL MEDICINE

## 2020-09-21 PROCEDURE — 99284 EMERGENCY DEPT VISIT MOD MDM: CPT | Performed by: EMERGENCY MEDICINE

## 2020-09-21 PROCEDURE — 36415 COLL VENOUS BLD VENIPUNCTURE: CPT | Performed by: EMERGENCY MEDICINE

## 2020-09-21 PROCEDURE — 80053 COMPREHEN METABOLIC PANEL: CPT | Performed by: EMERGENCY MEDICINE

## 2020-09-21 PROCEDURE — 93005 ELECTROCARDIOGRAM TRACING: CPT

## 2020-09-21 PROCEDURE — 85610 PROTHROMBIN TIME: CPT | Performed by: EMERGENCY MEDICINE

## 2020-09-21 PROCEDURE — 85025 COMPLETE CBC W/AUTO DIFF WBC: CPT | Performed by: EMERGENCY MEDICINE

## 2020-09-21 PROCEDURE — 83690 ASSAY OF LIPASE: CPT | Performed by: EMERGENCY MEDICINE

## 2020-09-21 PROCEDURE — 71045 X-RAY EXAM CHEST 1 VIEW: CPT

## 2020-09-21 PROCEDURE — 99285 EMERGENCY DEPT VISIT HI MDM: CPT

## 2020-09-21 PROCEDURE — 99220 PR INITIAL OBSERVATION CARE/DAY 70 MINUTES: CPT | Performed by: INTERNAL MEDICINE

## 2020-09-21 RX ORDER — IPRATROPIUM BROMIDE AND ALBUTEROL SULFATE 2.5; .5 MG/3ML; MG/3ML
3 SOLUTION RESPIRATORY (INHALATION) ONCE
Status: COMPLETED | OUTPATIENT
Start: 2020-09-21 | End: 2020-09-21

## 2020-09-21 RX ORDER — POTASSIUM CHLORIDE 20 MEQ/1
20 TABLET, EXTENDED RELEASE ORAL DAILY
Status: DISCONTINUED | OUTPATIENT
Start: 2020-09-21 | End: 2020-09-22 | Stop reason: HOSPADM

## 2020-09-21 RX ORDER — THIAMINE MONONITRATE (VIT B1) 100 MG
100 TABLET ORAL DAILY
Status: DISCONTINUED | OUTPATIENT
Start: 2020-09-22 | End: 2020-09-21 | Stop reason: SDUPTHER

## 2020-09-21 RX ORDER — ASPIRIN 81 MG/1
324 TABLET, CHEWABLE ORAL ONCE
Status: COMPLETED | OUTPATIENT
Start: 2020-09-21 | End: 2020-09-21

## 2020-09-21 RX ORDER — FOLIC ACID 1 MG/1
1 TABLET ORAL DAILY
Status: DISCONTINUED | OUTPATIENT
Start: 2020-09-21 | End: 2020-09-22 | Stop reason: HOSPADM

## 2020-09-21 RX ORDER — PANTOPRAZOLE SODIUM 40 MG/1
40 INJECTION, POWDER, FOR SOLUTION INTRAVENOUS ONCE
Status: COMPLETED | OUTPATIENT
Start: 2020-09-22 | End: 2020-09-22

## 2020-09-21 RX ORDER — GABAPENTIN 300 MG/1
300 CAPSULE ORAL 3 TIMES DAILY
Status: DISCONTINUED | OUTPATIENT
Start: 2020-09-21 | End: 2020-09-22 | Stop reason: HOSPADM

## 2020-09-21 RX ORDER — LORAZEPAM 2 MG/ML
1 INJECTION INTRAMUSCULAR ONCE
Status: COMPLETED | OUTPATIENT
Start: 2020-09-21 | End: 2020-09-21

## 2020-09-21 RX ORDER — KETOROLAC TROMETHAMINE 30 MG/ML
15 INJECTION, SOLUTION INTRAMUSCULAR; INTRAVENOUS ONCE
Status: COMPLETED | OUTPATIENT
Start: 2020-09-22 | End: 2020-09-22

## 2020-09-21 RX ORDER — FOLIC ACID 1 MG/1
1 TABLET ORAL DAILY
Status: DISCONTINUED | OUTPATIENT
Start: 2020-09-22 | End: 2020-09-21 | Stop reason: SDUPTHER

## 2020-09-21 RX ORDER — THIAMINE MONONITRATE (VIT B1) 100 MG
100 TABLET ORAL DAILY
Status: DISCONTINUED | OUTPATIENT
Start: 2020-09-21 | End: 2020-09-22 | Stop reason: HOSPADM

## 2020-09-21 RX ORDER — TRAZODONE HYDROCHLORIDE 100 MG/1
100 TABLET ORAL
Status: DISCONTINUED | OUTPATIENT
Start: 2020-09-21 | End: 2020-09-22 | Stop reason: HOSPADM

## 2020-09-21 RX ORDER — NICOTINE 21 MG/24HR
1 PATCH, TRANSDERMAL 24 HOURS TRANSDERMAL DAILY
Status: DISCONTINUED | OUTPATIENT
Start: 2020-09-22 | End: 2020-09-22 | Stop reason: HOSPADM

## 2020-09-21 RX ADMIN — LORAZEPAM 1 MG: 2 INJECTION INTRAMUSCULAR; INTRAVENOUS at 22:32

## 2020-09-21 RX ADMIN — Medication 1 TABLET: at 23:45

## 2020-09-21 RX ADMIN — FOLIC ACID 1 MG: 1 TABLET ORAL at 23:45

## 2020-09-21 RX ADMIN — POTASSIUM CHLORIDE 20 MEQ: 1500 TABLET, EXTENDED RELEASE ORAL at 23:45

## 2020-09-21 RX ADMIN — GABAPENTIN 300 MG: 300 CAPSULE ORAL at 23:45

## 2020-09-21 RX ADMIN — IPRATROPIUM BROMIDE AND ALBUTEROL SULFATE 3 ML: 2.5; .5 SOLUTION RESPIRATORY (INHALATION) at 17:25

## 2020-09-21 RX ADMIN — ASPIRIN 324 MG: 81 TABLET, CHEWABLE ORAL at 17:20

## 2020-09-21 RX ADMIN — Medication 1 TABLET: at 17:50

## 2020-09-21 RX ADMIN — SODIUM CHLORIDE 1000 ML: 0.9 INJECTION, SOLUTION INTRAVENOUS at 17:22

## 2020-09-21 RX ADMIN — THIAMINE HCL TAB 100 MG 100 MG: 100 TAB at 23:45

## 2020-09-21 RX ADMIN — TRAZODONE HYDROCHLORIDE 100 MG: 100 TABLET ORAL at 23:45

## 2020-09-21 NOTE — ED NOTES
Pt on the call ball again at this time asking to eat and given a box lunch      Sancho Garcia, SERINA  09/21/20 3468

## 2020-09-21 NOTE — ED NOTES
Antwon Montesinos  Male, 49 year old, 1971  Gender Pronouns:     OA Colonoscopy   Received: Today   Message Contents   Cristela GERARD Oac Nurse Review Pool   Cc: Cristela Weir,     Please review chart to enable scheduling.     Thanks,   Cristela         Pt given warm blankets at this time      Reza Ajo, RN  09/21/20 6085

## 2020-09-21 NOTE — ED NOTES
47year-old reports he is drinking 2 pints of vodka daily requesting inpatient detox  Pt he last drank vodka earlier today  He reports being a chronic alcoholic  Reports multiple detox and rehab for the same  AAX3  He denies SI/HI/AH/VH  Patient requesting [facility] assessment  Spoke with Ron Doyle at [facility], will call and complete assessment  Face sheet and clinical faxed to [facility]

## 2020-09-21 NOTE — ED NOTES
Pt resting comfortable at this time in no distress awaiting host to call back with placement     Kary Lomeli RN  09/21/20 1946

## 2020-09-21 NOTE — ED PROVIDER NOTES
Pt Name: Lurdes Ace  MRN: [id number]  [place] 1970  Age/Sex: 47 y o  male  Date of evaluation: 9/21/2020  PCP: Jose Juan Orr, [address] [place] [address]    Chief Complaint   Patient presents with    Chest Pain     Started last night around midnight, left anterior chest pain describes as tightness  No meds taken  "I'm a severe alcoholic and i'm trying to get to a facility"  Patient has had 2 pints of vodka   Alcohol Problem         HPI    47 y o  male presenting with chest pain  Patient states the last night around midnight he began having some pain in his left anterior chest   He states this pain felt like a tightness, was moderate intensity, worse with trigger lying down trying to sleep and better with standing up and walking around  He states he also had some shortness of breath also improved with walking and moving around  He states that he had decided to stop drinking alcohol yesterday and the symptoms occurred somewhere around 8-12 hours after his last drink  States that he drank 2 pt of vodka and all symptoms resolved  He is requesting help with getting into a facility for inpatient alcohol detox and rehab  He denies fever, trauma, nausea, vomiting, other symptoms      HPI      Past Medical and Surgical History    Past Medical History:   Diagnosis Date    [facility]     Alcohol abuse     [facility]     Anxiety     Arthritis     B[facility] disorder [facility]     Depression     Lung disease     31+ yr smoking hx    Sleep difficulties     [facility]     Tobacco abuse        Past Surgical History:   Procedure Laterality Date    HERNIA REPAIR  03/2018    Ing      MASTECTOMY  03/2018    subcutaneous per Arleta Gibson    WISDOM TOOTH EXTRACTION         Family History   Problem Relation Age of Onset    Heart disease Mother     Stroke Mother     Stroke Father        Social History     Tobacco Use    Smoking status: Current Every Day Smoker     Packs/day: 0 25     Years: 31 00     Pack years: 7 75     Types: Cigarettes    Smokeless tobacco: Never Used    Tobacco comment: 1 PPD per [place]   Substance Use Topics    Alcohol use: Yes     Frequency: 4 or more times a week     Drinks per session: 10 or more     Binge frequency: Daily or almost daily     Comment: 1 bottle of vodka daily    Drug use: Yes     Types: Marijuana           Allergies    No Known Allergies    Home Medications    Prior to Admission medications    Medication Sig Start Date End Date Taking? Authorizing Provider   gabapentin (NEURONTIN) 300 mg capsule Take 1 capsule (300 mg total) by mouth 3 (three) times a day 8/11/20 9/10/20  JEMAL Mcbride   naltrexone (REVIA) 50 mg tablet Take 1 tablet (50 mg total) by mouth daily 8/12/20 9/11/20  JEMAL Mcbride   sertraline (ZOLOFT) 50 mg tablet Take 1 tablet (50 mg total) by mouth daily 8/12/20 9/11/20  JEMAL Mcbride   traZODone (DESYREL) 100 mg tablet Take 1 tablet (100 mg total) by mouth daily at bedtime as needed for sleep 8/11/20 9/10/20  JEMAL Zapata           Review of Systems    Review of Systems   Constitutional: Negative for appetite change, chills and diaphoresis  HENT: Negative for drooling, facial swelling, trouble swallowing and voice change  Respiratory: Positive for chest tightness  Negative for apnea, shortness of breath and wheezing  Cardiovascular: Negative for chest pain and leg swelling  Gastrointestinal: Negative for abdominal distention, abdominal pain, diarrhea, nausea and vomiting  Genitourinary: Negative for dysuria and urgency  Musculoskeletal: Negative for arthralgias, back pain, gait problem and neck pain  Skin: Negative for color change, rash and wound  Neurological: Negative for seizures, speech difficulty, weakness and headaches  Psychiatric/Behavioral: Negative for agitation, behavioral problems and dysphoric mood  The patient is nervous/anxious              All other systems reviewed and negative  Physical Exam      ED Triage Vitals [09/21/20 1700]   Temperature Pulse Respirations Blood Pressure SpO2   99 1 °F (37 3 °C) 94 16 126/90 98 %      Temp Source Heart Rate Source Patient Position - Orthostatic VS BP Location FiO2 (%)   Tympanic Monitor Lying Right arm --      Pain Score       --               Physical Exam  Vitals signs and nursing note reviewed  Constitutional:       General: He is not in acute distress  Appearance: He is well-developed  He is not ill-appearing, toxic-appearing or diaphoretic  HENT:      Head: Normocephalic and atraumatic  Eyes:      Conjunctiva/sclera: Conjunctivae normal       Pupils: Pupils are equal, round, and reactive to light  Neck:      Musculoskeletal: Normal range of motion and neck supple  Trachea: No tracheal deviation  Cardiovascular:      Rate and Rhythm: Normal rate and regular rhythm  Heart sounds: Normal heart sounds  No murmur  Pulmonary:      Effort: Pulmonary effort is normal  No respiratory distress  Breath sounds: Normal breath sounds  No stridor  No wheezing or rales  Abdominal:      General: There is no distension  Palpations: Abdomen is soft  Tenderness: There is no abdominal tenderness  There is no guarding or rebound  Musculoskeletal: Normal range of motion  General: No deformity  Skin:     General: Skin is warm and dry  Findings: No rash  Neurological:      Mental Status: He is alert and oriented to person, place, and time  Cranial Nerves: No cranial nerve deficit  Sensory: No sensory deficit  Motor: No weakness  Gait: Gait normal       Comments: Cranial nerves 2-12 intact, 5/5 strength in all extremities   Psychiatric:         Thought Content:  Thought content normal          Judgment: Judgment normal       Comments: Patient appears anxious, slightly pressured and tangential speech              Diagnostic Results  EKG Interpretation    Rate:  89 BPM  Rhythm:  Normal Sinus Rhythm   Axis:  Normal   Intervals:  Incomplete left bundle branch block QTc  502 ms  Q waves:  No pathologic Q waves   T waves:  Slightly Flattened in lateral leads  ST segments:  No significant elevations or depressions     Impression:  Normal sinus rhythm with incomplete left bundle-branch block, likely LVH, slightly flattened T-waves in lateral leads but without evidence of acute ischemia or significant arrhythmia      EKG for comparison:  None available    EKG interpreted by me         Labs:    Results Reviewed     Procedure Component Value Units Date/Time    [facility] [[id number]]     Lab Status:  No result Specimen:  Nares from Nose     Comprehensive metabolic panel [[id number]]  (Abnormal) Collected:  09/21/20 1724    Lab Status:  Final result Specimen:  Blood from Arm, Left Updated:  09/21/20 1752     Sodium 142 mmol/L      Potassium 3 4 mmol/L      Chloride 106 mmol/L      CO2 26 mmol/L      ANION GAP 10 mmol/L      BUN 9 mg/dL      Creatinine 0 76 mg/dL      Glucose 100 mg/dL      Calcium 9 4 mg/dL      AST 26 U/L      ALT 23 U/L      Alkaline Phosphatase 48 9 U/L      Total Protein 7 3 g/dL      Albumin 4 5 g/dL      Total Bilirubin 0 52 mg/dL      eGFR 106 ml/min/1 73sq m     Narrative:       [place] guidelines for Chronic Kidney Disease (CKD):     Stage 1 with normal or high GFR (GFR > 90 mL/min/1 73 square meters)    Stage 2 Mild CKD (GFR = 60-89 mL/min/1 73 square meters)    Stage 3A Moderate CKD (GFR = 45-59 mL/min/1 73 square meters)    Stage 3B Moderate CKD (GFR = 30-44 mL/min/1 73 square meters)    Stage 4 Severe CKD (GFR = 15-29 mL/min/1 73 square meters)    Stage 5 End Stage CKD (GFR <15 mL/min/1 73 square meters)  Note: GFR calculation is accurate only with a steady state creatinine    Ethanol [[id number]]  (Abnormal) Collected:  09/21/20 1724    Lab Status:  Final result Specimen:  Blood from Arm, Left Updated:  09/21/20 1752     Ethanol Lvl 275 2 mg/dL     Lipase [[id number]]  (Normal) Collected:  09/21/20 1724    Lab Status:  Final result Specimen:  Blood from Arm, Left Updated:  09/21/20 1752     Lipase 21 u/L     Troponin I [[id number]]  (Normal) Collected:  09/21/20 1724    Lab Status:  Final result Specimen:  Blood from Arm, Left Updated:  09/21/20 1752     Troponin I <0 03 ng/mL     APTT [[id number]]  (Normal) Collected:  09/21/20 1724    Lab Status:  Final result Specimen:  Blood from Arm, Left Updated:  09/21/20 1745     PTT 29 seconds     Protime-INR [[id number]]  (Normal) Collected:  09/21/20 1724    Lab Status:  Final result Specimen:  Blood from Arm, Left Updated:  09/21/20 1745     Protime 10 5 seconds      INR 0 99    Narrative:       INR Reference Ranges:  No Anticoagulant, Normal:           0 9-1 1  Standard Dose, Oral Anticoagulant:  2 0-3 0  High Dose, Oral Anticoagulant:      2 5-3 5    CBC and differential [[id number]]  (Abnormal) Collected:  09/21/20 1724    Lab Status:  Final result Specimen:  Blood from Arm, Left Updated:  09/21/20 1733     WBC 6 76 Thousand/uL      RBC 5 31 Million/uL      Hemoglobin 17 4 g/dL      Hematocrit 50 2 %      MCV 95 fL      MCH 32 8 pg      MCHC 34 7 g/dL      RDW 13 7 %      MPV 8 7 fL      Platelets 203 Thousands/uL      Neutrophils Relative 50 %      Immat GRANS % 0 %      Lymphocytes Relative 39 %      Monocytes Relative 7 %      Eosinophils Relative 3 %      Basophils Relative 1 %      Neutrophils Absolute 3 37 Thousands/µL      Immature Grans Absolute 0 02 Thousand/uL      Lymphocytes Absolute 2 64 Thousands/µL      Monocytes Absolute 0 46 Thousand/µL      Eosinophils Absolute 0 20 Thousand/µL      Basophils Absolute 0 07 Thousands/µL           All labs reviewed and utilized in the medical decision making process    Radiology:    XR chest 1 view portable   ED Interpretation   No acute cardiopulmonary process or osseous abnormality                Final Result No acute cardiopulmonary disease  Workstation performed: [id number]             All radiology studies independently viewed by me and interpreted by the radiologist     Procedure    Procedures        ED Course of Care and Re-Assessments      Given vitamins as well as volume resuscitation emergency department  After crisis consultation, HOST team contacted and bed search started for alcohol rehabilitation  On reassessment, patient is sleeping peacefully    On further reassessment, patient again sleeping peacefully, awakened easily to verbal stimulus, requesting more food  Medications   thiamine (VITAMIN B1) tablet 100 mg (has no administration in time range)   folic acid (FOLVITE) tablet 1 mg (has no administration in time range)   sodium chloride 0 9 % bolus 1,000 mL (0 mL Intravenous Stopped 9/21/20 1946)   aspirin chewable tablet 324 mg (324 mg Oral Given 9/21/20 1720)   ipratropium-albuterol (DUO-NEB) 0 5-2 5 mg/3 mL inhalation solution 3 mL (3 mL Nebulization Given 9/21/20 1725)   multivitamin-minerals (CENTRUM) tablet 1 tablet (1 tablet Oral Given 9/21/20 1750)           FINAL IMPRESSION    Final diagnoses:   Alcohol abuse   Chest pain, unspecified type         DISPOSITION/PLAN    Alcohol abuse intoxication as well as chest pain as above  Chest pain very atypical for ACS, especially with improvement with ambulation and exertion rather than worsening  Perc negative, low suspicion for PE  Workup emergency department overall reassuring  Overall, I suspect patient's symptoms last night were due to alcohol withdrawal based on timing  After initial treatment resuscitation emergency department, HOST team contacted and patient awaiting placement at appropriate alcohol rehabilitation facility    Signed out to Dr Maxi Fay at time of shift change, hemodynamically stable comfortable at time, sleeping peacefully but easily arousable to verbal   Time reflects when diagnosis was documented in both MDM as applicable and the Disposition within this note     Time User Action Codes Description Comment    9/21/2020  8:14 PM Jelly Fails Add [F10 10] Alcohol abuse     9/21/2020  8:14 PM Dorathy Overcast T Add [R07 9] Chest pain, unspecified type       ED Disposition     None      Follow-up Information    None           PATIENT REFERRED TO:    No follow-up provider specified  DISCHARGE MEDICATIONS:    Patient's Medications   Discharge Prescriptions    No medications on file       No discharge procedures on file           MD Zach Hermosillo MD  09/21/20 2030

## 2020-09-22 ENCOUNTER — HOSPITAL ENCOUNTER (INPATIENT)
Facility: HOSPITAL | Age: 50
LOS: 2 days | Discharge: HOME | DRG: 203 | End: 2020-09-24
Attending: INTERNAL MEDICINE | Admitting: FAMILY MEDICINE
Payer: COMMERCIAL

## 2020-09-22 ENCOUNTER — APPOINTMENT (INPATIENT)
Dept: NON INVASIVE DIAGNOSTICS | Facility: HOSPITAL | Age: 50
DRG: 203 | End: 2020-09-22
Payer: COMMERCIAL

## 2020-09-22 VITALS
DIASTOLIC BLOOD PRESSURE: 61 MMHG | SYSTOLIC BLOOD PRESSURE: 115 MMHG | OXYGEN SATURATION: 98 % | WEIGHT: 222 LBS | TEMPERATURE: 98 F | HEART RATE: 87 BPM | BODY MASS INDEX: 30.07 KG/M2 | HEIGHT: 72 IN | RESPIRATION RATE: 16 BRPM

## 2020-09-22 DIAGNOSIS — R07.9 CHEST PAIN: Primary | ICD-10-CM

## 2020-09-22 DIAGNOSIS — F31.81 BIPOLAR 2 DISORDER (HCC): Chronic | ICD-10-CM

## 2020-09-22 DIAGNOSIS — F10.20 ALCOHOL USE DISORDER, SEVERE, DEPENDENCE (HCC): ICD-10-CM

## 2020-09-22 DIAGNOSIS — R39.15 URINARY URGENCY: ICD-10-CM

## 2020-09-22 DIAGNOSIS — I10 ESSENTIAL HYPERTENSION: ICD-10-CM

## 2020-09-22 LAB
ALBUMIN SERPL BCP-MCNC: 3.6 G/DL (ref 3.4–4.8)
ALP SERPL-CCNC: 37.9 U/L (ref 10–129)
ALT SERPL W P-5'-P-CCNC: 19 U/L (ref 5–63)
AMPHETAMINES SERPL QL SCN: NEGATIVE
ANION GAP SERPL CALCULATED.3IONS-SCNC: 6 MMOL/L (ref 4–13)
AST SERPL W P-5'-P-CCNC: 19 U/L (ref 15–41)
ATRIAL RATE: 75 BPM
ATRIAL RATE: 78 BPM
ATRIAL RATE: 81 BPM
ATRIAL RATE: 85 BPM
BARBITURATES UR QL: NEGATIVE
BASOPHILS # BLD AUTO: 0.03 THOUSANDS/ΜL (ref 0–0.1)
BASOPHILS NFR BLD AUTO: 1 % (ref 0–1)
BENZODIAZ UR QL: NEGATIVE
BILIRUB SERPL-MCNC: 0.46 MG/DL (ref 0.3–1.2)
BUN SERPL-MCNC: 12 MG/DL (ref 6–20)
CALCIUM SERPL-MCNC: 8.9 MG/DL (ref 8.4–10.2)
CHLORIDE SERPL-SCNC: 107 MMOL/L (ref 96–108)
CO2 SERPL-SCNC: 27 MMOL/L (ref 22–33)
COCAINE UR QL: NEGATIVE
CREAT SERPL-MCNC: 0.78 MG/DL (ref 0.5–1.2)
D DIMER PPP FEU-MCNC: 0.65 UG/ML FEU
EOSINOPHIL # BLD AUTO: 0.21 THOUSAND/ΜL (ref 0–0.61)
EOSINOPHIL NFR BLD AUTO: 4 % (ref 0–6)
ERYTHROCYTE [DISTWIDTH] IN BLOOD BY AUTOMATED COUNT: 14 % (ref 11.6–15.1)
EST. AVERAGE GLUCOSE BLD GHB EST-MCNC: 117 MG/DL
GFR SERPL CREATININE-BSD FRML MDRD: 105 ML/MIN/1.73SQ M
GLUCOSE SERPL-MCNC: 107 MG/DL (ref 65–140)
HBA1C MFR BLD: 5.7 %
HCT VFR BLD AUTO: 46.2 % (ref 36.5–49.3)
HGB BLD-MCNC: 15.7 G/DL (ref 12–17)
IMM GRANULOCYTES # BLD AUTO: 0.01 THOUSAND/UL (ref 0–0.2)
IMM GRANULOCYTES NFR BLD AUTO: 0 % (ref 0–2)
LYMPHOCYTES # BLD AUTO: 2.18 THOUSANDS/ΜL (ref 0.6–4.47)
LYMPHOCYTES NFR BLD AUTO: 41 % (ref 14–44)
MAGNESIUM SERPL-MCNC: 2.4 MG/DL (ref 1.6–2.6)
MCH RBC QN AUTO: 33.1 PG (ref 26.8–34.3)
MCHC RBC AUTO-ENTMCNC: 34 G/DL (ref 31.4–37.4)
MCV RBC AUTO: 98 FL (ref 82–98)
METHADONE UR QL: NEGATIVE
MONOCYTES # BLD AUTO: 0.42 THOUSAND/ΜL (ref 0.17–1.22)
MONOCYTES NFR BLD AUTO: 8 % (ref 4–12)
NEUTROPHILS # BLD AUTO: 2.47 THOUSANDS/ΜL (ref 1.85–7.62)
NEUTS SEG NFR BLD AUTO: 46 % (ref 43–75)
OPIATES UR QL SCN: NEGATIVE
OXYCODONE+OXYMORPHONE UR QL SCN: NEGATIVE
P AXIS: 64 DEGREES
P AXIS: 67 DEGREES
P AXIS: 68 DEGREES
P AXIS: 68 DEGREES
PCP UR QL: NEGATIVE
PHOSPHATE SERPL-MCNC: 3.3 MG/DL (ref 2.5–5)
PLATELET # BLD AUTO: 289 THOUSANDS/UL (ref 149–390)
PLATELET # BLD AUTO: 315 THOUSANDS/UL (ref 149–390)
PMV BLD AUTO: 8.3 FL (ref 8.9–12.7)
PMV BLD AUTO: 9.1 FL (ref 8.9–12.7)
POTASSIUM SERPL-SCNC: 3.6 MMOL/L (ref 3.5–5)
PR INTERVAL: 156 MS
PR INTERVAL: 160 MS
PR INTERVAL: 160 MS
PR INTERVAL: 165 MS
PROT SERPL-MCNC: 5.9 G/DL (ref 6.4–8.3)
QRS AXIS: -61 DEGREES
QRS AXIS: -68 DEGREES
QRS AXIS: -68 DEGREES
QRS AXIS: -72 DEGREES
QRSD INTERVAL: 105 MS
QRSD INTERVAL: 110 MS
QRSD INTERVAL: 113 MS
QRSD INTERVAL: 120 MS
QT INTERVAL: 366 MS
QT INTERVAL: 386 MS
QT INTERVAL: 390 MS
QT INTERVAL: 406 MS
QTC INTERVAL: 431 MS
QTC INTERVAL: 433 MS
QTC INTERVAL: 450 MS
QTC INTERVAL: 463 MS
RBC # BLD AUTO: 4.74 MILLION/UL (ref 3.88–5.62)
SODIUM SERPL-SCNC: 140 MMOL/L (ref 133–145)
T WAVE AXIS: -57 DEGREES
T WAVE AXIS: -73 DEGREES
T WAVE AXIS: -75 DEGREES
T WAVE AXIS: 268 DEGREES
THC UR QL: POSITIVE
TROPONIN I SERPL-MCNC: <0.02 NG/ML
TROPONIN I SERPL-MCNC: <0.02 NG/ML
TROPONIN I SERPL-MCNC: <0.03 NG/ML (ref 0–0.07)
TROPONIN I SERPL-MCNC: <0.03 NG/ML (ref 0–0.07)
VENTRICULAR RATE: 75 BPM
VENTRICULAR RATE: 78 BPM
VENTRICULAR RATE: 80 BPM
VENTRICULAR RATE: 84 BPM
WBC # BLD AUTO: 5.32 THOUSAND/UL (ref 4.31–10.16)

## 2020-09-22 PROCEDURE — 99222 1ST HOSP IP/OBS MODERATE 55: CPT | Performed by: PSYCHIATRY & NEUROLOGY

## 2020-09-22 PROCEDURE — 84484 ASSAY OF TROPONIN QUANT: CPT | Performed by: INTERNAL MEDICINE

## 2020-09-22 PROCEDURE — 93005 ELECTROCARDIOGRAM TRACING: CPT

## 2020-09-22 PROCEDURE — 93010 ELECTROCARDIOGRAM REPORT: CPT | Performed by: INTERNAL MEDICINE

## 2020-09-22 PROCEDURE — 85025 COMPLETE CBC W/AUTO DIFF WBC: CPT | Performed by: INTERNAL MEDICINE

## 2020-09-22 PROCEDURE — 99222 1ST HOSP IP/OBS MODERATE 55: CPT | Performed by: INTERNAL MEDICINE

## 2020-09-22 PROCEDURE — 80053 COMPREHEN METABOLIC PANEL: CPT | Performed by: INTERNAL MEDICINE

## 2020-09-22 PROCEDURE — G0379 DIRECT REFER HOSPITAL OBSERV: HCPCS

## 2020-09-22 PROCEDURE — C9113 INJ PANTOPRAZOLE SODIUM, VIA: HCPCS | Performed by: INTERNAL MEDICINE

## 2020-09-22 PROCEDURE — 84484 ASSAY OF TROPONIN QUANT: CPT | Performed by: FAMILY MEDICINE

## 2020-09-22 PROCEDURE — 80307 DRUG TEST PRSMV CHEM ANLYZR: CPT | Performed by: INTERNAL MEDICINE

## 2020-09-22 PROCEDURE — 85379 FIBRIN DEGRADATION QUANT: CPT | Performed by: FAMILY MEDICINE

## 2020-09-22 PROCEDURE — 94762 N-INVAS EAR/PLS OXIMTRY CONT: CPT

## 2020-09-22 PROCEDURE — 99220 PR INITIAL OBSERVATION CARE/DAY 70 MINUTES: CPT | Performed by: FAMILY MEDICINE

## 2020-09-22 PROCEDURE — 93306 TTE W/DOPPLER COMPLETE: CPT

## 2020-09-22 PROCEDURE — 94760 N-INVAS EAR/PLS OXIMETRY 1: CPT

## 2020-09-22 PROCEDURE — 85049 AUTOMATED PLATELET COUNT: CPT | Performed by: FAMILY MEDICINE

## 2020-09-22 RX ORDER — ACETAMINOPHEN 325 MG/1
650 TABLET ORAL EVERY 6 HOURS PRN
Status: DISCONTINUED | OUTPATIENT
Start: 2020-09-22 | End: 2020-09-24 | Stop reason: HOSPADM

## 2020-09-22 RX ORDER — GABAPENTIN 300 MG/1
300 CAPSULE ORAL 3 TIMES DAILY
Status: DISCONTINUED | OUTPATIENT
Start: 2020-09-22 | End: 2020-09-24 | Stop reason: HOSPADM

## 2020-09-22 RX ORDER — NITROGLYCERIN 0.4 MG/1
0.4 TABLET SUBLINGUAL
Qty: 30 TABLET | Refills: 0 | Status: SHIPPED | OUTPATIENT
Start: 2020-09-22 | End: 2021-05-16 | Stop reason: HOSPADM

## 2020-09-22 RX ORDER — FOLIC ACID 1 MG/1
1 TABLET ORAL DAILY
Status: DISCONTINUED | OUTPATIENT
Start: 2020-09-22 | End: 2020-09-24 | Stop reason: HOSPADM

## 2020-09-22 RX ORDER — FOLIC ACID 1 MG/1
1 TABLET ORAL DAILY
Qty: 30 TABLET | Refills: 0 | Status: SHIPPED | OUTPATIENT
Start: 2020-09-22

## 2020-09-22 RX ORDER — TRAMADOL HYDROCHLORIDE 50 MG/1
50 TABLET ORAL EVERY 6 HOURS PRN
Status: DISCONTINUED | OUTPATIENT
Start: 2020-09-22 | End: 2020-09-24 | Stop reason: HOSPADM

## 2020-09-22 RX ORDER — NICOTINE 21 MG/24HR
1 PATCH, TRANSDERMAL 24 HOURS TRANSDERMAL DAILY
Status: DISCONTINUED | OUTPATIENT
Start: 2020-09-22 | End: 2020-09-24 | Stop reason: HOSPADM

## 2020-09-22 RX ORDER — POTASSIUM CHLORIDE 20 MEQ/1
20 TABLET, EXTENDED RELEASE ORAL DAILY
Status: DISCONTINUED | OUTPATIENT
Start: 2020-09-22 | End: 2020-09-24 | Stop reason: HOSPADM

## 2020-09-22 RX ORDER — PANTOPRAZOLE SODIUM 40 MG/1
40 TABLET, DELAYED RELEASE ORAL
Status: DISCONTINUED | OUTPATIENT
Start: 2020-09-22 | End: 2020-09-24 | Stop reason: HOSPADM

## 2020-09-22 RX ORDER — NITROGLYCERIN 0.4 MG/1
0.4 TABLET SUBLINGUAL
Status: DISCONTINUED | OUTPATIENT
Start: 2020-09-22 | End: 2020-09-24 | Stop reason: HOSPADM

## 2020-09-22 RX ORDER — NICOTINE 21 MG/24HR
1 PATCH, TRANSDERMAL 24 HOURS TRANSDERMAL DAILY
Qty: 28 PATCH | Refills: 0 | Status: SHIPPED | OUTPATIENT
Start: 2020-09-22 | End: 2020-10-09

## 2020-09-22 RX ORDER — SUCRALFATE 1 G/1
1 TABLET ORAL EVERY 6 HOURS SCHEDULED
Status: DISCONTINUED | OUTPATIENT
Start: 2020-09-22 | End: 2020-09-24 | Stop reason: HOSPADM

## 2020-09-22 RX ORDER — THIAMINE MONONITRATE (VIT B1) 100 MG
100 TABLET ORAL DAILY
Status: DISCONTINUED | OUTPATIENT
Start: 2020-09-22 | End: 2020-09-24 | Stop reason: HOSPADM

## 2020-09-22 RX ORDER — NITROGLYCERIN 0.4 MG/1
0.4 TABLET SUBLINGUAL
Status: DISCONTINUED | OUTPATIENT
Start: 2020-09-22 | End: 2020-09-22 | Stop reason: HOSPADM

## 2020-09-22 RX ORDER — PANTOPRAZOLE SODIUM 40 MG/1
40 TABLET, DELAYED RELEASE ORAL
Status: DISCONTINUED | OUTPATIENT
Start: 2020-09-22 | End: 2020-09-22

## 2020-09-22 RX ORDER — ASPIRIN 81 MG/1
81 TABLET, CHEWABLE ORAL DAILY
Status: DISCONTINUED | OUTPATIENT
Start: 2020-09-22 | End: 2020-09-24 | Stop reason: HOSPADM

## 2020-09-22 RX ORDER — ONDANSETRON 2 MG/ML
4 INJECTION INTRAMUSCULAR; INTRAVENOUS EVERY 6 HOURS PRN
Status: DISCONTINUED | OUTPATIENT
Start: 2020-09-22 | End: 2020-09-24 | Stop reason: HOSPADM

## 2020-09-22 RX ORDER — HYDRALAZINE HYDROCHLORIDE 25 MG/1
25 TABLET, FILM COATED ORAL EVERY 8 HOURS SCHEDULED
Status: DISCONTINUED | OUTPATIENT
Start: 2020-09-22 | End: 2020-09-23

## 2020-09-22 RX ORDER — TRAZODONE HYDROCHLORIDE 100 MG/1
100 TABLET ORAL
Status: DISCONTINUED | OUTPATIENT
Start: 2020-09-22 | End: 2020-09-24 | Stop reason: HOSPADM

## 2020-09-22 RX ORDER — LANOLIN ALCOHOL/MO/W.PET/CERES
100 CREAM (GRAM) TOPICAL DAILY
Qty: 30 TABLET | Refills: 0 | Status: SHIPPED | OUTPATIENT
Start: 2020-09-22

## 2020-09-22 RX ORDER — POTASSIUM CHLORIDE 20 MEQ/1
20 TABLET, EXTENDED RELEASE ORAL DAILY
Qty: 30 TABLET | Refills: 0 | Status: SHIPPED | OUTPATIENT
Start: 2020-09-22 | End: 2021-05-16 | Stop reason: HOSPADM

## 2020-09-22 RX ORDER — NALTREXONE HYDROCHLORIDE 50 MG/1
50 TABLET, FILM COATED ORAL DAILY
Status: DISCONTINUED | OUTPATIENT
Start: 2020-09-22 | End: 2020-09-24 | Stop reason: HOSPADM

## 2020-09-22 RX ADMIN — SERTRALINE HYDROCHLORIDE 50 MG: 50 TABLET ORAL at 10:32

## 2020-09-22 RX ADMIN — POTASSIUM CHLORIDE 20 MEQ: 1500 TABLET, EXTENDED RELEASE ORAL at 10:33

## 2020-09-22 RX ADMIN — PANTOPRAZOLE SODIUM 40 MG: 40 TABLET, DELAYED RELEASE ORAL at 15:46

## 2020-09-22 RX ADMIN — NALTREXONE HYDROCHLORIDE 50 MG: 50 TABLET, FILM COATED ORAL at 11:54

## 2020-09-22 RX ADMIN — SUCRALFATE 1 G: 1 TABLET ORAL at 11:54

## 2020-09-22 RX ADMIN — HYDRALAZINE HYDROCHLORIDE 25 MG: 25 TABLET, FILM COATED ORAL at 22:38

## 2020-09-22 RX ADMIN — KETOROLAC TROMETHAMINE 15 MG: 30 INJECTION, SOLUTION INTRAMUSCULAR at 00:42

## 2020-09-22 RX ADMIN — GABAPENTIN 300 MG: 300 CAPSULE ORAL at 15:46

## 2020-09-22 RX ADMIN — SUCRALFATE 1 G: 1 TABLET ORAL at 17:49

## 2020-09-22 RX ADMIN — FOLIC ACID 1 MG: 1 TABLET ORAL at 10:32

## 2020-09-22 RX ADMIN — CYANOCOBALAMIN TAB 500 MCG 500 MCG: 500 TAB at 10:31

## 2020-09-22 RX ADMIN — PANTOPRAZOLE SODIUM 40 MG: 40 INJECTION, POWDER, FOR SOLUTION INTRAVENOUS at 00:41

## 2020-09-22 RX ADMIN — NICOTINE 1 PATCH: 14 PATCH TRANSDERMAL at 10:35

## 2020-09-22 RX ADMIN — ASPIRIN 81 MG CHEWABLE TABLET 81 MG: 81 TABLET CHEWABLE at 15:46

## 2020-09-22 RX ADMIN — NITROGLYCERIN 0.4 MG: 0.4 TABLET, ORALLY DISINTEGRATING SUBLINGUAL at 02:49

## 2020-09-22 RX ADMIN — HYDRALAZINE HYDROCHLORIDE 25 MG: 25 TABLET, FILM COATED ORAL at 17:49

## 2020-09-22 RX ADMIN — GABAPENTIN 300 MG: 300 CAPSULE ORAL at 22:38

## 2020-09-22 RX ADMIN — ACETAMINOPHEN 650 MG: 325 TABLET, FILM COATED ORAL at 10:29

## 2020-09-22 RX ADMIN — THIAMINE HCL TAB 100 MG 100 MG: 100 TAB at 10:31

## 2020-09-22 RX ADMIN — GABAPENTIN 300 MG: 300 CAPSULE ORAL at 10:31

## 2020-09-22 NOTE — ED NOTES
Rcvd a call from HOST, spoke to Mohan, pt will need a higher level of care due to his lung condition  At this time there are no beds available  HOST will f/u with pt tomorrow if he gets discharged   MD mayur Valenzuela RN  09/21/20 47 Denny Monique RN  09/21/20 8102

## 2020-09-22 NOTE — ASSESSMENT & PLAN NOTE
Has been drinking since age of 23  Drinks 2 pints of vodka everyday  Also smokes marijuana occasionally, denies any usage of street drugs   Multiple rehab admissions in the past for detox    Today presented with alcohol intoxication  Wants to go to rehab/ awaiting placement  Ethanol level- 273  Normal LFT's , hypokalemia    1 L of N/S given in ED  CIWA protocol  Seizure precautions  Aspiration precautions  Monitor vitals  Ativan p r n  Will give thiamine, folic acid and multivitamin  Maintain quiet environment  Check phosphorus and magnesium  Check serum lipase levels

## 2020-09-22 NOTE — H&P
H&P- [place] 1970, 47 y o  male MRN: [id number]    Unit/Bed#: -01 Encounter: [id number]    Primary Care Provider: Edna Quintainlla DO   Date and time admitted to hospital: 9/21/2020  4:56 PM        Chest pain  Assessment & Plan  Presented with left sided stabbing chest pain  Sudden onset, radiating to left arm  Assoc with sweating  Had nausea and 4-5 episodes of watery vomiting in the morning  Gives inconsistent hx and states that he also has a coffee ground emesis  Denies blood in stools    Troponin- neg, EKG- sinus rhythm  CXR- no acute cardiopulmonary disease    Will trend troponin  Will repeat EKG in the morning  Will hold lovenox for now  FOBT follow  Observe  Monitor vitals    * Alcohol use disorder, severe, dependence ([facility] )  Assessment & Plan  Has been drinking since age of 23  Drinks 2 pints of vodka everyday  Also smokes marijuana occasionally, denies any usage of street drugs   Multiple rehab admissions in the past for detox    Today presented with alcohol intoxication  Wants to go to rehab/ awaiting placement  Ethanol level- 273  Normal LFT's , hypokalemia    1 L of N/S given in ED  CIWA protocol  Seizure precautions  Aspiration precautions  Monitor vitals  Ativan p r n  Will give thiamine, folic acid and multivitamin  Maintain quiet environment  Check phosphorus and magnesium  Check serum lipase levels        Hypokalemia  Assessment & Plan  Potassium- 3 4    EKG- NORMAL sinus  Supplement potassium per protocol- 20 meq tablet PO everyday    Check electrolytes in the morning    Bipolar 2 disorder (HCC) and MDD   Assessment & Plan  Prolong history of bipolar disorder and MDD  Hx of prior suicidal ideation  Currently no SI/ HI     On gabapentin 300 mg x TID   Trazodone 100 mg x HS  SERTRALINE 50 MG DAILY    Inpatient psych consult appreciated  Continue home meds for now    Tobacco abuse  Assessment & Plan  Smokes more than half a pack everyday since age 26    Smoking cessation education  Nicotine patch    VTE Prophylaxis: Enoxaparin (Lovenox)  / sequential compression device   Code Status: full  POLST: There is no POLST form on file for this patient (pre-hospital)  Discussion with family: no    Anticipated Length of Stay:  Patient will be admitted on an Observation basis with an anticipated length of stay of   2 midnights  Justification for Hospital Stay alcohol intoxication/chest pain    Total Time for Visit, including Counseling / Coordination of Care: 45 minutes  Greater than 50% of this total time spent on direct patient counseling and coordination of care  Chief Complaint:   Alcohol intoxication/ chest pain    History of Present Illness:    Jessi Maya is a 47 y o  male with past medical history significant for alcohol abuse disorder, tobacco addiction, MDD, bipolar type 2 presented in the ED today with alcohol intoxication with EMS associated with left-sided chest pain starting this evening  As per patient, patient has multiple admissions to the alcohol rehab for detoxification  He usually drinks 2 pt of vodka every day  Patient describes his chest pain as throbbing in nature, 7 x 10 in intensity, radiating to the left arm, associated with sweating, nausea, and proceeded by 4-5 episodes of watery vomiting in the morning  Patient also endorses 4-5 episodes of loose stools in the morning  However patient states that every time he tries to stop drinking alcohol he has vomiting/diarrhea  Patient wants to enroll into alcohol rehabilitation for detoxification and is awaiting placement  Patient denies any urinary complaints, sick contacts, changes in appetite or weight, shortness of breath cough  In the ED patient got 1 L of normal saline IV infusion, injection Ativan, thiamine and folic acid along with nebulization with DuoNebs    Lab work in the ED revealed ethanol level of 273 with potassium 3 4  1st set of troponin negative EKG sinus rhythm chest x-ray no acute cardiopulmonary disease    Review of Systems:    Review of Systems   Constitutional: Positive for activity change  Negative for chills, fatigue and unexpected weight change  Respiratory: Positive for chest tightness  Negative for shortness of breath  Cardiovascular: Positive for chest pain  Negative for leg swelling  Gastrointestinal: Positive for vomiting  Negative for abdominal pain  Musculoskeletal: Negative for back pain  Neurological: Positive for syncope and headaches  Negative for dizziness, facial asymmetry and weakness  Psychiatric/Behavioral: Negative for agitation, behavioral problems, confusion, decreased concentration and hallucinations  Past Medical and Surgical History:     Past Medical History:   Diagnosis Date    Addiction to drug ([place] [address] )     Alcohol abuse     Alcoholism ([place] [address] )     Anxiety     Arthritis     Bipolar disorder ([place] [address] )     Depression     Lung disease     31+ yr smoking hx    Sleep difficulties     Substance abuse ([place] [address] )     Tobacco abuse        Past Surgical History:   Procedure Laterality Date    HERNIA REPAIR  03/2018    Ing      MASTECTOMY  03/2018    subcutaneous per Sibley Mares    WISDOM TOOTH EXTRACTION         Meds/Allergies:    Prior to Admission medications    Medication Sig Start Date End Date Taking? Authorizing Provider   gabapentin (NEURONTIN) 300 mg capsule Take 1 capsule (300 mg total) by mouth 3 (three) times a day 8/11/20 9/10/20  JEMAL Mcbride   naltrexone (REVIA) 50 mg tablet Take 1 tablet (50 mg total) by mouth daily 8/12/20 9/11/20  JEMAL Mcbride   sertraline (ZOLOFT) 50 mg tablet Take 1 tablet (50 mg total) by mouth daily 8/12/20 9/11/20  JEMAL Mcbride   traZODone (DESYREL) 100 mg tablet Take 1 tablet (100 mg total) by mouth daily at bedtime as needed for sleep 8/11/20 9/10/20  JEMAL Blanc     I have reviewed home medications with patient personally      Allergies: No Known Allergies    Social History:     Marital Status: Single   Substance Use History:   Social History     Substance and Sexual Activity   Alcohol Use Yes    Frequency: 4 or more times a week    Drinks per session: 10 or more    Binge frequency: Daily or almost daily    Comment: 1 bottle of vodka daily     Social History     Tobacco Use   Smoking Status Current Every Day Smoker    Packs/day: 0 25    Years: 31 00    Pack years: 7 75    Types: Cigarettes   Smokeless Tobacco Never Used   Tobacco Comment    1 PPD per [place]     Social History     Substance and Sexual Activity   Drug Use Yes    Types: Marijuana       Family History:    Family History   Problem Relation Age of Onset    Heart disease Mother     Stroke Mother     Stroke Father        Physical Exam:     Vitals:   Blood Pressure: 123/87 (09/21/20 2300)  Pulse: 85 (09/21/20 2300)  Temperature: 97 7 °F (36 5 °C) (09/21/20 2300)  Temp Source: Tympanic (09/21/20 2300)  Respirations: 18 (09/21/20 2300)  Height: 6' (182 9 cm) (09/21/20 1700)  Weight - Scale: 101 kg (222 lb) (09/21/20 1700)  SpO2: 97 % (09/21/20 2300)    Physical Exam  HENT:      Mouth/Throat:      Mouth: Mucous membranes are moist    Eyes:      Conjunctiva/sclera: Conjunctivae normal       Pupils: Pupils are equal, round, and reactive to light  Cardiovascular:      Rate and Rhythm: Normal rate and regular rhythm  Heart sounds: Normal heart sounds  Pulmonary:      Breath sounds: Normal breath sounds  Abdominal:      General: Bowel sounds are normal       Palpations: Abdomen is soft  Tenderness: There is no abdominal tenderness  Skin:     General: Skin is warm and dry  Neurological:      Mental Status: He is alert and oriented to person, place, and time  Additional Data:     Lab Results: I have personally reviewed pertinent reports        Results from last 7 days   Lab Units 09/21/20  1724   WBC Thousand/uL 6 76   HEMOGLOBIN g/dL 17 4*   HEMATOCRIT % 50 2*   PLATELETS Thousands/uL 348   NEUTROS PCT % 50   LYMPHS PCT % 39   MONOS PCT % 7   EOS PCT % 3     Results from last 7 days   Lab Units 09/21/20  1724   SODIUM mmol/L 142   POTASSIUM mmol/L 3 4*   CHLORIDE mmol/L 106   CO2 mmol/L 26   BUN mg/dL 9   CREATININE mg/dL 0 76   ANION GAP mmol/L 10   CALCIUM mg/dL 9 4   ALBUMIN g/dL 4 5   TOTAL BILIRUBIN mg/dL 0 52   ALK PHOS U/L 48 9   ALT U/L 23   AST U/L 26   GLUCOSE RANDOM mg/dL 100     Results from last 7 days   Lab Units 09/21/20  1724   INR  0 99                   Imaging: I have personally reviewed pertinent reports  XR chest 1 view portable   ED Interpretation by Anand Arndt MD (09/21 0996)   No acute cardiopulmonary process or osseous abnormality  Final Result by Annie Lobo MD (09/21 4633)      No acute cardiopulmonary disease  Workstation performed: [id number]             EKG, Pathology, and Other Studies Reviewed on Admission:   · EKG: sinus rhythm    Allscripts / Epic Records Reviewed: Yes     ** Please Note: This note has been constructed using a voice recognition system   **

## 2020-09-22 NOTE — UTILIZATION REVIEW
Initial Clinical Review    Admission: Date/Time/Statement:   Admission Orders (From admission, onward)     Ordered        09/21/20 2216  Place in Observation (expected length of stay for this patient is less than two midnights)  Once                Orders Placed This Encounter   Procedures    Place in Observation (expected length of stay for this patient is less than two midnights)     Standing Status:   Standing     Number of Occurrences:   1     Order Specific Question:   Admitting Physician     Answer:   Georgia Acharya     Order Specific Question:   Level of Care     Answer:   Med Surg [16]     ED Arrival Information     Expected Arrival Acuity Means of Arrival Escorted By Service Admission Type    - 9/21/2020 16:52 Emergent Walk-In Self Hospitalist Emergency    Arrival Complaint    Chest Pain     Chief Complaint   Patient presents with    Chest Pain     Started last night around midnight, left anterior chest pain describes as tightness  No meds taken  "I'm a severe alcoholic and i'm trying to get to a facility"  Patient has had 2 pints of vodka   Alcohol Problem     History of Present Illness:   Taras Coombs is a 47 y o  male with past medical history significant for alcohol abuse disorder, tobacco addiction, MDD, bipolar type 2 presented in the ED today with alcohol intoxication with EMS associated with left-sided chest pain starting this evening  As per patient, patient has multiple admissions to the alcohol rehab for detoxification  He usually drinks 2 pt of vodka every day  Patient describes his chest pain as throbbing in nature, 7 x 10 in intensity, radiating to the left arm, associated with sweating, nausea, and proceeded by 4-5 episodes of watery vomiting in the morning  Patient also endorses 4-5 episodes of loose stools in the morning  However patient states that every time he tries to stop drinking alcohol he has vomiting/diarrhea    Patient wants to enroll into alcohol rehabilitation for detoxification and is awaiting placement  Patient denies any urinary complaints, sick contacts, changes in appetite or weight, shortness of breath cough  In the ED patient got 1 L of normal saline IV infusion, injection Ativan, thiamine and folic acid along with nebulization with DuoNebs  Lab work in the ED revealed ethanol level of 273 with potassium 3 4  1st set of troponin negative EKG sinus rhythm chest x-ray no acute cardiopulmonary disease     Review of Systems:  Constitutional: Positive for activity change  Respiratory: Positive for chest tightness  Cardiovascular: Positive for chest pain     Gastrointestinal: Positive for vomiting       Neurological: Positive for syncope and headaches    Assessment/Plan:    Chest pain  Assessment & Plan  Presented with left sided stabbing chest pain  Sudden onset, radiating to left arm  Assoc with sweating  Had nausea and 4-5 episodes of watery vomiting in the morning  Gives inconsistent hx and states that he also has a coffee ground emesis  Denies blood in stools     Troponin- neg, EKG- sinus rhythm  CXR- no acute cardiopulmonary disease     Will trend troponin  Will repeat EKG in the morning  Will hold lovenox for now  FOBT follow  Observe  Monitor vitals     * Alcohol use disorder, severe, dependence (HCC)  Assessment & Plan  Has been drinking since age of 23  Drinks 2 pints of vodka everyday  Also smokes marijuana occasionally, denies any usage of street drugs   Multiple rehab admissions in the past for detox     Today presented with alcohol intoxication  Wants to go to rehab/ awaiting placement  Ethanol level- 273  Normal LFT's , hypokalemia     1 L of N/S given in ED  CIWA protocol  Seizure precautions  Aspiration precautions  Monitor vitals  Ativan p r n  Will give thiamine, folic acid and multivitamin  Maintain quiet environment  Check phosphorus and magnesium  Check serum lipase levels      Hypokalemia  Assessment & Plan  Potassium- 3 4     EKG- NORMAL sinus  Supplement potassium per protocol- 20 meq tablet PO everyday     Check electrolytes in the morning     VTE Prophylaxis: Enoxaparin (Lovenox)  / sequential compression device      Anticipated Length of Stay:  Patient will be admitted on an Observation basis with an anticipated length of stay of   2 midnights  Justification for Hospital Stay alcohol intoxication/chest pain    INT MED NOTE 9/22/20 AT 0144:  Still complains of chest pain, says that he has hx of GERD but this pain is different, it is pressing in nature with radiation to left arm, also endorses paresthesias in his left arm    2nd set of troponin- negative  Repeat EKG , showed t wave inversions in II III and AVF   3rd EKG in 1 hr- progression of t wave inversions in II III AVF , V5 and V6  Patient still c/o chest pain, mild to moderate      Patient was given aspirin at time of presentation  Followed by S/L nitro   I/V toradol inj     ED Triage Vitals   Temperature Pulse Respirations Blood Pressure SpO2   09/21/20 1700 09/21/20 1700 09/21/20 1700 09/21/20 1700 09/21/20 1700   99 1 °F (37 3 °C) 94 16 126/90 98 %      Temp Source Heart Rate Source Patient Position - Orthostatic VS BP Location FiO2 (%)   09/21/20 1700 09/21/20 1700 09/21/20 1700 09/21/20 1700 --   Tympanic Monitor Lying Right arm       Pain Score       09/21/20 2300       7          Wt Readings from Last 1 Encounters:   09/22/20 104 kg (230 lb)     Additional Vital Signs:   09/22/20 0412   --   87   --   115/61   --     09/22/20 0300   --   86   --   119/64   --     09/22/20 0159   98 °F (36 7 °C)   --   --   --   98 %     09/21/20 2300   97 7 °F (36 5 °C)   85   18   123/87   97 %       I/O 09/21 0701   09/22 0700    IV Piggyback  1000    Total Intake(mL/kg)  1000 (9 9)    Net  +1000      Pertinent Labs/Diagnostic Test Results:     Results from last 7 days   Lab Units 09/22/20  0554 09/21/20  1724   WBC Thousand/uL 5 32 6 76   HEMOGLOBIN g/dL 15 7 17 4* HEMATOCRIT % 46 2 50 2*   PLATELETS Thousands/uL 315 348   NEUTROS ABS Thousands/µL 2 47 3 37     Results from last 7 days   Lab Units 09/22/20  0554 09/21/20  1724   SODIUM mmol/L 140 142   POTASSIUM mmol/L 3 6 3 4*   CHLORIDE mmol/L 107 106   CO2 mmol/L 27 26   ANION GAP mmol/L 6 10   BUN mg/dL 12 9   CREATININE mg/dL 0 78 0 76   EGFR ml/min/1 73sq m 105 106   CALCIUM mg/dL 8 9 9 4   MAGNESIUM mg/dL  --  2 4   PHOSPHORUS mg/dL  --  3 3     Results from last 7 days   Lab Units 09/22/20  0554 09/21/20  1724   AST U/L 19 26   ALT U/L 19 23   ALK PHOS U/L 37 9 48 9   TOTAL PROTEIN g/dL 5 9* 7 3   ALBUMIN g/dL 3 6 4 5   TOTAL BILIRUBIN mg/dL 0 46 0 52     Results from last 7 days   Lab Units 09/22/20  0554 09/21/20  1724   GLUCOSE RANDOM mg/dL 107 100     Results from last 7 days   Lab Units 09/22/20  0554 09/22/20  0035 09/21/20  1724   TROPONIN I ng/mL <0 03 <0 03 <0 03     Results from last 7 days   Lab Units 09/21/20  1724   PROTIME seconds 10 5   INR  0 99   PTT seconds 29     Results from last 7 days   Lab Units 09/21/20  1724   LIPASE u/L 21     Results from last 7 days   Lab Units 09/22/20  0040   AMPH/METH  Negative   BARBITURATE UR  Negative   BENZODIAZEPINE UR  Negative   COCAINE UR  Negative   METHADONE URINE  Negative   OPIATE UR  Negative   PCP UR  Negative   THC UR  Positive*     Results from last 7 days   Lab Units 09/21/20  1724   ETHANOL LVL mg/dL 275 2*     EKG (Per Int Med note):   Initial EKG in ED - NSR  Repeat EKG , showed t wave inversions in II III and AVF   3rd EKG in 1 hr- progression of t wave inversions in II III AVF , V5 and V6    ED Treatment:   Medication Administration from 09/21/2020 1652 to 09/21/2020 2301       Date/Time Order Dose Route Action     09/21/2020 1946 sodium chloride 0 9 % bolus 1,000 mL 0 mL Intravenous Stopped     09/21/2020 1722 sodium chloride 0 9 % bolus 1,000 mL 1,000 mL Intravenous New Bag     09/21/2020 4626 aspirin chewable tablet 324 mg 324 mg Oral Given 09/21/2020 1725 ipratropium-albuterol (DUO-NEB) 0 5-2 5 mg/3 mL inhalation solution 3 mL 3 mL Nebulization Given     09/21/2020 1750 multivitamin-minerals (CENTRUM) tablet 1 tablet 1 tablet Oral Given     09/21/2020 2232 LORazepam (ATIVAN) injection 1 mg 1 mg Intravenous Given     Past Medical History:   Diagnosis Date    Addiction to drug ([place] [address] )     Alcohol abuse     Alcoholism ([place] [address] )     Anxiety     Arthritis     neck    Bipolar disorder ([place] [address] )     Depression     Lung disease     31+ yr smoking hx    Sleep difficulties     Substance abuse ([place] [address] )     Tobacco abuse      Present on Admission:   Alcohol use disorder, severe, dependence ([place] [address] )   Bipolar 2 disorder ([place] [address] ) and MDD    Tobacco abuse   Hypokalemia   Chest pain    Admitting Diagnosis: Alcohol abuse [F10 10]  Chest pain [R07 9]  Chest pain, unspecified type [R07 9]    Age/Sex: 47 y o  male    Admission Orders:  Telemetry  ST Segment Monitoring  VS q4hrs  CIWA - AR score q4hrs  Continuous Pulse Oximetry  Regular; House Diet    Scheduled Medications:  Medications  09/21 09/22    aspirin chewable tablet 324 mg    Dose: 324 mg  Freq:  Once Route: PO  Start: 09/21/20 1715 End: 09/21/20 1720     1720         cyanocobalamin (VITAMIN B-12) tablet 500 mcg    Dose: 500 mcg  Freq: Daily Route: PO  Start: 09/22/20 1000      1031        enoxaparin (LOVENOX) subcutaneous injection 40 mg    Dose: 40 mg  Freq: Daily Route: SC  Start: 09/22/20 1000 End: 09/22/20 0950      0950-D/C'd       enoxaparin (LOVENOX) subcutaneous injection 40 mg    Dose: 40 mg  Freq: Daily Route: SC  Start: 09/22/20 0900 End: 09/22/20 0044      7395-Q/J'B       folic acid (FOLVITE) tablet 1 mg    Dose: 1 mg  Freq: Daily Route: PO  Start: 09/22/20 1000      8219        folic acid (FOLVITE) tablet 1 mg    Dose: 1 mg  Freq: Daily Route: PO  Start: 09/21/20 2245 End: 09/22/20 0849     2345      7470-P/H'F       folic acid (FOLVITE) tablet 1 mg    Dose: 1 mg  Freq: Daily Route: PO  Start: 09/22/20 0900 End: 09/21/20 2246     2246-D/C'd        gabapentin (NEURONTIN) capsule 300 mg    Dose: 300 mg  Freq: 3 times daily Route: PO  Start: 09/22/20 1000      1031     1600     2100        gabapentin (NEURONTIN) capsule 300 mg    Dose: 300 mg  Freq: 3 times daily Route: PO  Start: 09/21/20 2315 End: 09/22/20 0849     2345      0849-D/C'd       ipratropium-albuterol (DUO-NEB) 0 5-2 5 mg/3 mL inhalation solution 3 mL    Dose: 3 mL  Freq: Once Route: NEBULIZATION  Start: 09/21/20 1715 End: 09/21/20 1725     1725         ketorolac (TORADOL) injection 15 mg    Dose: 15 mg  Freq: Once Route: IV  Start: 09/22/20 0000 End: 09/22/20 0042      0042        LORazepam (ATIVAN) injection 1 mg    Dose: 1 mg  Freq: Once Route: IV  Indications of Use: ALCOHOL WITHDRAWAL SYNDROME  Start: 09/21/20 2215 End: 09/21/20 2232     2232         multivitamin-minerals (CENTRUM) tablet 1 tablet    Dose: 1 tablet  Freq: Daily Route: PO  Start: 09/21/20 2245 End: 09/22/20 0849     2345      0849-D/C'd       multivitamin-minerals (CENTRUM) tablet 1 tablet    Dose: 1 tablet  Freq: Once Route: PO  Start: 09/21/20 1715 End: 09/21/20 1750     1750         naltrexone (REVIA) tablet 50 mg    Dose: 50 mg  Freq: Daily Route: PO  Start: 09/22/20 1000      1000        nicotine (NICODERM CQ) 14 mg/24hr TD 24 hr patch 1 patch    Dose: 1 patch  Freq: Daily Route: TD  Start: 09/22/20 1000      1035        nicotine (NICODERM CQ) 14 mg/24hr TD 24 hr patch 1 patch    Dose: 1 patch  Freq: Daily Route: TD  Start: 09/22/20 0900 End: 09/22/20 0849      0849-D/C'd       pantoprazole (PROTONIX) EC tablet 40 mg    Dose: 40 mg  Freq: 2 times daily before meals Route: PO  Start: 09/22/20 1600     Admin Instructions:    Swallow whole; do not crush, chew or split   LOOK ALIKE SOUND ALIKE MED      1600        pantoprazole (PROTONIX) EC tablet 40 mg    Dose: 40 mg  Freq: Daily (early morning) Route: PO  Start: 09/22/20 1030 End: 09/22/20 1007      1007-D/C'd pantoprazole (PROTONIX) injection 40 mg    Dose: 40 mg  Freq: Once Route: IV  Start: 09/22/20 0000 End: 09/22/20 0043      0041        potassium chloride (K-DUR,KLOR-CON) CR tablet 20 mEq    Dose: 20 mEq  Freq: Daily Route: PO  Start: 09/22/20 1000      1033        potassium chloride (K-DUR,KLOR-CON) CR tablet 20 mEq    Dose: 20 mEq  Freq: Daily Route: PO  Start: 09/21/20 2245 End: 09/22/20 0849     2345 0849-D/C'd       sertraline (ZOLOFT) tablet 50 mg    Dose: 50 mg  Freq: Daily Route: PO  Start: 09/22/20 1000      1032        sertraline (ZOLOFT) tablet 50 mg    Dose: 50 mg  Freq: Daily Route: PO  Start: 09/22/20 0900 End: 09/22/20 0849      0849-D/C'd       sodium chloride 0 9 % bolus 1,000 mL    Dose: 1,000 mL  Freq:  Once Route: IV  Indications of Use: FLUID AND ELECTROLYTE DISTURBANCE  Last Dose: Stopped (09/21/20 1946)  Start: 09/21/20 1715 End: 09/21/20 1946 1722 1946         sucralfate (CARAFATE) tablet 1 g    Dose: 1 g  Freq: Every 6 hours scheduled Route: PO  Start: 09/22/20 1200      1200     1800        thiamine (VITAMIN B1) tablet 100 mg    Dose: 100 mg  Freq: Daily Route: PO  Start: 09/22/20 1000      1031        thiamine (VITAMIN B1) tablet 100 mg    Dose: 100 mg  Freq: Daily Route: PO  Start: 09/21/20 2245 End: 09/22/20 0849     2345 0849-D/C'd       thiamine (VITAMIN B1) tablet 100 mg    Dose: 100 mg  Freq: Daily Route: PO  Start: 09/22/20 0900 End: 09/21/20 2246 2246-D/C'd        traZODone (DESYREL) tablet 100 mg    Dose: 100 mg  Freq: Daily at bedtime Route: PO  Start: 09/21/20 2315 End: 09/22/20 0849             PRN Meds:  Medications  09/21 09/22    acetaminophen (TYLENOL) tablet 650 mg    Dose: 650 mg  Freq: Every 6 hours PRN Route: PO  PRN Reasons: mild pain,headaches,fever  Indications of Use: FEVER,HEADACHE,MILD PAIN  Start: 09/22/20 0943      1029             nitroglycerin (NITROSTAT) SL tablet 0 4 mg    Dose: 0 4 mg  Freq: Every 5 minutes PRN Route: SL  PRN Reason: chest pain  Start: 09/22/20 0142 End: 09/22/20 0849     Admin Instructions:    May administer up to 3 doses, then call physician  0249           ondansetron (ZOFRAN) injection 4 mg    Dose: 4 mg  Freq: Every 6 hours PRN Route: IV  PRN Reasons: nausea,vomiting  Start: 09/22/20 0943         traMADol (ULTRAM) tablet 50 mg    Dose: 50 mg  Freq: Every 6 hours PRN Route: PO  PRN Reason: moderate pain  Start: 09/22/20 0955         traZODone (DESYREL) tablet 100 mg    Dose: 100 mg  Freq: Daily at bedtime PRN Route: PO  PRN Reason: insomnia  Start: 09/22/20 0942             Network Utilization Review Department  [email or web address]  ATTENTION: Please call with any questions or concerns to [phone] and carefully listen to the prompts so that you are directed to the right person  All voicemails are confidential   Ravi Arteaga all requests for admission clinical reviews, approved or denied determinations and any other requests to dedicated fax number below belonging to the campus where the patient is receiving treatment   List of dedicated fax numbers for the Facilities:  [address] DENIALS (Administrative/Medical Necessity) [phone]   [address] (Maternity/NICU/Pediatrics) [phone]   Basilia Vivas [phone]   Kimberly Chadwick [phone]   Efrem Bose [phone]   [address]  [phone]   [address] [facility] [address] [place] [address] [phone]   Luther Tapia [phone]   [address] [facility] [address]  [address] [place] [address] [address] [facility] [phone]

## 2020-09-22 NOTE — QUICK NOTE
Patient still complains of chest pain, says that he has hx of GERD but this pain is different, it is pressing in nature with radiation to left arm, also endorses paresthesias in his left arm  2nd set of troponin- negative  Repeat EKG , showed t wave inversions in II III and AVF   3rd EKG in 1 hr- progression of t wave inversions in II III AVF , V5 and V6  Patient still c/o chest pain, mild to moderate     Patient was given aspirin at the time of presentation  Followed by S/L nitro   I/V toradol inj    Case was discussed with on call attending Dr Sumit Zamna  On call cardiologist Dr Raymundo Welsh was tiger texted with all the EKG scripts  Case was also discussed with ED physician Dr Nola Barrow    On call cardiologist suggested that EKG changes show biphasic t wave changes, with on going chest pain , patient can be transferred to [facility] for further work up

## 2020-09-22 NOTE — ASSESSMENT & PLAN NOTE
Prolong history of bipolar disorder and MDD  Hx of prior suicidal ideation  Currently no SI/ HI     On gabapentin 300 mg x TID   Trazodone 100 mg x HS  SERTRALINE 50 MG DAILY    Inpatient psych consult appreciated  Continue home meds for now

## 2020-09-22 NOTE — NURSING NOTE
Patient transferred via EMS to [facility]  Monitor removed  IV still in place  Report called to nurse  at [place]  All belongings accounted for

## 2020-09-22 NOTE — ASSESSMENT & PLAN NOTE
· Continue trazodone 100 mg HS  · Zoloft 50 mg daily    · Consult psychiatry since the patient is requesting inpatient alcohol rehab

## 2020-09-22 NOTE — NURSING NOTE
Report called to to [place] by night nurse  Pt tranfer  transported via transport team  Pt discharged  ,

## 2020-09-22 NOTE — ED NOTES
Pt resting comfortable on litter in no distress even and unlabored breathing awaiting placement by HOST    Pt left to rest at this time      Woodrow Alvarado RN  09/21/20 2126

## 2020-09-22 NOTE — ASSESSMENT & PLAN NOTE
Presented with left sided stabbing chest pain  Sudden onset, radiating to left arm  Assoc with sweating  Had nausea and 4-5 episodes of watery vomiting in the morning  Gives inconsistent hx and states that he also has a coffee ground emesis  Denies blood in stools    Troponin- neg, EKG- sinus rhythm  CXR- no acute cardiopulmonary disease    Will trend troponin  Will repeat EKG in the morning  Will hold lovenox for now  FOBT follow  Observe  Monitor vitals

## 2020-09-22 NOTE — ASSESSMENT & PLAN NOTE
Has been drinking since age of 23  Drinks 2 pints of vodka everyday  Multiple rehab admissions in the past for detox    Today presented with alcohol intoxication  Wants to go to rehab/ awaiting placement  Ethanol level- 273  Normal LFT's , hypokalemia    1 L of N/S given in ED  CIWA protocol  Seizure precautions  Aspiration precautions  Monitor vitals  Ativan p r n  Will give thiamine, folic acid and multivitamin  Maintain quiet environment  Check phosphorus and magnesium  Check serum lipase levels

## 2020-09-22 NOTE — CASE MANAGEMENT
Pt transferred to B prior to SW arriving on shift; therefore, SW unable to give pt obvs notice on unit  RYAN obtained pt's room phone number at [facility] but was unable to make contact with pt after mult  Phone calls throughout the day  SW also contacted mobile number listed for patient; however, phone was off  Observation notice mailed to patient

## 2020-09-22 NOTE — ASSESSMENT & PLAN NOTE
Presented with left sided stabbing chest pain  Sudden onset, radiating to left arm  Assoc with sweating  Had nausea and 4-5 episodes of watery vomiting in the morning    Troponin- neg, EKG- sinus rhythm  CXR- no acute cardiopulmonary disease    Will trend troponin  Will repeat EKG in the morning  Observe  Monitor vitals

## 2020-09-22 NOTE — ED CARE HANDOFF
Emergency Department Sign Out Note        Sign out and transfer of care from   See Separate Emergency Department note  The patient, Claudio Gibson, was evaluated by the previous provider for chest pain and etoh intoxication    Workup Completed:      ED Course / Workup Pending (followup):  EKG demonstrated normal sinus rhythm troponins were not elevated patient's chest x-ray is unremarkable patient's symptoms did actually improve when he had been drinking earlier  Patient was asymptomatic here other than was seen to be withdrawal symptoms and shakiness  Patient had consultation with HOST who were trying to find an inpatient bed for him however there is no beds available this evening  Plan will be to admit the patient to the hospital for rest of his chest pain evaluation CIWA precautions treatment for any signs of withdrawal   Patient will likely need  consult      HEART Risk Score      Most Recent Value   Heart Score Risk Calculator   History  0 Filed at: 09/21/2020 2015   ECG  1 Filed at: 09/21/2020 2015   Age  1 Filed at: 09/21/2020 2015   Risk Factors  1 Filed at: 09/21/2020 2015   Troponin  0 Filed at: 09/21/2020 2015   HEART Score  3 Filed at: 09/21/2020 2015                             Procedures  MDM    Disposition  Final diagnoses:   Alcohol abuse   Chest pain, unspecified type     Time reflects when diagnosis was documented in both MDM as applicable and the Disposition within this note     Time User Action Codes Description Comment    9/21/2020  8:14 PM Mickael Litten T Add [F10 10] Alcohol abuse     9/21/2020  8:14 PM Hayley Marx Add [R07 9] Chest pain, unspecified type       ED Disposition     None      MD Documentation      Most Recent Value   Sending MD Dr Roberto Restrepo    None       Patient's Medications   Discharge Prescriptions    No medications on file     No discharge procedures on file         ED Provider  Electronically Signed by Bobbi Dunbar MD  09/22/20 4632

## 2020-09-22 NOTE — ASSESSMENT & PLAN NOTE
· Patient initially admitted in [facility] for alcohol dependence and withdrawal symptoms  Then complaint of chest discomfort despite negative troponins however with incidental finding of possible EKG changes and biphasic T-waves  Patient transferred to [facility] for further monitoring and Cardiology consult  · Continue troponin checks    Secondary prevention laboratories:  TSH, hemoglobin A1c, lipid profile-already obtained  · Consult Cardiology  · Monitor on telemetry  · Patient reported that his chest pain has been present on and off for the past 1 week-

## 2020-09-22 NOTE — CONSULTS
Consultation - Cardiology   Jose D Murdock 47 y o  male MRN: [id number]  [facility] Encounter: [id number]      Assessment and Plan:  58-year-old male with alcohol use disorder and no significant cardiac history with chest pain and associated SOB, possibly due to acute pericarditis or GERD, rule out CAD  Troponin neg x 3   -will get an echo and reassess afterwards  -Check TSH, lipid panel and A1c  -Repeat EKG  -Monitor on telemetry  -Start aspirin low-dose  -do an exercise stress test       History of Present Illness   Physician Requesting Consult: Velia Mckeon DO     Reason for Consult / Principal Problem: Chest pain    HPI: Jose D Murdock is a 47y o  year old male with PMH of alcohol use disorder, and bipolar disorder but no significant cardiac history who presents with chest pain that has been occurring for a week and gradually worsening  Patient describes chest pain as sudden, sharp, 9/10 central chest pain that is not usually present during the day but at night after he lays down to sleep have radiates to his left arm  It is associated with nausea and vomiting and SOB and he had 1 episode of palpitations yesterday  Patient reports that he gets better when he sits up from bed and ambulates, sometimes when he takes sips of water and reports that it may also occur with swallowing and after an alcohol binge  He denies fever, chills, headache, dizziness, diaphoresis and lower extremity edema  He has never had a stress test and does not experience chest pain or SOB on exertion  Patient is a heavy alcoholic drinker, binges daily on about 10 or more drinks per session, drinks 4 or more times a week  He usually drinks about 2 pt of vodka on a daily basis  Patient is a current everyday smoker with 7 75 pack years  He also uses marijuana about once a week but no cocaine  Family history is significant for atrial fibrillation in his mother who also passed from a massive stroke at 70   All maternal relatives have a hx of heart disease per patient  Inpatient consult to Cardiology     Performed by  Jose Manuel Bentley MD     Authorized by Ajay Gambino MD              Review of Systems:  Review of Systems   Constitutional: Negative for chills, fatigue and fever  HENT: Negative for sore throat  Eyes: Negative for visual disturbance  Respiratory: Positive for shortness of breath  Negative for cough  Cardiovascular: Positive for chest pain and palpitations (Only 1 episode yesterday)  Negative for leg swelling  Gastrointestinal: Positive for nausea and vomiting (Has not occurred today, 4 episodes yesterday  )  Negative for abdominal distention, abdominal pain, constipation and diarrhea  Genitourinary: Negative for dysuria  Musculoskeletal: Negative for arthralgias and back pain  Skin: Negative for rash  Neurological: Positive for weakness  Negative for dizziness, numbness and headaches  Psychiatric/Behavioral: Negative for sleep disturbance  The patient is not nervous/anxious            Historical Information   Past Medical History:   Diagnosis Date    Addiction to drug ([place] [address] )     Alcohol abuse     Alcoholism ([place] [address] )     Anxiety     Arthritis     neck    Bipolar disorder ([place] [address] )     Depression     Lung disease     31+ yr smoking hx    Sleep difficulties     Substance abuse ([place] [address] )     Tobacco abuse      Past Surgical History:   Procedure Laterality Date    HERNIA REPAIR      WISDOM TOOTH EXTRACTION       Social History     Substance and Sexual Activity   Alcohol Use Yes    Frequency: 4 or more times a week    Drinks per session: 10 or more    Binge frequency: Daily or almost daily    Comment: 1 bottle of vodka daily     Social History     Substance and Sexual Activity   Drug Use Yes    Types: Marijuana     Social History     Tobacco Use   Smoking Status Current Every Day Smoker    Packs/day: 0 50    Years: 35 00    Pack years: 17 50    Types: Cigarettes   Smokeless Tobacco Never Used Tobacco Comment    1 PPD per [employer]     Family History:   Family History   Problem Relation Age of Onset    Heart disease Mother     Stroke Mother     Stroke Father        Meds/Allergies   PTA meds:   Prior to Admission Medications   Prescriptions Last Dose Informant Patient Reported? Taking?   folic acid (FOLVITE) 1 mg tablet 9/21/2020 at Unknown time  No Yes   Sig: Take 1 tablet (1 mg total) by mouth daily   gabapentin (NEURONTIN) 300 mg capsule   No No   Sig: Take 1 capsule (300 mg total) by mouth 3 (three) times a day   naltrexone (REVIA) 50 mg tablet   No No   Sig: Take 1 tablet (50 mg total) by mouth daily   nicotine (NICODERM CQ) 14 mg/24hr TD 24 hr patch 9/21/2020 at Unknown time  No Yes   Sig: Place 1 patch on the skin daily   nitroglycerin (NITROSTAT) 0 4 mg SL tablet 9/22/2020 at Unknown time  No Yes   Sig: Place 1 tablet (0 4 mg total) under the tongue every 5 (five) minutes as needed for chest pain   potassium chloride (K-DUR,KLOR-CON) 20 mEq tablet 9/21/2020 at Unknown time  No Yes   Sig: Take 1 tablet (20 mEq total) by mouth daily   sertraline (ZOLOFT) 50 mg tablet   No No   Sig: Take 1 tablet (50 mg total) by mouth daily   thiamine 100 MG tablet 9/21/2020 at Unknown time  No Yes   Sig: Take 1 tablet (100 mg total) by mouth daily   traZODone (DESYREL) 100 mg tablet   No No   Sig: Take 1 tablet (100 mg total) by mouth daily at bedtime as needed for sleep      Facility-Administered Medications: None     No Known Allergies    Objective   Vitals: Blood pressure 145/99, pulse 78, temperature 97 5 °F (36 4 °C), resp  rate 19, height 6' (1 829 m), weight 104 kg (230 lb), SpO2 93 %  , Body mass index is 31 19 kg/m² ,   Orthostatic Blood Pressures      Most Recent Value   Blood Pressure  145/99 filed at 09/22/2020 1226            Intake/Output Summary (Last 24 hours) at 9/22/2020 1552  Last data filed at 9/22/2020 1202  Gross per 24 hour   Intake 120 ml   Output    Net 120 ml       Invasive Devices Peripheral Intravenous Line            Peripheral IV 09/21/20 Left Antecubital less than 1 day                Physical Exam:  Physical Exam  Vitals signs reviewed  Constitutional:       General: He is not in acute distress  Appearance: Normal appearance  HENT:      Head: Normocephalic and atraumatic  Right Ear: External ear normal       Left Ear: External ear normal       Nose: Nose normal    Neck:      Musculoskeletal: Normal range of motion  No muscular tenderness  Vascular: No carotid bruit  Cardiovascular:      Rate and Rhythm: Normal rate and regular rhythm  Pulses: Normal pulses  Heart sounds: Normal heart sounds  No murmur  Pulmonary:      Effort: Pulmonary effort is normal  No respiratory distress  Breath sounds: Normal breath sounds  No wheezing  Chest:      Chest wall: No tenderness  Abdominal:      General: Bowel sounds are normal  There is no distension  Palpations: Abdomen is soft  There is no mass  Tenderness: There is no abdominal tenderness  Musculoskeletal: Normal range of motion  General: No swelling, tenderness or deformity  Right lower leg: No edema  Left lower leg: No edema  Skin:     General: Skin is warm  Coloration: Skin is not pale  Findings: No bruising, erythema or rash  Neurological:      Mental Status: He is alert and oriented to person, place, and time     Psychiatric:         Behavior: Behavior normal        Lab Results:     Lab Results   Component Value Date    TROPONINI <0 02 09/22/2020    TROPONINI <0 03 09/22/2020    TROPONINI <0 03 09/22/2020       Lab Results   Component Value Date    CALCIUM 8 9 09/22/2020    K 3 6 09/22/2020    CO2 27 09/22/2020     09/22/2020    BUN 12 09/22/2020    CREATININE 0 78 09/22/2020       Lab Results   Component Value Date    WBC 5 32 09/22/2020    HGB 15 7 09/22/2020    HCT 46 2 09/22/2020    MCV 98 09/22/2020     09/22/2020       No results found for: CHOL  Lab Results   Component Value Date    HDL 50 08/07/2020    HDL 54 02/25/2016     Lab Results   Component Value Date    LDLCALC 78 08/07/2020    LDLCALC 99 02/25/2016     Lab Results   Component Value Date    TRIG 188 (H) 08/07/2020    TRIG 88 02/25/2016       Lab Results   Component Value Date    ALT 19 09/22/2020    AST 19 09/22/2020       Results from last 7 days   Lab Units 09/21/20  1724   INR  0 99       Imaging: I have personally reviewed pertinent reports

## 2020-09-22 NOTE — H&P
H&P- Juaquin Young 1970, 47 y o  male MRN: [id number]    Unit/Bed#: [facility] 721-01 Encounter: [id number]    Primary Care Provider: Ama Conway DO   Date and time admitted to hospital: 9/22/2020  8:49 AM        * Chest pain  Assessment & Plan  · Patient initially admitted in [facility] for alcohol dependence and withdrawal symptoms  Then complaint of chest discomfort despite negative troponins however with incidental finding of possible EKG changes and biphasic T-waves  Patient transferred to [place] [address] for further monitoring and Cardiology consult  · Continue troponin checks  Secondary prevention laboratories:  TSH, hemoglobin A1c, lipid profile-already obtained  · Consult Cardiology  · Monitor on telemetry  · Patient reported that his chest pain has been present on and off for the past 1 week-    MDD (major depressive disorder), recurrent episode, moderate ([facility] [address] )  Assessment & Plan  · Continue Desyrel and Zoloft as noted  Tobacco abuse  Assessment & Plan  · Nicotine replacement therapy  Alcohol use disorder, severe, dependence ([facility] [address] )  Assessment & Plan  · CIWA protocol  · Continue vitamin, folate, B12   · Patient was evaluated by host in [facility]  · Consult case management  · Patient is requesting a inpatient rehab    Bipolar 2 disorder [facility]) and MDD   Assessment & Plan  · Continue trazodone 100 mg HS  · Zoloft 50 mg daily  · Consult psychiatry since the patient is requesting inpatient alcohol rehab        VTE Prophylaxis: Enoxaparin (Lovenox)  / sequential compression device   Code Status: Level 1 - Full Code as before  POLST: There is no POLST form on file for this patient (pre-hospital)    Anticipated Length of Stay:  Patient will be admitted on an Inpatient basis with an anticipated length of stay of  less than 2 midnights  Justification for Hospital Stay: Please see detailed plans noted above      Chief Complaint:     Continued chest discomfort  History of Present Illness:  Suzi Thompson is a 47 y o  male who was just recently admitted in [facility] for what seemed to be alcoholic withdrawal associated with left-sided stabbing chest pain  He also had nausea with vomiting  Review of history shows that he has been drinking since age of 23 with 2 pt of vodka every day  Note is th he had several admissions for alcohol rehab and detoxification  But the patient arrives in [facility] complaining of sweating nausea with watery vomiting and loose stools  Question of whether this is alcoholic withdrawal; he was therefore placed in hospital and started on CIWA protocol  His ethanol level at that time was 273  As the evening went on, he began to complain of chest discomfort that radiates towards the left arm  Noted is that at this time he was already on Protonix  He was also on several medications associated for alcohol withdrawal including multivitamins, thiamine and folate  Upon review of an EKG taken there was note of T-wave inversions in inferior leads and V5 and V6  This was then reviewed by resident on duty sending the tracings to Cardiology (Willam) within advised that patient needs transfer to [facility] for further evaluation  Essentially they would see him in the morning  Patient also reported that he has been having on and off chest pain for the past 1 week  No relationship to activity  Since last night the pain has been more or less persistent  Patient reported that currently the pain is about 3-4  Patient reported that she his pain gets worse with deep inspiration  Patient also reported that he has been having nausea vomiting diarrhea and abdominal pain recently  Noted that he had blood in his vomitus approximately 2 weeks ago  She also noticed that his stool is dark        Review of Systems:    Constitutional:  Denies fever or chills   Eyes:  Denies change in visual acuity   HENT:  Denies nasal congestion or sore throat Respiratory:  Intermittent cough and shortness of breath  Cardiovascular:  Chest pain   GI:  Complaining nausea, vomiting, bloody stools or diarrhea , blood in stool and also blood in the vomitus  :  Denies dysuria   Musculoskeletal:  Denies back pain or joint pain   Integument:  Denies rash   Neurologic:  Headache present, numbness of the left upper extremity  Endocrine:  Denies polyuria or polydipsia   Lymphatic:  Denies swollen glands   Psychiatric:  Denies depression or anxiety     Past Medical and Surgical History:   Past Medical History:   Diagnosis Date    Addiction to drug ([place] [address] )     Alcohol abuse     Alcoholism ([place] [address] )     Anxiety     Arthritis     neck    Bipolar disorder ([place] [address] )     Depression     Lung disease     31+ yr smoking hx    Sleep difficulties     Substance abuse ([place] [address] )     Tobacco abuse      Past Surgical History:   Procedure Laterality Date    HERNIA REPAIR      WISDOM TOOTH EXTRACTION         Meds/Allergies:  Medications Prior to Admission   Medication    folic acid (FOLVITE) 1 mg tablet    gabapentin (NEURONTIN) 300 mg capsule    naltrexone (REVIA) 50 mg tablet    nicotine (NICODERM CQ) 14 mg/24hr TD 24 hr patch    nitroglycerin (NITROSTAT) 0 4 mg SL tablet    potassium chloride (K-DUR,KLOR-CON) 20 mEq tablet    sertraline (ZOLOFT) 50 mg tablet    thiamine 100 MG tablet    traZODone (DESYREL) 100 mg tablet       Allergies: No Known Allergies  History:  Marital Status: Single   Occupation:    Patient Pre-hospital Living Situation:  Lives at home  Patient Pre-hospital Level of Mobility:  Independent  Patient Pre-hospital Diet Restrictions:  Substance Use History:   Social History     Substance and Sexual Activity   Alcohol Use Yes    Frequency: 4 or more times a week    Drinks per session: 10 or more    Binge frequency: Daily or almost daily    Comment: 1 bottle of vodka daily     Social History     Tobacco Use   Smoking Status Current Every Day Smoker    Packs/day: 0 50    Years: 35 00    Pack years: 17 50    Types: Cigarettes   Smokeless Tobacco Never Used   Tobacco Comment    1 PPD per [employer]     Social History     Substance and Sexual Activity   Drug Use Yes    Types: Marijuana       Family History:  Family History   Problem Relation Age of Onset    Heart disease Mother     Stroke Mother     Stroke Father        Physical Exam:     Vitals:   Blood Pressure: 140/95 (09/22/20 0945)  Pulse: 84 (09/22/20 0945)  SpO2: 91 % (09/22/20 0945)    Constitutional:  Well developed, well nourished, no acute distress, non-toxic appearance   Eyes:  PERRL, conjunctiva normal   HENT:  Atraumatic, external ears normal, nose normal, oropharynx moist, no pharyngeal exudates  Neck- normal range of motion, no tenderness, supple   Respiratory:  No respiratory distress, normal breath sounds, no rales, no wheezing   Cardiovascular:  Normal rate, normal rhythm, no murmurs, no gallops, no rubs   GI:  Soft, nondistended, normal bowel sounds, nontender, no organomegaly, no mass, no rebound, no guarding   :  No costovertebral angle tenderness   Musculoskeletal:  No edema, no tenderness, no deformities  Back- no tenderness  Integument:  Well hydrated, no rash   Lymphatic:  No lymphadenopathy noted   Neurologic:  Alert &awake, communicative, CN 2-12 normal, normal motor function, normal sensory function, no focal deficits noted   Psychiatric:  Speech and behavior appropriate       Lab Results: I have personally reviewed pertinent reports        Results from last 7 days   Lab Units 09/22/20  0554   WBC Thousand/uL 5 32   HEMOGLOBIN g/dL 15 7   HEMATOCRIT % 46 2   PLATELETS Thousands/uL 315   NEUTROS PCT % 46   LYMPHS PCT % 41   MONOS PCT % 8   EOS PCT % 4     Results from last 7 days   Lab Units 09/22/20  0554   POTASSIUM mmol/L 3 6   CHLORIDE mmol/L 107   CO2 mmol/L 27   BUN mg/dL 12   CREATININE mg/dL 0 78   CALCIUM mg/dL 8 9   ALK PHOS U/L 37 9   ALT U/L 19   AST U/L 19     Results from last 7 days   Lab Units 09/21/20  1724   INR  0 99       EKG:     Imaging: I have personally reviewed pertinent reports  Xr Chest 1 View Portable    Result Date: 9/21/2020  Narrative: CHEST INDICATION:   chest pain  COMPARISON:  Chest radiograph from 8/30/2020 and 2/14/2020  EXAM PERFORMED/VIEWS:  XR CHEST PORTABLE FINDINGS: Cardiomediastinal silhouette appears unremarkable  The lungs are clear  No pneumothorax or pleural effusion  Osseous structures appear within normal limits for patient age  Impression: No acute cardiopulmonary disease  Workstation performed: [id number]     Xr Chest 1 View Portable    Result Date: 8/31/2020  Narrative: CHEST INDICATION:   EtOH withdrawal  COMPARISON:  02/14/2020 EXAM PERFORMED/VIEWS:  XR CHEST PORTABLE 1 image FINDINGS: Cardiomediastinal silhouette appears unremarkable  The lungs are clear  No pneumothorax or pleural effusion  Osseous structures appear within normal limits for patient age  Impression: No acute cardiopulmonary disease  Workstation performed: [id number]         ** Please Note: Dragon 360 Dictation voice to text software was used in the creation of this document   **

## 2020-09-22 NOTE — PLAN OF CARE
Problem: PAIN - ADULT  Goal: Verbalizes/displays adequate comfort level or baseline comfort level  Description: Interventions:  - Encourage patient to monitor pain and request assistance  - Assess pain using appropriate pain scale  - Administer analgesics based on type and severity of pain and evaluate response  - Implement non-pharmacological measures as appropriate and evaluate response  - Consider cultural and social influences on pain and pain management  - Notify physician/advanced practitioner if interventions unsuccessful or patient reports new pain  Outcome: Adequate for Discharge     Problem: INFECTION - ADULT  Goal: Absence or prevention of progression during hospitalization  Description: INTERVENTIONS:  - Assess and monitor for signs and symptoms of infection  - Monitor lab/diagnostic results  - Monitor all insertion sites, i e  indwelling lines, tubes, and drains  - Monitor endotracheal if appropriate and nasal secretions for changes in amount and color  - [place] appropriate cooling/warming therapies per order  - Administer medications as ordered  - Instruct and encourage patient and family to use good hand hygiene technique  - Identify and instruct in appropriate isolation precautions for identified infection/condition  Outcome: Adequate for Discharge  Goal: Absence of fever/infection during neutropenic period  Description: INTERVENTIONS:  - Monitor WBC    Outcome: Adequate for Discharge     Problem: SAFETY ADULT  Goal: Patient will remain free of falls  Description: INTERVENTIONS:  - Assess patient frequently for physical needs  -  Identify cognitive and physical deficits and behaviors that affect risk of falls    -  [place] fall precautions as indicated by assessment   - Educate patient/family on patient safety including physical limitations  - Instruct patient to call for assistance with activity based on assessment  - Modify environment to reduce risk of injury  - Consider OT/PT consult to assist with strengthening/mobility  Outcome: Adequate for Discharge  Goal: Maintain or return to baseline ADL function  Description: INTERVENTIONS:  -  Assess patient's ability to carry out ADLs; assess patient's baseline for ADL function and identify physical deficits which impact ability to perform ADLs (bathing, care of mouth/teeth, toileting, grooming, dressing, etc )  - Assess/evaluate cause of self-care deficits   - Assess range of motion  - Assess patient's mobility; develop plan if impaired  - Assess patient's need for assistive devices and provide as appropriate  - Encourage maximum independence but intervene and supervise when necessary  - Involve family in performance of ADLs  - Assess for home care needs following discharge   - Consider OT consult to assist with ADL evaluation and planning for discharge  - Provide patient education as appropriate  Outcome: Adequate for Discharge  Goal: Maintain or return mobility status to optimal level  Description: INTERVENTIONS:  - Assess patient's baseline mobility status (ambulation, transfers, stairs, etc )    - Identify cognitive and physical deficits and behaviors that affect mobility  - Identify mobility aids required to assist with transfers and/or ambulation (gait belt, sit-to-stand, lift, walker, cane, etc )  - [place] fall precautions as indicated by assessment  - Record patient progress and toleration of activity level on Mobility SBAR; progress patient to next Phase/Stage  - Instruct patient to call for assistance with activity based on assessment  - Consider rehabilitation consult to assist with strengthening/weightbearing, etc   Outcome: Adequate for Discharge     Problem: DISCHARGE PLANNING  Goal: Discharge to home or other facility with appropriate resources  Description: INTERVENTIONS:  - Identify barriers to discharge w/patient and caregiver  - Arrange for needed discharge resources and transportation as appropriate  - Identify discharge learning needs (meds, wound care, etc )  - Arrange for interpretive services to assist at discharge as needed  - Refer to Case Management Department for coordinating discharge planning if the patient needs post-hospital services based on physician/advanced practitioner order or complex needs related to functional status, cognitive ability, or social support system  Outcome: Adequate for Discharge     Problem: Knowledge Deficit  Goal: Patient/family/caregiver demonstrates understanding of disease process, treatment plan, medications, and discharge instructions  Description: Complete learning assessment and assess knowledge base    Interventions:  - Provide teaching at level of understanding  - Provide teaching via preferred learning methods  Outcome: Adequate for Discharge

## 2020-09-22 NOTE — PLAN OF CARE
Problem: PAIN - ADULT  Goal: Verbalizes/displays adequate comfort level or baseline comfort level  Description: Interventions:  - Encourage patient to monitor pain and request assistance  - Assess pain using appropriate pain scale  - Administer analgesics based on type and severity of pain and evaluate response  - Implement non-pharmacological measures as appropriate and evaluate response  - Consider cultural and social influences on pain and pain management  - Notify physician/advanced practitioner if interventions unsuccessful or patient reports new pain  Outcome: Not Progressing     Problem: INFECTION - ADULT  Goal: Absence or prevention of progression during hospitalization  Description: INTERVENTIONS:  - Assess and monitor for signs and symptoms of infection  - Monitor lab/diagnostic results  - Monitor all insertion sites, i e  indwelling lines, tubes, and drains  - Monitor endotracheal if appropriate and nasal secretions for changes in amount and color  - [place] appropriate cooling/warming therapies per order  - Administer medications as ordered  - Instruct and encourage patient and family to use good hand hygiene technique  - Identify and instruct in appropriate isolation precautions for identified infection/condition  Outcome: Progressing  Goal: Absence of fever/infection during neutropenic period  Description: INTERVENTIONS:  - Monitor WBC    Outcome: Progressing     Problem: SAFETY ADULT  Goal: Patient will remain free of falls  Description: INTERVENTIONS:  - Assess patient frequently for physical needs  -  Identify cognitive and physical deficits and behaviors that affect risk of falls    -  [place] fall precautions as indicated by assessment   - Educate patient/family on patient safety including physical limitations  - Instruct patient to call for assistance with activity based on assessment  - Modify environment to reduce risk of injury  - Consider OT/PT consult to assist with strengthening/mobility  Outcome: Progressing  Goal: Maintain or return to baseline ADL function  Description: INTERVENTIONS:  -  Assess patient's ability to carry out ADLs; assess patient's baseline for ADL function and identify physical deficits which impact ability to perform ADLs (bathing, care of mouth/teeth, toileting, grooming, dressing, etc )  - Assess/evaluate cause of self-care deficits   - Assess range of motion  - Assess patient's mobility; develop plan if impaired  - Assess patient's need for assistive devices and provide as appropriate  - Encourage maximum independence but intervene and supervise when necessary  - Involve family in performance of ADLs  - Assess for home care needs following discharge   - Consider OT consult to assist with ADL evaluation and planning for discharge  - Provide patient education as appropriate  Outcome: Progressing  Goal: Maintain or return mobility status to optimal level  Description: INTERVENTIONS:  - Assess patient's baseline mobility status (ambulation, transfers, stairs, etc )    - Identify cognitive and physical deficits and behaviors that affect mobility  - Identify mobility aids required to assist with transfers and/or ambulation (gait belt, sit-to-stand, lift, walker, cane, etc )  - [place] fall precautions as indicated by assessment  - Record patient progress and toleration of activity level on Mobility SBAR; progress patient to next Phase/Stage  - Instruct patient to call for assistance with activity based on assessment  - Consider rehabilitation consult to assist with strengthening/weightbearing, etc   Outcome: Progressing     Problem: Knowledge Deficit  Goal: Patient/family/caregiver demonstrates understanding of disease process, treatment plan, medications, and discharge instructions  Description: Complete learning assessment and assess knowledge base    Interventions:  - Provide teaching at level of understanding  - Provide teaching via preferred learning methods  Outcome: Progressing

## 2020-09-22 NOTE — ASSESSMENT & PLAN NOTE
Potassium- 3 4    EKG- NORMAL sinus  Supplement potassium per protocol- 20 meq tablet PO everyday    Check electrolytes in the morning

## 2020-09-22 NOTE — PLAN OF CARE
Problem: Nutrition/Hydration-ADULT  Goal: Nutrient/Hydration intake appropriate for improving, restoring or maintaining nutritional needs  Description: Monitor and assess patient's nutrition/hydration status for malnutrition  Collaborate with interdisciplinary team and initiate plan and interventions as ordered  Monitor patient's weight and dietary intake as ordered or per policy  Utilize nutrition screening tool and intervene as necessary  Determine patient's food preferences and provide high-protein, high-caloric foods as appropriate       INTERVENTIONS:  - Monitor oral intake, urinary output, labs, and treatment plans  - Assess nutrition and hydration status and recommend course of action  - Evaluate amount of meals eaten  - Assist patient with eating if necessary   - Allow adequate time for meals  - Recommend/ encourage appropriate diets, oral nutritional supplements, and vitamin/mineral supplements  - Order, calculate, and assess calorie counts as needed  - Recommend, monitor, and adjust tube feedings and TPN/PPN based on assessed needs  - Assess need for intravenous fluids  - Provide specific nutrition/hydration education as appropriate  - Include patient/family/caregiver in decisions related to nutrition  Outcome: Progressing     Problem: PAIN - ADULT  Goal: Verbalizes/displays adequate comfort level or baseline comfort level  Description: Interventions:  - Encourage patient to monitor pain and request assistance  - Assess pain using appropriate pain scale  - Administer analgesics based on type and severity of pain and evaluate response  - Implement non-pharmacological measures as appropriate and evaluate response  - Consider cultural and social influences on pain and pain management  - Notify physician/advanced practitioner if interventions unsuccessful or patient reports new pain  Outcome: Progressing     Problem: INFECTION - ADULT  Goal: Absence or prevention of progression during hospitalization  Description: INTERVENTIONS:  - Assess and monitor for signs and symptoms of infection  - Monitor lab/diagnostic results  - Monitor all insertion sites, i e  indwelling lines, tubes, and drains  - Monitor endotracheal if appropriate and nasal secretions for changes in amount and color  - [place] appropriate cooling/warming therapies per order  - Administer medications as ordered  - Instruct and encourage patient and family to use good hand hygiene technique  - Identify and instruct in appropriate isolation precautions for identified infection/condition  Outcome: Progressing  Goal: Absence of fever/infection during neutropenic period  Description: INTERVENTIONS:  - Monitor WBC    Outcome: Progressing     Problem: SAFETY ADULT  Goal: Patient will remain free of falls  Description: INTERVENTIONS:  - Assess patient frequently for physical needs  -  Identify cognitive and physical deficits and behaviors that affect risk of falls    -  [place] fall precautions as indicated by assessment   - Educate patient/family on patient safety including physical limitations  - Instruct patient to call for assistance with activity based on assessment  - Modify environment to reduce risk of injury  - Consider OT/PT consult to assist with strengthening/mobility  Outcome: Progressing  Goal: Maintain or return to baseline ADL function  Description: INTERVENTIONS:  -  Assess patient's ability to carry out ADLs; assess patient's baseline for ADL function and identify physical deficits which impact ability to perform ADLs (bathing, care of mouth/teeth, toileting, grooming, dressing, etc )  - Assess/evaluate cause of self-care deficits   - Assess range of motion  - Assess patient's mobility; develop plan if impaired  - Assess patient's need for assistive devices and provide as appropriate  - Encourage maximum independence but intervene and supervise when necessary  - Involve family in performance of ADLs  - Assess for home care needs following discharge   - Consider OT consult to assist with ADL evaluation and planning for discharge  - Provide patient education as appropriate  Outcome: Progressing  Goal: Maintain or return mobility status to optimal level  Description: INTERVENTIONS:  - Assess patient's baseline mobility status (ambulation, transfers, stairs, etc )    - Identify cognitive and physical deficits and behaviors that affect mobility  - Identify mobility aids required to assist with transfers and/or ambulation (gait belt, sit-to-stand, lift, walker, cane, etc )  - [place] fall precautions as indicated by assessment  - Record patient progress and toleration of activity level on Mobility SBAR; progress patient to next Phase/Stage  - Instruct patient to call for assistance with activity based on assessment  - Consider rehabilitation consult to assist with strengthening/weightbearing, etc   Outcome: Progressing     Problem: DISCHARGE PLANNING  Goal: Discharge to home or other facility with appropriate resources  Description: INTERVENTIONS:  - Identify barriers to discharge w/patient and caregiver  - Arrange for needed discharge resources and transportation as appropriate  - Identify discharge learning needs (meds, wound care, etc )  - Arrange for interpretive services to assist at discharge as needed  - Refer to Case Management Department for coordinating discharge planning if the patient needs post-hospital services based on physician/advanced practitioner order or complex needs related to functional status, cognitive ability, or social support system  Outcome: Progressing     Problem: Knowledge Deficit  Goal: Patient/family/caregiver demonstrates understanding of disease process, treatment plan, medications, and discharge instructions  Description: Complete learning assessment and assess knowledge base    Interventions:  - Provide teaching at level of understanding  - Provide teaching via preferred learning methods  Outcome: Progressing

## 2020-09-22 NOTE — CONSULTS
Consultation - [address] [facility] 47 y o  male MRN: [id number]  Unit/Bed#: [address] 721-01 Encounter: [id number]      Chief Complaint:  I want to go to detox    History of Present Illness   Physician Requesting Consult: Abilio Maldonado DO  Reason for Consult / Principal Problem:  Bipolar disorder, alcohol use disorder severe dependence    Rolanda Webb is a 47 y o  male with a longstanding history of alcohol use, mood disorder presents to [facility] with left-sided stabbing chest pain most likely associated with alcohol withdrawal   He had been drinking daily and he wants to go to rehab but due to his she has pain he was transferred to [facility] for continuation of care  He has had multiple admissions for alcoholic rehab and detoxification  He also have 1 admission in in [address] [place] [address]  from 8/5 to 8/11 2020  Patient reports that he drinks 1 bottle of vodka per day and has withdrawal symptoms the next day if he does not drink  His ethanol level at the time of admission was 273  He is currently awaiting cardiology evaluation for T wave inversions seen on EKG  Patient has been sober in the past for 6 years from [id number] but reports that he began drinking again after he lost his girl  He denies any significant depression or suicidal ideation  He just reports that he beats himself up too much and wishes he was more financially stable  He is very interested in going to rehab for alcoholism and moving into a recovery house           Psychiatric Review Of Systems:  sleep: no  appetite changes: no  weight changes: no  energy/anergy: no  interest/pleasure/anhedonia: yes  somatic symptoms: no  anxiety/panic: no  claire: no  guilty/hopeless: no  self injurious behavior/risky behavior: no    Historical Information   Past Psychiatric History:   Admitted to [address] [place] [address]  psychiatric admission from 8/5 to 8/11/2020  Past Suicide attempts: none  Past Violent behavior: none  Past Psychiatric medication trial: Zoloft, Trazodone    Substance Abuse History:  Alcohol use: 1 bottle of vodka per night, binge drinking daily  Occasional Cannabis use   History of IP/OP rehabilitation program: Multiple rehabilitation programs in [place], [place], and most recently [place], [place]  Smoking history: Currently smokes 1/2 pack a day with a Nicotene patch  17 5 pack year history  Family Psychiatric History:   Brother with history of alcohol use, he denies any mental illness in the family denies any history is suicidal in the family  Social History  Education: technical college  Learning Disabilities: none  Marital history:   Living arrangement, social support: lives at home  Occupational History: unemployed  Functioning Relationships: poor support system    Other Pertinent History: Legal: 3 previous DUIs, most recently in 2012    Traumatic History:   Abuse: none  Other Traumatic Events: none    Past Medical History:   Diagnosis Date    Addiction to drug ([facility] )     Alcohol abuse     Alcoholism ([facility] )     Anxiety     Arthritis     neck    [facility]     Depression     Lung disease     31+ yr smoking hx    Sleep difficulties     Substance abuse ([facility] )     Tobacco abuse        Medical Review Of Systems:  Review of Systems - Negative except chest pain, all other systems reviewed were negative    Meds/Allergies   current meds:   Current Facility-Administered Medications   Medication Dose Route Frequency    acetaminophen (TYLENOL) tablet 650 mg  650 mg Oral Q6H PRN    cyanocobalamin (VITAMIN B-12) tablet 500 mcg  500 mcg Oral Daily    folic acid (FOLVITE) tablet 1 mg  1 mg Oral Daily    gabapentin (NEURONTIN) capsule 300 mg  300 mg Oral TID    naltrexone (REVIA) tablet 50 mg  50 mg Oral Daily    nicotine (NICODERM CQ) 14 mg/24hr TD 24 hr patch 1 patch  1 patch Transdermal Daily    nitroglycerin (NITROSTAT) SL tablet 0 4 mg  0 4 mg Sublingual Q5 Min PRN    ondansetron (ZOFRAN) injection 4 mg  4 mg Intravenous Q6H PRN    pantoprazole (PROTONIX) EC tablet 40 mg  40 mg Oral BID AC    potassium chloride (K-DUR,KLOR-CON) CR tablet 20 mEq  20 mEq Oral Daily    sertraline (ZOLOFT) tablet 50 mg  50 mg Oral Daily    sucralfate (CARAFATE) tablet 1 g  1 g Oral Q6H [address]    thiamine (VITAMIN B1) tablet 100 mg  100 mg Oral Daily    traMADol (ULTRAM) tablet 50 mg  50 mg Oral Q6H PRN    traZODone (DESYREL) tablet 100 mg  100 mg Oral HS PRN     No Known Allergies    Objective   Vital signs in last 24 hours:  Temp:  [97 5 °F (36 4 °C)-99 1 °F (37 3 °C)] 97 5 °F (36 4 °C)  HR:  [78-94] 78  Resp:  [16-19] 19  BP: (115-145)/(61-99) 145/99    No intake or output data in the 24 hours ending 09/22/20 1331    Mental Status Evaluation:  Appearance:  age appropriate   Behavior:  Cooperative   Speech:  normal pitch and normal volume   Mood:  anxious   Affect:  mood-congruent   Language: naming objects and repeating phrases   Thought Process:  goal directed   Associations: intact associations   Thought Content:  normal   Perceptual Disturbances: None   Risk Potential: Suicidal Ideations none, Homicidal Ideations none and Potential for Aggression No   Sensorium:  person, place, time/date, situation, day of week and month of year   Memory:  recent and remote memory grossly intact   Cognition:  recent and remote memory grossly intact   Consciousness:  alert and awake    Attention: attention span and concentration were age appropriate   Intellect: within normal limits   Fund of Knowledge: awareness of current events: Fair, past history: Fair and vocabulary: Fair   Insight:  fair   Judgment: fair   Muscle Strength and Tone: Within normal limits   Gait/Station: normal gait/station and normal balance   Motor Activity: no abnormal movements     Lab Results:    I have personally reviewed all pertinent laboratory/tests results    Labs in last 72 hours:   Recent Labs     09/22/20  0554 09/22/20  1147   WBC 5 32 --    RBC 4 74  --    HGB 15 7  --    HCT 46 2  --     289   RDW 14 0  --    NEUTROABS 2 47  --    SODIUM 140  --    K 3 6  --      --    CO2 27  --    BUN 12  --    CREATININE 0 78  --    GLUC 107  --    CALCIUM 8 9  --    AST 19  --    ALT 19  --    ALKPHOS 37 9  --    TP 5 9*  --    ALB 3 6  --    TBILI 0 46  --      Drug Screen:   Lab Results   Component Value Date    AMPMETHUR Negative 09/22/2020    BARBTUR Negative 09/22/2020    BDZUR Negative 09/22/2020    THCUR Positive (A) 09/22/2020    COCAINEUR Negative 09/22/2020    METHADONEUR Negative 09/22/2020    OPIATEUR Negative 09/22/2020    PCPUR Negative 09/22/2020     Medical alcohol level   Lab Results   Component Value Date    ETOH 275 2 (H) 09/21/2020       Code Status: )Level 1 - Full Code    Assessment/Plan     Assessment:  Berenice Wilkins is a 47 y o  male with longstanding history of alcohol use presented to [facility] for alcohol withdrawal and left thigh stabbing chest pain, patient was transferred to [facility] for continuation of care  Patient states that he had been drinking daily and he wanted to stop drinking, he states that he has some symptoms of withdrawal were when he tried in his own  Patient mood is anxious denies suicidal plan or intent, denies any psychotic symptoms, denies any history manic episodes  Diagnosis:  Major depressive disorder moderate without psychotic feature  Plan:   Continue medical management  Continue CIWA protocol  Zoloft 50 mg p o  Daily  Continue gabapentin 300 mg p o  T i d  Patient will benefit from inpatient rehabilitation please contact host program for arrangement  Discussed with   No other intervention at this time I will sign off  Risks, benefits and possible side effects of Medications:   Risks, benefits, and possible side effects of medications explained to patient and patient verbalizes understanding             Onofre Avila MD

## 2020-09-22 NOTE — ASSESSMENT & PLAN NOTE
· CIWA protocol    · Continue vitamin, folate, B12   · Patient was evaluated by host in [facility]  · Consult case management  · Patient is requesting a inpatient rehab

## 2020-09-23 ENCOUNTER — APPOINTMENT (INPATIENT)
Dept: NON INVASIVE DIAGNOSTICS | Facility: HOSPITAL | Age: 50
DRG: 203 | End: 2020-09-23
Payer: COMMERCIAL

## 2020-09-23 PROBLEM — I10 ESSENTIAL HYPERTENSION: Status: ACTIVE | Noted: 2020-09-23

## 2020-09-23 LAB
ATRIAL RATE: 70 BPM
ATRIAL RATE: 79 BPM
CHEST PAIN STATEMENT: NORMAL
CHOLEST SERPL-MCNC: 164 MG/DL (ref 50–200)
HDLC SERPL-MCNC: 57 MG/DL
LDLC SERPL CALC-MCNC: 82 MG/DL (ref 0–100)
MAX DIASTOLIC BP: 98 MMHG
MAX HEART RATE: 155 BPM
MAX PREDICTED HEART RATE: 170 BPM
MAX. SYSTOLIC BP: 174 MMHG
NONHDLC SERPL-MCNC: 107 MG/DL
P AXIS: 57 DEGREES
P AXIS: 66 DEGREES
PR INTERVAL: 162 MS
PR INTERVAL: 166 MS
PROTOCOL NAME: NORMAL
QRS AXIS: -58 DEGREES
QRS AXIS: -60 DEGREES
QRSD INTERVAL: 110 MS
QRSD INTERVAL: 112 MS
QT INTERVAL: 388 MS
QT INTERVAL: 396 MS
QTC INTERVAL: 419 MS
QTC INTERVAL: 454 MS
REASON FOR TERMINATION: NORMAL
T WAVE AXIS: -58 DEGREES
T WAVE AXIS: 31 DEGREES
TARGET HR FORMULA: NORMAL
TIME IN EXERCISE PHASE: NORMAL
TRIGL SERPL-MCNC: 126 MG/DL
TROPONIN I SERPL-MCNC: <0.02 NG/ML
TROPONIN I SERPL-MCNC: <0.02 NG/ML
VENTRICULAR RATE: 70 BPM
VENTRICULAR RATE: 79 BPM

## 2020-09-23 PROCEDURE — 93017 CV STRESS TEST TRACING ONLY: CPT

## 2020-09-23 PROCEDURE — 84484 ASSAY OF TROPONIN QUANT: CPT | Performed by: INTERNAL MEDICINE

## 2020-09-23 PROCEDURE — 99232 SBSQ HOSP IP/OBS MODERATE 35: CPT | Performed by: INTERNAL MEDICINE

## 2020-09-23 PROCEDURE — 93018 CV STRESS TEST I&R ONLY: CPT | Performed by: INTERNAL MEDICINE

## 2020-09-23 PROCEDURE — 93010 ELECTROCARDIOGRAM REPORT: CPT | Performed by: INTERNAL MEDICINE

## 2020-09-23 PROCEDURE — 93306 TTE W/DOPPLER COMPLETE: CPT | Performed by: INTERNAL MEDICINE

## 2020-09-23 PROCEDURE — 99219 PR INITIAL OBSERVATION CARE/DAY 50 MINUTES: CPT | Performed by: PHYSICIAN ASSISTANT

## 2020-09-23 PROCEDURE — 93016 CV STRESS TEST SUPVJ ONLY: CPT | Performed by: INTERNAL MEDICINE

## 2020-09-23 PROCEDURE — 80061 LIPID PANEL: CPT | Performed by: FAMILY MEDICINE

## 2020-09-23 PROCEDURE — 93005 ELECTROCARDIOGRAM TRACING: CPT

## 2020-09-23 RX ORDER — GUAIFENESIN 600 MG
600 TABLET, EXTENDED RELEASE 12 HR ORAL EVERY 12 HOURS SCHEDULED
Status: DISCONTINUED | OUTPATIENT
Start: 2020-09-23 | End: 2020-09-24 | Stop reason: HOSPADM

## 2020-09-23 RX ORDER — HYDRALAZINE HYDROCHLORIDE 25 MG/1
25 TABLET, FILM COATED ORAL EVERY 8 HOURS SCHEDULED
Status: DISCONTINUED | OUTPATIENT
Start: 2020-09-23 | End: 2020-09-24

## 2020-09-23 RX ORDER — HYDRALAZINE HYDROCHLORIDE 50 MG/1
50 TABLET, FILM COATED ORAL EVERY 8 HOURS SCHEDULED
Status: DISCONTINUED | OUTPATIENT
Start: 2020-09-23 | End: 2020-09-23

## 2020-09-23 RX ADMIN — ASPIRIN 81 MG CHEWABLE TABLET 81 MG: 81 TABLET CHEWABLE at 10:20

## 2020-09-23 RX ADMIN — GABAPENTIN 300 MG: 300 CAPSULE ORAL at 10:20

## 2020-09-23 RX ADMIN — SERTRALINE HYDROCHLORIDE 50 MG: 50 TABLET ORAL at 10:20

## 2020-09-23 RX ADMIN — SUCRALFATE 1 G: 1 TABLET ORAL at 01:06

## 2020-09-23 RX ADMIN — TRAZODONE HYDROCHLORIDE 100 MG: 100 TABLET ORAL at 21:54

## 2020-09-23 RX ADMIN — NICOTINE 1 PATCH: 14 PATCH TRANSDERMAL at 18:15

## 2020-09-23 RX ADMIN — GUAIFENESIN 600 MG: 600 TABLET, EXTENDED RELEASE ORAL at 21:54

## 2020-09-23 RX ADMIN — GABAPENTIN 300 MG: 300 CAPSULE ORAL at 16:20

## 2020-09-23 RX ADMIN — CYANOCOBALAMIN TAB 500 MCG 500 MCG: 500 TAB at 10:20

## 2020-09-23 RX ADMIN — SUCRALFATE 1 G: 1 TABLET ORAL at 21:54

## 2020-09-23 RX ADMIN — THIAMINE HCL TAB 100 MG 100 MG: 100 TAB at 10:19

## 2020-09-23 RX ADMIN — POTASSIUM CHLORIDE 20 MEQ: 1500 TABLET, EXTENDED RELEASE ORAL at 10:20

## 2020-09-23 RX ADMIN — FOLIC ACID 1 MG: 1 TABLET ORAL at 10:20

## 2020-09-23 RX ADMIN — GABAPENTIN 300 MG: 300 CAPSULE ORAL at 21:54

## 2020-09-23 RX ADMIN — SUCRALFATE 1 G: 1 TABLET ORAL at 05:46

## 2020-09-23 RX ADMIN — SUCRALFATE 1 G: 1 TABLET ORAL at 12:05

## 2020-09-23 RX ADMIN — Medication 12.5 MG: at 21:54

## 2020-09-23 RX ADMIN — HYDRALAZINE HYDROCHLORIDE 25 MG: 25 TABLET, FILM COATED ORAL at 05:46

## 2020-09-23 RX ADMIN — SUCRALFATE 1 G: 1 TABLET ORAL at 18:14

## 2020-09-23 RX ADMIN — PANTOPRAZOLE SODIUM 40 MG: 40 TABLET, DELAYED RELEASE ORAL at 16:20

## 2020-09-23 RX ADMIN — PANTOPRAZOLE SODIUM 40 MG: 40 TABLET, DELAYED RELEASE ORAL at 06:04

## 2020-09-23 RX ADMIN — HYDRALAZINE HYDROCHLORIDE 25 MG: 25 TABLET, FILM COATED ORAL at 21:54

## 2020-09-23 RX ADMIN — NITROGLYCERIN 0.4 MG: 0.4 TABLET SUBLINGUAL at 14:07

## 2020-09-23 RX ADMIN — HYDRALAZINE HYDROCHLORIDE 50 MG: 50 TABLET, FILM COATED ORAL at 15:25

## 2020-09-23 RX ADMIN — TRAZODONE HYDROCHLORIDE 100 MG: 100 TABLET ORAL at 02:08

## 2020-09-23 RX ADMIN — NALTREXONE HYDROCHLORIDE 50 MG: 50 TABLET, FILM COATED ORAL at 10:21

## 2020-09-23 NOTE — ASSESSMENT & PLAN NOTE
· CIWA protocol  · Continue vitamin, folate, B12   · Patient was evaluated by host in [facility]  · Patient is requesting a inpatient rehab; CM following   · Continue home dose naltrexone

## 2020-09-23 NOTE — PROGRESS NOTES
Progress Note - Cardiology   Loyda Lopez 47 y o  male MRN: [id number]  [facility] Encounter: [id number]  09/23/20  1:21 PM    Impression and Plan:     48-year-old with history of alcohol abuse and bipolar disorder, no prior cardiac history, no family history of premature vascular disease in first-degree relatives, mother had a stroke in her 76s, admitted with chest pains  Sharp 9/10 central chest pain only when he lies down in the night, has associated nausea and vomiting and pain improves with drinking water, no exacerbation with physical activity-has a physical job working as an   Has been having these symptoms for over a week      Of all ECGs performed in the hospital, 1 ECG-3/6 showed T-wave inversions in the inferior and lateral leads  Transferred from [facility] for further evaluation  Troponins were negative        Plan:     Chest pains:  Based on description, unlikely to be cardiac, more likely to be gastroesophageal reflux disease  However considering [place] 67  ethnicity, mild mixed dyslipidemia and tobacco use history, ordered and awaiting exercise treadmill stress test   A1c is pending      Hypertension:  Not on any antihypertensives at baseline, will add hydralazine for now       ===================================================================    Chief Complaint: No chief complaint on file          Subjective/Objective     Subjective:  Denies any further complaints, feels well but a little anxious-from nicotine withdrawal    Objective:  No distress at the time of exam    Patient Active Problem List   Diagnosis    [facility] 2 disorder [facility] ) and MDD     [facility], severe[facility] dependence [facility]    Tobacco abuse    Hypokalemia    MDD (major depressive disorder), recurrent episode, moderate (HCC)    Chest pain       Vitals: BP (!) 143/103   Pulse 95   Temp 98 3 °F (36 8 °C)   Resp 16   Ht 6' (1 829 m)   Wt 104 kg (230 lb)   SpO2 93%   BMI 31 19 kg/m² I/O this shift:  In: 120 [P O :120]  Out: -   Wt Readings from Last 3 Encounters:   09/22/20 104 kg (230 lb)   09/21/20 101 kg (222 lb)   09/08/20 102 kg (224 lb 6 4 oz)       Intake/Output Summary (Last 24 hours) at 9/23/2020 1321  Last data filed at 9/23/2020 0746  Gross per 24 hour   Intake 120 ml   Output    Net 120 ml     I/O last 3 completed shifts: In: 120 [P O :120]  Out: -     Invasive Devices     Peripheral Intravenous Line            Peripheral IV 09/21/20 Left Antecubital 1 day                  Physical Exam:  GEN: Juaquin Young appears well, alert and oriented x 3, pleasant and cooperative   HEENT: pupils equal, round, and reactive to light; extraocular muscles intact  NECK: supple, no carotid bruits or JVD  HEART: regular rhythm, normal S1 and S2, no murmur, no clicks, gallops or rubs   LUNGS: clear to auscultation bilaterally; no wheezes or rhonchi, no rales  ABDOMEN/GI: normal bowel sounds, soft, no tenderness, no distention  EXTREMITIES/Musculoskeltal: peripheral pulses normal; no clubbing, cyanosis, no no edema  NEURO: no focal motor findings   SKIN: normal without suspicious lesions on exposed skin              Lab Results:   Results from last 7 days   Lab Units 09/22/20  1555 09/22/20  1148 09/22/20  0554   TROPONIN I ng/mL <0 02 <0 02 <0 03     Results from last 7 days   Lab Units 09/22/20  1147 09/22/20  0554 09/21/20  1724   WBC Thousand/uL  --  5 32 6 76   HEMOGLOBIN g/dL  --  15 7 17 4*   HEMATOCRIT %  --  46 2 50 2*   PLATELETS Thousands/uL 289 315 348         Results from last 7 days   Lab Units 09/22/20  0554 09/21/20  1724   POTASSIUM mmol/L 3 6 3 4*   CHLORIDE mmol/L 107 106   CO2 mmol/L 27 26   BUN mg/dL 12 9   CREATININE mg/dL 0 78 0 76   CALCIUM mg/dL 8 9 9 4   ALK PHOS U/L 37 9 48 9   ALT U/L 19 23   AST U/L 19 26     Results from last 7 days   Lab Units 09/21/20  1724   INR  0 99       Imaging: I have personally reviewed pertinent reports      EKG/Telemtry:  No events    Scheduled Meds:  Current Facility-Administered Medications   Medication Dose Route Frequency Provider Last Rate    acetaminophen  650 mg Oral Q6H PRN Shruti Juares MD      aspirin  81 mg Oral Daily Nandini Kwan DO      cyanocobalamin  500 mcg Oral Daily Shruti Juares MD      folic acid  1 mg Oral Daily Shruti Juares MD      gabapentin  300 mg Oral TID Shruti Juares MD      hydrALAZINE  50 mg Oral [facility] Nandini Venegas,       naltrexone  50 mg Oral Daily Shruti Juares MD      nicotine  1 patch Transdermal Daily Shruti Juares MD      nitroglycerin  0 4 mg Sublingual Q5 Min PRN Shruti Juares MD      ondansetron  4 mg Intravenous Q6H PRN Shruti Juares MD      pantoprazole  40 mg Oral BID AC Shruti Juares MD      potassium chloride  20 mEq Oral Daily Shruti Juares MD      sertraline  50 mg Oral Daily Shruti Juares MD      sucralfate  1 g Oral Q6H [address] Shruti Juares MD      thiamine  100 mg Oral Daily Shruti Juares MD      traMADol  50 mg Oral Q6H PRN Shruti Juares MD      traZODone  100 mg Oral HS PRN Shruti Juares MD       Continuous Infusions:

## 2020-09-23 NOTE — PLAN OF CARE
Problem: Nutrition/Hydration-ADULT  Goal: Nutrient/Hydration intake appropriate for improving, restoring or maintaining nutritional needs  Description: Monitor and assess patient's nutrition/hydration status for malnutrition  Collaborate with interdisciplinary team and initiate plan and interventions as ordered  Monitor patient's weight and dietary intake as ordered or per policy  Utilize nutrition screening tool and intervene as necessary  Determine patient's food preferences and provide high-protein, high-caloric foods as appropriate       INTERVENTIONS:  - Monitor oral intake, urinary output, labs, and treatment plans  - Assess nutrition and hydration status and recommend course of action  - Evaluate amount of meals eaten  - Assist patient with eating if necessary   - Allow adequate time for meals  - Recommend/ encourage appropriate diets, oral nutritional supplements, and vitamin/mineral supplements  - Order, calculate, and assess calorie counts as needed  - Recommend, monitor, and adjust tube feedings and TPN/PPN based on assessed needs  - Assess need for intravenous fluids  - Provide specific nutrition/hydration education as appropriate  - Include patient/family/caregiver in decisions related to nutrition  Outcome: Progressing     Problem: PAIN - ADULT  Goal: Verbalizes/displays adequate comfort level or baseline comfort level  Description: Interventions:  - Encourage patient to monitor pain and request assistance  - Assess pain using appropriate pain scale  - Administer analgesics based on type and severity of pain and evaluate response  - Implement non-pharmacological measures as appropriate and evaluate response  - Consider cultural and social influences on pain and pain management  - Notify physician/advanced practitioner if interventions unsuccessful or patient reports new pain  Outcome: Progressing     Problem: INFECTION - ADULT  Goal: Absence or prevention of progression during hospitalization  Description: INTERVENTIONS:  - Assess and monitor for signs and symptoms of infection  - Monitor lab/diagnostic results  - Monitor all insertion sites, i e  indwelling lines, tubes, and drains  - Monitor endotracheal if appropriate and nasal secretions for changes in amount and color  - [place] appropriate cooling/warming therapies per order  - Administer medications as ordered  - Instruct and encourage patient and family to use good hand hygiene technique  - Identify and instruct in appropriate isolation precautions for identified infection/condition  Outcome: Progressing  Goal: Absence of fever/infection during neutropenic period  Description: INTERVENTIONS:  - Monitor WBC    Outcome: Progressing     Problem: SAFETY ADULT  Goal: Patient will remain free of falls  Description: INTERVENTIONS:  - Assess patient frequently for physical needs  -  Identify cognitive and physical deficits and behaviors that affect risk of falls    -  [place] fall precautions as indicated by assessment   - Educate patient/family on patient safety including physical limitations  - Instruct patient to call for assistance with activity based on assessment  - Modify environment to reduce risk of injury  - Consider OT/PT consult to assist with strengthening/mobility  Outcome: Progressing  Goal: Maintain or return to baseline ADL function  Description: INTERVENTIONS:  -  Assess patient's ability to carry out ADLs; assess patient's baseline for ADL function and identify physical deficits which impact ability to perform ADLs (bathing, care of mouth/teeth, toileting, grooming, dressing, etc )  - Assess/evaluate cause of self-care deficits   - Assess range of motion  - Assess patient's mobility; develop plan if impaired  - Assess patient's need for assistive devices and provide as appropriate  - Encourage maximum independence but intervene and supervise when necessary  - Involve family in performance of ADLs  - Assess for home care needs following discharge   - Consider OT consult to assist with ADL evaluation and planning for discharge  - Provide patient education as appropriate  Outcome: Progressing  Goal: Maintain or return mobility status to optimal level  Description: INTERVENTIONS:  - Assess patient's baseline mobility status (ambulation, transfers, stairs, etc )    - Identify cognitive and physical deficits and behaviors that affect mobility  - Identify mobility aids required to assist with transfers and/or ambulation (gait belt, sit-to-stand, lift, walker, cane, etc )  - [place] fall precautions as indicated by assessment  - Record patient progress and toleration of activity level on Mobility SBAR; progress patient to next Phase/Stage  - Instruct patient to call for assistance with activity based on assessment  - Consider rehabilitation consult to assist with strengthening/weightbearing, etc   Outcome: Progressing     Problem: DISCHARGE PLANNING  Goal: Discharge to home or other facility with appropriate resources  Description: INTERVENTIONS:  - Identify barriers to discharge w/patient and caregiver  - Arrange for needed discharge resources and transportation as appropriate  - Identify discharge learning needs (meds, wound care, etc )  - Arrange for interpretive services to assist at discharge as needed  - Refer to Case Management Department for coordinating discharge planning if the patient needs post-hospital services based on physician/advanced practitioner order or complex needs related to functional status, cognitive ability, or social support system  Outcome: Progressing     Problem: Knowledge Deficit  Goal: Patient/family/caregiver demonstrates understanding of disease process, treatment plan, medications, and discharge instructions  Description: Complete learning assessment and assess knowledge base    Interventions:  - Provide teaching at level of understanding  - Provide teaching via preferred learning methods  Outcome: Progressing

## 2020-09-23 NOTE — PROGRESS NOTES
Progress Note - Cori Burciaga 1970, 47 y o  male MRN: [id number]  Unit/Bed#: [facility] 721-01 Encounter: [id number]  Primary Care Provider: Gaynell Baumgarten, DO   Date and time admitted to hospital: 9/22/2020  8:49 AM    * Chest pain  Assessment & Plan  · Patient initially admitted in [facility] for alcohol dependence and withdrawal symptoms  Then complaint of chest discomfort despite negative troponins however with incidental finding of possible EKG changes and biphasic T-waves  Patient transferred to [facility] for further monitoring and Cardiology consult  Patient reported that his chest pain has been present on and off for the past 1 week  · Troponin negative x 5  · Hemoglobin A1c 5 7   · Lipid panel: cholesterol 164, , HDL 57, LDL 82  · D-dimer mildly elevated at 0 65 and patient with complaints of pleuritic chest pain; consider CTA chest if patient becomes hypoxic or tachycardic  · Echo: Systolic function was normal by visual assessment  EF 55 %  There were no regional wall motion abnormalities  Grade 1 diastolic dysfunction  · Apprecaite Cardiology recommendations  · S/p exercise stress test today, results pending   · Patient with severe chest pain after returning from stress test this afternoon, relieved with SL nitro x 1  EKG unchanged from prior  Troponin negative; follow-up repeat  Essential hypertension  Assessment & Plan  · Not on antihypertensives PTA   · Started on hydralazine 50 mg q8h per cardiology   · Monitor routinely     Alcohol use disorder, severe, dependence ([facility] [address] )  Assessment & Plan  · CIWA protocol  · Continue vitamin, folate, B12   · Patient was evaluated by host in [facility]  · Patient is requesting a inpatient rehab; CM following   · Continue home dose naltrexone     Bipolar 2 disorder ([facility] [address] ) and MDD   Assessment & Plan  · Continue trazodone 100 mg HS  · Zoloft 50 mg daily    · Apprecaite psychiatry consult since the patient is requesting inpatient alcohol rehab  · Recommending continue home medical management     MDD (major depressive disorder), recurrent episode, moderate (HCC)  Assessment & Plan  · Continue Desyrel and Zoloft as noted  Tobacco abuse  Assessment & Plan  · Nicotine replacement therapy  VTE Pharmacologic Prophylaxis:   Pharmacologic: Enoxaparin (Lovenox)  Mechanical VTE Prophylaxis in Place: No    Patient Centered Rounds: I have performed bedside rounds with nursing staff today  Discussions with Specialists or Other Care Team Provider: primary RN, cardiology, case management     Education and Discussions with Family / Patient: patient; will call brother with update per patient request     Time Spent for Care: 15 minutes  More than 50% of total time spent on counseling and coordination of care as described above  Current Length of Stay: 1 day(s)    Current Patient Status: Inpatient   Certification Statement: The patient will continue to require additional inpatient hospital stay due to monitor for clinical and symptomatic improvement     Discharge Plan: possibly to inpatient ETOH rehab once cleared by cardiology, suspect next 24-48 hours     Code Status: Level 1 - Full Code    Subjective:   Patient reports severe chest pain "20/10" about 20 minutes after returning from stress test this afternoon  Describes it as heaviness or pressure in mid-sternal area and radiates to L arm  Denies associated nausea/vomiting, diaphoresis, vision changes, lightheadedness/dizziness, headache  Reports pain is worse with inspiration and has associated SOB  Improved after SL nitro x 1  Patient also reports coughing phlegm which has progressed since admission       Objective:     Vitals:   Temp (24hrs), Av 1 °F (36 7 °C), Min:97 9 °F (36 6 °C), Max:98 3 °F (36 8 °C)    Temp:  [97 9 °F (36 6 °C)-98 3 °F (36 8 °C)] 98 3 °F (36 8 °C)  HR:  [65-95] 95  Resp:  [16-18] 16  BP: (134-159)/() 146/103  SpO2:  [86 %-98 %] 93 %  Body mass index is 31 19 kg/m²  Input and Output Summary (last 24 hours): Intake/Output Summary (Last 24 hours) at 9/23/2020 1518  Last data filed at 9/23/2020 0746  Gross per 24 hour   Intake 120 ml   Output    Net 120 ml       Physical Exam:     Physical Exam  Vitals signs and nursing note reviewed  Exam conducted with a chaperone present  Constitutional:       General: He is not in acute distress  Cardiovascular:      Rate and Rhythm: Normal rate and regular rhythm  Pulses: Normal pulses  Heart sounds: No murmur  Pulmonary:      Effort: Pulmonary effort is normal  No respiratory distress  Breath sounds: No decreased breath sounds, wheezing, rhonchi or rales  Comments: Course breath sounds bilaterally   Noted cough during exam  Abdominal:      General: Abdomen is flat  Bowel sounds are normal  There is no distension  Palpations: Abdomen is soft  Tenderness: There is no abdominal tenderness  Musculoskeletal:      Right lower leg: No edema  Left lower leg: No edema  Skin:     General: Skin is warm and dry  Coloration: Skin is not pale  Findings: No erythema  Neurological:      Mental Status: He is alert and oriented to person, place, and time         Additional Data:     Labs:    Results from last 7 days   Lab Units 09/22/20  1147 09/22/20  0554   WBC Thousand/uL  --  5 32   HEMOGLOBIN g/dL  --  15 7   HEMATOCRIT %  --  46 2   PLATELETS Thousands/uL 289 315   NEUTROS PCT %  --  46   LYMPHS PCT %  --  41   MONOS PCT %  --  8   EOS PCT %  --  4     Results from last 7 days   Lab Units 09/22/20  0554   SODIUM mmol/L 140   POTASSIUM mmol/L 3 6   CHLORIDE mmol/L 107   CO2 mmol/L 27   BUN mg/dL 12   CREATININE mg/dL 0 78   ANION GAP mmol/L 6   CALCIUM mg/dL 8 9   ALBUMIN g/dL 3 6   TOTAL BILIRUBIN mg/dL 0 46   ALK PHOS U/L 37 9   ALT U/L 19   AST U/L 19   GLUCOSE RANDOM mg/dL 107     Results from last 7 days   Lab Units 09/21/20  1724   INR  0 99         Results from last 7 days Lab Units 09/21/20  1724   HEMOGLOBIN A1C % 5 7*               * I Have Reviewed All Lab Data Listed Above  * Additional Pertinent Lab Tests Reviewed: All Labs Within Last 24 Hours Reviewed    Imaging:    Imaging Reports Reviewed Today Include: will review stress test once available   Imaging Personally Reviewed by Myself Includes:  none    Recent Cultures (last 7 days):           Last 24 Hours Medication List:   Current Facility-Administered Medications   Medication Dose Route Frequency Provider Last Rate    acetaminophen  650 mg Oral Q6H PRN Johan Walsh MD      aspirin  81 mg Oral Daily Nandini Gasimli-Luis, DO      cyanocobalamin  500 mcg Oral Daily Johan Walsh MD      folic acid  1 mg Oral Daily Johan Walsh MD      gabapentin  300 mg Oral TID Johan Walsh MD      guaiFENesin  600 mg Oral Q12H [address] Nury Mendez PA-C      hydrALAZINE  50 mg Oral Q8H [address] Nandini Emmit Coil, DO      naltrexone  50 mg Oral Daily Johan Walsh MD      nicotine  1 patch Transdermal Daily Johan Walsh MD      nitroglycerin  0 4 mg Sublingual Q5 Min PRN Johan Walsh MD      ondansetron  4 mg Intravenous Q6H PRN Johan Walsh MD      pantoprazole  40 mg Oral BID AC Johan Walsh MD      potassium chloride  20 mEq Oral Daily Johan Walsh MD      sertraline  50 mg Oral Daily Johan Walsh MD      sucralfate  1 g Oral Q6H [address] Johan Walsh MD      thiamine  100 mg Oral Daily Johan Walsh MD      traMADol  50 mg Oral Q6H PRN Johan Walsh MD      traZODone  100 mg Oral HS PRN Johan Walsh MD          Today, Patient Was Seen By: Jacklyn Mueller PA-C    ** Please Note: Dictation voice to text software may have been used in the creation of this document   **

## 2020-09-23 NOTE — UTILIZATION REVIEW
Initial Clinical Review    Admission: Date/Time/Statement:   Admission Orders (From admission, onward)     Ordered        09/22/20 0852  Inpatient Admission  Once                   Orders Placed This Encounter   Procedures    Inpatient Admission     Standing Status:   Standing     Number of Occurrences:   1     Order Specific Question:   Admitting Physician     Answer:   Nelson Garner [[id number]]     Order Specific Question:   Level of Care     Answer:   Med Surg [[id number]]     Order Specific Question:   Estimated length of stay     Answer:   More than 2 Midnights     Order Specific Question:   Certification     Answer:   I certify that inpatient services are medically necessary for this patient for a duration of greater than two midnights  See H&P and MD Progress Notes for additional information about the patient's course of treatment  9/21/2020 - 9/22/2020 (15 hours) observation   Tom[place] [address]  66   Chest pain, alcohol abuse  Transfer to [facility]        Assessment/Plan:     47year old female received from [place] for evaluation and treatment of left sided chest pain and alcoholic withdrawal    Clinical assessment significant for nausea and vomiting, sweating, diarrhea and abdominal pain  Ekg shows T wave inversions in inferior and lateral leads  D dimer 0 65  Admit to inpatient medical surgical for chest pain  Plan includes, echo, stress test, consult psychiatry, consult cardiology, consult gastroenterology, ciwa assessments, telemetry, trend troponin ,clear liquid diet, stool occult blood  Consult cardiology   Of all ECGs performed in the hospital, 1 ECG-3/6 showed T-wave inversions in the inferior and lateral leads  Transferred from [facility] for further evaluation  Troponins were negative     Plan:   Chest pains:  Based on description, unlikely to be cardiac, more likely to be gastroesophageal reflux disease    However considering [place] 67  ethnicity, mild mixed dyslipidemia and tobacco use history, will proceed with exercise treadmill stress test       Consult psychiatry  Diagnosis:  Major depressive disorder moderate without psychotic feature  Plan:   Continue medical management  Continue CIWA protocol  Zoloft 50 mg p o  Daily  Continue gabapentin 300 mg p o  T i d    Patient will benefit from inpatient rehabilitation please contact hospice program for arrangement              Arrival    09/22/20 1202 09/22/20 0945 09/22/20 0945 09/22/20 0900 09/22/20 0945   97 5 °F (36 4 °C) 84 19 140/95 91 %      Oral          09/22/20 0900       2          09/22/20 104 kg (230 lb)     Additional Vital Signs:     Date/Time   Temp   Pulse   Resp   BP   MAP (mmHg)   SpO2   O2 Device     09/23/20 07:46:13      89      141/101Abnormal     114   88 %Abnormal          09/23/20 07:29:22   98 3 °F (36 8 °C)   78   16   142/101Abnormal     115   98 %        09/23/20 05:44:36      65      139/93   108   93 %        09/23/20 02:54:56   97 9 °F (36 6 °C)   65   18   134/90   105   90 %   None (Room air)     09/22/20 22:37:07      80      159/114Abnormal     129   94 %        09/22/20 22:36:43      79      159/114Abnormal     129   93 %        09/22/20 21:56:38   98 °F (36 7 °C)   84   16   150/102Abnormal     118   93 %        09/22/20 2000            152/90         None (Room air)     09/22/20 19:11:50   98 2 °F (36 8 °C)   76   16   158/109Abnormal     125   93 %        09/22/20 17:48:21      72      158/108Abnormal     125   96 %        09/22/20 16:53:57      76      157/109Abnormal     125   86 %Abnormal          09/22/20 16:53:06            156/108Abnormal     124           09/22/20 12:26:53      78      145/99   114   93 %                Pertinent Labs/Diagnostic Test Results:      Ref Range & Units  9/22/20 0908 9/22/20 0525 9/22/20 0105   Ventricular Rate  BPM  75  84  78    Atrial Rate  BPM  75  85  78    UT Interval  ms  156  160  165    QRSD Interval  ms 110  105  113    QT Interval  ms  386  366  406    QTC Interval  ms  431  433  463    P Axis  degrees  68  68  64    QRS Axis  degrees  -61  -68  -72    T Wave Axis  degrees  -57  -73  268          Normal sinus rhythm  Possible Left atrial enlargement  Left axis deviation  Right bundle branch block  Possible Septal infarct , age undetermined  Nonspecific T wave changes  Abnormal ECG        Sinus rhythm  Left anterior fascicular block  Minimal voltage criteria for LVH, may be normal variant  Nonspecific T wave changes               Sinus rhythm  Left anterior fascicular block  Minimal voltage criteria for LVH, may be normal variant  Nonspecific T wave changes                 Results from last 7 days   Lab Units 09/22/20  1147 09/22/20  0554 09/21/20  1724   WBC Thousand/uL  --  5 32 6 76   HEMOGLOBIN g/dL  --  15 7 17 4*   HEMATOCRIT %  --  46 2 50 2*   PLATELETS Thousands/uL 289 315 348   NEUTROS ABS Thousands/µL  --  2 47 3 37         Results from last 7 days   Lab Units 09/22/20  0554 09/21/20  1724   SODIUM mmol/L 140 142   POTASSIUM mmol/L 3 6 3 4*   CHLORIDE mmol/L 107 106   CO2 mmol/L 27 26   ANION GAP mmol/L 6 10   BUN mg/dL 12 9   CREATININE mg/dL 0 78 0 76   EGFR ml/min/1 73sq m 105 106   CALCIUM mg/dL 8 9 9 4   MAGNESIUM mg/dL  --  2 4   PHOSPHORUS mg/dL  --  3 3     Results from last 7 days   Lab Units 09/22/20  0554 09/21/20  1724   AST U/L 19 26   ALT U/L 19 23   ALK PHOS U/L 37 9 48 9   TOTAL PROTEIN g/dL 5 9* 7 3   ALBUMIN g/dL 3 6 4 5   TOTAL BILIRUBIN mg/dL 0 46 0 52         Results from last 7 days   Lab Units 09/22/20  0554 09/21/20  1724   GLUCOSE RANDOM mg/dL 107 100         Results from last 7 days   Lab Units 09/21/20  1724   HEMOGLOBIN A1C % 5 7*   EAG mg/dl 117       Results from last 7 days   Lab Units 09/22/20  1555 09/22/20  1148 09/22/20  0554 09/22/20  0035 09/21/20  1724   TROPONIN I ng/mL <0 02 <0 02 <0 03 <0 03 <0 03     Results from last 7 days   Lab Units 09/22/20  1147   D-DIMER QUANTITATIVE ug/ml FEU 0 65*     Results from last 7 days   Lab Units 09/21/20  1724   PROTIME seconds 10 5   INR  0 99   PTT seconds 29       Results from last 7 days   Lab Units 09/21/20  1724   LIPASE u/L 21     Results from last 7 days   Lab Units 09/22/20  0040   AMPH/METH  Negative   BARBITURATE UR  Negative   BENZODIAZEPINE UR  Negative   COCAINE UR  Negative   METHADONE URINE  Negative   OPIATE UR  Negative   PCP UR  Negative   THC UR  Positive*     Results from last 7 days   Lab Units 09/21/20  1724   ETHANOL LVL mg/dL 275 2*     ED Treatment:   Medication Administration - No Administrations Displayed (No Start Event Found)     None        Past Medical History:   Diagnosis    Addiction [facility] drug ([facility] )    Alcohol abuse    Alcoholism ([place] [address] )    Anxiety    Arthritis    neck    [facility]    Depression    Lung disease    31+ yr smoking hx    Sleep difficulties    Substance abuse ([facility] )    Tobacco abuse     Present on Admission:   Bipolar 2 disorder ([facility] ) and MDD    Tobacco abuse   Alcohol use disorder, severe, dependence ([facility] )   MDD (major depressive disorder), recurrent episode, moderate ([facility] )   Chest pain      Admitting Diagnosis: Alcohol use disorder, severe, dependence ([facility] ) [F10 20]     Age/Sex: 47 y o  male     Admission Orders:      aspirin, 81 mg, Oral, Daily  cyanocobalamin, 500 mcg, Oral, Daily  folic acid, 1 mg, Oral, Daily  gabapentin, 300 mg, Oral, TID  hydrALAZINE, 25 mg, Oral, Q8H GUILLERMINA  naltrexone, 50 mg, Oral, Daily  nicotine, 1 patch, Transdermal, Daily  pantoprazole, 40 mg, Oral, BID AC  potassium chloride, 20 mEq, Oral, Daily  sertraline, 50 mg, Oral, Daily  sucralfate, 1 g, Oral, Q6H [address]  thiamine, 100 mg, Oral, Daily      Continuous IV Infusions:     PRN Meds:  acetaminophen, 650 mg, Oral, Q6H PRN  nitroglycerin, 0 4 mg, Sublingual, Q5 Min PRN  ondansetron, 4 mg, Intravenous, Q6H PRN  traMADol, 50 mg, Oral, Q6H PRN  traZODone, 100 mg, Oral, HS PRN        IP CONSULT TO CARDIOLOGY  IP CONSULT TO CASE MANAGEMENT  IP CONSULT TO PSYCHIATRY    Network Utilization Review Department  [email or web address]  ATTENTION: Please call with any questions or concerns to [phone] and carefully listen to the prompts so that you are directed to the right person  All voicemails are confidential   Antoinetteericka Hughesdi all requests for admission clinical reviews, approved or denied determinations and any other requests to dedicated fax number below belonging to the campus where the patient is receiving treatment   List of dedicated fax numbers for the Facilities:  [address] DENIALS (Administrative/Medical Necessity) [phone]   [address] (Maternity/NICU/Pediatrics) [phone]   Annia Flatten [phone]   Junito Chávez [phone]   Nat Ballard [phone]   Vik Anglin [phone]   [address] [place] [address] [phone]   [facility]  [phone]   [address] [facility] [address] [facility]  [address] [place] [address] [address] [facility] [phone]

## 2020-09-23 NOTE — ASSESSMENT & PLAN NOTE
Continue trazodone 100 mg HS  · Zoloft 50 mg daily    · Apprecaite psychiatry consult since the patient is requesting inpatient alcohol rehab  · Recommending continue home medical management

## 2020-09-23 NOTE — ASSESSMENT & PLAN NOTE
· Patient initially admitted in [facility] for alcohol dependence and withdrawal symptoms  Then complaint of chest discomfort despite negative troponins however with incidental finding of possible EKG changes and biphasic T-waves  Patient transferred to [facility] for further monitoring and Cardiology consult  Patient reported that his chest pain has been present on and off for the past 1 week  · Troponin negative x 5  · Hemoglobin A1c 5 7   · Lipid panel: cholesterol 164, , HDL 57, LDL 82  · D-dimer mildly elevated at 0 65 and patient with complaints of pleuritic chest pain; consider CTA chest if patient becomes hypoxic or tachycardic  · Echo: Systolic function was normal by visual assessment  EF 55 %  There were no regional wall motion abnormalities  Grade 1 diastolic dysfunction  · Apprecaite Cardiology recommendations  · S/p exercise stress test today, results pending   · Patient with severe chest pain after returning from stress test this afternoon, relieved with SL nitro x 1  EKG unchanged from prior  Troponin negative; follow-up repeat

## 2020-09-23 NOTE — ASSESSMENT & PLAN NOTE
ZENON protocol  · Continue vitamin, folate, B12   · Patient was evaluated by host in [facility]  · Patient is requesting a inpatient rehab; CM following   · Continue home dose naltrexone

## 2020-09-23 NOTE — UTILIZATION REVIEW
Notification of Inpatient Admission/Inpatient Authorization Request   This is a Notification of Inpatient Admission for [address]  Be advised that this patient was admitted to our facility under Inpatient Status  Contact Patti Warner at [phone] for additional admission information  [facility] DEPT  DEDICATED -[phone]  Patient Name:   Linette Oneil   YOB: 1970       [address]   [address]:   Mk Hacektt  Tax ID: [id number]  NPI: [id number] Attending Provider/NPI:  Phone:  Address: Rodney Ramon [[id number]]  [phone]  Same as VEE/Nghia Greco 1106 of Service Code: 24 Place of Service Name:  [address] [place] [address]   Start Date: 9/22/20 7904 Discharge Date & Time: No discharge date for patient encounter  Type of Admission: Inpatient Status Discharge Disposition (if discharged): [address] [place] [address]   Patient Diagnoses: Alcohol use disorder, severe, dependence ([facility] [address] ) [F10 20]     Orders: Admission Orders (From admission, onward)     Ordered        09/22/20 0852  Inpatient Admission  Once                    Assigned Utilization Review Contact: Patti Warner  Utilization ,   Network Utilization Review Department  Phone: [phone]; Fax [phone]   Email: [email or web address]   ATTENTION PAYERS: Please call the assigned Utilization  directly with any questions or concerns ALL voicemails in the department are confidential  Send all requests for admission clinical reviews, approved or denied determinations and any other requests to dedicated fax number belonging to the campus where the patient is receiving treatment

## 2020-09-23 NOTE — SOCIAL WORK
Pt is not a 30 day readmission  Pt is not a bundle  CM met pt at bedside, introduce self and made aware of CM role at dc  Pt's primary contact is his brother Lindsay Dodge- [phone]  Pt reported that he was renting a portion of a house  There are 2 other tenants renting at the said house  Pt reported that he will not be going back to his residence when dc  Pt stated that he will find an apt or another rental residence when dc  Pt was IPTA with ADL's, drive and work odd jobs  No DME  Pharmacy is [facility] in [place]  PCP is dr iLborio Cole  Pt has multiple alc rehab admissions, recent one was last August 2020 with [facility] in [place]  Pt has hx with IP psych tx, last admission was August 2020 at [facility]  Pt denies hx with HHc, STR and drug tx  CM offered and explained HOST referral to pt and pt was agreeable and verbalize understanding  VM from Uziel Martines with HOST received, called back and left VM  Information release form signed by pt and placed in MR bin  CM reviewed d/c planning process including the following: identifying help at home, patient preference for d/c planning needs, Discharge Lounge, [facility] Meds to Bed program, availability of treatment team to discuss questions or concerns patient and/or family may have regarding understanding medications and recognizing signs and symptoms once discharged  CM also encouraged patient to follow up with all recommended appointments after discharge  Patient advised of importance for patient and family to participate in managing patients medical well being

## 2020-09-23 NOTE — ASSESSMENT & PLAN NOTE
Patient initially admitted in [facility] for alcohol dependence and withdrawal symptoms  Then complaint of chest discomfort despite negative troponins however with incidental finding of possible EKG changes and biphasic T-waves  Patient transferred to [facility] for further monitoring and Cardiology consult  Patient reported that his chest pain has been present on and off for the past 1 week  · Troponin negative x 5  · Hemoglobin A1c 5 7   · Lipid panel: cholesterol 164, , HDL 57, LDL 82  · D-dimer mildly elevated at 0 65  CTA was not done to r/o PE however patient is not tachycardic, nor hypoxic and CP has resolved  · Echo: Systolic function was normal by visual assessment  EF 55 %  There were no regional wall motion abnormalities  Grade 1 diastolic dysfunction  · S/p exercise stress test which was negative   · Cardiology input appreciated   Suspect GI related

## 2020-09-23 NOTE — ASSESSMENT & PLAN NOTE
Not on antihypertensives PTA   · Started on hydralazine 50 mg q8h per cardiology   · Monitor routinely

## 2020-09-23 NOTE — ASSESSMENT & PLAN NOTE
· Not on antihypertensives PTA   · Started on hydralazine 50 mg q8h per cardiology   · Monitor routinely

## 2020-09-23 NOTE — ASSESSMENT & PLAN NOTE
· Continue trazodone 100 mg HS  · Zoloft 50 mg daily    · Apprecaite psychiatry consult since the patient is requesting inpatient alcohol rehab  · Recommending continue home medical management

## 2020-09-24 VITALS
TEMPERATURE: 98.4 F | HEIGHT: 72 IN | OXYGEN SATURATION: 95 % | DIASTOLIC BLOOD PRESSURE: 97 MMHG | SYSTOLIC BLOOD PRESSURE: 142 MMHG | RESPIRATION RATE: 16 BRPM | BODY MASS INDEX: 31.15 KG/M2 | WEIGHT: 230 LBS | HEART RATE: 75 BPM

## 2020-09-24 PROBLEM — R39.15 URINARY URGENCY: Status: ACTIVE | Noted: 2020-09-24

## 2020-09-24 LAB
ATRIAL RATE: 88 BPM
P AXIS: 52 DEGREES
PR INTERVAL: 166 MS
QRS AXIS: -70 DEGREES
QRSD INTERVAL: 119 MS
QT INTERVAL: 412 MS
QTC INTERVAL: 502 MS
SARS-COV-2 RNA RESP QL NAA+PROBE: NEGATIVE
T WAVE AXIS: 63 DEGREES
VENTRICULAR RATE: 89 BPM

## 2020-09-24 PROCEDURE — 93010 ELECTROCARDIOGRAM REPORT: CPT | Performed by: INTERNAL MEDICINE

## 2020-09-24 PROCEDURE — 99217 PR OBSERVATION CARE DISCHARGE MANAGEMENT: CPT | Performed by: INTERNAL MEDICINE

## 2020-09-24 PROCEDURE — 99232 SBSQ HOSP IP/OBS MODERATE 35: CPT | Performed by: INTERNAL MEDICINE

## 2020-09-24 PROCEDURE — U0003 INFECTIOUS AGENT DETECTION BY NUCLEIC ACID (DNA OR RNA); SEVERE ACUTE RESPIRATORY SYNDROME CORONAVIRUS 2 (SARS-COV-2) (CORONAVIRUS DISEASE [COVID-19]), AMPLIFIED PROBE TECHNIQUE, MAKING USE OF HIGH THROUGHPUT TECHNOLOGIES AS DESCRIBED BY CMS-2020-01-R: HCPCS | Performed by: INTERNAL MEDICINE

## 2020-09-24 RX ORDER — TAMSULOSIN HYDROCHLORIDE 0.4 MG/1
0.4 CAPSULE ORAL
Qty: 30 CAPSULE | Refills: 0 | Status: SHIPPED | OUTPATIENT
Start: 2020-09-24

## 2020-09-24 RX ORDER — HYDRALAZINE HYDROCHLORIDE 10 MG/1
10 TABLET, FILM COATED ORAL EVERY 8 HOURS SCHEDULED
Status: DISCONTINUED | OUTPATIENT
Start: 2020-09-24 | End: 2020-09-24 | Stop reason: HOSPADM

## 2020-09-24 RX ORDER — PRAVASTATIN SODIUM 40 MG
40 TABLET ORAL
Status: DISCONTINUED | OUTPATIENT
Start: 2020-09-24 | End: 2020-09-24 | Stop reason: HOSPADM

## 2020-09-24 RX ORDER — PANTOPRAZOLE SODIUM 40 MG/1
40 TABLET, DELAYED RELEASE ORAL
Qty: 60 TABLET | Refills: 0 | Status: SHIPPED | OUTPATIENT
Start: 2020-09-24 | End: 2021-05-16 | Stop reason: HOSPADM

## 2020-09-24 RX ORDER — TAMSULOSIN HYDROCHLORIDE 0.4 MG/1
0.4 CAPSULE ORAL
Status: DISCONTINUED | OUTPATIENT
Start: 2020-09-24 | End: 2020-09-24 | Stop reason: HOSPADM

## 2020-09-24 RX ORDER — HYDRALAZINE HYDROCHLORIDE 10 MG/1
10 TABLET, FILM COATED ORAL EVERY 8 HOURS SCHEDULED
Qty: 90 TABLET | Refills: 0 | Status: SHIPPED | OUTPATIENT
Start: 2020-09-24 | End: 2021-05-16 | Stop reason: HOSPADM

## 2020-09-24 RX ADMIN — HYDRALAZINE HYDROCHLORIDE 25 MG: 25 TABLET, FILM COATED ORAL at 06:27

## 2020-09-24 RX ADMIN — PANTOPRAZOLE SODIUM 40 MG: 40 TABLET, DELAYED RELEASE ORAL at 06:27

## 2020-09-24 RX ADMIN — SUCRALFATE 1 G: 1 TABLET ORAL at 06:27

## 2020-09-24 RX ADMIN — SERTRALINE HYDROCHLORIDE 50 MG: 50 TABLET ORAL at 09:26

## 2020-09-24 RX ADMIN — METOPROLOL TARTRATE 25 MG: 25 TABLET, FILM COATED ORAL at 09:26

## 2020-09-24 RX ADMIN — POTASSIUM CHLORIDE 20 MEQ: 1500 TABLET, EXTENDED RELEASE ORAL at 09:26

## 2020-09-24 RX ADMIN — ASPIRIN 81 MG CHEWABLE TABLET 81 MG: 81 TABLET CHEWABLE at 09:26

## 2020-09-24 RX ADMIN — NICOTINE 1 PATCH: 14 PATCH TRANSDERMAL at 09:27

## 2020-09-24 RX ADMIN — GUAIFENESIN 600 MG: 600 TABLET, EXTENDED RELEASE ORAL at 09:26

## 2020-09-24 RX ADMIN — GABAPENTIN 300 MG: 300 CAPSULE ORAL at 09:26

## 2020-09-24 RX ADMIN — CYANOCOBALAMIN TAB 500 MCG 500 MCG: 500 TAB at 09:26

## 2020-09-24 RX ADMIN — THIAMINE HCL TAB 100 MG 100 MG: 100 TAB at 09:26

## 2020-09-24 RX ADMIN — SUCRALFATE 1 G: 1 TABLET ORAL at 12:52

## 2020-09-24 RX ADMIN — NALTREXONE HYDROCHLORIDE 50 MG: 50 TABLET, FILM COATED ORAL at 09:28

## 2020-09-24 RX ADMIN — HYDRALAZINE HYDROCHLORIDE 10 MG: 10 TABLET, FILM COATED ORAL at 14:29

## 2020-09-24 RX ADMIN — FOLIC ACID 1 MG: 1 TABLET ORAL at 09:26

## 2020-09-24 NOTE — DISCHARGE INSTRUCTIONS
Abuse of Alcohol   WHAT YOU NEED TO KNOW:   · Alcohol abuse is unhealthy drinking behavior  You may drink too much at one time once a week, or continue to drink too much daily  You continue to drink even though it causes problems  The problems can be alcohol related legal problems, or problems with work or relationships with family  · If you drink too much at one time, you are binge drinking  Binge drinking is when you have a large amount of alcohol in a short time  Your blood alcohol concentrations (NANO) goes above 0 08 g/dLlevel during binge drinking  For men, this usually happens with more than 4 drinks in 2 hours  For women, it is more than 3 drinks in 2 hours  A drink is 12 ounces of beer, 4 ounces of wine, or 1½ ounces of liquor  DISCHARGE INSTRUCTIONS:   Call 911 for the following:   · You have sudden chest pain or trouble breathing  · You have a seizure or have shaking or trembling  · You were in an accident because of alcohol  Seek care immediately if:   · You want to harm yourself or others  · You have hallucinations (you see or hear things that are not real)  · You cannot stop vomiting or you vomit blood  Contact your healthcare provider if:   · You need help to stop drinking alcohol  · You have questions or concerns about your condition or care  Medicines:   · Vitamin supplements  may be given to treat low vitamin levels  Alcohol can make it hard for your body to absorb enough vitamins such as B1  Vitamin supplements may also be given to prevent alcohol related brain damage  · Take your medicine as directed  Contact your healthcare provider if you think your medicine is not helping or if you have side effects  Tell him or her if you are allergic to any medicine  Keep a list of the medicines, vitamins, and herbs you take  Include the amounts, and when and why you take them  Bring the list or the pill bottles to follow-up visits   Carry your medicine list with you in case of an emergency  Treatments or therapies you may need:   · Detoxification (detox) and withdrawal  is a program that helps you to safely get alcohol out of your body  Detox can also help get rid of the physical need to drink  Healthcare providers monitor the physical symptoms of withdrawal  They may give you medicines to help decrease nausea, dehydration, and seizures  Healthcare providers will also monitor your blood pressure, heart and breathing rates, and your temperature  Symptoms of anxiety, depression, and suicidal thoughts are also monitored and managed during detox  Healthcare providers may give you medicines for these symptoms and therapy sessions will be available to you  Detox is usually done at a detox center or in a hospital  Healthcare providers do not recommend that you try to detox at home or by yourself  Withdrawal symptoms may become life-threatening  The center can help you find 12 step programs or an individual therapist to help with emotional support after detox  · Inpatient and outpatient treatment  focus on your personal needs to help you stop drinking  Treatment helps you understand the reasons you abuse alcohol  Counselors and therapists provide you with support and help you find ways to cope instead of drinking  You may need inpatient treatment to provide a controlled environment  You may need outpatient treatment after your inpatient treatment is complete  · Alcohol aversion therapy  takes away the desire to drink by causing a negative reaction when you drink  Healthcare providers may give you medicines that cause nausea and vomiting when you drink alcohol  They may instead give you a medicine that decreases your urge to drink alcohol  These medicines are used to help you stop drinking or reduce the amount you drink  They can also help you avoid relapse  Follow up with your healthcare provider as directed:  Write down your questions so you remember to ask them during your visits    Avoid alcohol:  You should stop drinking entirely  Alcohol can damage your brain, heart, and liver  It also increases your risk for injury, high blood pressure, and certain types of cancer  Alcohol is dangerous when you combine it with certain medicines  Do not drive if you drink alcohol:  Make sure someone who has not been drinking can help you get home  Get support:  Most people need support to stop drinking alcohol  Mental health providers, support groups, rehabilitation centers, and your healthcare provider can provide support  For more information:   · Alcoholics Anonymous  Web Address: http://[employer]/  · Substance Abuse and [address] , [address] [place]  Web Address: https://[employer]/  © 2017 [address] Information is for End User's use only and may not be sold, redistributed or otherwise used for commercial purposes  All illustrations and images included in CareNotes® are the copyrighted property of A D A M , Inc  or Dhiraj Simon  The above information is an  only  It is not intended as medical advice for individual conditions or treatments  Talk to your doctor, nurse or pharmacist before following any medical regimen to see if it is safe and effective for you

## 2020-09-24 NOTE — PROGRESS NOTES
Cardiology Progress Note - Cori Burciaga 47 y o  male MRN: [id number]    Unit/Bed#: [facility] 721-01 Encounter: [id number]    Assessment  51yo male with history of alcohol abuse and bipolar disorder with no prior cardiac history admitted with recurring episodes of Sharp 9/10 central chest pain only, not on exertion but when he lies down in the night, awakens him from sleep sometimes and resolving spontaneously or after he stands and ambulates or with sips of water with associated nausea and vomiting  Per patient, he has abused alcohol on a daily basis since July  Plan  1  Recurrent chest pains  -patient has had recurring substernal chest pain most likely not cardiac in origin  - troponin negative x 5  - A1c 5 7, Lipid panel: cholesterol 164, , HDL 57, LDL 82  - Echo: Systolic function was normal by visual assessment  EF 55 %  There were no regional wall motion abnormalities  Grade 1 diastolic dysfunction   - Stress test done yesterday was negative, echo was normal with ejection fraction of 55% and no regional wall motion abnormalities  - recurrent chest pain episodes are most likely GI in origin, due to GERD or diffuse esophageal spasms or some form of esophagitis  However, Patient has been on pantoprazole 40mg bid since Tuesday, and had another episode yesterday 1/2hr after stress test so maybe LUIS FELIPE  PE is unlikely but possible  D-dimer was 0 65  patient had previous sats of 86% on 9/22 and 88% on 9/23  Consider CT angio to r/o PE   - calculated ASCVD risk is 6 4%, moderate statin recommended, started on pravastatin 40 mg daily especially considering risk factors  - Will allow primary team to continue workup/management    2  HTN  - systolic BP has become stable, diastolic still remains high  BP today is  - will decrease hydralazine dose and increase metoprolol dose to 25 mg b i d   - continue to monitor BP  - cardiology will consider signing off pending stabilization of BP  Troponin negative x 5      Subjective:   Patient was seen and evaluated at bedside  No event overnight  No new complaints this morning  Patient had an episode of chest pain yesterday that started a few minutes after the stress test   Cardiac testing at this time remains negative chest pain is most likely GI in origin  Patient denies SOB, palpitations, dizziness, headache, visual disturbances or leg swelling  Objective  VS: Blood pressure 134/98, pulse 84, temperature 98 4 °F (36 9 °C), resp  rate 18, height 6' (1 829 m), weight 104 kg (230 lb), SpO2 93 %  , Body mass index is 31 19 kg/m² ,   Orthostatic Blood Pressures      Most Recent Value   Blood Pressure  134/98 filed at 09/24/2020 5786   Patient Position - Orthostatic VS  Lying filed at 09/23/2020 0254        Physical Exam  Vitals signs reviewed  Constitutional:       General: He is not in acute distress  Appearance: He is not ill-appearing  HENT:      Head: Normocephalic and atraumatic  Right Ear: External ear normal       Left Ear: External ear normal       Nose: Nose normal    Eyes:      Conjunctiva/sclera: Conjunctivae normal    Neck:      Musculoskeletal: Normal range of motion  Vascular: No carotid bruit  Cardiovascular:      Rate and Rhythm: Normal rate and regular rhythm  Heart sounds: Normal heart sounds  No murmur  Pulmonary:      Effort: Pulmonary effort is normal  No respiratory distress  Breath sounds: No wheezing  Abdominal:      General: Bowel sounds are normal       Palpations: Abdomen is soft  Tenderness: There is no abdominal tenderness  Musculoskeletal:      Right lower leg: No edema  Left lower leg: No edema  Skin:     General: Skin is warm  Findings: No rash  Neurological:      General: No focal deficit present  Mental Status: He is alert and oriented to person, place, and time     Psychiatric:         Behavior: Behavior normal        Active Meds    Current Facility-Administered Medications:   acetaminophen (TYLENOL) tablet 650 mg, 650 mg, Oral, Q6H PRN, Mellody Nageotte, MD, 650 mg at 09/22/20 1029    aspirin chewable tablet 81 mg, 81 mg, Oral, Daily, Nandini Perez-Luis, DO, 81 mg at 09/24/20 5155    cyanocobalamin (VITAMIN B-12) tablet 500 mcg, 500 mcg, Oral, Daily, Mellody Nageotte, MD, 500 mcg at 01/38/11 1507    folic acid (FOLVITE) tablet 1 mg, 1 mg, Oral, Daily, Mellody Nageotte, MD, 1 mg at 09/24/20 7594    gabapentin (NEURONTIN) capsule 300 mg, 300 mg, Oral, TID, Mellody Nageotte, MD, 300 mg at 09/24/20 0926    guaiFENesin (MUCINEX) 12 hr tablet 600 mg, 600 mg, Oral, Q12H [address], Nury Mendez PA-C, 600 mg at 09/24/20 7494    hydrALAZINE (APRESOLINE) tablet 10 mg, 10 mg, Oral, Q8H GUILLERMINA, Nandini Perez-Luis, DO    metoprolol tartrate (LOPRESSOR) tablet 25 mg, 25 mg, Oral, Q12H GUILLERMINA, Nandini Perez-Luis, DO, 25 mg at 09/24/20 0926    naltrexone (REVIA) tablet 50 mg, 50 mg, Oral, Daily, Mellody Nageotte, MD, 50 mg at 09/24/20 0928    nicotine (NICODERM CQ) 14 mg/24hr TD 24 hr patch 1 patch, 1 patch, Transdermal, Daily, Mellody Nageotte, MD, 1 patch at 09/24/20 0927    nitroglycerin (NITROSTAT) SL tablet 0 4 mg, 0 4 mg, Sublingual, Q5 Min PRN, Mellody Nageotte, MD, 0 4 mg at 09/23/20 1407    ondansetron (ZOFRAN) injection 4 mg, 4 mg, Intravenous, Q6H PRN, Mellody Nageotte, MD    pantoprazole (PROTONIX) EC tablet 40 mg, 40 mg, Oral, BID AC, Mellody Nageotte, MD, 40 mg at 09/24/20 8191    potassium chloride (K-DUR,KLOR-CON) CR tablet 20 mEq, 20 mEq, Oral, Daily, Mellody Nageotte, MD, 20 mEq at 09/24/20 3686    pravastatin (PRAVACHOL) tablet 40 mg, 40 mg, Oral, Daily With Dinner, Nandini Kwan DO    sertraline (ZOLOFT) tablet 50 mg, 50 mg, Oral, Daily, Mellody Nageotte, MD, 50 mg at 09/24/20 0926    sucralfate (CARAFATE) tablet 1 g, 1 g, Oral, Q6H [address], Mellody Nageotte, MD, 1 g at 09/24/20 8090    tamsulosin (FLOMAX) capsule 0 4 mg, 0 4 mg, Oral, Daily With Dinner, Jenna Ribera PA-C    thiamine (VITAMIN B1) tablet 100 mg, 100 mg, Oral, Daily, Shruti Juares MD, 100 mg at 09/24/20 0926    traMADol (ULTRAM) tablet 50 mg, 50 mg, Oral, Q6H PRN, Shruti Juares MD    traZODone (DESYREL) tablet 100 mg, 100 mg, Oral, HS PRN, Shruti Juares MD, 100 mg at 09/23/20 2154    Labs & Results  Lab Results   Component Value Date    TROPONINI <0 02 09/23/2020    TROPONINI <0 02 09/23/2020    TROPONINI <0 02 09/22/2020     Lab Results   Component Value Date    CALCIUM 8 9 09/22/2020    K 3 6 09/22/2020    CO2 27 09/22/2020     09/22/2020    BUN 12 09/22/2020    CREATININE 0 78 09/22/2020     Lab Results   Component Value Date    WBC 5 32 09/22/2020    HGB 15 7 09/22/2020    HCT 46 2 09/22/2020    MCV 98 09/22/2020     09/22/2020     Results from last 7 days   Lab Units 09/21/20  1724   INR  0 99     No results found for: CHOL  Lab Results   Component Value Date    HDL 57 09/23/2020    HDL 50 08/07/2020     Lab Results   Component Value Date    LDLCALC 82 09/23/2020    LDLCALC 78 08/07/2020     Lab Results   Component Value Date    TRIG 126 09/23/2020    TRIG 188 (H) 08/07/2020     Lab Results   Component Value Date    ALT 19 09/22/2020    AST 19 09/22/2020

## 2020-09-24 NOTE — DISCHARGE SUMMARY
Discharge- Lrudes Ace 1970, 47 y o  male MRN: [id number]    [facility] Encounter: [id number]    Primary Care Provider: Jose Juan Orr DO   Date and time admitted to hospital: 9/22/2020  8:49 AM      * Chest pain  Assessment & Plan  Patient initially admitted in [facility] for alcohol dependence and withdrawal symptoms  Then complaint of chest discomfort despite negative troponins however with incidental finding of possible EKG changes and biphasic T-waves  Patient transferred to [facility] for further monitoring and Cardiology consult  Patient reported that his chest pain has been present on and off for the past 1 week  · Troponin negative x 5  · Hemoglobin A1c 5 7   · Lipid panel: cholesterol 164, , HDL 57, LDL 82  · D-dimer mildly elevated at 0 65  CTA was not done to r/o PE however patient is not tachycardic, nor hypoxic and CP has resolved  · Echo: Systolic function was normal by visual assessment  EF 55 %  There were no regional wall motion abnormalities  Grade 1 diastolic dysfunction  · S/p exercise stress test which was negative   · Cardiology input appreciated  Suspect GI related     Alcohol use disorder, severe, dependence ([facility] [address] )  Assessment & Plan  No evidence of withdrawal  Last drink Monday 9/21  · Continue vitamin, folate, B12   · Patient was evaluated by host in [facility]  · Patient was requesting a inpatient rehab however then changed his mind   · Continue home dose naltrexone     Bipolar 2 disorder ([facility] [address] ) and MDD   Assessment & Plan  Continue trazodone 100 mg HS    · Psych saw patient  · Refusing inpatient rehab now   · Recommending continue home medical management     Urinary urgency  Assessment & Plan  Symptoms sound consistent with BPH  · Trial flomax  · Has outpatient urology appointment next month     Essential hypertension  Assessment & Plan  Not on antihypertensives PTA   · Started on hydralazine 50 mg q8h per cardiology   · Monitor routinely MDD (major depressive disorder), recurrent episode, moderate (HCC)  Assessment & Plan  · Continue Desyrel and Zoloft as noted  Tobacco abuse  Assessment & Plan  · Nicotine replacement therapy  Discharging Physician / Practitioner: Carri Carmen PA-C  PCP: Benjamin Lynn DO  Admission Date:   Admission Orders (From admission, onward)     Ordered        09/22/20 0852  Inpatient Admission  Once                   Discharge Date: 09/24/20    Resolved Problems  Date Reviewed: 9/24/2020    None          Consultations During Hospital Stay:  · Cardiology    Procedures Performed:   · Stress test: negative  · Echo: EF 55%, G1DD, trace valvular abnormalities  · CXR: negative    Significant Findings / Test Results:   · none    Incidental Findings:   · Very mildly elevated d-dimer at 0 65     Test Results Pending at Discharge (will require follow up):   · none     Outpatient Tests Requested:  · F/u PCP  · Consider outpatient GI f/u for EGD    Complications:  none    Reason for Admission: chest pain    Hospital Course: Erik Lim is a 47 y o  male patient with past medical history of daily alcohol abuse, recurrent CP, HTN, MDD, tobacco abuse, urinary urgency who originally presented to the hospital on 9/22/2020 due to chest pain  Cardiac workup was negative including stress test   Suspect related to GI issues, started on a PPI and should follow-up outpatient with GI for consideration of EGD  He needs to stop drinking and smoking  Will patient initially was agreeable to inpatient rehab and had a bed at a facility, however then changed his mind and decided to go home instead  Please see above list of diagnoses and related plan for additional information       Condition at Discharge: stable     Discharge Day Visit / Exam:     * Please refer to separate progress note for these details *    Discussion with Family: pt    Discharge instructions/Information to patient and family:   See after visit summary for information provided to patient and family  Provisions for Follow-Up Care:  See after visit summary for information related to follow-up care and any pertinent home health orders  Disposition:     Home    For Discharges to [facility] SNF:   · Not Applicable to this Patient - Not Applicable to this Patient    Planned Readmission: no     Discharge Statement:  I spent 34 minutes discharging the patient  This time was spent on the day of discharge  I had direct contact with the patient on the day of discharge  Greater than 50% of the total time was spent examining patient, answering all patient questions, arranging and discussing plan of care with patient as well as directly providing post-discharge instructions  Additional time then spent on discharge activities  Discharge Medications:  See after visit summary for reconciled discharge medications provided to patient and family        ** Please Note: This note has been constructed using a voice recognition system **

## 2020-09-24 NOTE — ASSESSMENT & PLAN NOTE
No evidence of withdrawal  Last drink Monday 9/21  · Continue vitamin, folate, B12   · Patient was evaluated by host in [facility]  · Patient was requesting a inpatient rehab however then changed his mind   · Continue home dose naltrexone

## 2020-09-24 NOTE — SOCIAL WORK
Received phone call from Montse Parada at HOST  Pt has been accepted at [facility] for inpatient alcohol rehab  This CM informed patient of same  Pt is refusing rehab and will be going directly home with family support  Family will transport  **addendum @ 3722  Pt does not have transportation home  Pt signed [employer] waiver  His nurse will arrange [employer] p/u  Pt states that he will be following up with [facility] which is an outpatient alcohol program near his home

## 2020-09-24 NOTE — PROGRESS NOTES
Progress Note - Pearl Ac 1970, 47 y o  male MRN: [id number]    [facility] Encounter: [id number]    Primary Care Provider: Josefina Mohr DO   Date and time admitted to hospital: 9/22/2020  8:49 AM    * Chest pain  Assessment & Plan  Patient initially admitted in [facility] for alcohol dependence and withdrawal symptoms  Then complaint of chest discomfort despite negative troponins however with incidental finding of possible EKG changes and biphasic T-waves  Patient transferred to [facility] for further monitoring and Cardiology consult  Patient reported that his chest pain has been present on and off for the past 1 week  · Troponin negative x 5  · Hemoglobin A1c 5 7   · Lipid panel: cholesterol 164, , HDL 57, LDL 82  · D-dimer mildly elevated at 0 65  CTA was not done to r/o PE however patient is not tachycardic, nor hypoxic and CP has resolved  · Echo: Systolic function was normal by visual assessment  EF 55 %  There were no regional wall motion abnormalities  Grade 1 diastolic dysfunction  · S/p exercise stress test which was negative   · Cardiology input appreciated  Suspect GI related     Alcohol use disorder, severe, dependence ([facility] )  Assessment & Plan  CIWA protocol  · Continue vitamin, folate, B12   · Patient was evaluated by host in [facility]  · Patient is requesting a inpatient rehab; CM following   · Continue home dose naltrexone     Bipolar 2 disorder ([facility] ) and MDD   Assessment & Plan  Continue trazodone 100 mg HS  · Zoloft 50 mg daily    · Apprecaite psychiatry consult since the patient is requesting inpatient alcohol rehab  · Recommending continue home medical management     Urinary urgency  Assessment & Plan  Symptoms sound consistent with BPH  · Trial flomax  · Has outpatient urology appointment next month     Essential hypertension  Assessment & Plan  Not on antihypertensives PTA   · Started on hydralazine 50 mg q8h per cardiology   · Monitor routinely     MDD (major depressive disorder), recurrent episode, moderate (HCC)  Assessment & Plan  · Continue Desyrel and Zoloft as noted  Tobacco abuse  Assessment & Plan  · Nicotine replacement therapy  VTE Pharmacologic Prophylaxis:   Pharmacologic: Pharmacologic VTE Prophylaxis contraindicated due to ambulatory   Mechanical VTE Prophylaxis in Place: No    Patient Centered Rounds: I have performed bedside rounds with nursing staff today  Discussions with Specialists or Other Care Team Provider: case management    Education and Discussions with Family / Patient: patient  Time Spent for Care: 30 minutes  More than 50% of total time spent on counseling and coordination of care as described above  Current Length of Stay: 2 day(s)    Current Patient Status: Inpatient   Certification Statement: The patient will continue to require additional inpatient hospital stay due to medically stable, awaiting etoh rehab     Discharge Plan: med stable, CM working on etoh rehab    Code Status: Level 1 - Full Code      Subjective:   Doing well today  Denies any further CP, SOB, dizziness, lightheadedness  Feels his symptoms are GI related  Ready to quit drinking  Objective:     Vitals:   Temp (24hrs), Av 5 °F (36 9 °C), Min:98 4 °F (36 9 °C), Max:98 7 °F (37 1 °C)    Temp:  [98 4 °F (36 9 °C)-98 7 °F (37 1 °C)] 98 4 °F (36 9 °C)  HR:  [68-95] 84  Resp:  [18] 18  BP: (116-146)/() 134/98  SpO2:  [93 %-96 %] 93 %  Body mass index is 31 19 kg/m²  Input and Output Summary (last 24 hours): Intake/Output Summary (Last 24 hours) at 2020 0938  Last data filed at 2020 0814  Gross per 24 hour   Intake 840 ml   Output    Net 840 ml       Physical Exam:     Physical Exam  Vitals signs and nursing note reviewed  Constitutional:       Appearance: He is obese  Cardiovascular:      Rate and Rhythm: Normal rate and regular rhythm  Pulmonary:      Effort: No respiratory distress  Abdominal:      General: Abdomen is flat  Bowel sounds are normal  There is no distension  Musculoskeletal:      Right lower leg: No edema  Left lower leg: No edema  Neurological:      Mental Status: He is oriented to person, place, and time  Psychiatric:      Comments: Talkative          Additional Data:     Labs:    Results from last 7 days   Lab Units 09/22/20  1147 09/22/20  0554   WBC Thousand/uL  --  5 32   HEMOGLOBIN g/dL  --  15 7   HEMATOCRIT %  --  46 2   PLATELETS Thousands/uL 289 315   NEUTROS PCT %  --  46   LYMPHS PCT %  --  41   MONOS PCT %  --  8   EOS PCT %  --  4     Results from last 7 days   Lab Units 09/22/20  0554   POTASSIUM mmol/L 3 6   CHLORIDE mmol/L 107   CO2 mmol/L 27   BUN mg/dL 12   CREATININE mg/dL 0 78   CALCIUM mg/dL 8 9   ALK PHOS U/L 37 9   ALT U/L 19   AST U/L 19     Results from last 7 days   Lab Units 09/21/20  1724   INR  0 99       * I Have Reviewed All Lab Data Listed Above  * Additional Pertinent Lab Tests Reviewed:  All Labs Within Last 24 Hours Reviewed    Imaging:    Imaging Reports Reviewed Today Include: all  Imaging Personally Reviewed by Myself Includes:  none    Recent Cultures (last 7 days):           Last 24 Hours Medication List:   Current Facility-Administered Medications   Medication Dose Route Frequency Provider Last Rate    acetaminophen  650 mg Oral Q6H PRN Caitlin Abraham MD      aspirin  81 mg Oral Daily Nandini Kwan, DO      cyanocobalamin  500 mcg Oral Daily Caitlin Abraham MD      folic acid  1 mg Oral Daily Caitlin Abraham MD      gabapentin  300 mg Oral TID Caitlin Abraham MD      guaiFENesin  600 mg Oral Q12H [address] Nury Mendez PA-C      hydrALAZINE  10 mg Oral Q8H [address] Nandini Chris-Luis, DO      metoprolol tartrate  25 mg Oral Q12H [address] Nandini Kwan, DO      naltrexone  50 mg Oral Daily Caitlin Abraham MD      nicotine  1 patch Transdermal Daily Caitlin Abraham MD      nitroglycerin  0 4 mg Sublingual Q5 Min PRN Mauricio Rodriguez MD      ondansetron  4 mg Intravenous Q6H PRN Mauricio Rodriguez MD      pantoprazole  40 mg Oral BID [facility] Mauricio Rodriguez MD      potassium chloride  20 mEq Oral Daily Mauricio Rodrgiuez MD      pravastatin  40 mg Oral Daily With Guilherme Taylor DO      sertraline  50 mg Oral Daily Mauricio Rodriguez MD      sucralfate  1 g Oral Q6H [address] Mauricio Rodriguez MD      tamsulosin  0 4 mg Oral Daily With [employer]      thiamine  100 mg Oral Daily Mauricio Rodriguez MD      traMADol  50 mg Oral Q6H PRN Mauricio Rodriguez MD      traZODone  100 mg Oral HS PRN Mauricio Rodriguez MD          Today, Patient Was Seen By: Tommie Alcantara PA-C    ** Please Note: Dictation voice to text software may have been used in the creation of this document   **

## 2020-09-24 NOTE — ASSESSMENT & PLAN NOTE
Continue trazodone 100 mg HS    · Psych saw patient  · Refusing inpatient rehab now   · Recommending continue home medical management

## 2020-09-24 NOTE — ASSESSMENT & PLAN NOTE
Patient initially admitted in [facility] for alcohol dependence and withdrawal symptoms  Then complaint of chest discomfort despite negative troponins however with incidental finding of possible EKG changes and biphasic T-waves  Patient transferred to [facility] for further monitoring and Cardiology consult  Patient reported that his chest pain has been present on and off for the past 1 week  · Troponin negative x 5  · Hemoglobin A1c 5 7   · Lipid panel: cholesterol 164, , HDL 57, LDL 82  · D-dimer mildly elevated at 0 65  CTA was not done to r/o PE however patient is not tachycardic, nor hypoxic and CP has resolved  · Echo: Systolic function was normal by visual assessment  EF 55 %  There were no regional wall motion abnormalities  Grade 1 diastolic dysfunction  · S/p exercise stress test which was negative   · Cardiology input appreciated   Suspect GI related

## 2020-09-25 ENCOUNTER — HOSPITAL ENCOUNTER (EMERGENCY)
Facility: HOSPITAL | Age: 50
Discharge: HOME | End: 2020-09-25
Attending: EMERGENCY MEDICINE
Payer: COMMERCIAL

## 2020-09-25 ENCOUNTER — APPOINTMENT (EMERGENCY)
Dept: RADIOLOGY | Facility: HOSPITAL | Age: 50
End: 2020-09-25
Payer: COMMERCIAL

## 2020-09-25 VITALS
TEMPERATURE: 98.2 F | WEIGHT: 222.44 LBS | BODY MASS INDEX: 30.13 KG/M2 | HEIGHT: 72 IN | HEART RATE: 80 BPM | RESPIRATION RATE: 20 BRPM | DIASTOLIC BLOOD PRESSURE: 84 MMHG | OXYGEN SATURATION: 95 % | SYSTOLIC BLOOD PRESSURE: 132 MMHG

## 2020-09-25 DIAGNOSIS — R55 NEAR SYNCOPE: Primary | ICD-10-CM

## 2020-09-25 LAB
ALBUMIN SERPL BCP-MCNC: 3.7 G/DL (ref 3.5–5)
ALP SERPL-CCNC: 53 U/L (ref 46–116)
ALT SERPL W P-5'-P-CCNC: 37 U/L (ref 12–78)
AMPHETAMINES SERPL QL SCN: NEGATIVE
ANION GAP SERPL CALCULATED.3IONS-SCNC: 11 MMOL/L (ref 4–13)
AST SERPL W P-5'-P-CCNC: 27 U/L (ref 5–45)
ATRIAL RATE: 66 BPM
ATRIAL RATE: 67 BPM
ATRIAL RATE: 71 BPM
BARBITURATES UR QL: NEGATIVE
BASOPHILS # BLD AUTO: 0.06 THOUSANDS/ΜL (ref 0–0.1)
BASOPHILS NFR BLD AUTO: 1 % (ref 0–1)
BENZODIAZ UR QL: NEGATIVE
BILIRUB SERPL-MCNC: 0.47 MG/DL (ref 0.2–1)
BUN SERPL-MCNC: 11 MG/DL (ref 5–25)
CALCIUM SERPL-MCNC: 8.8 MG/DL (ref 8.3–10.1)
CHLORIDE SERPL-SCNC: 100 MMOL/L (ref 100–108)
CO2 SERPL-SCNC: 24 MMOL/L (ref 21–32)
COCAINE UR QL: NEGATIVE
CREAT SERPL-MCNC: 0.9 MG/DL (ref 0.6–1.3)
D DIMER PPP FEU-MCNC: 0.33 UG/ML FEU
EOSINOPHIL # BLD AUTO: 0.35 THOUSAND/ΜL (ref 0–0.61)
EOSINOPHIL NFR BLD AUTO: 5 % (ref 0–6)
ERYTHROCYTE [DISTWIDTH] IN BLOOD BY AUTOMATED COUNT: 13.7 % (ref 11.6–15.1)
ETHANOL EXG-MCNC: 0 MG/DL
GFR SERPL CREATININE-BSD FRML MDRD: 99 ML/MIN/1.73SQ M
GLUCOSE SERPL-MCNC: 152 MG/DL (ref 65–140)
HCT VFR BLD AUTO: 49.9 % (ref 36.5–49.3)
HGB BLD-MCNC: 16.8 G/DL (ref 12–17)
IMM GRANULOCYTES # BLD AUTO: 0.01 THOUSAND/UL (ref 0–0.2)
IMM GRANULOCYTES NFR BLD AUTO: 0 % (ref 0–2)
LIPASE SERPL-CCNC: 125 U/L (ref 73–393)
LYMPHOCYTES # BLD AUTO: 1.86 THOUSANDS/ΜL (ref 0.6–4.47)
LYMPHOCYTES NFR BLD AUTO: 27 % (ref 14–44)
MCH RBC QN AUTO: 33.5 PG (ref 26.8–34.3)
MCHC RBC AUTO-ENTMCNC: 33.7 G/DL (ref 31.4–37.4)
MCV RBC AUTO: 100 FL (ref 82–98)
METHADONE UR QL: NEGATIVE
MONOCYTES # BLD AUTO: 0.53 THOUSAND/ΜL (ref 0.17–1.22)
MONOCYTES NFR BLD AUTO: 8 % (ref 4–12)
NEUTROPHILS # BLD AUTO: 4.04 THOUSANDS/ΜL (ref 1.85–7.62)
NEUTS SEG NFR BLD AUTO: 59 % (ref 43–75)
NRBC BLD AUTO-RTO: 0 /100 WBCS
OPIATES UR QL SCN: NEGATIVE
OXYCODONE+OXYMORPHONE UR QL SCN: NEGATIVE
P AXIS: 76 DEGREES
P AXIS: 77 DEGREES
P AXIS: 79 DEGREES
PCP UR QL: NEGATIVE
PLATELET # BLD AUTO: 313 THOUSANDS/UL (ref 149–390)
PMV BLD AUTO: 8.6 FL (ref 8.9–12.7)
POTASSIUM SERPL-SCNC: 3.4 MMOL/L (ref 3.5–5.3)
PR INTERVAL: 150 MS
PR INTERVAL: 158 MS
PR INTERVAL: 162 MS
PROT SERPL-MCNC: 7.6 G/DL (ref 6.4–8.2)
QRS AXIS: -57 DEGREES
QRS AXIS: -59 DEGREES
QRS AXIS: -60 DEGREES
QRSD INTERVAL: 110 MS
QRSD INTERVAL: 112 MS
QRSD INTERVAL: 112 MS
QT INTERVAL: 374 MS
QT INTERVAL: 384 MS
QT INTERVAL: 390 MS
QTC INTERVAL: 405 MS
QTC INTERVAL: 406 MS
QTC INTERVAL: 408 MS
RBC # BLD AUTO: 5.01 MILLION/UL (ref 3.88–5.62)
SODIUM SERPL-SCNC: 135 MMOL/L (ref 136–145)
T WAVE AXIS: -60 DEGREES
T WAVE AXIS: 10 DEGREES
T WAVE AXIS: 72 DEGREES
THC UR QL: NEGATIVE
TROPONIN I SERPL-MCNC: <0.02 NG/ML
TROPONIN I SERPL-MCNC: <0.02 NG/ML
VENTRICULAR RATE: 66 BPM
VENTRICULAR RATE: 67 BPM
VENTRICULAR RATE: 71 BPM
WBC # BLD AUTO: 6.85 THOUSAND/UL (ref 4.31–10.16)

## 2020-09-25 PROCEDURE — 71045 X-RAY EXAM CHEST 1 VIEW: CPT

## 2020-09-25 PROCEDURE — 99285 EMERGENCY DEPT VISIT HI MDM: CPT

## 2020-09-25 PROCEDURE — 85379 FIBRIN DEGRADATION QUANT: CPT | Performed by: EMERGENCY MEDICINE

## 2020-09-25 PROCEDURE — 36415 COLL VENOUS BLD VENIPUNCTURE: CPT | Performed by: EMERGENCY MEDICINE

## 2020-09-25 PROCEDURE — 93010 ELECTROCARDIOGRAM REPORT: CPT | Performed by: INTERNAL MEDICINE

## 2020-09-25 PROCEDURE — 80307 DRUG TEST PRSMV CHEM ANLYZR: CPT | Performed by: EMERGENCY MEDICINE

## 2020-09-25 PROCEDURE — 99284 EMERGENCY DEPT VISIT MOD MDM: CPT | Performed by: EMERGENCY MEDICINE

## 2020-09-25 PROCEDURE — 85025 COMPLETE CBC W/AUTO DIFF WBC: CPT | Performed by: EMERGENCY MEDICINE

## 2020-09-25 PROCEDURE — 80053 COMPREHEN METABOLIC PANEL: CPT | Performed by: EMERGENCY MEDICINE

## 2020-09-25 PROCEDURE — 93005 ELECTROCARDIOGRAM TRACING: CPT

## 2020-09-25 PROCEDURE — 82075 ASSAY OF BREATH ETHANOL: CPT | Performed by: EMERGENCY MEDICINE

## 2020-09-25 PROCEDURE — 84484 ASSAY OF TROPONIN QUANT: CPT | Performed by: EMERGENCY MEDICINE

## 2020-09-25 PROCEDURE — 83690 ASSAY OF LIPASE: CPT | Performed by: EMERGENCY MEDICINE

## 2020-09-25 RX ORDER — SODIUM CHLORIDE 9 MG/ML
3 INJECTION INTRAVENOUS
Status: DISCONTINUED | OUTPATIENT
Start: 2020-09-25 | End: 2020-09-25 | Stop reason: HOSPADM

## 2020-09-25 NOTE — ED PROVIDER NOTES
History  Chief Complaint   Patient presents with    Weakness - Generalized     Pt reports episode of weakness, eyes "going black" and feeling dizzy/nauseous while at work  States episode lasted for approx 10-15 minutes  Recently dc from [place] after cardiac work up  Denies CP at this time  Denies dizziness, but still c/o generalized weakness and feeling fatigued     48 yo male with h/o EtOH dependence, anxiety, bipolar d/oi, tobacco abuse presents after near syncopal episode just PTA  Reports that he was discharged from hospital yesterday after 4 day stay  During hospitilization received cardiac evaluation, including negative serial CTNI x 5, ECHO (G1DD, EF 50%), cardiac monitoring, and psychiatric evaluation  Pt offered inpatient EtOH rehab was initially amenable but then declined  Was discharged feeling well, reports drinking "two glasses of wine" and "smoking two cigarettes"  Denies other substance use  Reports that he is eating/drinking normally, ate breakfast this morning  Pt was on way to work, sitting in cab of vehicle then felt dizzy, nauseous and "like I was going to black out "  Denies actual LOC/syncope, shaking/incontinence or tongue biting  Denies associated chest pain/pressure, diaphoresis, shortness of breath, changes in vision/speech/motor  Episode not precipitated by position change, head turning or exertion  Pt denies previous similar episodes  Denies feeling in withdrawal            History provided by:  Medical records and patient   used: No    Syncope   Episode history:  Single  Most recent episode:   Today  Timing:  Intermittent  Progression:  Partially resolved  Chronicity:  New  Context: normal activity    Context: not exertion, not standing up and not urination    Witnessed: yes    Relieved by:  Nothing  Worsened by:  Nothing  Ineffective treatments:  None tried  Associated symptoms: dizziness and weakness    Associated symptoms: no chest pain, no diaphoresis, no difficulty breathing, no fever, no focal sensory loss, no focal weakness, no recent fall, no recent injury, no seizures, no shortness of breath, no visual change and no vomiting    Risk factors: no congenital heart disease        Prior to Admission Medications   Prescriptions Last Dose Informant Patient Reported?  Taking?   folic acid (FOLVITE) 1 mg tablet   No No   Sig: Take 1 tablet (1 mg total) by mouth daily   gabapentin (NEURONTIN) 300 mg capsule   No No   Sig: Take 1 capsule (300 mg total) by mouth 3 (three) times a day   hydrALAZINE (APRESOLINE) 10 mg tablet   No No   Sig: Take 1 tablet (10 mg total) by mouth every 8 (eight) hours   metoprolol tartrate (LOPRESSOR) 25 mg tablet   No No   Sig: Take 1 tablet (25 mg total) by mouth every 12 (twelve) hours   naltrexone (REVIA) 50 mg tablet   No No   Sig: Take 1 tablet (50 mg total) by mouth daily   nicotine (NICODERM CQ) 14 mg/24hr TD 24 hr patch   No No   Sig: Place 1 patch on the skin daily   nitroglycerin (NITROSTAT) 0 4 mg SL tablet   No No   Sig: Place 1 tablet (0 4 mg total) under the tongue every 5 (five) minutes as needed for chest pain   pantoprazole (PROTONIX) 40 mg tablet   No No   Sig: Take 1 tablet (40 mg total) by mouth 2 (two) times a day before meals   potassium chloride (K-DUR,KLOR-CON) 20 mEq tablet   No No   Sig: Take 1 tablet (20 mEq total) by mouth daily   sertraline (ZOLOFT) 50 mg tablet   No No   Sig: Take 1 tablet (50 mg total) by mouth daily   tamsulosin (FLOMAX) 0 4 mg   No No   Sig: Take 1 capsule (0 4 mg total) by mouth daily with dinner   thiamine 100 MG tablet   No No   Sig: Take 1 tablet (100 mg total) by mouth daily   traZODone (DESYREL) 100 mg tablet   No No   Sig: Take 1 tablet (100 mg total) by mouth daily at bedtime as needed for sleep      Facility-Administered Medications: None       Past Medical History:   Diagnosis Date    Addiction to drug ([place] [address] )     Alcohol abuse     Alcoholism ([place] [address] )     Anxiety     Arthritis     neck    Bipolar disorder ([facility] )     Depression     Lung disease     31+ yr smoking hx    Sleep difficulties     Substance abuse ([facility] )     Tobacco abuse        Past Surgical History:   Procedure Laterality Date    HERNIA REPAIR      WISDOM TOOTH EXTRACTION         Family History   Problem Relation Age of Onset    Heart disease Mother     Stroke Mother     Stroke Father      I have reviewed and agree with the history as documented  E-Cigarette/Vaping    E-Cigarette Use Never User      E-Cigarette/Vaping Substances    Nicotine No     THC No     CBD No     Flavoring No     Other No     Unknown No      Social History     Tobacco Use    Smoking status: Current Every Day Smoker     Packs/day: 0 50     Years: 35 00     Pack years: 17 50     Types: Cigarettes    Smokeless tobacco: Never Used    Tobacco comment: 1 PPD per [employer]   Substance Use Topics    Alcohol use: Yes     Frequency: 4 or more times a week     Drinks per session: 10 or more     Binge frequency: Daily or almost daily     Comment: 1 bottle of vodka daily    Drug use: Yes     Types: Marijuana       Review of Systems   Constitutional: Negative for diaphoresis and fever  Respiratory: Negative for shortness of breath  Cardiovascular: Positive for syncope  Negative for chest pain  Gastrointestinal: Negative for vomiting  Neurological: Positive for dizziness and weakness  Negative for focal weakness and seizures  All other systems reviewed and are negative  Physical Exam  Physical Exam  Vitals signs and nursing note reviewed  Constitutional:       Appearance: He is well-developed  He is not diaphoretic  HENT:      Head: Normocephalic and atraumatic  Eyes:      Conjunctiva/sclera: Conjunctivae normal    Neck:      Musculoskeletal: Normal range of motion  Cardiovascular:      Rate and Rhythm: Normal rate and regular rhythm  Heart sounds: Normal heart sounds  No murmur     Pulmonary:      Effort: Pulmonary effort is normal  No respiratory distress  Breath sounds: Normal breath sounds  Abdominal:      Palpations: Abdomen is soft  Musculoskeletal: Normal range of motion  General: No deformity  Skin:     General: Skin is warm and dry  Neurological:      Mental Status: He is alert and oriented to person, place, and time  He is not disoriented  GCS: GCS eye subscore is 4  GCS verbal subscore is 5  GCS motor subscore is 6  Cranial Nerves: No cranial nerve deficit  Sensory: No sensory deficit  Coordination: Coordination normal       Gait: Gait normal       Deep Tendon Reflexes: Babinski sign absent on the right side  Babinski sign absent on the left side  Comments: No nystagmus   Psychiatric:         Behavior: Behavior normal          Thought Content: Thought content normal          Judgment: Judgment normal          Vital Signs  ED Triage Vitals [09/25/20 0711]   Temperature Pulse Respirations Blood Pressure SpO2   98 2 °F (36 8 °C) 77 17 160/91 97 %      Temp Source Heart Rate Source Patient Position - Orthostatic VS BP Location FiO2 (%)   Oral Monitor Sitting Right arm --      Pain Score       Worst Possible Pain           Vitals:    09/25/20 0711 09/25/20 0715 09/25/20 0800   BP: 160/91 160/91 135/82   Pulse: 77 78 74   Patient Position - Orthostatic VS: Sitting           Visual Acuity      ED Medications  Medications   sodium chloride (PF) 0 9 % injection 3 mL (has no administration in time range)       Diagnostic Studies  Results Reviewed     Procedure Component Value Units Date/Time    D-Dimer [[id number]] Collected:  09/25/20 0752    Lab Status:   In process Specimen:  Blood from Arm, Left Updated:  09/25/20 0756    Troponin I [[id number]]  (Normal) Collected:  09/25/20 0723    Lab Status:  Final result Specimen:  Blood from Arm, Left Updated:  09/25/20 0750     Troponin I <0 02 ng/mL     Lipase [[id number]]  (Normal) Collected:  09/25/20 0723    Lab Status:  Final result Specimen:  Blood from Arm, Left Updated:  09/25/20 0746     Lipase 125 u/L     Comprehensive metabolic panel [[id number]]  (Abnormal) Collected:  09/25/20 0723    Lab Status:  Final result Specimen:  Blood from Arm, Left Updated:  09/25/20 0746     Sodium 135 mmol/L      Potassium 3 4 mmol/L      Chloride 100 mmol/L      CO2 24 mmol/L      ANION GAP 11 mmol/L      BUN 11 mg/dL      Creatinine 0 90 mg/dL      Glucose 152 mg/dL      Calcium 8 8 mg/dL      AST 27 U/L      ALT 37 U/L      Alkaline Phosphatase 53 U/L      Total Protein 7 6 g/dL      Albumin 3 7 g/dL      Total Bilirubin 0 47 mg/dL      eGFR 99 ml/min/1 73sq m     Narrative:       [place] guidelines for Chronic Kidney Disease (CKD):     Stage 1 with normal or high GFR (GFR > 90 mL/min/1 73 square meters)    Stage 2 Mild CKD (GFR = 60-89 mL/min/1 73 square meters)    Stage 3A Moderate CKD (GFR = 45-59 mL/min/1 73 square meters)    Stage 3B Moderate CKD (GFR = 30-44 mL/min/1 73 square meters)    Stage 4 Severe CKD (GFR = 15-29 mL/min/1 73 square meters)    Stage 5 End Stage CKD (GFR <15 mL/min/1 73 square meters)  Note: GFR calculation is accurate only with a steady state creatinine    CBC and differential [[id number]]  (Abnormal) Collected:  09/25/20 0723    Lab Status:  Final result Specimen:  Blood from Arm, Left Updated:  09/25/20 0736     WBC 6 85 Thousand/uL      RBC 5 01 Million/uL      Hemoglobin 16 8 g/dL      Hematocrit 49 9 %       fL      MCH 33 5 pg      MCHC 33 7 g/dL      RDW 13 7 %      MPV 8 6 fL      Platelets 023 Thousands/uL      nRBC 0 /100 WBCs      Neutrophils Relative 59 %      Immat GRANS % 0 %      Lymphocytes Relative 27 %      Monocytes Relative 8 %      Eosinophils Relative 5 %      Basophils Relative 1 %      Neutrophils Absolute 4 04 Thousands/µL      Immature Grans Absolute 0 01 Thousand/uL      Lymphocytes Absolute 1 86 Thousands/µL      Monocytes Absolute 0 53 Thousand/µL Eosinophils Absolute 0 35 Thousand/µL      Basophils Absolute 0 06 Thousands/µL     POCT alcohol breath test [[id number]]  (Normal) Resulted:  09/25/20 0725    Lab Status:  Final result Updated:  09/25/20 0726     EXTBreath Alcohol 0 00                 X-ray chest 1 view portable    (Results Pending)              Procedures  ECG 12 Lead Documentation Only    Date/Time: 9/25/2020 8:18 AM  Performed by: Priya Doss MD  Authorized by: Priya Doss MD     Indications / Diagnosis:  Near-Syncope  ECG reviewed by me, the ED Provider: yes    Patient location:  ED  Previous ECG:     Previous ECG:  Compared to current    Comparison ECG info:  9/23/2020    Similarity:  Changes noted    Comparison to cardiac monitor: Yes    Interpretation:     Interpretation: abnormal    Rate:     ECG rate:  71 BPM    ECG rate assessment: normal    Rhythm:     Rhythm: sinus rhythm    Ectopy:     Ectopy: none    QRS:     QRS axis:  Left    QRS intervals:  Normal  Conduction:     Conduction: normal    ST segments:     ST segments:  Non-specific  Comments:      No STEMI,     ECG 12 Lead Documentation Only    Date/Time: 9/25/2020 10:34 AM  Performed by: Priya Doss MD  Authorized by: Priya Doss MD     Indications / Diagnosis:  Re-eval  ECG reviewed by me, the ED Provider: yes    Patient location:  ED  Previous ECG:     Previous ECG:  Compared to current    Comparison ECG info:  9/25/2020    Similarity:  Changes noted  Interpretation:     Interpretation: abnormal    Rate:     ECG rate:  66 BPM    ECG rate assessment: normal    Rhythm:     Rhythm: sinus rhythm    Ectopy:     Ectopy: none    QRS:     QRS axis:  Left    QRS intervals:  Normal  Conduction:     Conduction: normal    ST segments:     ST segments:  Non-specific  T waves:     T waves: non-specific    Comments:      No STEMI             ED Course  ED Course as of Sep 25 1529   Fri Sep 25, 2020   0833 Wnl, normalized from 0 6 during recent admission, no clinical indication for CTA at this time   D-Dimer, Quant: 0 33   1007 IMPRESSION:     No acute cardiopulmonary disease  X-ray chest 1 view portable   1042 wnl   Troponin I: <0 02   1043 Echo 9/22/2020 - LEFT VENTRICLE:  Systolic function was normal by visual assessment  Ejection fraction was estimated to be 55 %  There were no regional wall motion abnormalities  Doppler parameters were consistent with abnormal left ventricular relaxation (grade 1 diastolic dysfunction)  1044 9/23/2020 - Exercise Stress Test -     SUMMARY:  -  Stress results: Duration of exercise was 10 min and 1 sec  Target heart rate was achieved  There was no chest pain during stress  -  ECG conclusions: The stress ECG was normal      IMPRESSIONS: Normal study after maximal exercise without reproduction of symptoms  1147 negative   Rapid drug screen, urine   1158 Work up reassuring, serial EKG/CTNI without acute ischemia  Pt feels back to baseline  Declined inpatient placement for substance abuse, reports he has w/d meds, and outpatient support already lined up  Safe for dispo with low threshold to RTER for worsening symptoms or any new concerns  SBIRT 22yo+      Most Recent Value   SBIRT (22 yo +)   In order to provide better care to our patients, we are screening all of our patients for alcohol and drug use  Would it be okay to ask you these screening questions? Yes Filed at: 09/25/2020 1746   Initial Alcohol Screen: US AUDIT-C    1  How often do you have a drink containing alcohol? 5 Filed at: 09/25/2020 3186   2  How many drinks containing alcohol do you have on a typical day you are drinking? 5 Filed at: 09/25/2020 0712   3a  Male UNDER 65: How often do you have five or more drinks on one occasion?   5 Filed at: 09/25/2020 4152   Audit-C Score  (!) 15 Filed at: 09/25/2020 3858                  MDM    Disposition  Final diagnoses:   None     ED Disposition     None      Follow-up Information    None Patient's Medications   Discharge Prescriptions    No medications on file     No discharge procedures on file      PDMP Review       Value Time User    PDMP Reviewed  Yes 8/6/2020 11:00 AM Dior Contreras MD          ED Provider  Electronically Signed by           Aparna Simmons MD  09/25/20 9074

## 2020-09-25 NOTE — ED NOTES
Xray at bedside, call bell within reach, bed low position     [facility]  09/25/20 9669

## 2020-09-25 NOTE — DISCHARGE INSTRUCTIONS
Your testing was reassuring  Please return to the ER with any return of symptoms especially chest pain/pressure, dizziness, feeling faint, racing heart, shortness of breath, or any new concerns  Please follow up as instructed on your recent hospitalization discharge paperwork

## 2020-09-25 NOTE — UTILIZATION REVIEW
Notification of Discharge  This is a Notification of Discharge from our facility [address]  Please be advised that this patient has been discharge from our facility  Below you will find the admission and discharge date and time including the patients disposition  PRESENTATION DATE: 9/22/2020  8:49 AM  OBS ADMISSION DATE:   IP ADMISSION DATE: 9/22/20 0853   DISCHARGE DATE: 9/24/2020  4:07 PM  DISPOSITION: Home/Self Care Home/Self Care   Admission Orders listed below:  Admission Orders (From admission, onward)     Ordered        09/22/20 0852  Inpatient Admission  Once                   Please contact the UR Department if additional information is required to close this patient's authorization/case  [address] Utilization Review Department  Main: [phone] x carefully listen to the prompts  All voicemails are confidential   [email or web address]  Send all requests for admission clinical reviews, approved or denied determinations and any other requests to dedicated fax number below belonging to the campus where the patient is receiving treatment   List of dedicated fax numbers:  [address] DENIALS (Administrative/Medical Necessity) [phone]   [address] (Maternity/NICU/Pediatrics) [phone]   Everton Cerna [phone]   Naif Barboza [phone]   [address] [place] [address] [phone]   [address] [place] [address] [phone]   [facility]  [phone]   [address] [facility]  [address] [phone]

## 2020-09-25 NOTE — Clinical Note
Jenny Villafuerte was seen and treated in our emergency department on 9/25/2020  Diagnosis:     Eva Leos  may return to work on return date  He may return on this date: 09/28/2020         If you have any questions or concerns, please don't hesitate to call        Bria Paris MD    ______________________________           _______________          _______________  Hospital Representative                              Date                                Time

## 2020-09-28 ENCOUNTER — TELEPHONE (OUTPATIENT)
Dept: GASTROENTEROLOGY | Facility: CLINIC | Age: 50
End: 2020-09-28

## 2020-09-28 ENCOUNTER — HOSPITAL ENCOUNTER (EMERGENCY)
Facility: HOSPITAL | Age: 50
Discharge: HOME | End: 2020-09-29
Attending: EMERGENCY MEDICINE
Payer: COMMERCIAL

## 2020-09-28 VITALS
OXYGEN SATURATION: 95 % | SYSTOLIC BLOOD PRESSURE: 113 MMHG | TEMPERATURE: 98.9 F | HEART RATE: 85 BPM | DIASTOLIC BLOOD PRESSURE: 73 MMHG | RESPIRATION RATE: 18 BRPM

## 2020-09-28 DIAGNOSIS — F10.10 ALCOHOL ABUSE: Primary | ICD-10-CM

## 2020-09-28 LAB
ETHANOL EXG-MCNC: 0.04 MG/DL
ETHANOL EXG-MCNC: 0.12 MG/DL
ETHANOL EXG-MCNC: 0.16 MG/DL

## 2020-09-28 PROCEDURE — 82075 ASSAY OF BREATH ETHANOL: CPT | Performed by: PHYSICIAN ASSISTANT

## 2020-09-28 PROCEDURE — 99284 EMERGENCY DEPT VISIT MOD MDM: CPT | Performed by: PHYSICIAN ASSISTANT

## 2020-09-28 PROCEDURE — 99285 EMERGENCY DEPT VISIT HI MDM: CPT

## 2020-09-28 PROCEDURE — 80307 DRUG TEST PRSMV CHEM ANLYZR: CPT | Performed by: PHYSICIAN ASSISTANT

## 2020-09-28 RX ORDER — IBUPROFEN 400 MG/1
800 TABLET ORAL ONCE
Status: COMPLETED | OUTPATIENT
Start: 2020-09-28 | End: 2020-09-28

## 2020-09-28 RX ORDER — LORAZEPAM 0.5 MG/1
0.5 TABLET ORAL ONCE
Status: COMPLETED | OUTPATIENT
Start: 2020-09-28 | End: 2020-09-28

## 2020-09-28 RX ADMIN — LORAZEPAM 0.5 MG: 0.5 TABLET ORAL at 19:41

## 2020-09-28 RX ADMIN — IBUPROFEN 800 MG: 400 TABLET ORAL at 19:41

## 2020-09-28 NOTE — TELEPHONE ENCOUNTER
Called patient to get him set up for a follow up visit with dr Louise Ayon for hematemesis   Left message for him to call back

## 2020-10-08 ENCOUNTER — HOSPITAL ENCOUNTER (EMERGENCY)
Facility: HOSPITAL | Age: 50
Discharge: HOME | End: 2020-10-09
Attending: EMERGENCY MEDICINE | Admitting: EMERGENCY MEDICINE
Payer: COMMERCIAL

## 2020-10-08 DIAGNOSIS — F10.10 ALCOHOL ABUSE: Primary | ICD-10-CM

## 2020-10-08 LAB
ALBUMIN SERPL BCP-MCNC: 4.1 G/DL (ref 3.5–5)
ALP SERPL-CCNC: 58 U/L (ref 46–116)
ALT SERPL W P-5'-P-CCNC: 26 U/L (ref 12–78)
AMPHETAMINES SERPL QL SCN: NEGATIVE
ANION GAP SERPL CALCULATED.3IONS-SCNC: 9 MMOL/L (ref 4–13)
APTT PPP: 30 SECONDS (ref 23–37)
AST SERPL W P-5'-P-CCNC: 17 U/L (ref 5–45)
BARBITURATES UR QL: NEGATIVE
BASOPHILS # BLD AUTO: 0.07 THOUSANDS/ΜL (ref 0–0.1)
BASOPHILS NFR BLD AUTO: 1 % (ref 0–1)
BENZODIAZ UR QL: NEGATIVE
BILIRUB SERPL-MCNC: 0.31 MG/DL (ref 0.2–1)
BILIRUB UR QL STRIP: NEGATIVE
BUN SERPL-MCNC: 15 MG/DL (ref 5–25)
CALCIUM SERPL-MCNC: 8.4 MG/DL (ref 8.3–10.1)
CHLORIDE SERPL-SCNC: 109 MMOL/L (ref 100–108)
CLARITY UR: CLEAR
CO2 SERPL-SCNC: 25 MMOL/L (ref 21–32)
COCAINE UR QL: NEGATIVE
COLOR UR: YELLOW
CREAT SERPL-MCNC: 0.97 MG/DL (ref 0.6–1.3)
EOSINOPHIL # BLD AUTO: 0.42 THOUSAND/ΜL (ref 0–0.61)
EOSINOPHIL NFR BLD AUTO: 6 % (ref 0–6)
ERYTHROCYTE [DISTWIDTH] IN BLOOD BY AUTOMATED COUNT: 13 % (ref 11.6–15.1)
ETHANOL EXG-MCNC: 0.19 MG/DL
GFR SERPL CREATININE-BSD FRML MDRD: 91 ML/MIN/1.73SQ M
GLUCOSE SERPL-MCNC: 116 MG/DL (ref 65–140)
GLUCOSE UR STRIP-MCNC: NEGATIVE MG/DL
HCT VFR BLD AUTO: 49.3 % (ref 36.5–49.3)
HGB BLD-MCNC: 16.7 G/DL (ref 12–17)
HGB UR QL STRIP.AUTO: NEGATIVE
IMM GRANULOCYTES # BLD AUTO: 0.02 THOUSAND/UL (ref 0–0.2)
IMM GRANULOCYTES NFR BLD AUTO: 0 % (ref 0–2)
INR PPP: 1.02 (ref 0.84–1.19)
KETONES UR STRIP-MCNC: ABNORMAL MG/DL
LEUKOCYTE ESTERASE UR QL STRIP: NEGATIVE
LYMPHOCYTES # BLD AUTO: 3.21 THOUSANDS/ΜL (ref 0.6–4.47)
LYMPHOCYTES NFR BLD AUTO: 42 % (ref 14–44)
MCH RBC QN AUTO: 32.9 PG (ref 26.8–34.3)
MCHC RBC AUTO-ENTMCNC: 33.9 G/DL (ref 31.4–37.4)
MCV RBC AUTO: 97 FL (ref 82–98)
METHADONE UR QL: NEGATIVE
MONOCYTES # BLD AUTO: 0.46 THOUSAND/ΜL (ref 0.17–1.22)
MONOCYTES NFR BLD AUTO: 6 % (ref 4–12)
NEUTROPHILS # BLD AUTO: 3.43 THOUSANDS/ΜL (ref 1.85–7.62)
NEUTS SEG NFR BLD AUTO: 45 % (ref 43–75)
NITRITE UR QL STRIP: NEGATIVE
NRBC BLD AUTO-RTO: 0 /100 WBCS
OPIATES UR QL SCN: NEGATIVE
OXYCODONE+OXYMORPHONE UR QL SCN: NEGATIVE
PCP UR QL: NEGATIVE
PH UR STRIP.AUTO: 6 [PH] (ref 4.5–8)
PLATELET # BLD AUTO: 267 THOUSANDS/UL (ref 149–390)
PMV BLD AUTO: 8.7 FL (ref 8.9–12.7)
POTASSIUM SERPL-SCNC: 3.7 MMOL/L (ref 3.5–5.3)
PROT SERPL-MCNC: 7.1 G/DL (ref 6.4–8.2)
PROT UR STRIP-MCNC: NEGATIVE MG/DL
PROTHROMBIN TIME: 13.5 SECONDS (ref 11.6–14.5)
RBC # BLD AUTO: 5.07 MILLION/UL (ref 3.88–5.62)
SODIUM SERPL-SCNC: 143 MMOL/L (ref 136–145)
SP GR UR STRIP.AUTO: >=1.03 (ref 1–1.03)
THC UR QL: NEGATIVE
TROPONIN I SERPL-MCNC: <0.02 NG/ML
TSH SERPL DL<=0.05 MIU/L-ACNC: 1.59 UIU/ML (ref 0.36–3.74)
UROBILINOGEN UR QL STRIP.AUTO: 0.2 E.U./DL
WBC # BLD AUTO: 7.61 THOUSAND/UL (ref 4.31–10.16)

## 2020-10-08 PROCEDURE — 99285 EMERGENCY DEPT VISIT HI MDM: CPT | Performed by: EMERGENCY MEDICINE

## 2020-10-08 PROCEDURE — 81003 URINALYSIS AUTO W/O SCOPE: CPT

## 2020-10-08 PROCEDURE — 93005 ELECTROCARDIOGRAM TRACING: CPT

## 2020-10-08 PROCEDURE — 36415 COLL VENOUS BLD VENIPUNCTURE: CPT | Performed by: EMERGENCY MEDICINE

## 2020-10-08 PROCEDURE — 85610 PROTHROMBIN TIME: CPT | Performed by: EMERGENCY MEDICINE

## 2020-10-08 PROCEDURE — 85025 COMPLETE CBC W/AUTO DIFF WBC: CPT | Performed by: EMERGENCY MEDICINE

## 2020-10-08 PROCEDURE — 99285 EMERGENCY DEPT VISIT HI MDM: CPT

## 2020-10-08 PROCEDURE — 80307 DRUG TEST PRSMV CHEM ANLYZR: CPT | Performed by: EMERGENCY MEDICINE

## 2020-10-08 PROCEDURE — 84484 ASSAY OF TROPONIN QUANT: CPT | Performed by: EMERGENCY MEDICINE

## 2020-10-08 PROCEDURE — 84443 ASSAY THYROID STIM HORMONE: CPT | Performed by: EMERGENCY MEDICINE

## 2020-10-08 PROCEDURE — 82075 ASSAY OF BREATH ETHANOL: CPT | Performed by: EMERGENCY MEDICINE

## 2020-10-08 PROCEDURE — 80053 COMPREHEN METABOLIC PANEL: CPT | Performed by: EMERGENCY MEDICINE

## 2020-10-08 PROCEDURE — 85730 THROMBOPLASTIN TIME PARTIAL: CPT | Performed by: EMERGENCY MEDICINE

## 2020-10-08 RX ORDER — LORAZEPAM 1 MG/1
1 TABLET ORAL EVERY 4 HOURS PRN
Status: DISCONTINUED | OUTPATIENT
Start: 2020-10-08 | End: 2020-10-09 | Stop reason: HOSPADM

## 2020-10-09 VITALS
OXYGEN SATURATION: 97 % | DIASTOLIC BLOOD PRESSURE: 105 MMHG | WEIGHT: 225 LBS | HEART RATE: 97 BPM | SYSTOLIC BLOOD PRESSURE: 169 MMHG | HEIGHT: 72 IN | RESPIRATION RATE: 20 BRPM | BODY MASS INDEX: 30.48 KG/M2 | TEMPERATURE: 98.6 F

## 2020-10-09 LAB
ATRIAL RATE: 91 BPM
ETHANOL EXG-MCNC: 0.07 MG/DL
P AXIS: 67 DEGREES
PR INTERVAL: 158 MS
QRS AXIS: -58 DEGREES
QRSD INTERVAL: 118 MS
QT INTERVAL: 346 MS
QTC INTERVAL: 425 MS
T WAVE AXIS: 51 DEGREES
VENTRICULAR RATE: 91 BPM

## 2020-10-09 PROCEDURE — 93010 ELECTROCARDIOGRAM REPORT: CPT | Performed by: INTERNAL MEDICINE

## 2020-10-09 PROCEDURE — 99285 EMERGENCY DEPT VISIT HI MDM: CPT | Performed by: EMERGENCY MEDICINE

## 2020-10-09 PROCEDURE — 82075 ASSAY OF BREATH ETHANOL: CPT | Performed by: EMERGENCY MEDICINE

## 2020-10-09 RX ORDER — ACETAMINOPHEN 325 MG/1
650 TABLET ORAL EVERY 6 HOURS PRN
Status: DISCONTINUED | OUTPATIENT
Start: 2020-10-09 | End: 2020-10-09 | Stop reason: HOSPADM

## 2020-10-09 RX ADMIN — LORAZEPAM 1 MG: 1 TABLET ORAL at 02:40

## 2020-10-09 RX ADMIN — ACETAMINOPHEN 650 MG: 325 TABLET, FILM COATED ORAL at 02:52

## 2020-10-31 ENCOUNTER — APPOINTMENT (EMERGENCY)
Dept: RADIOLOGY | Facility: HOSPITAL | Age: 50
End: 2020-10-31
Payer: COMMERCIAL

## 2020-10-31 ENCOUNTER — HOSPITAL ENCOUNTER (OUTPATIENT)
Facility: HOSPITAL | Age: 50
Setting detail: OBSERVATION
Discharge: HOME | End: 2020-11-02
Attending: EMERGENCY MEDICINE | Admitting: EMERGENCY MEDICINE
Payer: COMMERCIAL

## 2020-10-31 ENCOUNTER — APPOINTMENT (EMERGENCY)
Dept: CT IMAGING | Facility: HOSPITAL | Age: 50
End: 2020-10-31
Payer: COMMERCIAL

## 2020-10-31 DIAGNOSIS — R49.0 HOARSENESS OF VOICE: ICD-10-CM

## 2020-10-31 DIAGNOSIS — J32.9 SINUSITIS: ICD-10-CM

## 2020-10-31 DIAGNOSIS — K13.79 UVULAR EDEMA: Primary | ICD-10-CM

## 2020-10-31 DIAGNOSIS — F10.10 ALCOHOL ABUSE: ICD-10-CM

## 2020-10-31 DIAGNOSIS — F10.20 ALCOHOLISM (HCC): ICD-10-CM

## 2020-10-31 DIAGNOSIS — R13.10 DYSPHAGIA, UNSPECIFIED TYPE: ICD-10-CM

## 2020-10-31 DIAGNOSIS — M25.78 CERVICAL OSTEOPHYTE: ICD-10-CM

## 2020-10-31 LAB
ALBUMIN SERPL BCP-MCNC: 3.6 G/DL (ref 3.5–5)
ALP SERPL-CCNC: 56 U/L (ref 46–116)
ALT SERPL W P-5'-P-CCNC: 36 U/L (ref 12–78)
AMPHETAMINES SERPL QL SCN: NEGATIVE
ANION GAP SERPL CALCULATED.3IONS-SCNC: 11 MMOL/L (ref 4–13)
APTT PPP: 30 SECONDS (ref 23–37)
AST SERPL W P-5'-P-CCNC: 23 U/L (ref 5–45)
BARBITURATES UR QL: NEGATIVE
BASOPHILS # BLD AUTO: 0.09 THOUSANDS/ΜL (ref 0–0.1)
BASOPHILS NFR BLD AUTO: 2 % (ref 0–1)
BENZODIAZ UR QL: NEGATIVE
BILIRUB SERPL-MCNC: 0.18 MG/DL (ref 0.2–1)
BUN SERPL-MCNC: 14 MG/DL (ref 5–25)
CALCIUM SERPL-MCNC: 8.4 MG/DL (ref 8.3–10.1)
CHLORIDE SERPL-SCNC: 108 MMOL/L (ref 100–108)
CO2 SERPL-SCNC: 25 MMOL/L (ref 21–32)
COCAINE UR QL: NEGATIVE
CREAT SERPL-MCNC: 0.91 MG/DL (ref 0.6–1.3)
EOSINOPHIL # BLD AUTO: 0.3 THOUSAND/ΜL (ref 0–0.61)
EOSINOPHIL NFR BLD AUTO: 5 % (ref 0–6)
ERYTHROCYTE [DISTWIDTH] IN BLOOD BY AUTOMATED COUNT: 13.1 % (ref 11.6–15.1)
ETHANOL EXG-MCNC: 0.12 MG/DL
GFR SERPL CREATININE-BSD FRML MDRD: 98 ML/MIN/1.73SQ M
GLUCOSE SERPL-MCNC: 115 MG/DL (ref 65–140)
HCT VFR BLD AUTO: 47.5 % (ref 36.5–49.3)
HGB BLD-MCNC: 15.9 G/DL (ref 12–17)
IMM GRANULOCYTES # BLD AUTO: 0.02 THOUSAND/UL (ref 0–0.2)
IMM GRANULOCYTES NFR BLD AUTO: 0 % (ref 0–2)
INR PPP: 0.95 (ref 0.84–1.19)
LACTATE SERPL-SCNC: 1.8 MMOL/L (ref 0.5–2)
LYMPHOCYTES # BLD AUTO: 2.52 THOUSANDS/ΜL (ref 0.6–4.47)
LYMPHOCYTES NFR BLD AUTO: 41 % (ref 14–44)
MCH RBC QN AUTO: 33.1 PG (ref 26.8–34.3)
MCHC RBC AUTO-ENTMCNC: 33.5 G/DL (ref 31.4–37.4)
MCV RBC AUTO: 99 FL (ref 82–98)
METHADONE UR QL: NEGATIVE
MONOCYTES # BLD AUTO: 0.48 THOUSAND/ΜL (ref 0.17–1.22)
MONOCYTES NFR BLD AUTO: 8 % (ref 4–12)
NEUTROPHILS # BLD AUTO: 2.77 THOUSANDS/ΜL (ref 1.85–7.62)
NEUTS SEG NFR BLD AUTO: 44 % (ref 43–75)
NRBC BLD AUTO-RTO: 0 /100 WBCS
OPIATES UR QL SCN: NEGATIVE
OXYCODONE+OXYMORPHONE UR QL SCN: NEGATIVE
PCP UR QL: NEGATIVE
PLATELET # BLD AUTO: 284 THOUSANDS/UL (ref 149–390)
PMV BLD AUTO: 8.5 FL (ref 8.9–12.7)
POTASSIUM SERPL-SCNC: 3.9 MMOL/L (ref 3.5–5.3)
PROT SERPL-MCNC: 7.1 G/DL (ref 6.4–8.2)
PROTHROMBIN TIME: 12.8 SECONDS (ref 11.6–14.5)
RBC # BLD AUTO: 4.81 MILLION/UL (ref 3.88–5.62)
S PYO DNA THROAT QL NAA+PROBE: NORMAL
SARS-COV-2 RNA RESP QL NAA+PROBE: NEGATIVE
SODIUM SERPL-SCNC: 144 MMOL/L (ref 136–145)
THC UR QL: POSITIVE
TROPONIN I SERPL-MCNC: <0.02 NG/ML
WBC # BLD AUTO: 6.18 THOUSAND/UL (ref 4.31–10.16)

## 2020-10-31 PROCEDURE — 80307 DRUG TEST PRSMV CHEM ANLYZR: CPT | Performed by: EMERGENCY MEDICINE

## 2020-10-31 PROCEDURE — 85730 THROMBOPLASTIN TIME PARTIAL: CPT | Performed by: EMERGENCY MEDICINE

## 2020-10-31 PROCEDURE — 70491 CT SOFT TISSUE NECK W/DYE: CPT

## 2020-10-31 PROCEDURE — 83605 ASSAY OF LACTIC ACID: CPT | Performed by: EMERGENCY MEDICINE

## 2020-10-31 PROCEDURE — 71045 X-RAY EXAM CHEST 1 VIEW: CPT

## 2020-10-31 PROCEDURE — 96361 HYDRATE IV INFUSION ADD-ON: CPT

## 2020-10-31 PROCEDURE — 84484 ASSAY OF TROPONIN QUANT: CPT | Performed by: EMERGENCY MEDICINE

## 2020-10-31 PROCEDURE — 36415 COLL VENOUS BLD VENIPUNCTURE: CPT | Performed by: EMERGENCY MEDICINE

## 2020-10-31 PROCEDURE — 96374 THER/PROPH/DIAG INJ IV PUSH: CPT

## 2020-10-31 PROCEDURE — 99285 EMERGENCY DEPT VISIT HI MDM: CPT

## 2020-10-31 PROCEDURE — 85610 PROTHROMBIN TIME: CPT | Performed by: EMERGENCY MEDICINE

## 2020-10-31 PROCEDURE — 85025 COMPLETE CBC W/AUTO DIFF WBC: CPT | Performed by: EMERGENCY MEDICINE

## 2020-10-31 PROCEDURE — 87651 STREP A DNA AMP PROBE: CPT | Performed by: EMERGENCY MEDICINE

## 2020-10-31 PROCEDURE — 83735 ASSAY OF MAGNESIUM: CPT | Performed by: NURSE PRACTITIONER

## 2020-10-31 PROCEDURE — 87635 SARS-COV-2 COVID-19 AMP PRB: CPT | Performed by: EMERGENCY MEDICINE

## 2020-10-31 PROCEDURE — 99285 EMERGENCY DEPT VISIT HI MDM: CPT | Performed by: EMERGENCY MEDICINE

## 2020-10-31 PROCEDURE — 80053 COMPREHEN METABOLIC PANEL: CPT | Performed by: EMERGENCY MEDICINE

## 2020-10-31 PROCEDURE — G1004 CDSM NDSC: HCPCS

## 2020-10-31 PROCEDURE — 82075 ASSAY OF BREATH ETHANOL: CPT | Performed by: EMERGENCY MEDICINE

## 2020-10-31 PROCEDURE — 93005 ELECTROCARDIOGRAM TRACING: CPT

## 2020-10-31 RX ORDER — DEXAMETHASONE SODIUM PHOSPHATE 4 MG/ML
10 INJECTION, SOLUTION INTRA-ARTICULAR; INTRALESIONAL; INTRAMUSCULAR; INTRAVENOUS; SOFT TISSUE ONCE
Status: COMPLETED | OUTPATIENT
Start: 2020-10-31 | End: 2020-10-31

## 2020-10-31 RX ADMIN — DEXAMETHASONE SODIUM PHOSPHATE 10 MG: 4 INJECTION, SOLUTION INTRAMUSCULAR; INTRAVENOUS at 19:18

## 2020-10-31 RX ADMIN — SODIUM CHLORIDE 1000 ML: 0.9 INJECTION, SOLUTION INTRAVENOUS at 19:17

## 2020-10-31 RX ADMIN — IOHEXOL 85 ML: 350 INJECTION, SOLUTION INTRAVENOUS at 20:40

## 2020-11-01 PROBLEM — R13.10 DYSPHAGIA: Status: ACTIVE | Noted: 2020-11-01

## 2020-11-01 PROBLEM — J32.9 SINUSITIS: Status: RESOLVED | Noted: 2020-11-01 | Resolved: 2020-11-01

## 2020-11-01 PROBLEM — J32.9 SINUSITIS: Status: ACTIVE | Noted: 2020-11-01

## 2020-11-01 LAB
ANION GAP SERPL CALCULATED.3IONS-SCNC: 9 MMOL/L (ref 4–13)
ATRIAL RATE: 98 BPM
BASOPHILS # BLD AUTO: 0.02 THOUSANDS/ΜL (ref 0–0.1)
BASOPHILS NFR BLD AUTO: 0 % (ref 0–1)
BUN SERPL-MCNC: 11 MG/DL (ref 5–25)
CALCIUM SERPL-MCNC: 8.7 MG/DL (ref 8.3–10.1)
CHLORIDE SERPL-SCNC: 106 MMOL/L (ref 100–108)
CO2 SERPL-SCNC: 23 MMOL/L (ref 21–32)
CREAT SERPL-MCNC: 0.82 MG/DL (ref 0.6–1.3)
EOSINOPHIL # BLD AUTO: 0 THOUSAND/ΜL (ref 0–0.61)
EOSINOPHIL NFR BLD AUTO: 0 % (ref 0–6)
ERYTHROCYTE [DISTWIDTH] IN BLOOD BY AUTOMATED COUNT: 13.1 % (ref 11.6–15.1)
GFR SERPL CREATININE-BSD FRML MDRD: 103 ML/MIN/1.73SQ M
GLUCOSE SERPL-MCNC: 168 MG/DL (ref 65–140)
HCT VFR BLD AUTO: 48.7 % (ref 36.5–49.3)
HGB BLD-MCNC: 15.9 G/DL (ref 12–17)
IMM GRANULOCYTES # BLD AUTO: 0.04 THOUSAND/UL (ref 0–0.2)
IMM GRANULOCYTES NFR BLD AUTO: 1 % (ref 0–2)
LYMPHOCYTES # BLD AUTO: 0.83 THOUSANDS/ΜL (ref 0.6–4.47)
LYMPHOCYTES NFR BLD AUTO: 10 % (ref 14–44)
MAGNESIUM SERPL-MCNC: 2.1 MG/DL (ref 1.6–2.6)
MCH RBC QN AUTO: 32.9 PG (ref 26.8–34.3)
MCHC RBC AUTO-ENTMCNC: 32.6 G/DL (ref 31.4–37.4)
MCV RBC AUTO: 101 FL (ref 82–98)
MONOCYTES # BLD AUTO: 0.1 THOUSAND/ΜL (ref 0.17–1.22)
MONOCYTES NFR BLD AUTO: 1 % (ref 4–12)
NEUTROPHILS # BLD AUTO: 7.52 THOUSANDS/ΜL (ref 1.85–7.62)
NEUTS SEG NFR BLD AUTO: 88 % (ref 43–75)
NRBC BLD AUTO-RTO: 0 /100 WBCS
P AXIS: 62 DEGREES
PLATELET # BLD AUTO: 273 THOUSANDS/UL (ref 149–390)
PMV BLD AUTO: 8.8 FL (ref 8.9–12.7)
POTASSIUM SERPL-SCNC: 4.1 MMOL/L (ref 3.5–5.3)
PR INTERVAL: 158 MS
QRS AXIS: -55 DEGREES
QRSD INTERVAL: 112 MS
QT INTERVAL: 344 MS
QTC INTERVAL: 439 MS
RBC # BLD AUTO: 4.84 MILLION/UL (ref 3.88–5.62)
SODIUM SERPL-SCNC: 138 MMOL/L (ref 136–145)
T WAVE AXIS: -1 DEGREES
VENTRICULAR RATE: 98 BPM
WBC # BLD AUTO: 8.51 THOUSAND/UL (ref 4.31–10.16)

## 2020-11-01 PROCEDURE — 94762 N-INVAS EAR/PLS OXIMTRY CONT: CPT

## 2020-11-01 PROCEDURE — 85025 COMPLETE CBC W/AUTO DIFF WBC: CPT | Performed by: NURSE PRACTITIONER

## 2020-11-01 PROCEDURE — 80048 BASIC METABOLIC PNL TOTAL CA: CPT | Performed by: NURSE PRACTITIONER

## 2020-11-01 PROCEDURE — 93010 ELECTROCARDIOGRAM REPORT: CPT | Performed by: INTERNAL MEDICINE

## 2020-11-01 PROCEDURE — 99219 PR INITIAL OBSERVATION CARE/DAY 50 MINUTES: CPT | Performed by: INTERNAL MEDICINE

## 2020-11-01 PROCEDURE — 94760 N-INVAS EAR/PLS OXIMETRY 1: CPT

## 2020-11-01 RX ORDER — FOLIC ACID 1 MG/1
1 TABLET ORAL DAILY
Status: DISCONTINUED | OUTPATIENT
Start: 2020-11-01 | End: 2020-11-02 | Stop reason: HOSPADM

## 2020-11-01 RX ORDER — SODIUM CHLORIDE, SODIUM LACTATE, POTASSIUM CHLORIDE, CALCIUM CHLORIDE 600; 310; 30; 20 MG/100ML; MG/100ML; MG/100ML; MG/100ML
100 INJECTION, SOLUTION INTRAVENOUS ONCE
Status: COMPLETED | OUTPATIENT
Start: 2020-11-01 | End: 2020-11-01

## 2020-11-01 RX ORDER — ONDANSETRON 2 MG/ML
4 INJECTION INTRAMUSCULAR; INTRAVENOUS EVERY 6 HOURS PRN
Status: DISCONTINUED | OUTPATIENT
Start: 2020-11-01 | End: 2020-11-02 | Stop reason: HOSPADM

## 2020-11-01 RX ORDER — FLUTICASONE PROPIONATE 50 MCG
1 SPRAY, SUSPENSION (ML) NASAL DAILY
Status: DISCONTINUED | OUTPATIENT
Start: 2020-11-01 | End: 2020-11-02 | Stop reason: HOSPADM

## 2020-11-01 RX ORDER — LORATADINE 10 MG/1
10 TABLET ORAL DAILY
Status: DISCONTINUED | OUTPATIENT
Start: 2020-11-01 | End: 2020-11-02 | Stop reason: HOSPADM

## 2020-11-01 RX ORDER — POTASSIUM CHLORIDE 20 MEQ/1
20 TABLET, EXTENDED RELEASE ORAL DAILY
Status: DISCONTINUED | OUTPATIENT
Start: 2020-11-01 | End: 2020-11-02 | Stop reason: HOSPADM

## 2020-11-01 RX ORDER — NICOTINE 21 MG/24HR
1 PATCH, TRANSDERMAL 24 HOURS TRANSDERMAL DAILY
Status: DISCONTINUED | OUTPATIENT
Start: 2020-11-01 | End: 2020-11-01

## 2020-11-01 RX ORDER — PANTOPRAZOLE SODIUM 40 MG/1
40 TABLET, DELAYED RELEASE ORAL
Status: DISCONTINUED | OUTPATIENT
Start: 2020-11-01 | End: 2020-11-02 | Stop reason: HOSPADM

## 2020-11-01 RX ORDER — SIMETHICONE 80 MG
80 TABLET,CHEWABLE ORAL 4 TIMES DAILY PRN
Status: DISCONTINUED | OUTPATIENT
Start: 2020-11-01 | End: 2020-11-02 | Stop reason: HOSPADM

## 2020-11-01 RX ORDER — THIAMINE MONONITRATE (VIT B1) 100 MG
100 TABLET ORAL DAILY
Status: DISCONTINUED | OUTPATIENT
Start: 2020-11-01 | End: 2020-11-02 | Stop reason: HOSPADM

## 2020-11-01 RX ORDER — TRAZODONE HYDROCHLORIDE 50 MG/1
50 TABLET ORAL
Status: DISCONTINUED | OUTPATIENT
Start: 2020-11-01 | End: 2020-11-02 | Stop reason: HOSPADM

## 2020-11-01 RX ORDER — ACETAMINOPHEN 325 MG/1
650 TABLET ORAL EVERY 6 HOURS PRN
Status: DISCONTINUED | OUTPATIENT
Start: 2020-11-01 | End: 2020-11-02 | Stop reason: HOSPADM

## 2020-11-01 RX ORDER — NICOTINE 21 MG/24HR
1 PATCH, TRANSDERMAL 24 HOURS TRANSDERMAL DAILY
Status: DISCONTINUED | OUTPATIENT
Start: 2020-11-01 | End: 2020-11-02 | Stop reason: HOSPADM

## 2020-11-01 RX ORDER — LIDOCAINE 50 MG/G
1 PATCH TOPICAL DAILY
Status: DISCONTINUED | OUTPATIENT
Start: 2020-11-02 | End: 2020-11-02 | Stop reason: HOSPADM

## 2020-11-01 RX ORDER — HYDRALAZINE HYDROCHLORIDE 10 MG/1
10 TABLET, FILM COATED ORAL EVERY 8 HOURS SCHEDULED
Status: DISCONTINUED | OUTPATIENT
Start: 2020-11-01 | End: 2020-11-02 | Stop reason: HOSPADM

## 2020-11-01 RX ORDER — TRAZODONE HYDROCHLORIDE 50 MG/1
50 TABLET ORAL
COMMUNITY

## 2020-11-01 RX ADMIN — HYDRALAZINE HYDROCHLORIDE 10 MG: 10 TABLET, FILM COATED ORAL at 06:40

## 2020-11-01 RX ADMIN — Medication 1 PATCH: at 01:39

## 2020-11-01 RX ADMIN — FOLIC ACID 1 MG: 1 TABLET ORAL at 08:05

## 2020-11-01 RX ADMIN — THIAMINE HCL TAB 100 MG 100 MG: 100 TAB at 08:05

## 2020-11-01 RX ADMIN — POTASSIUM CHLORIDE 20 MEQ: 1500 TABLET, EXTENDED RELEASE ORAL at 08:05

## 2020-11-01 RX ADMIN — METOPROLOL TARTRATE 25 MG: 25 TABLET, FILM COATED ORAL at 21:03

## 2020-11-01 RX ADMIN — METOPROLOL TARTRATE 25 MG: 25 TABLET, FILM COATED ORAL at 01:39

## 2020-11-01 RX ADMIN — PANTOPRAZOLE SODIUM 40 MG: 40 TABLET, DELAYED RELEASE ORAL at 17:24

## 2020-11-01 RX ADMIN — PANTOPRAZOLE SODIUM 40 MG: 40 TABLET, DELAYED RELEASE ORAL at 06:40

## 2020-11-01 RX ADMIN — FLUTICASONE PROPIONATE 1 SPRAY: 50 SPRAY, METERED NASAL at 08:10

## 2020-11-01 RX ADMIN — Medication 1 TABLET: at 08:05

## 2020-11-01 RX ADMIN — TRAZODONE HYDROCHLORIDE 50 MG: 50 TABLET ORAL at 01:38

## 2020-11-01 RX ADMIN — METOPROLOL TARTRATE 25 MG: 25 TABLET, FILM COATED ORAL at 08:05

## 2020-11-01 RX ADMIN — TRAZODONE HYDROCHLORIDE 50 MG: 50 TABLET ORAL at 21:03

## 2020-11-01 RX ADMIN — HYDRALAZINE HYDROCHLORIDE 10 MG: 10 TABLET, FILM COATED ORAL at 13:49

## 2020-11-01 RX ADMIN — Medication 1 PATCH: at 08:04

## 2020-11-01 RX ADMIN — HYDRALAZINE HYDROCHLORIDE 10 MG: 10 TABLET, FILM COATED ORAL at 01:39

## 2020-11-01 RX ADMIN — LORATADINE 10 MG: 10 TABLET ORAL at 08:05

## 2020-11-01 RX ADMIN — SODIUM CHLORIDE, SODIUM LACTATE, POTASSIUM CHLORIDE, AND CALCIUM CHLORIDE 100 ML/HR: .6; .31; .03; .02 INJECTION, SOLUTION INTRAVENOUS at 01:34

## 2020-11-01 RX ADMIN — ACETAMINOPHEN 650 MG: 325 TABLET, FILM COATED ORAL at 18:14

## 2020-11-01 RX ADMIN — HYDRALAZINE HYDROCHLORIDE 10 MG: 10 TABLET, FILM COATED ORAL at 21:03

## 2020-11-02 ENCOUNTER — TELEPHONE (OUTPATIENT)
Dept: NEUROSURGERY | Facility: CLINIC | Age: 50
End: 2020-11-02

## 2020-11-02 VITALS
SYSTOLIC BLOOD PRESSURE: 131 MMHG | BODY MASS INDEX: 30.03 KG/M2 | WEIGHT: 221.7 LBS | HEIGHT: 72 IN | HEART RATE: 78 BPM | RESPIRATION RATE: 18 BRPM | DIASTOLIC BLOOD PRESSURE: 74 MMHG | TEMPERATURE: 98.1 F | OXYGEN SATURATION: 97 %

## 2020-11-02 DIAGNOSIS — M48.02 CERVICAL SPINAL STENOSIS: Primary | ICD-10-CM

## 2020-11-02 PROCEDURE — 94760 N-INVAS EAR/PLS OXIMETRY 1: CPT

## 2020-11-02 PROCEDURE — 99244 OFF/OP CNSLTJ NEW/EST MOD 40: CPT | Performed by: PHYSICIAN ASSISTANT

## 2020-11-02 PROCEDURE — 94762 N-INVAS EAR/PLS OXIMTRY CONT: CPT

## 2020-11-02 RX ORDER — AMOXICILLIN AND CLAVULANATE POTASSIUM 875; 125 MG/1; MG/1
1 TABLET, FILM COATED ORAL EVERY 12 HOURS SCHEDULED
Status: DISCONTINUED | OUTPATIENT
Start: 2020-11-02 | End: 2020-11-02 | Stop reason: HOSPADM

## 2020-11-02 RX ORDER — AMOXICILLIN AND CLAVULANATE POTASSIUM 875; 125 MG/1; MG/1
1 TABLET, FILM COATED ORAL EVERY 12 HOURS SCHEDULED
Qty: 14 TABLET | Refills: 0 | Status: SHIPPED | OUTPATIENT
Start: 2020-11-02 | End: 2020-11-10

## 2020-11-02 RX ORDER — NICOTINE 21 MG/24HR
1 PATCH, TRANSDERMAL 24 HOURS TRANSDERMAL DAILY
Qty: 28 PATCH | Refills: 0 | Status: SHIPPED | OUTPATIENT
Start: 2020-11-03 | End: 2021-05-16 | Stop reason: HOSPADM

## 2020-11-02 RX ORDER — SIMETHICONE 80 MG
80 TABLET,CHEWABLE ORAL 4 TIMES DAILY PRN
Qty: 30 TABLET | Refills: 0 | Status: SHIPPED | OUTPATIENT
Start: 2020-11-02 | End: 2021-05-16 | Stop reason: HOSPADM

## 2020-11-02 RX ORDER — LORATADINE 10 MG/1
10 TABLET ORAL DAILY
Qty: 30 TABLET | Refills: 0 | Status: SHIPPED | OUTPATIENT
Start: 2020-11-03

## 2020-11-02 RX ADMIN — FOLIC ACID 1 MG: 1 TABLET ORAL at 08:07

## 2020-11-02 RX ADMIN — Medication 1 TABLET: at 08:07

## 2020-11-02 RX ADMIN — METOPROLOL TARTRATE 25 MG: 25 TABLET, FILM COATED ORAL at 08:07

## 2020-11-02 RX ADMIN — HYDRALAZINE HYDROCHLORIDE 10 MG: 10 TABLET, FILM COATED ORAL at 06:15

## 2020-11-02 RX ADMIN — PANTOPRAZOLE SODIUM 40 MG: 40 TABLET, DELAYED RELEASE ORAL at 06:15

## 2020-11-02 RX ADMIN — Medication 1 PATCH: at 08:06

## 2020-11-02 RX ADMIN — LORATADINE 10 MG: 10 TABLET ORAL at 08:07

## 2020-11-02 RX ADMIN — THIAMINE HCL TAB 100 MG 100 MG: 100 TAB at 08:07

## 2020-11-02 RX ADMIN — POTASSIUM CHLORIDE 20 MEQ: 1500 TABLET, EXTENDED RELEASE ORAL at 08:07

## 2020-11-02 RX ADMIN — HYDRALAZINE HYDROCHLORIDE 10 MG: 10 TABLET, FILM COATED ORAL at 12:44

## 2020-11-04 ENCOUNTER — HOSPITAL ENCOUNTER (EMERGENCY)
Facility: HOSPITAL | Age: 50
Discharge: HOME | End: 2020-11-05
Attending: EMERGENCY MEDICINE
Payer: COMMERCIAL

## 2020-11-04 ENCOUNTER — APPOINTMENT (EMERGENCY)
Dept: RADIOLOGY | Facility: HOSPITAL | Age: 50
End: 2020-11-04
Payer: COMMERCIAL

## 2020-11-04 DIAGNOSIS — M54.2 NECK PAIN: Primary | ICD-10-CM

## 2020-11-04 DIAGNOSIS — F41.9 ANXIETY: ICD-10-CM

## 2020-11-04 DIAGNOSIS — F10.929 ALCOHOL INTOXICATION (HCC): ICD-10-CM

## 2020-11-04 LAB
ALBUMIN SERPL BCP-MCNC: 3.6 G/DL (ref 3.5–5)
ALP SERPL-CCNC: 50 U/L (ref 46–116)
ALT SERPL W P-5'-P-CCNC: 32 U/L (ref 12–78)
ANION GAP SERPL CALCULATED.3IONS-SCNC: 7 MMOL/L (ref 4–13)
AST SERPL W P-5'-P-CCNC: 13 U/L (ref 5–45)
BASOPHILS # BLD AUTO: 0.07 THOUSANDS/ΜL (ref 0–0.1)
BASOPHILS NFR BLD AUTO: 1 % (ref 0–1)
BILIRUB SERPL-MCNC: 0.16 MG/DL (ref 0.2–1)
BUN SERPL-MCNC: 10 MG/DL (ref 5–25)
CALCIUM SERPL-MCNC: 8.9 MG/DL (ref 8.3–10.1)
CHLORIDE SERPL-SCNC: 105 MMOL/L (ref 100–108)
CO2 SERPL-SCNC: 28 MMOL/L (ref 21–32)
CREAT SERPL-MCNC: 0.81 MG/DL (ref 0.6–1.3)
EOSINOPHIL # BLD AUTO: 0.43 THOUSAND/ΜL (ref 0–0.61)
EOSINOPHIL NFR BLD AUTO: 6 % (ref 0–6)
ERYTHROCYTE [DISTWIDTH] IN BLOOD BY AUTOMATED COUNT: 13 % (ref 11.6–15.1)
ETHANOL SERPL-MCNC: 242 MG/DL (ref 0–3)
GFR SERPL CREATININE-BSD FRML MDRD: 104 ML/MIN/1.73SQ M
GLUCOSE SERPL-MCNC: 123 MG/DL (ref 65–140)
HCT VFR BLD AUTO: 49.3 % (ref 36.5–49.3)
HGB BLD-MCNC: 16.6 G/DL (ref 12–17)
IMM GRANULOCYTES # BLD AUTO: 0.03 THOUSAND/UL (ref 0–0.2)
IMM GRANULOCYTES NFR BLD AUTO: 0 % (ref 0–2)
LYMPHOCYTES # BLD AUTO: 3.41 THOUSANDS/ΜL (ref 0.6–4.47)
LYMPHOCYTES NFR BLD AUTO: 44 % (ref 14–44)
MCH RBC QN AUTO: 33.3 PG (ref 26.8–34.3)
MCHC RBC AUTO-ENTMCNC: 33.7 G/DL (ref 31.4–37.4)
MCV RBC AUTO: 99 FL (ref 82–98)
MONOCYTES # BLD AUTO: 0.55 THOUSAND/ΜL (ref 0.17–1.22)
MONOCYTES NFR BLD AUTO: 7 % (ref 4–12)
NEUTROPHILS # BLD AUTO: 3.28 THOUSANDS/ΜL (ref 1.85–7.62)
NEUTS SEG NFR BLD AUTO: 42 % (ref 43–75)
NRBC BLD AUTO-RTO: 0 /100 WBCS
PLATELET # BLD AUTO: 298 THOUSANDS/UL (ref 149–390)
PMV BLD AUTO: 8.5 FL (ref 8.9–12.7)
POTASSIUM SERPL-SCNC: 3.7 MMOL/L (ref 3.5–5.3)
PROT SERPL-MCNC: 7.1 G/DL (ref 6.4–8.2)
RBC # BLD AUTO: 4.99 MILLION/UL (ref 3.88–5.62)
SODIUM SERPL-SCNC: 140 MMOL/L (ref 136–145)
TROPONIN I SERPL-MCNC: <0.02 NG/ML
WBC # BLD AUTO: 7.77 THOUSAND/UL (ref 4.31–10.16)

## 2020-11-04 PROCEDURE — 80053 COMPREHEN METABOLIC PANEL: CPT | Performed by: EMERGENCY MEDICINE

## 2020-11-04 PROCEDURE — 99285 EMERGENCY DEPT VISIT HI MDM: CPT

## 2020-11-04 PROCEDURE — 71045 X-RAY EXAM CHEST 1 VIEW: CPT

## 2020-11-04 PROCEDURE — 84484 ASSAY OF TROPONIN QUANT: CPT | Performed by: EMERGENCY MEDICINE

## 2020-11-04 PROCEDURE — 36415 COLL VENOUS BLD VENIPUNCTURE: CPT

## 2020-11-04 PROCEDURE — 80320 DRUG SCREEN QUANTALCOHOLS: CPT | Performed by: EMERGENCY MEDICINE

## 2020-11-04 PROCEDURE — 85025 COMPLETE CBC W/AUTO DIFF WBC: CPT | Performed by: EMERGENCY MEDICINE

## 2020-11-05 VITALS
HEIGHT: 72 IN | OXYGEN SATURATION: 99 % | WEIGHT: 225 LBS | HEART RATE: 78 BPM | DIASTOLIC BLOOD PRESSURE: 80 MMHG | SYSTOLIC BLOOD PRESSURE: 131 MMHG | BODY MASS INDEX: 30.48 KG/M2 | RESPIRATION RATE: 18 BRPM | TEMPERATURE: 98 F

## 2020-11-05 PROCEDURE — 99284 EMERGENCY DEPT VISIT MOD MDM: CPT | Performed by: EMERGENCY MEDICINE

## 2020-11-05 RX ORDER — LIDOCAINE 50 MG/G
OINTMENT TOPICAL AS NEEDED
Qty: 150 G | Refills: 0 | Status: SHIPPED | OUTPATIENT
Start: 2020-11-05 | End: 2020-11-10

## 2020-11-05 RX ORDER — NAPROXEN 250 MG/1
500 TABLET ORAL ONCE
Status: COMPLETED | OUTPATIENT
Start: 2020-11-05 | End: 2020-11-05

## 2020-11-05 RX ORDER — NAPROXEN 500 MG/1
500 TABLET ORAL 2 TIMES DAILY WITH MEALS
Qty: 30 TABLET | Refills: 0 | Status: SHIPPED | OUTPATIENT
Start: 2020-11-05 | End: 2020-11-10

## 2020-11-05 RX ORDER — LIDOCAINE 50 MG/G
1 PATCH TOPICAL ONCE
Status: DISCONTINUED | OUTPATIENT
Start: 2020-11-05 | End: 2020-11-05 | Stop reason: HOSPADM

## 2020-11-05 RX ADMIN — NAPROXEN 500 MG: 250 TABLET ORAL at 00:32

## 2020-11-05 RX ADMIN — LIDOCAINE 5% 1 PATCH: 700 PATCH TOPICAL at 00:31

## 2020-11-09 ENCOUNTER — HOSPITAL ENCOUNTER (EMERGENCY)
Facility: HOSPITAL | Age: 50
Discharge: HOME | End: 2020-11-10
Attending: EMERGENCY MEDICINE
Payer: COMMERCIAL

## 2020-11-09 DIAGNOSIS — R07.89 CHRONIC CHEST WALL PAIN: ICD-10-CM

## 2020-11-09 DIAGNOSIS — M54.2 CHRONIC NECK PAIN: ICD-10-CM

## 2020-11-09 DIAGNOSIS — F10.920 ACUTE ALCOHOLIC INTOXICATION WITHOUT COMPLICATION (HCC): Primary | ICD-10-CM

## 2020-11-09 DIAGNOSIS — G89.29 CHRONIC CHEST WALL PAIN: ICD-10-CM

## 2020-11-09 DIAGNOSIS — F10.10 CHRONIC ALCOHOL ABUSE: ICD-10-CM

## 2020-11-09 DIAGNOSIS — G89.29 CHRONIC NECK PAIN: ICD-10-CM

## 2020-11-09 LAB — ETHANOL EXG-MCNC: 0.19 MG/DL

## 2020-11-09 PROCEDURE — 82075 ASSAY OF BREATH ETHANOL: CPT | Performed by: EMERGENCY MEDICINE

## 2020-11-09 PROCEDURE — 99284 EMERGENCY DEPT VISIT MOD MDM: CPT | Performed by: EMERGENCY MEDICINE

## 2020-11-09 PROCEDURE — 99284 EMERGENCY DEPT VISIT MOD MDM: CPT

## 2020-11-10 VITALS
TEMPERATURE: 98.8 F | SYSTOLIC BLOOD PRESSURE: 140 MMHG | HEART RATE: 95 BPM | DIASTOLIC BLOOD PRESSURE: 97 MMHG | OXYGEN SATURATION: 98 % | RESPIRATION RATE: 16 BRPM

## 2020-11-10 RX ORDER — LORAZEPAM 1 MG/1
1 TABLET ORAL EVERY 6 HOURS PRN
Status: DISCONTINUED | OUTPATIENT
Start: 2020-11-10 | End: 2020-11-10 | Stop reason: HOSPADM

## 2020-11-29 ENCOUNTER — HOSPITAL ENCOUNTER (EMERGENCY)
Facility: HOSPITAL | Age: 50
Discharge: HOME | End: 2020-11-29
Attending: EMERGENCY MEDICINE | Admitting: EMERGENCY MEDICINE
Payer: COMMERCIAL

## 2020-11-29 VITALS
WEIGHT: 225.97 LBS | RESPIRATION RATE: 16 BRPM | HEIGHT: 72 IN | HEART RATE: 96 BPM | DIASTOLIC BLOOD PRESSURE: 87 MMHG | TEMPERATURE: 98.4 F | BODY MASS INDEX: 30.61 KG/M2 | SYSTOLIC BLOOD PRESSURE: 139 MMHG | OXYGEN SATURATION: 98 %

## 2020-11-29 DIAGNOSIS — F10.929 ALCOHOL INTOXICATION (HCC): ICD-10-CM

## 2020-11-29 DIAGNOSIS — F10.10 ALCOHOL ABUSE: Primary | ICD-10-CM

## 2020-11-29 LAB
AMPHETAMINES SERPL QL SCN: NEGATIVE
BARBITURATES UR QL: NEGATIVE
BENZODIAZ UR QL: NEGATIVE
COCAINE UR QL: NEGATIVE
ETHANOL EXG-MCNC: 0.21 MG/DL
METHADONE UR QL: NEGATIVE
OPIATES UR QL SCN: NEGATIVE
OXYCODONE+OXYMORPHONE UR QL SCN: NEGATIVE
PCP UR QL: NEGATIVE
THC UR QL: NEGATIVE

## 2020-11-29 PROCEDURE — 82075 ASSAY OF BREATH ETHANOL: CPT | Performed by: EMERGENCY MEDICINE

## 2020-11-29 PROCEDURE — 99283 EMERGENCY DEPT VISIT LOW MDM: CPT

## 2020-11-29 PROCEDURE — 80307 DRUG TEST PRSMV CHEM ANLYZR: CPT | Performed by: EMERGENCY MEDICINE

## 2020-11-29 PROCEDURE — 99284 EMERGENCY DEPT VISIT MOD MDM: CPT | Performed by: EMERGENCY MEDICINE

## 2020-12-02 ENCOUNTER — HOSPITAL ENCOUNTER (EMERGENCY)
Facility: HOSPITAL | Age: 50
Discharge: HOME | End: 2020-12-02
Attending: INTERNAL MEDICINE | Admitting: INTERNAL MEDICINE
Payer: COMMERCIAL

## 2020-12-02 VITALS
OXYGEN SATURATION: 96 % | WEIGHT: 203 LBS | BODY MASS INDEX: 27.5 KG/M2 | RESPIRATION RATE: 18 BRPM | HEIGHT: 72 IN | DIASTOLIC BLOOD PRESSURE: 103 MMHG | TEMPERATURE: 98.4 F | SYSTOLIC BLOOD PRESSURE: 124 MMHG | HEART RATE: 75 BPM

## 2020-12-02 DIAGNOSIS — F10.10 ALCOHOL ABUSE: Primary | ICD-10-CM

## 2020-12-02 LAB
ALBUMIN SERPL BCP-MCNC: 4.2 G/DL (ref 3.4–4.8)
ALP SERPL-CCNC: 48.9 U/L (ref 10–129)
ALT SERPL W P-5'-P-CCNC: 18 U/L (ref 5–63)
AMPHETAMINES SERPL QL SCN: NEGATIVE
ANION GAP SERPL CALCULATED.3IONS-SCNC: 10 MMOL/L (ref 4–13)
AST SERPL W P-5'-P-CCNC: 18 U/L (ref 15–41)
ATRIAL RATE: 91 BPM
BARBITURATES UR QL: NEGATIVE
BASOPHILS # BLD AUTO: 0.03 THOUSANDS/ΜL (ref 0–0.1)
BASOPHILS NFR BLD AUTO: 0 % (ref 0–1)
BENZODIAZ UR QL: NEGATIVE
BILIRUB SERPL-MCNC: 0.72 MG/DL (ref 0.3–1.2)
BILIRUB UR QL STRIP: NEGATIVE
BUN SERPL-MCNC: 11 MG/DL (ref 6–20)
CALCIUM SERPL-MCNC: 9.1 MG/DL (ref 8.4–10.2)
CHLORIDE SERPL-SCNC: 102 MMOL/L (ref 96–108)
CLARITY UR: CLEAR
CO2 SERPL-SCNC: 24 MMOL/L (ref 22–33)
COCAINE UR QL: NEGATIVE
COLOR UR: YELLOW
CREAT SERPL-MCNC: 0.79 MG/DL (ref 0.5–1.2)
EOSINOPHIL # BLD AUTO: 0.14 THOUSAND/ΜL (ref 0–0.61)
EOSINOPHIL NFR BLD AUTO: 2 % (ref 0–6)
ERYTHROCYTE [DISTWIDTH] IN BLOOD BY AUTOMATED COUNT: 12.7 % (ref 11.6–15.1)
ETHANOL EXG-MCNC: 0.07 MG/DL
ETHANOL EXG-MCNC: 0.14 MG/DL
FLUAV RNA RESP QL NAA+PROBE: NEGATIVE
FLUBV RNA RESP QL NAA+PROBE: NEGATIVE
GFR SERPL CREATININE-BSD FRML MDRD: 105 ML/MIN/1.73SQ M
GLUCOSE SERPL-MCNC: 160 MG/DL (ref 65–140)
GLUCOSE UR STRIP-MCNC: NEGATIVE MG/DL
HCT VFR BLD AUTO: 47.6 % (ref 36.5–49.3)
HGB BLD-MCNC: 16.5 G/DL (ref 12–17)
HGB UR QL STRIP.AUTO: NEGATIVE
IMM GRANULOCYTES # BLD AUTO: 0.01 THOUSAND/UL (ref 0–0.2)
IMM GRANULOCYTES NFR BLD AUTO: 0 % (ref 0–2)
KETONES UR STRIP-MCNC: NEGATIVE MG/DL
LEUKOCYTE ESTERASE UR QL STRIP: NEGATIVE
LYMPHOCYTES # BLD AUTO: 1.76 THOUSANDS/ΜL (ref 0.6–4.47)
LYMPHOCYTES NFR BLD AUTO: 26 % (ref 14–44)
MCH RBC QN AUTO: 32.9 PG (ref 26.8–34.3)
MCHC RBC AUTO-ENTMCNC: 34.7 G/DL (ref 31.4–37.4)
MCV RBC AUTO: 95 FL (ref 82–98)
METHADONE UR QL: NEGATIVE
MONOCYTES # BLD AUTO: 0.44 THOUSAND/ΜL (ref 0.17–1.22)
MONOCYTES NFR BLD AUTO: 7 % (ref 4–12)
NEUTROPHILS # BLD AUTO: 4.36 THOUSANDS/ΜL (ref 1.85–7.62)
NEUTS SEG NFR BLD AUTO: 65 % (ref 43–75)
NITRITE UR QL STRIP: NEGATIVE
OPIATES UR QL SCN: NEGATIVE
OXYCODONE+OXYMORPHONE UR QL SCN: NEGATIVE
P AXIS: 53 DEGREES
PCP UR QL: NEGATIVE
PH UR STRIP.AUTO: 6 [PH]
PLATELET # BLD AUTO: 323 THOUSANDS/UL (ref 149–390)
PMV BLD AUTO: 8.7 FL (ref 8.9–12.7)
POTASSIUM SERPL-SCNC: 3.3 MMOL/L (ref 3.5–5)
PR INTERVAL: 159 MS
PROT SERPL-MCNC: 6.9 G/DL (ref 6.4–8.3)
PROT UR STRIP-MCNC: NEGATIVE MG/DL
QRS AXIS: -64 DEGREES
QRSD INTERVAL: 116 MS
QT INTERVAL: 367 MS
QTC INTERVAL: 452 MS
RBC # BLD AUTO: 5.02 MILLION/UL (ref 3.88–5.62)
RSV RNA RESP QL NAA+PROBE: NEGATIVE
SARS-COV-2 RNA RESP QL NAA+PROBE: NEGATIVE
SODIUM SERPL-SCNC: 136 MMOL/L (ref 133–145)
SP GR UR STRIP.AUTO: <=1.005 (ref 1–1.03)
T WAVE AXIS: 59 DEGREES
THC UR QL: NEGATIVE
TROPONIN I SERPL-MCNC: <0.03 NG/ML (ref 0–0.07)
TSH SERPL DL<=0.05 MIU/L-ACNC: 1.29 UIU/ML (ref 0.34–5.6)
UROBILINOGEN UR QL STRIP.AUTO: 0.2 E.U./DL
VENTRICULAR RATE: 91 BPM
WBC # BLD AUTO: 6.74 THOUSAND/UL (ref 4.31–10.16)

## 2020-12-02 PROCEDURE — 99284 EMERGENCY DEPT VISIT MOD MDM: CPT

## 2020-12-02 PROCEDURE — 93005 ELECTROCARDIOGRAM TRACING: CPT

## 2020-12-02 PROCEDURE — 93010 ELECTROCARDIOGRAM REPORT: CPT | Performed by: INTERNAL MEDICINE

## 2020-12-02 PROCEDURE — 0241U HB NFCT DS VIR RESP RNA 4 TRGT: CPT | Performed by: PHYSICIAN ASSISTANT

## 2020-12-02 PROCEDURE — 85025 COMPLETE CBC W/AUTO DIFF WBC: CPT | Performed by: PHYSICIAN ASSISTANT

## 2020-12-02 PROCEDURE — 84484 ASSAY OF TROPONIN QUANT: CPT | Performed by: PHYSICIAN ASSISTANT

## 2020-12-02 PROCEDURE — 82075 ASSAY OF BREATH ETHANOL: CPT | Performed by: PHYSICIAN ASSISTANT

## 2020-12-02 PROCEDURE — 80053 COMPREHEN METABOLIC PANEL: CPT | Performed by: PHYSICIAN ASSISTANT

## 2020-12-02 PROCEDURE — 81003 URINALYSIS AUTO W/O SCOPE: CPT | Performed by: PHYSICIAN ASSISTANT

## 2020-12-02 PROCEDURE — 36415 COLL VENOUS BLD VENIPUNCTURE: CPT | Performed by: PHYSICIAN ASSISTANT

## 2020-12-02 PROCEDURE — 99284 EMERGENCY DEPT VISIT MOD MDM: CPT | Performed by: PHYSICIAN ASSISTANT

## 2020-12-02 PROCEDURE — 84443 ASSAY THYROID STIM HORMONE: CPT | Performed by: PHYSICIAN ASSISTANT

## 2020-12-02 PROCEDURE — 80307 DRUG TEST PRSMV CHEM ANLYZR: CPT | Performed by: PHYSICIAN ASSISTANT

## 2020-12-02 RX ORDER — TAMSULOSIN HYDROCHLORIDE 0.4 MG/1
0.4 CAPSULE ORAL ONCE
Status: COMPLETED | OUTPATIENT
Start: 2020-12-02 | End: 2020-12-02

## 2020-12-02 RX ORDER — CHLORDIAZEPOXIDE HYDROCHLORIDE 5 MG/1
10 CAPSULE, GELATIN COATED ORAL ONCE
Status: COMPLETED | OUTPATIENT
Start: 2020-12-02 | End: 2020-12-02

## 2020-12-02 RX ORDER — LORAZEPAM 1 MG/1
1 TABLET ORAL ONCE
Status: COMPLETED | OUTPATIENT
Start: 2020-12-02 | End: 2020-12-02

## 2020-12-02 RX ADMIN — TAMSULOSIN HYDROCHLORIDE 0.4 MG: 0.4 CAPSULE ORAL at 20:16

## 2020-12-02 RX ADMIN — LORAZEPAM 1 MG: 1 TABLET ORAL at 20:20

## 2020-12-02 RX ADMIN — CHLORDIAZEPOXIDE HYDROCHLORIDE 10 MG: 5 CAPSULE ORAL at 11:58

## 2021-01-08 ENCOUNTER — TELEPHONE (OUTPATIENT)
Dept: NEUROSURGERY | Facility: CLINIC | Age: 51
End: 2021-01-08

## 2021-01-08 NOTE — TELEPHONE ENCOUNTER
1/6/21 PT CALLED TO RESCHEDULE 12/14/20 APPT WITH PA FOR 1 TIME HOSP F/U WITH MRI CSPINE  ADVISED PT HE NEEDS TO SCHEDULE MRI CSPINE PRIOR TO COMING BACK  HE CAN SCHEDULE  [facility] FOR MRI SINCE THEY PAR WITH [facility]  [address]  ADVISED PT TO CALL BACK FOR F/U APPT WITH PAIGE VALENTE ONCE MRI IS SCHEDULED  ALSO ADVISED PT TO F/U AFTER ONE TIME HOSP F/U, HE MAY WANT TO CHANGE HIS INS PLAN TO [employer]  PT WILL CALL BACK FOR APPT

## 2021-01-08 NOTE — TELEPHONE ENCOUNTER
1/6/21 PT CALLED BACK, HE CANNOT SCHEDULE MRI CSPINE AT [facility] THEY ARE NOT DOING MRI'S RIGHT NOW  [facility] NOT DOING MRI BRAIN, NAOMI, ISIDORO AT THIS TIME  ADVISED PT HE CAN SCHEDULE MRI AT [facility] AND CALL BACK TO PROVIDE FAX FOR SCRIPT TO BE SENT  PT WILL NEED TO GET DISC TO BRING TO APPT  HE WILL CALL BACK

## 2021-03-03 ENCOUNTER — APPOINTMENT (EMERGENCY)
Dept: CT IMAGING | Facility: HOSPITAL | Age: 51
DRG: 816 | End: 2021-03-03
Payer: COMMERCIAL

## 2021-03-03 ENCOUNTER — HOSPITAL ENCOUNTER (INPATIENT)
Facility: HOSPITAL | Age: 51
LOS: 3 days | Discharge: HOME | DRG: 816 | End: 2021-03-06
Attending: EMERGENCY MEDICINE | Admitting: INTERNAL MEDICINE
Payer: COMMERCIAL

## 2021-03-03 ENCOUNTER — APPOINTMENT (EMERGENCY)
Dept: RADIOLOGY | Facility: HOSPITAL | Age: 51
DRG: 816 | End: 2021-03-03
Payer: COMMERCIAL

## 2021-03-03 DIAGNOSIS — J96.00 ACUTE RESPIRATORY FAILURE, UNSPECIFIED WHETHER WITH HYPOXIA OR HYPERCAPNIA (HCC): ICD-10-CM

## 2021-03-03 DIAGNOSIS — J96.00 ACUTE RESPIRATORY FAILURE (HCC): Primary | ICD-10-CM

## 2021-03-03 DIAGNOSIS — T50.901A OVERDOSE: ICD-10-CM

## 2021-03-03 PROBLEM — G93.41 ACUTE METABOLIC ENCEPHALOPATHY: Status: ACTIVE | Noted: 2021-03-03

## 2021-03-03 PROBLEM — J96.02 ACUTE RESPIRATORY FAILURE WITH HYPERCAPNIA (HCC): Status: ACTIVE | Noted: 2021-03-03

## 2021-03-03 PROBLEM — M25.78 CERVICAL OSTEOPHYTE: Status: ACTIVE | Noted: 2021-03-03

## 2021-03-03 LAB
ALBUMIN SERPL BCP-MCNC: 3.8 G/DL (ref 3.5–5)
ALP SERPL-CCNC: 61 U/L (ref 46–116)
ALT SERPL W P-5'-P-CCNC: 66 U/L (ref 12–78)
AMMONIA PLAS-SCNC: 31 UMOL/L (ref 11–35)
AMPHETAMINES SERPL QL SCN: NEGATIVE
ANION GAP SERPL CALCULATED.3IONS-SCNC: 15 MMOL/L (ref 4–13)
APAP SERPL-MCNC: <2 UG/ML (ref 10–20)
AST SERPL W P-5'-P-CCNC: 80 U/L (ref 5–45)
BACTERIA UR QL AUTO: ABNORMAL /HPF
BARBITURATES UR QL: NEGATIVE
BASOPHILS # BLD MANUAL: 0 THOUSAND/UL (ref 0–0.1)
BASOPHILS NFR MAR MANUAL: 0 % (ref 0–1)
BENZODIAZ UR QL: NEGATIVE
BILIRUB DIRECT SERPL-MCNC: 0.13 MG/DL (ref 0–0.2)
BILIRUB SERPL-MCNC: 0.32 MG/DL (ref 0.2–1)
BILIRUB UR QL STRIP: NEGATIVE
BUN SERPL-MCNC: 11 MG/DL (ref 5–25)
CALCIUM SERPL-MCNC: 8.3 MG/DL (ref 8.3–10.1)
CHLORIDE SERPL-SCNC: 105 MMOL/L (ref 100–108)
CLARITY UR: CLEAR
CO2 SERPL-SCNC: 22 MMOL/L (ref 21–32)
COCAINE UR QL: NEGATIVE
COLOR UR: YELLOW
CREAT SERPL-MCNC: 1.74 MG/DL (ref 0.6–1.3)
EOSINOPHIL # BLD MANUAL: 0.2 THOUSAND/UL (ref 0–0.4)
EOSINOPHIL NFR BLD MANUAL: 1 % (ref 0–6)
ERYTHROCYTE [DISTWIDTH] IN BLOOD BY AUTOMATED COUNT: 12.7 % (ref 11.6–15.1)
ETHANOL SERPL-MCNC: 114 MG/DL (ref 0–3)
GFR SERPL CREATININE-BSD FRML MDRD: 44 ML/MIN/1.73SQ M
GLUCOSE SERPL-MCNC: 177 MG/DL (ref 65–140)
GLUCOSE UR STRIP-MCNC: NEGATIVE MG/DL
HCT VFR BLD AUTO: 50 % (ref 36.5–49.3)
HGB BLD-MCNC: 16 G/DL (ref 12–17)
HGB UR QL STRIP.AUTO: ABNORMAL
HYALINE CASTS #/AREA URNS LPF: ABNORMAL /LPF
KETONES UR STRIP-MCNC: NEGATIVE MG/DL
LACTATE SERPL-SCNC: 9.1 MMOL/L (ref 0.5–2)
LEUKOCYTE ESTERASE UR QL STRIP: NEGATIVE
LYMPHOCYTES # BLD AUTO: 10 % (ref 14–44)
LYMPHOCYTES # BLD AUTO: 2.01 THOUSAND/UL (ref 0.6–4.47)
MCH RBC QN AUTO: 31.9 PG (ref 26.8–34.3)
MCHC RBC AUTO-ENTMCNC: 32 G/DL (ref 31.4–37.4)
MCV RBC AUTO: 100 FL (ref 82–98)
METAMYELOCYTES NFR BLD MANUAL: 3 % (ref 0–1)
METHADONE UR QL: NEGATIVE
MONOCYTES # BLD AUTO: 1 THOUSAND/UL (ref 0–1.22)
MONOCYTES NFR BLD: 5 % (ref 4–12)
NEUTROPHILS # BLD MANUAL: 16.25 THOUSAND/UL (ref 1.85–7.62)
NEUTS BAND NFR BLD MANUAL: 3 % (ref 0–8)
NEUTS SEG NFR BLD AUTO: 78 % (ref 43–75)
NITRITE UR QL STRIP: NEGATIVE
NON-SQ EPI CELLS URNS QL MICRO: ABNORMAL /HPF
NRBC BLD AUTO-RTO: 0 /100 WBCS
OPIATES UR QL SCN: POSITIVE
OXYCODONE+OXYMORPHONE UR QL SCN: NEGATIVE
PCP UR QL: NEGATIVE
PH UR STRIP.AUTO: 6 [PH]
PLATELET # BLD AUTO: 303 THOUSANDS/UL (ref 149–390)
PLATELET BLD QL SMEAR: ABNORMAL
PMV BLD AUTO: 8.6 FL (ref 8.9–12.7)
POTASSIUM SERPL-SCNC: 4.1 MMOL/L (ref 3.5–5.3)
PROT SERPL-MCNC: 7.1 G/DL (ref 6.4–8.2)
PROT UR STRIP-MCNC: ABNORMAL MG/DL
RBC # BLD AUTO: 5.01 MILLION/UL (ref 3.88–5.62)
RBC #/AREA URNS AUTO: ABNORMAL /HPF
SALICYLATES SERPL-MCNC: <3 MG/DL (ref 3–20)
SODIUM SERPL-SCNC: 142 MMOL/L (ref 136–145)
SP GR UR STRIP.AUTO: >=1.03 (ref 1–1.03)
THC UR QL: NEGATIVE
TOTAL CELLS COUNTED SPEC: 100
TROPONIN I SERPL-MCNC: 0.04 NG/ML
UROBILINOGEN UR QL STRIP.AUTO: 0.2 E.U./DL
WBC # BLD AUTO: 20.06 THOUSAND/UL (ref 4.31–10.16)
WBC #/AREA URNS AUTO: ABNORMAL /HPF

## 2021-03-03 PROCEDURE — 31500 INSERT EMERGENCY AIRWAY: CPT | Performed by: EMERGENCY MEDICINE

## 2021-03-03 PROCEDURE — 99291 CRITICAL CARE FIRST HOUR: CPT

## 2021-03-03 PROCEDURE — 80179 DRUG ASSAY SALICYLATE: CPT | Performed by: EMERGENCY MEDICINE

## 2021-03-03 PROCEDURE — 99291 CRITICAL CARE FIRST HOUR: CPT | Performed by: EMERGENCY MEDICINE

## 2021-03-03 PROCEDURE — G1004 CDSM NDSC: HCPCS

## 2021-03-03 PROCEDURE — 93005 ELECTROCARDIOGRAM TRACING: CPT

## 2021-03-03 PROCEDURE — 0241U HB NFCT DS VIR RESP RNA 4 TRGT: CPT | Performed by: NURSE PRACTITIONER

## 2021-03-03 PROCEDURE — 70498 CT ANGIOGRAPHY NECK: CPT

## 2021-03-03 PROCEDURE — 94150 VITAL CAPACITY TEST: CPT

## 2021-03-03 PROCEDURE — 80076 HEPATIC FUNCTION PANEL: CPT | Performed by: EMERGENCY MEDICINE

## 2021-03-03 PROCEDURE — 94002 VENT MGMT INPAT INIT DAY: CPT

## 2021-03-03 PROCEDURE — 94760 N-INVAS EAR/PLS OXIMETRY 1: CPT

## 2021-03-03 PROCEDURE — 82140 ASSAY OF AMMONIA: CPT | Performed by: EMERGENCY MEDICINE

## 2021-03-03 PROCEDURE — 83605 ASSAY OF LACTIC ACID: CPT | Performed by: EMERGENCY MEDICINE

## 2021-03-03 PROCEDURE — 80048 BASIC METABOLIC PNL TOTAL CA: CPT | Performed by: EMERGENCY MEDICINE

## 2021-03-03 PROCEDURE — 71045 X-RAY EXAM CHEST 1 VIEW: CPT

## 2021-03-03 PROCEDURE — 82553 CREATINE MB FRACTION: CPT | Performed by: INTERNAL MEDICINE

## 2021-03-03 PROCEDURE — 80307 DRUG TEST PRSMV CHEM ANLYZR: CPT | Performed by: EMERGENCY MEDICINE

## 2021-03-03 PROCEDURE — 0BH17EZ INSERTION OF ENDOTRACHEAL AIRWAY INTO TRACHEA, VIA NATURAL OR ARTIFICIAL OPENING: ICD-10-PCS | Performed by: EMERGENCY MEDICINE

## 2021-03-03 PROCEDURE — 70450 CT HEAD/BRAIN W/O DYE: CPT

## 2021-03-03 PROCEDURE — 82077 ASSAY SPEC XCP UR&BREATH IA: CPT | Performed by: EMERGENCY MEDICINE

## 2021-03-03 PROCEDURE — 96365 THER/PROPH/DIAG IV INF INIT: CPT

## 2021-03-03 PROCEDURE — 80143 DRUG ASSAY ACETAMINOPHEN: CPT | Performed by: EMERGENCY MEDICINE

## 2021-03-03 PROCEDURE — 96360 HYDRATION IV INFUSION INIT: CPT

## 2021-03-03 PROCEDURE — 36415 COLL VENOUS BLD VENIPUNCTURE: CPT | Performed by: EMERGENCY MEDICINE

## 2021-03-03 PROCEDURE — 82550 ASSAY OF CK (CPK): CPT | Performed by: INTERNAL MEDICINE

## 2021-03-03 PROCEDURE — 85027 COMPLETE CBC AUTOMATED: CPT | Performed by: EMERGENCY MEDICINE

## 2021-03-03 PROCEDURE — 5A1945Z RESPIRATORY VENTILATION, 24-96 CONSECUTIVE HOURS: ICD-10-PCS | Performed by: EMERGENCY MEDICINE

## 2021-03-03 PROCEDURE — 87040 BLOOD CULTURE FOR BACTERIA: CPT | Performed by: EMERGENCY MEDICINE

## 2021-03-03 PROCEDURE — 70496 CT ANGIOGRAPHY HEAD: CPT

## 2021-03-03 PROCEDURE — 31500 INSERT EMERGENCY AIRWAY: CPT

## 2021-03-03 PROCEDURE — 85007 BL SMEAR W/DIFF WBC COUNT: CPT | Performed by: EMERGENCY MEDICINE

## 2021-03-03 PROCEDURE — 81001 URINALYSIS AUTO W/SCOPE: CPT | Performed by: EMERGENCY MEDICINE

## 2021-03-03 PROCEDURE — 84484 ASSAY OF TROPONIN QUANT: CPT | Performed by: EMERGENCY MEDICINE

## 2021-03-03 RX ORDER — FENTANYL CITRATE 50 UG/ML
50 INJECTION, SOLUTION INTRAMUSCULAR; INTRAVENOUS EVERY 2 HOUR PRN
Status: DISCONTINUED | OUTPATIENT
Start: 2021-03-03 | End: 2021-03-04

## 2021-03-03 RX ORDER — HEPARIN SODIUM 5000 [USP'U]/ML
5000 INJECTION, SOLUTION INTRAVENOUS; SUBCUTANEOUS EVERY 8 HOURS SCHEDULED
Status: DISCONTINUED | OUTPATIENT
Start: 2021-03-03 | End: 2021-03-04

## 2021-03-03 RX ORDER — FOLIC ACID 1 MG/1
1 TABLET ORAL DAILY
Status: CANCELLED | OUTPATIENT
Start: 2021-03-03

## 2021-03-03 RX ORDER — ETOMIDATE 2 MG/ML
30 INJECTION INTRAVENOUS ONCE
Status: COMPLETED | OUTPATIENT
Start: 2021-03-03 | End: 2021-03-03

## 2021-03-03 RX ORDER — SODIUM CHLORIDE, SODIUM GLUCONATE, SODIUM ACETATE, POTASSIUM CHLORIDE, MAGNESIUM CHLORIDE, SODIUM PHOSPHATE, DIBASIC, AND POTASSIUM PHOSPHATE .53; .5; .37; .037; .03; .012; .00082 G/100ML; G/100ML; G/100ML; G/100ML; G/100ML; G/100ML; G/100ML
1000 INJECTION, SOLUTION INTRAVENOUS ONCE
Status: COMPLETED | OUTPATIENT
Start: 2021-03-03 | End: 2021-03-04

## 2021-03-03 RX ORDER — NALOXONE HYDROCHLORIDE 1 MG/ML
1 INJECTION PARENTERAL ONCE
Status: COMPLETED | OUTPATIENT
Start: 2021-03-03 | End: 2021-03-03

## 2021-03-03 RX ORDER — SODIUM CHLORIDE, SODIUM LACTATE, POTASSIUM CHLORIDE, CALCIUM CHLORIDE 600; 310; 30; 20 MG/100ML; MG/100ML; MG/100ML; MG/100ML
50 INJECTION, SOLUTION INTRAVENOUS CONTINUOUS
Status: DISCONTINUED | OUTPATIENT
Start: 2021-03-03 | End: 2021-03-05

## 2021-03-03 RX ORDER — CHLORHEXIDINE GLUCONATE 0.12 MG/ML
15 RINSE ORAL EVERY 12 HOURS SCHEDULED
Status: DISCONTINUED | OUTPATIENT
Start: 2021-03-03 | End: 2021-03-04

## 2021-03-03 RX ORDER — LANOLIN ALCOHOL/MO/W.PET/CERES
100 CREAM (GRAM) TOPICAL DAILY
Status: CANCELLED | OUTPATIENT
Start: 2021-03-03

## 2021-03-03 RX ORDER — SUCCINYLCHOLINE/SOD CL,ISO/PF 100 MG/5ML
150 SYRINGE (ML) INTRAVENOUS ONCE
Status: COMPLETED | OUTPATIENT
Start: 2021-03-03 | End: 2021-03-03

## 2021-03-03 RX ORDER — PROPOFOL 10 MG/ML
5-50 INJECTION, EMULSION INTRAVENOUS
Status: DISCONTINUED | OUTPATIENT
Start: 2021-03-03 | End: 2021-03-04

## 2021-03-03 RX ADMIN — IOHEXOL 100 ML: 350 INJECTION, SOLUTION INTRAVENOUS at 23:56

## 2021-03-03 RX ADMIN — SODIUM CHLORIDE 1000 ML: 0.9 INJECTION, SOLUTION INTRAVENOUS at 22:17

## 2021-03-03 RX ADMIN — Medication 150 MG: at 21:56

## 2021-03-03 RX ADMIN — ETOMIDATE 30 MG: 2 INJECTION, SOLUTION INTRAVENOUS at 21:56

## 2021-03-03 RX ADMIN — PROPOFOL 40 MCG/KG/MIN: 10 INJECTION, EMULSION INTRAVENOUS at 22:07

## 2021-03-03 RX ADMIN — SODIUM CHLORIDE 1000 ML: 0.9 INJECTION, SOLUTION INTRAVENOUS at 22:48

## 2021-03-03 RX ADMIN — CEFEPIME HYDROCHLORIDE 2000 MG: 2 INJECTION, POWDER, FOR SOLUTION INTRAVENOUS at 22:47

## 2021-03-04 PROBLEM — R79.89 ELEVATED LACTIC ACID LEVEL: Status: ACTIVE | Noted: 2021-03-04

## 2021-03-04 PROBLEM — I10 ESSENTIAL HYPERTENSION: Chronic | Status: ACTIVE | Noted: 2020-09-23

## 2021-03-04 PROBLEM — R07.9 CHEST PAIN: Status: RESOLVED | Noted: 2020-09-21 | Resolved: 2021-03-04

## 2021-03-04 PROBLEM — M25.78 CERVICAL OSTEOPHYTE: Chronic | Status: ACTIVE | Noted: 2021-03-03

## 2021-03-04 PROBLEM — N17.9 AKI (ACUTE KIDNEY INJURY) (HCC): Status: ACTIVE | Noted: 2021-03-04

## 2021-03-04 PROBLEM — F33.1 MDD (MAJOR DEPRESSIVE DISORDER), RECURRENT EPISODE, MODERATE (HCC): Chronic | Status: ACTIVE | Noted: 2020-08-06

## 2021-03-04 PROBLEM — F10.94 ALCOHOL-INDUCED MOOD DISORDER (HCC): Status: ACTIVE | Noted: 2018-11-26

## 2021-03-04 PROBLEM — M48.00 CENTRAL STENOSIS OF SPINAL CANAL: Status: ACTIVE | Noted: 2018-01-01

## 2021-03-04 LAB
ANION GAP SERPL CALCULATED.3IONS-SCNC: 12 MMOL/L (ref 4–13)
ATRIAL RATE: 133 BPM
BASE EXCESS BLDA CALC-SCNC: -6 MMOL/L (ref -2–3)
BASOPHILS # BLD AUTO: 0.03 THOUSANDS/ΜL (ref 0–0.1)
BASOPHILS NFR BLD AUTO: 0 % (ref 0–1)
BUN SERPL-MCNC: 13 MG/DL (ref 5–25)
CALCIUM SERPL-MCNC: 8.1 MG/DL (ref 8.3–10.1)
CHLORIDE SERPL-SCNC: 108 MMOL/L (ref 100–108)
CK MB SERPL-MCNC: 1.4 % (ref 0–2.5)
CK MB SERPL-MCNC: 2.9 NG/ML (ref 0–5)
CK SERPL-CCNC: 201 U/L (ref 39–308)
CO2 SERPL-SCNC: 22 MMOL/L (ref 21–32)
CREAT SERPL-MCNC: 1.11 MG/DL (ref 0.6–1.3)
EOSINOPHIL # BLD AUTO: 0.01 THOUSAND/ΜL (ref 0–0.61)
EOSINOPHIL NFR BLD AUTO: 0 % (ref 0–6)
ERYTHROCYTE [DISTWIDTH] IN BLOOD BY AUTOMATED COUNT: 12.9 % (ref 11.6–15.1)
FIO2 GAS DIL.REBREATH: 60 L
FLUAV RNA RESP QL NAA+PROBE: NEGATIVE
FLUBV RNA RESP QL NAA+PROBE: NEGATIVE
GFR SERPL CREATININE-BSD FRML MDRD: 76 ML/MIN/1.73SQ M
GLUCOSE SERPL-MCNC: 100 MG/DL (ref 65–140)
GLUCOSE SERPL-MCNC: 106 MG/DL (ref 65–140)
HCO3 BLDA-SCNC: 18.7 MMOL/L (ref 22–28)
HCT VFR BLD AUTO: 44.7 % (ref 36.5–49.3)
HCT VFR BLD CALC: 43 % (ref 36.5–49.3)
HGB BLD-MCNC: 14.7 G/DL (ref 12–17)
HGB BLDA-MCNC: 14.6 G/DL (ref 12–17)
IMM GRANULOCYTES # BLD AUTO: 0.1 THOUSAND/UL (ref 0–0.2)
IMM GRANULOCYTES NFR BLD AUTO: 1 % (ref 0–2)
LACTATE SERPL-SCNC: 3.3 MMOL/L (ref 0.5–2)
LACTATE SERPL-SCNC: 4.2 MMOL/L (ref 0.5–2)
LYMPHOCYTES # BLD AUTO: 2.58 THOUSANDS/ΜL (ref 0.6–4.47)
LYMPHOCYTES NFR BLD AUTO: 18 % (ref 14–44)
MAGNESIUM SERPL-MCNC: 1.6 MG/DL (ref 1.6–2.6)
MCH RBC QN AUTO: 32.1 PG (ref 26.8–34.3)
MCHC RBC AUTO-ENTMCNC: 32.9 G/DL (ref 31.4–37.4)
MCV RBC AUTO: 98 FL (ref 82–98)
MONOCYTES # BLD AUTO: 0.88 THOUSAND/ΜL (ref 0.17–1.22)
MONOCYTES NFR BLD AUTO: 6 % (ref 4–12)
NEUTROPHILS # BLD AUTO: 11.17 THOUSANDS/ΜL (ref 1.85–7.62)
NEUTS SEG NFR BLD AUTO: 75 % (ref 43–75)
NRBC BLD AUTO-RTO: 0 /100 WBCS
P AXIS: 58 DEGREES
PCO2 BLD: 20 MMOL/L (ref 21–32)
PCO2 BLD: 32.5 MM HG (ref 36–44)
PH BLD: 7.37 [PH] (ref 7.35–7.45)
PLATELET # BLD AUTO: 234 THOUSANDS/UL (ref 149–390)
PMV BLD AUTO: 8.8 FL (ref 8.9–12.7)
PO2 BLD: 209 MM HG (ref 75–129)
POTASSIUM BLD-SCNC: 4.1 MMOL/L (ref 3.5–5.3)
POTASSIUM SERPL-SCNC: 3.8 MMOL/L (ref 3.5–5.3)
PR INTERVAL: 156 MS
PROCALCITONIN SERPL-MCNC: 0.77 NG/ML
QRS AXIS: -48 DEGREES
QRSD INTERVAL: 114 MS
QT INTERVAL: 286 MS
QTC INTERVAL: 423 MS
RBC # BLD AUTO: 4.58 MILLION/UL (ref 3.88–5.62)
RSV RNA RESP QL NAA+PROBE: NEGATIVE
SAO2 % BLD FROM PO2: 100 % (ref 60–85)
SARS-COV-2 RNA RESP QL NAA+PROBE: NEGATIVE
SODIUM BLD-SCNC: 140 MMOL/L (ref 136–145)
SODIUM SERPL-SCNC: 142 MMOL/L (ref 136–145)
SPECIMEN SOURCE: ABNORMAL
T WAVE AXIS: 76 DEGREES
VENTRICULAR RATE: 132 BPM
WBC # BLD AUTO: 14.77 THOUSAND/UL (ref 4.31–10.16)

## 2021-03-04 PROCEDURE — 82947 ASSAY GLUCOSE BLOOD QUANT: CPT

## 2021-03-04 PROCEDURE — 84145 PROCALCITONIN (PCT): CPT | Performed by: NURSE PRACTITIONER

## 2021-03-04 PROCEDURE — 83735 ASSAY OF MAGNESIUM: CPT | Performed by: NURSE PRACTITIONER

## 2021-03-04 PROCEDURE — 82803 BLOOD GASES ANY COMBINATION: CPT

## 2021-03-04 PROCEDURE — 99291 CRITICAL CARE FIRST HOUR: CPT | Performed by: NURSE PRACTITIONER

## 2021-03-04 PROCEDURE — 94664 DEMO&/EVAL PT USE INHALER: CPT

## 2021-03-04 PROCEDURE — 94150 VITAL CAPACITY TEST: CPT

## 2021-03-04 PROCEDURE — 93010 ELECTROCARDIOGRAM REPORT: CPT | Performed by: INTERNAL MEDICINE

## 2021-03-04 PROCEDURE — 85025 COMPLETE CBC W/AUTO DIFF WBC: CPT | Performed by: NURSE PRACTITIONER

## 2021-03-04 PROCEDURE — 84295 ASSAY OF SERUM SODIUM: CPT

## 2021-03-04 PROCEDURE — 99292 CRITICAL CARE ADDL 30 MIN: CPT | Performed by: INTERNAL MEDICINE

## 2021-03-04 PROCEDURE — 83605 ASSAY OF LACTIC ACID: CPT | Performed by: NURSE PRACTITIONER

## 2021-03-04 PROCEDURE — 94003 VENT MGMT INPAT SUBQ DAY: CPT

## 2021-03-04 PROCEDURE — 82948 REAGENT STRIP/BLOOD GLUCOSE: CPT

## 2021-03-04 PROCEDURE — 36600 WITHDRAWAL OF ARTERIAL BLOOD: CPT

## 2021-03-04 PROCEDURE — 80048 BASIC METABOLIC PNL TOTAL CA: CPT | Performed by: NURSE PRACTITIONER

## 2021-03-04 PROCEDURE — 85014 HEMATOCRIT: CPT

## 2021-03-04 PROCEDURE — 99291 CRITICAL CARE FIRST HOUR: CPT | Performed by: EMERGENCY MEDICINE

## 2021-03-04 PROCEDURE — 84132 ASSAY OF SERUM POTASSIUM: CPT

## 2021-03-04 PROCEDURE — 94760 N-INVAS EAR/PLS OXIMETRY 1: CPT

## 2021-03-04 RX ORDER — LANOLIN ALCOHOL/MO/W.PET/CERES
3 CREAM (GRAM) TOPICAL
Status: DISCONTINUED | OUTPATIENT
Start: 2021-03-04 | End: 2021-03-06 | Stop reason: HOSPADM

## 2021-03-04 RX ORDER — MAGNESIUM SULFATE HEPTAHYDRATE 40 MG/ML
2 INJECTION, SOLUTION INTRAVENOUS ONCE
Status: COMPLETED | OUTPATIENT
Start: 2021-03-04 | End: 2021-03-04

## 2021-03-04 RX ORDER — FOLIC ACID 1 MG/1
1 TABLET ORAL DAILY
Status: DISCONTINUED | OUTPATIENT
Start: 2021-03-05 | End: 2021-03-06 | Stop reason: HOSPADM

## 2021-03-04 RX ORDER — POLYETHYLENE GLYCOL 3350 17 G/17G
17 POWDER, FOR SOLUTION ORAL DAILY
Status: DISCONTINUED | OUTPATIENT
Start: 2021-03-05 | End: 2021-03-06 | Stop reason: HOSPADM

## 2021-03-04 RX ORDER — SENNOSIDES 8.6 MG
1 TABLET ORAL
Status: DISCONTINUED | OUTPATIENT
Start: 2021-03-04 | End: 2021-03-06 | Stop reason: HOSPADM

## 2021-03-04 RX ORDER — DEXMEDETOMIDINE 100 UG/ML
.1-.7 INJECTION, SOLUTION, CONCENTRATE INTRAVENOUS
Status: DISCONTINUED | OUTPATIENT
Start: 2021-03-04 | End: 2021-03-04

## 2021-03-04 RX ORDER — FAMOTIDINE 40 MG/5ML
20 POWDER, FOR SUSPENSION ORAL 2 TIMES DAILY
Status: DISCONTINUED | OUTPATIENT
Start: 2021-03-04 | End: 2021-03-04

## 2021-03-04 RX ORDER — ONDANSETRON 2 MG/ML
4 INJECTION INTRAMUSCULAR; INTRAVENOUS ONCE
Status: COMPLETED | OUTPATIENT
Start: 2021-03-04 | End: 2021-03-04

## 2021-03-04 RX ORDER — PANTOPRAZOLE SODIUM 40 MG/1
40 TABLET, DELAYED RELEASE ORAL
Status: DISCONTINUED | OUTPATIENT
Start: 2021-03-04 | End: 2021-03-06 | Stop reason: HOSPADM

## 2021-03-04 RX ORDER — POTASSIUM CHLORIDE 20MEQ/15ML
20 LIQUID (ML) ORAL ONCE
Status: COMPLETED | OUTPATIENT
Start: 2021-03-04 | End: 2021-03-04

## 2021-03-04 RX ORDER — LANOLIN ALCOHOL/MO/W.PET/CERES
100 CREAM (GRAM) TOPICAL DAILY
Status: DISCONTINUED | OUTPATIENT
Start: 2021-03-05 | End: 2021-03-06 | Stop reason: HOSPADM

## 2021-03-04 RX ADMIN — FENTANYL CITRATE 50 MCG: 50 INJECTION INTRAMUSCULAR; INTRAVENOUS at 09:19

## 2021-03-04 RX ADMIN — CHLORHEXIDINE GLUCONATE 0.12% ORAL RINSE 15 ML: 1.2 LIQUID ORAL at 08:40

## 2021-03-04 RX ADMIN — FENTANYL CITRATE 50 MCG: 50 INJECTION INTRAMUSCULAR; INTRAVENOUS at 07:20

## 2021-03-04 RX ADMIN — PANTOPRAZOLE SODIUM 40 MG: 40 TABLET, DELAYED RELEASE ORAL at 16:00

## 2021-03-04 RX ADMIN — FENTANYL CITRATE 50 MCG: 50 INJECTION INTRAMUSCULAR; INTRAVENOUS at 03:27

## 2021-03-04 RX ADMIN — METRONIDAZOLE 500 MG: 500 INJECTION, SOLUTION INTRAVENOUS at 00:18

## 2021-03-04 RX ADMIN — CEFTRIAXONE SODIUM 1000 MG: 10 INJECTION, POWDER, FOR SOLUTION INTRAVENOUS at 21:15

## 2021-03-04 RX ADMIN — HEPARIN SODIUM 5000 UNITS: 5000 INJECTION INTRAVENOUS; SUBCUTANEOUS at 13:28

## 2021-03-04 RX ADMIN — METRONIDAZOLE 500 MG: 500 INJECTION, SOLUTION INTRAVENOUS at 08:41

## 2021-03-04 RX ADMIN — HEPARIN SODIUM 5000 UNITS: 5000 INJECTION INTRAVENOUS; SUBCUTANEOUS at 00:18

## 2021-03-04 RX ADMIN — MAGNESIUM SULFATE HEPTAHYDRATE 2 G: 40 INJECTION, SOLUTION INTRAVENOUS at 08:55

## 2021-03-04 RX ADMIN — SODIUM CHLORIDE, SODIUM LACTATE, POTASSIUM CHLORIDE, AND CALCIUM CHLORIDE 125 ML/HR: .6; .31; .03; .02 INJECTION, SOLUTION INTRAVENOUS at 00:18

## 2021-03-04 RX ADMIN — SODIUM CHLORIDE, SODIUM LACTATE, POTASSIUM CHLORIDE, AND CALCIUM CHLORIDE 125 ML/HR: .6; .31; .03; .02 INJECTION, SOLUTION INTRAVENOUS at 08:35

## 2021-03-04 RX ADMIN — HEPARIN SODIUM 5000 UNITS: 5000 INJECTION INTRAVENOUS; SUBCUTANEOUS at 05:41

## 2021-03-04 RX ADMIN — CHLORHEXIDINE GLUCONATE 0.12% ORAL RINSE 15 ML: 1.2 LIQUID ORAL at 00:18

## 2021-03-04 RX ADMIN — POTASSIUM CHLORIDE 20 MEQ: 20 SOLUTION ORAL at 10:00

## 2021-03-04 RX ADMIN — ENOXAPARIN SODIUM 40 MG: 40 INJECTION SUBCUTANEOUS at 16:00

## 2021-03-04 RX ADMIN — FENTANYL CITRATE 50 MCG: 50 INJECTION INTRAMUSCULAR; INTRAVENOUS at 01:08

## 2021-03-04 RX ADMIN — PROPOFOL 35 MCG/KG/MIN: 10 INJECTION, EMULSION INTRAVENOUS at 09:07

## 2021-03-04 RX ADMIN — PROPOFOL 40 MCG/KG/MIN: 10 INJECTION, EMULSION INTRAVENOUS at 05:15

## 2021-03-04 RX ADMIN — ONDANSETRON 4 MG: 2 INJECTION INTRAMUSCULAR; INTRAVENOUS at 04:45

## 2021-03-04 RX ADMIN — SODIUM CHLORIDE 0.1 MCG/KG/HR: 9 INJECTION, SOLUTION INTRAVENOUS at 10:00

## 2021-03-04 RX ADMIN — FAMOTIDINE 20 MG: 40 POWDER, FOR SUSPENSION ORAL at 11:06

## 2021-03-04 RX ADMIN — Medication 3 MG: at 21:15

## 2021-03-04 RX ADMIN — METRONIDAZOLE 500 MG: 500 INJECTION, SOLUTION INTRAVENOUS at 14:58

## 2021-03-04 RX ADMIN — SENNOSIDES 8.6 MG: 8.6 TABLET, FILM COATED ORAL at 21:15

## 2021-03-04 RX ADMIN — SODIUM CHLORIDE, SODIUM GLUCONATE, SODIUM ACETATE, POTASSIUM CHLORIDE, MAGNESIUM CHLORIDE, SODIUM PHOSPHATE, DIBASIC, AND POTASSIUM PHOSPHATE 1000 ML: .53; .5; .37; .037; .03; .012; .00082 INJECTION, SOLUTION INTRAVENOUS at 00:45

## 2021-03-05 ENCOUNTER — APPOINTMENT (INPATIENT)
Dept: RADIOLOGY | Facility: HOSPITAL | Age: 51
DRG: 816 | End: 2021-03-05
Payer: COMMERCIAL

## 2021-03-05 LAB
ANION GAP SERPL CALCULATED.3IONS-SCNC: 6 MMOL/L (ref 4–13)
ATRIAL RATE: 82 BPM
ATRIAL RATE: 93 BPM
BASOPHILS # BLD AUTO: 0.06 THOUSANDS/ΜL (ref 0–0.1)
BASOPHILS NFR BLD AUTO: 1 % (ref 0–1)
BUN SERPL-MCNC: 9 MG/DL (ref 5–25)
CA-I BLD-SCNC: 1.11 MMOL/L (ref 1.12–1.32)
CALCIUM SERPL-MCNC: 8 MG/DL (ref 8.3–10.1)
CHLORIDE SERPL-SCNC: 108 MMOL/L (ref 100–108)
CO2 SERPL-SCNC: 28 MMOL/L (ref 21–32)
CREAT SERPL-MCNC: 0.88 MG/DL (ref 0.6–1.3)
EOSINOPHIL # BLD AUTO: 0.22 THOUSAND/ΜL (ref 0–0.61)
EOSINOPHIL NFR BLD AUTO: 2 % (ref 0–6)
ERYTHROCYTE [DISTWIDTH] IN BLOOD BY AUTOMATED COUNT: 12.9 % (ref 11.6–15.1)
GFR SERPL CREATININE-BSD FRML MDRD: 99 ML/MIN/1.73SQ M
GLUCOSE SERPL-MCNC: 102 MG/DL (ref 65–140)
GLUCOSE SERPL-MCNC: 94 MG/DL (ref 65–140)
HCT VFR BLD AUTO: 39.4 % (ref 36.5–49.3)
HGB BLD-MCNC: 12.8 G/DL (ref 12–17)
IMM GRANULOCYTES # BLD AUTO: 0.02 THOUSAND/UL (ref 0–0.2)
IMM GRANULOCYTES NFR BLD AUTO: 0 % (ref 0–2)
LYMPHOCYTES # BLD AUTO: 2.66 THOUSANDS/ΜL (ref 0.6–4.47)
LYMPHOCYTES NFR BLD AUTO: 29 % (ref 14–44)
MAGNESIUM SERPL-MCNC: 2.1 MG/DL (ref 1.6–2.6)
MCH RBC QN AUTO: 31.8 PG (ref 26.8–34.3)
MCHC RBC AUTO-ENTMCNC: 32.5 G/DL (ref 31.4–37.4)
MCV RBC AUTO: 98 FL (ref 82–98)
MONOCYTES # BLD AUTO: 0.78 THOUSAND/ΜL (ref 0.17–1.22)
MONOCYTES NFR BLD AUTO: 9 % (ref 4–12)
NEUTROPHILS # BLD AUTO: 5.4 THOUSANDS/ΜL (ref 1.85–7.62)
NEUTS SEG NFR BLD AUTO: 59 % (ref 43–75)
NRBC BLD AUTO-RTO: 0 /100 WBCS
P AXIS: 70 DEGREES
P AXIS: 98 DEGREES
PHOSPHATE SERPL-MCNC: 2.8 MG/DL (ref 2.7–4.5)
PLATELET # BLD AUTO: 211 THOUSANDS/UL (ref 149–390)
PMV BLD AUTO: 9 FL (ref 8.9–12.7)
POTASSIUM SERPL-SCNC: 3.8 MMOL/L (ref 3.5–5.3)
PR INTERVAL: 132 MS
PR INTERVAL: 162 MS
PROCALCITONIN SERPL-MCNC: 1.35 NG/ML
QRS AXIS: -3 DEGREES
QRS AXIS: 8 DEGREES
QRSD INTERVAL: 108 MS
QRSD INTERVAL: 108 MS
QT INTERVAL: 380 MS
QT INTERVAL: 382 MS
QTC INTERVAL: 446 MS
QTC INTERVAL: 472 MS
RBC # BLD AUTO: 4.02 MILLION/UL (ref 3.88–5.62)
SODIUM SERPL-SCNC: 142 MMOL/L (ref 136–145)
T WAVE AXIS: 90 DEGREES
T WAVE AXIS: 99 DEGREES
VENTRICULAR RATE: 82 BPM
VENTRICULAR RATE: 93 BPM
WBC # BLD AUTO: 9.14 THOUSAND/UL (ref 4.31–10.16)

## 2021-03-05 PROCEDURE — 82330 ASSAY OF CALCIUM: CPT | Performed by: PHYSICIAN ASSISTANT

## 2021-03-05 PROCEDURE — 99232 SBSQ HOSP IP/OBS MODERATE 35: CPT | Performed by: INTERNAL MEDICINE

## 2021-03-05 PROCEDURE — 97167 OT EVAL HIGH COMPLEX 60 MIN: CPT

## 2021-03-05 PROCEDURE — 83735 ASSAY OF MAGNESIUM: CPT | Performed by: PHYSICIAN ASSISTANT

## 2021-03-05 PROCEDURE — 71045 X-RAY EXAM CHEST 1 VIEW: CPT

## 2021-03-05 PROCEDURE — 84100 ASSAY OF PHOSPHORUS: CPT | Performed by: PHYSICIAN ASSISTANT

## 2021-03-05 PROCEDURE — 97163 PT EVAL HIGH COMPLEX 45 MIN: CPT

## 2021-03-05 PROCEDURE — 85025 COMPLETE CBC W/AUTO DIFF WBC: CPT | Performed by: PHYSICIAN ASSISTANT

## 2021-03-05 PROCEDURE — 84145 PROCALCITONIN (PCT): CPT | Performed by: NURSE PRACTITIONER

## 2021-03-05 PROCEDURE — 93005 ELECTROCARDIOGRAM TRACING: CPT

## 2021-03-05 PROCEDURE — 93010 ELECTROCARDIOGRAM REPORT: CPT | Performed by: INTERNAL MEDICINE

## 2021-03-05 PROCEDURE — 80048 BASIC METABOLIC PNL TOTAL CA: CPT | Performed by: PHYSICIAN ASSISTANT

## 2021-03-05 RX ORDER — TAMSULOSIN HYDROCHLORIDE 0.4 MG/1
0.4 CAPSULE ORAL
Status: DISCONTINUED | OUTPATIENT
Start: 2021-03-05 | End: 2021-03-06 | Stop reason: HOSPADM

## 2021-03-05 RX ORDER — HYDRALAZINE HYDROCHLORIDE 10 MG/1
10 TABLET, FILM COATED ORAL EVERY 8 HOURS SCHEDULED
Status: DISCONTINUED | OUTPATIENT
Start: 2021-03-05 | End: 2021-03-06 | Stop reason: HOSPADM

## 2021-03-05 RX ORDER — ACETAMINOPHEN 325 MG/1
650 TABLET ORAL EVERY 6 HOURS PRN
Status: DISCONTINUED | OUTPATIENT
Start: 2021-03-05 | End: 2021-03-06 | Stop reason: HOSPADM

## 2021-03-05 RX ORDER — NICOTINE 21 MG/24HR
14 PATCH, TRANSDERMAL 24 HOURS TRANSDERMAL DAILY
Status: DISCONTINUED | OUTPATIENT
Start: 2021-03-05 | End: 2021-03-06 | Stop reason: HOSPADM

## 2021-03-05 RX ORDER — LIDOCAINE 50 MG/G
1 PATCH TOPICAL DAILY
Status: DISCONTINUED | OUTPATIENT
Start: 2021-03-05 | End: 2021-03-06 | Stop reason: HOSPADM

## 2021-03-05 RX ADMIN — TAMSULOSIN HYDROCHLORIDE 0.4 MG: 0.4 CAPSULE ORAL at 15:38

## 2021-03-05 RX ADMIN — HYDRALAZINE HYDROCHLORIDE 10 MG: 10 TABLET, FILM COATED ORAL at 14:45

## 2021-03-05 RX ADMIN — METRONIDAZOLE 500 MG: 500 INJECTION, SOLUTION INTRAVENOUS at 14:51

## 2021-03-05 RX ADMIN — Medication 3 MG: at 21:01

## 2021-03-05 RX ADMIN — ACETAMINOPHEN 650 MG: 325 TABLET, FILM COATED ORAL at 21:00

## 2021-03-05 RX ADMIN — HYDRALAZINE HYDROCHLORIDE 10 MG: 10 TABLET, FILM COATED ORAL at 21:01

## 2021-03-05 RX ADMIN — LIDOCAINE 5% 1 PATCH: 700 PATCH TOPICAL at 09:44

## 2021-03-05 RX ADMIN — Medication 1 TABLET: at 09:42

## 2021-03-05 RX ADMIN — METOPROLOL TARTRATE 25 MG: 25 TABLET, FILM COATED ORAL at 09:42

## 2021-03-05 RX ADMIN — CEFTRIAXONE SODIUM 1000 MG: 10 INJECTION, POWDER, FOR SOLUTION INTRAVENOUS at 21:03

## 2021-03-05 RX ADMIN — PANTOPRAZOLE SODIUM 40 MG: 40 TABLET, DELAYED RELEASE ORAL at 15:38

## 2021-03-05 RX ADMIN — PANTOPRAZOLE SODIUM 40 MG: 40 TABLET, DELAYED RELEASE ORAL at 07:35

## 2021-03-05 RX ADMIN — METRONIDAZOLE 500 MG: 500 INJECTION, SOLUTION INTRAVENOUS at 00:36

## 2021-03-05 RX ADMIN — ENOXAPARIN SODIUM 40 MG: 40 INJECTION SUBCUTANEOUS at 09:42

## 2021-03-05 RX ADMIN — METRONIDAZOLE 500 MG: 500 INJECTION, SOLUTION INTRAVENOUS at 07:36

## 2021-03-05 RX ADMIN — HYDRALAZINE HYDROCHLORIDE 10 MG: 10 TABLET, FILM COATED ORAL at 09:47

## 2021-03-05 RX ADMIN — METOPROLOL TARTRATE 25 MG: 25 TABLET, FILM COATED ORAL at 21:00

## 2021-03-05 RX ADMIN — THIAMINE HCL TAB 100 MG 100 MG: 100 TAB at 09:42

## 2021-03-05 RX ADMIN — POLYETHYLENE GLYCOL 3350 17 G: 17 POWDER, FOR SOLUTION ORAL at 09:45

## 2021-03-05 RX ADMIN — ACETAMINOPHEN 650 MG: 325 TABLET, FILM COATED ORAL at 07:37

## 2021-03-05 RX ADMIN — ACETAMINOPHEN 650 MG: 325 TABLET, FILM COATED ORAL at 14:51

## 2021-03-05 RX ADMIN — METRONIDAZOLE 500 MG: 500 INJECTION, SOLUTION INTRAVENOUS at 23:21

## 2021-03-05 RX ADMIN — SENNOSIDES 8.6 MG: 8.6 TABLET, FILM COATED ORAL at 21:01

## 2021-03-05 RX ADMIN — FOLIC ACID 1 MG: 1 TABLET ORAL at 09:47

## 2021-03-06 VITALS
SYSTOLIC BLOOD PRESSURE: 140 MMHG | OXYGEN SATURATION: 98 % | TEMPERATURE: 98.5 F | HEART RATE: 84 BPM | RESPIRATION RATE: 19 BRPM | DIASTOLIC BLOOD PRESSURE: 88 MMHG | BODY MASS INDEX: 31.65 KG/M2 | WEIGHT: 233.69 LBS | HEIGHT: 72 IN

## 2021-03-06 PROCEDURE — 97116 GAIT TRAINING THERAPY: CPT

## 2021-03-06 PROCEDURE — 99239 HOSP IP/OBS DSCHRG MGMT >30: CPT | Performed by: INTERNAL MEDICINE

## 2021-03-06 PROCEDURE — 97110 THERAPEUTIC EXERCISES: CPT

## 2021-03-06 PROCEDURE — RECHECK: Performed by: INTERNAL MEDICINE

## 2021-03-06 RX ORDER — TRAZODONE HYDROCHLORIDE 50 MG/1
50 TABLET ORAL
Status: DISCONTINUED | OUTPATIENT
Start: 2021-03-06 | End: 2021-03-06 | Stop reason: HOSPADM

## 2021-03-06 RX ADMIN — HYDRALAZINE HYDROCHLORIDE 10 MG: 10 TABLET, FILM COATED ORAL at 13:42

## 2021-03-06 RX ADMIN — METOPROLOL TARTRATE 25 MG: 25 TABLET, FILM COATED ORAL at 08:00

## 2021-03-06 RX ADMIN — NICOTINE 14 MG: 14 PATCH, EXTENDED RELEASE TRANSDERMAL at 08:01

## 2021-03-06 RX ADMIN — FOLIC ACID 1 MG: 1 TABLET ORAL at 08:00

## 2021-03-06 RX ADMIN — ENOXAPARIN SODIUM 40 MG: 40 INJECTION SUBCUTANEOUS at 08:02

## 2021-03-06 RX ADMIN — METRONIDAZOLE 500 MG: 500 INJECTION, SOLUTION INTRAVENOUS at 15:54

## 2021-03-06 RX ADMIN — POLYETHYLENE GLYCOL 3350 17 G: 17 POWDER, FOR SOLUTION ORAL at 08:02

## 2021-03-06 RX ADMIN — TAMSULOSIN HYDROCHLORIDE 0.4 MG: 0.4 CAPSULE ORAL at 15:54

## 2021-03-06 RX ADMIN — THIAMINE HCL TAB 100 MG 100 MG: 100 TAB at 08:00

## 2021-03-06 RX ADMIN — Medication 1 TABLET: at 08:00

## 2021-03-06 RX ADMIN — PANTOPRAZOLE SODIUM 40 MG: 40 TABLET, DELAYED RELEASE ORAL at 15:54

## 2021-03-06 RX ADMIN — METRONIDAZOLE 500 MG: 500 INJECTION, SOLUTION INTRAVENOUS at 08:01

## 2021-03-06 RX ADMIN — HYDRALAZINE HYDROCHLORIDE 10 MG: 10 TABLET, FILM COATED ORAL at 06:10

## 2021-03-06 RX ADMIN — PANTOPRAZOLE SODIUM 40 MG: 40 TABLET, DELAYED RELEASE ORAL at 06:10

## 2021-03-09 LAB
BACTERIA BLD CULT: NORMAL
BACTERIA BLD CULT: NORMAL

## 2021-05-04 ENCOUNTER — OFFICE VISIT (OUTPATIENT)
Dept: URGENT CARE | Age: 51
End: 2021-05-04
Payer: COMMERCIAL

## 2021-05-04 VITALS — RESPIRATION RATE: 18 BRPM | TEMPERATURE: 98 F | HEART RATE: 118 BPM | OXYGEN SATURATION: 98 %

## 2021-05-04 DIAGNOSIS — Z11.52 ENCOUNTER FOR SCREENING FOR COVID-19: Primary | ICD-10-CM

## 2021-05-04 DIAGNOSIS — R05.9 COUGH: ICD-10-CM

## 2021-05-04 PROCEDURE — U0005 INFEC AGEN DETEC AMPLI PROBE: HCPCS | Performed by: NURSE PRACTITIONER

## 2021-05-04 PROCEDURE — G0382 LEV 3 HOSP TYPE B ED VISIT: HCPCS | Performed by: NURSE PRACTITIONER

## 2021-05-04 PROCEDURE — U0003 INFECTIOUS AGENT DETECTION BY NUCLEIC ACID (DNA OR RNA); SEVERE ACUTE RESPIRATORY SYNDROME CORONAVIRUS 2 (SARS-COV-2) (CORONAVIRUS DISEASE [COVID-19]), AMPLIFIED PROBE TECHNIQUE, MAKING USE OF HIGH THROUGHPUT TECHNOLOGIES AS DESCRIBED BY CMS-2020-01-R: HCPCS | Performed by: NURSE PRACTITIONER

## 2021-05-04 NOTE — PROGRESS NOTES
[address]        NAME: Fitz Salazar is a 45 y o  male  : 1970    MRN: [id number]  DATE: May 4, 2021  TIME: 10:18 AM    Assessment and Plan   Encounter for screening for COVID-19 [Z11 52]  1  Encounter for screening for COVID-19  Novel Coronavirus (Covid-19),PCR [facility] - Office Collection   2  Cough           Patient Instructions     Covid tested; results in 2-3 days via MyChart  Stay quarantined  OTC cough med  Follow up with PCP in 3-5 days  Proceed to  ER if symptoms worsen  Chief Complaint     Chief Complaint   Patient presents with    Cough     AND STUFFY  NOSE          History of Present Illness       HPI   Reports cough and congested nose since yesterday  No recent travel  No known contacts with covid 19  Requested testing for covid  Review of Systems   Review of Systems   Constitutional: Negative for chills and fever  HENT: Positive for congestion (nose)  Negative for sore throat and trouble swallowing  Respiratory: Positive for cough  Negative for chest tightness, shortness of breath and wheezing  Cardiovascular: Negative for chest pain  Gastrointestinal: Negative for diarrhea, nausea and vomiting  Neurological: Negative for dizziness and headaches           Current Medications       Current Outpatient Medications:     tamsulosin (FLOMAX) 0 4 mg, Take 1 capsule (0 4 mg total) by mouth daily with dinner, Disp: 30 capsule, Rfl: 0    traZODone (DESYREL) 50 mg tablet, Take 50 mg by mouth daily at bedtime, Disp: , Rfl:     folic acid (FOLVITE) 1 mg tablet, Take 1 tablet (1 mg total) by mouth daily (Patient not taking: Reported on 2020), Disp: 30 tablet, Rfl: 0    hydrALAZINE (APRESOLINE) 10 mg tablet, Take 1 tablet (10 mg total) by mouth every 8 (eight) hours (Patient not taking: Reported on 2020), Disp: 90 tablet, Rfl: 0    loratadine (CLARITIN) 10 mg tablet, Take 1 tablet (10 mg total) by mouth daily (Patient not taking: Reported on 2020), Disp: 30 tablet, Rfl: 0    metoprolol tartrate (LOPRESSOR) 25 mg tablet, Take 1 tablet (25 mg total) by mouth every 12 (twelve) hours (Patient not taking: Reported on 11/10/2020), Disp: 60 tablet, Rfl: 0    nicotine (NICODERM CQ) 14 mg/24hr TD 24 hr patch, Place 1 patch on the skin daily (Patient not taking: Reported on 12/2/2020), Disp: 28 patch, Rfl: 0    nitroglycerin (NITROSTAT) 0 4 mg SL tablet, Place 1 tablet (0 4 mg total) under the tongue every 5 (five) minutes as needed for chest pain (Patient not taking: Reported on 12/2/2020), Disp: 30 tablet, Rfl: 0    pantoprazole (PROTONIX) 40 mg tablet, Take 1 tablet (40 mg total) by mouth 2 (two) times a day before meals (Patient not taking: Reported on 12/2/2020), Disp: 60 tablet, Rfl: 0    potassium chloride (K-DUR,KLOR-CON) 20 mEq tablet, Take 1 tablet (20 mEq total) by mouth daily (Patient not taking: Reported on 12/2/2020), Disp: 30 tablet, Rfl: 0    simethicone (MYLICON) 80 mg chewable tablet, Chew 1 tablet (80 mg total) 4 (four) times a day as needed for flatulence (Patient not taking: Reported on 12/2/2020), Disp: 30 tablet, Rfl: 0    thiamine 100 MG tablet, Take 1 tablet (100 mg total) by mouth daily (Patient not taking: Reported on 12/2/2020), Disp: 30 tablet, Rfl: 0    Current Allergies     Allergies as of 05/04/2021    (No Known Allergies)            The following portions of the patient's history were reviewed and updated as appropriate: allergies, current medications, past family history, past medical history, past social history, past surgical history and problem list      Past Medical History:   Diagnosis Date    Addiction to drug ([facility] )     BEN (acute kidney injury) ([facility] ) 3/4/2021    Alcohol abuse     Alcoholism ([facility] )     Anxiety     Arthritis     neck    Bipolar disorder ([facility] )     Depression     Lung disease     31+ yr smoking hx    Sleep difficulties     Substance abuse ([facility] )     Tobacco abuse        Past Surgical History:   Procedure Laterality Date    HERNIA REPAIR      WISDOM TOOTH EXTRACTION         Family History   Problem Relation Age of Onset    Heart disease Mother     Stroke Mother     Stroke Father          Medications have been verified  Objective   Pulse (!) 118   Temp 98 °F (36 7 °C)   Resp 18   SpO2 98%   No LMP for male patient  Physical Exam     Physical Exam  Constitutional:       Appearance: He is not ill-appearing or diaphoretic  HENT:      Right Ear: Tympanic membrane and ear canal normal       Left Ear: Tympanic membrane and ear canal normal       Nose: Rhinorrhea present  Mouth/Throat:      Pharynx: No posterior oropharyngeal erythema  Cardiovascular:      Rate and Rhythm: Regular rhythm  Heart sounds: Normal heart sounds  Pulmonary:      Effort: Pulmonary effort is normal       Breath sounds: Normal breath sounds  Neurological:      Mental Status: He is alert

## 2021-05-04 NOTE — PATIENT INSTRUCTIONS
Acute Cough   WHAT YOU NEED TO KNOW:   An acute cough can last up to 3 weeks  Common causes of an acute cough include a cold, allergies, or a lung infection  DISCHARGE INSTRUCTIONS:   Return to the emergency department if:   · You have trouble breathing or feel short of breath  · You cough up blood, or you see blood in your mucus  · You faint or feel weak or dizzy  · You have chest pain when you cough or take a deep breath  · You have new wheezing  Contact your healthcare provider if:   · You have a fever  · Your cough lasts longer than 4 weeks  · Your symptoms do not improve with treatment  · You have questions or concerns about your condition or care  Medicines:   · Medicines  may be needed to stop the cough, decrease swelling in your airways, or help open your airways  Medicine may also be given to help you cough up mucus  Ask your healthcare provider what over-the-counter medicines you can take  If you have an infection caused by bacteria, you may need antibiotics  · Take your medicine as directed  Contact your healthcare provider if you think your medicine is not helping or if you have side effects  Tell him or her if you are allergic to any medicine  Keep a list of the medicines, vitamins, and herbs you take  Include the amounts, and when and why you take them  Bring the list or the pill bottles to follow-up visits  Carry your medicine list with you in case of an emergency  Manage your symptoms:   · Do not smoke and stay away from others who smoke  Nicotine and other chemicals in cigarettes and cigars can cause lung damage and make your cough worse  Ask your healthcare provider for information if you currently smoke and need help to quit  E-cigarettes or smokeless tobacco still contain nicotine  Talk to your healthcare provider before you use these products  · Drink extra liquids as directed  Liquids will help thin and loosen mucus so you can cough it up  Liquids will also help prevent dehydration  Examples of good liquids to drink include water, fruit juice, and broth  Do not drink liquids that contain caffeine  Caffeine can increase your risk for dehydration  Ask your healthcare provider how much liquid to drink each day  · Rest as directed  Do not do activities that make your cough worse, such as exercise  · Use a humidifier or vaporizer  Use a cool mist humidifier or a vaporizer to increase air moisture in your home  This may make it easier for you to breathe and help decrease your cough  · Eat 2 to 5 mL of honey 2 times each day  Honey can help thin mucus and decrease your cough  · Use cough drops or lozenges  These can help decrease throat irritation and your cough  Follow up with your healthcare provider as directed:  Write down your questions so you remember to ask them during your visits  © Copyright [address] Information is for End User's use only and may not be sold, redistributed or otherwise used for commercial purposes  All illustrations and images included in CareNotes® are the copyrighted property of A D A M , Inc  or [address] [place], [place] [address]daniela   The above information is an  only  It is not intended as medical advice for individual conditions or treatments  Talk to your doctor, nurse or pharmacist before following any medical regimen to see if it is safe and effective for you

## 2021-05-05 LAB — SARS-COV-2 RNA RESP QL NAA+PROBE: NEGATIVE

## 2021-05-14 ENCOUNTER — HOSPITAL ENCOUNTER (EMERGENCY)
Facility: HOSPITAL | Age: 51
End: 2021-05-14
Attending: EMERGENCY MEDICINE | Admitting: EMERGENCY MEDICINE
Payer: COMMERCIAL

## 2021-05-14 ENCOUNTER — HOSPITAL ENCOUNTER (INPATIENT)
Facility: HOSPITAL | Age: 51
LOS: 2 days | Discharge: OTHER INSTITUTION | End: 2021-05-16
Attending: EMERGENCY MEDICINE | Admitting: EMERGENCY MEDICINE
Payer: COMMERCIAL

## 2021-05-14 ENCOUNTER — APPOINTMENT (INPATIENT)
Dept: ULTRASOUND IMAGING | Facility: HOSPITAL | Age: 51
End: 2021-05-14
Payer: COMMERCIAL

## 2021-05-14 VITALS
TEMPERATURE: 98.2 F | RESPIRATION RATE: 18 BRPM | DIASTOLIC BLOOD PRESSURE: 104 MMHG | OXYGEN SATURATION: 100 % | HEART RATE: 101 BPM | SYSTOLIC BLOOD PRESSURE: 169 MMHG

## 2021-05-14 DIAGNOSIS — F31.81 BIPOLAR 2 DISORDER (HCC): Primary | Chronic | ICD-10-CM

## 2021-05-14 DIAGNOSIS — Z72.0 TOBACCO ABUSE: Chronic | ICD-10-CM

## 2021-05-14 DIAGNOSIS — F10.20 ALCOHOL USE DISORDER, SEVERE, DEPENDENCE (HCC): Chronic | ICD-10-CM

## 2021-05-14 DIAGNOSIS — F10.230 ALCOHOL WITHDRAWAL SYNDROME WITHOUT COMPLICATION (HCC): Primary | ICD-10-CM

## 2021-05-14 DIAGNOSIS — I10 ESSENTIAL HYPERTENSION: Chronic | ICD-10-CM

## 2021-05-14 DIAGNOSIS — I10 HYPERTENSION, UNSPECIFIED TYPE: ICD-10-CM

## 2021-05-14 DIAGNOSIS — F33.1 MDD (MAJOR DEPRESSIVE DISORDER), RECURRENT EPISODE, MODERATE (HCC): Chronic | ICD-10-CM

## 2021-05-14 DIAGNOSIS — R10.9 ABDOMINAL CRAMPING: ICD-10-CM

## 2021-05-14 PROBLEM — F10.939 ALCOHOL WITHDRAWAL (HCC): Status: ACTIVE | Noted: 2021-05-14

## 2021-05-14 PROBLEM — F10.239 ALCOHOL WITHDRAWAL (HCC): Status: ACTIVE | Noted: 2021-05-14

## 2021-05-14 PROBLEM — R11.2 NAUSEA AND VOMITING: Status: ACTIVE | Noted: 2021-05-14

## 2021-05-14 PROBLEM — E86.0 DEHYDRATION: Status: ACTIVE | Noted: 2021-05-14

## 2021-05-14 LAB
ALBUMIN SERPL BCP-MCNC: 3.7 G/DL (ref 3.5–5)
ALP SERPL-CCNC: 73 U/L (ref 46–116)
ALT SERPL W P-5'-P-CCNC: 43 U/L (ref 12–78)
ANION GAP SERPL CALCULATED.3IONS-SCNC: 12 MMOL/L (ref 4–13)
AST SERPL W P-5'-P-CCNC: 18 U/L (ref 5–45)
BASOPHILS # BLD AUTO: 0.06 THOUSANDS/ΜL (ref 0–0.1)
BASOPHILS NFR BLD AUTO: 1 % (ref 0–1)
BILIRUB SERPL-MCNC: 0.45 MG/DL (ref 0.2–1)
BUN SERPL-MCNC: 17 MG/DL (ref 5–25)
CALCIUM SERPL-MCNC: 8.6 MG/DL (ref 8.3–10.1)
CHLORIDE SERPL-SCNC: 103 MMOL/L (ref 100–108)
CO2 SERPL-SCNC: 26 MMOL/L (ref 21–32)
CREAT SERPL-MCNC: 0.8 MG/DL (ref 0.6–1.3)
EOSINOPHIL # BLD AUTO: 0.19 THOUSAND/ΜL (ref 0–0.61)
EOSINOPHIL NFR BLD AUTO: 2 % (ref 0–6)
ERYTHROCYTE [DISTWIDTH] IN BLOOD BY AUTOMATED COUNT: 13.4 % (ref 11.6–15.1)
ETHANOL SERPL-MCNC: 116 MG/DL (ref 0–3)
GFR SERPL CREATININE-BSD FRML MDRD: 103 ML/MIN/1.73SQ M
GLUCOSE SERPL-MCNC: 99 MG/DL (ref 65–140)
HCT VFR BLD AUTO: 48 % (ref 36.5–49.3)
HGB BLD-MCNC: 17.2 G/DL (ref 12–17)
IMM GRANULOCYTES # BLD AUTO: 0.04 THOUSAND/UL (ref 0–0.2)
IMM GRANULOCYTES NFR BLD AUTO: 1 % (ref 0–2)
LYMPHOCYTES # BLD AUTO: 2.51 THOUSANDS/ΜL (ref 0.6–4.47)
LYMPHOCYTES NFR BLD AUTO: 32 % (ref 14–44)
MAGNESIUM SERPL-MCNC: 1.9 MG/DL (ref 1.6–2.6)
MCH RBC QN AUTO: 32.9 PG (ref 26.8–34.3)
MCHC RBC AUTO-ENTMCNC: 35.8 G/DL (ref 31.4–37.4)
MCV RBC AUTO: 92 FL (ref 82–98)
MONOCYTES # BLD AUTO: 0.55 THOUSAND/ΜL (ref 0.17–1.22)
MONOCYTES NFR BLD AUTO: 7 % (ref 4–12)
NEUTROPHILS # BLD AUTO: 4.59 THOUSANDS/ΜL (ref 1.85–7.62)
NEUTS SEG NFR BLD AUTO: 57 % (ref 43–75)
NRBC BLD AUTO-RTO: 0 /100 WBCS
PHOSPHATE SERPL-MCNC: 3.7 MG/DL (ref 2.7–4.5)
PLATELET # BLD AUTO: 270 THOUSANDS/UL (ref 149–390)
PMV BLD AUTO: 8.3 FL (ref 8.9–12.7)
POTASSIUM SERPL-SCNC: 3.9 MMOL/L (ref 3.5–5.3)
PROT SERPL-MCNC: 6.9 G/DL (ref 6.4–8.2)
RBC # BLD AUTO: 5.23 MILLION/UL (ref 3.88–5.62)
SODIUM SERPL-SCNC: 141 MMOL/L (ref 136–145)
WBC # BLD AUTO: 7.94 THOUSAND/UL (ref 4.31–10.16)

## 2021-05-14 PROCEDURE — 96374 THER/PROPH/DIAG INJ IV PUSH: CPT

## 2021-05-14 PROCEDURE — 99285 EMERGENCY DEPT VISIT HI MDM: CPT | Performed by: EMERGENCY MEDICINE

## 2021-05-14 PROCEDURE — 99222 1ST HOSP IP/OBS MODERATE 55: CPT | Performed by: EMERGENCY MEDICINE

## 2021-05-14 PROCEDURE — G0379 DIRECT REFER HOSPITAL OBSERV: HCPCS

## 2021-05-14 PROCEDURE — 83735 ASSAY OF MAGNESIUM: CPT | Performed by: PSYCHIATRY & NEUROLOGY

## 2021-05-14 PROCEDURE — 36415 COLL VENOUS BLD VENIPUNCTURE: CPT | Performed by: PSYCHIATRY & NEUROLOGY

## 2021-05-14 PROCEDURE — 82077 ASSAY SPEC XCP UR&BREATH IA: CPT | Performed by: PSYCHIATRY & NEUROLOGY

## 2021-05-14 PROCEDURE — 96361 HYDRATE IV INFUSION ADD-ON: CPT

## 2021-05-14 PROCEDURE — 80053 COMPREHEN METABOLIC PANEL: CPT | Performed by: PSYCHIATRY & NEUROLOGY

## 2021-05-14 PROCEDURE — 99254 IP/OBS CNSLTJ NEW/EST MOD 60: CPT | Performed by: PSYCHIATRY & NEUROLOGY

## 2021-05-14 PROCEDURE — 84100 ASSAY OF PHOSPHORUS: CPT | Performed by: PSYCHIATRY & NEUROLOGY

## 2021-05-14 PROCEDURE — HZ2ZZZZ DETOXIFICATION SERVICES FOR SUBSTANCE ABUSE TREATMENT: ICD-10-PCS | Performed by: EMERGENCY MEDICINE

## 2021-05-14 PROCEDURE — 76705 ECHO EXAM OF ABDOMEN: CPT

## 2021-05-14 PROCEDURE — 99285 EMERGENCY DEPT VISIT HI MDM: CPT

## 2021-05-14 PROCEDURE — 85025 COMPLETE CBC W/AUTO DIFF WBC: CPT | Performed by: PSYCHIATRY & NEUROLOGY

## 2021-05-14 RX ORDER — PHENOBARBITAL 32.4 MG/1
64.8 TABLET ORAL ONCE
Status: COMPLETED | OUTPATIENT
Start: 2021-05-14 | End: 2021-05-14

## 2021-05-14 RX ORDER — LANOLIN ALCOHOL/MO/W.PET/CERES
100 CREAM (GRAM) TOPICAL DAILY
Status: DISCONTINUED | OUTPATIENT
Start: 2021-05-14 | End: 2021-05-16 | Stop reason: HOSPADM

## 2021-05-14 RX ORDER — DIAZEPAM 5 MG/ML
10 INJECTION, SOLUTION INTRAMUSCULAR; INTRAVENOUS ONCE
Status: COMPLETED | OUTPATIENT
Start: 2021-05-14 | End: 2021-05-14

## 2021-05-14 RX ORDER — ACETAMINOPHEN 325 MG/1
650 TABLET ORAL EVERY 6 HOURS PRN
Status: DISCONTINUED | OUTPATIENT
Start: 2021-05-14 | End: 2021-05-16

## 2021-05-14 RX ORDER — PHENOBARBITAL SODIUM 130 MG/ML
130 INJECTION INTRAMUSCULAR ONCE
Status: COMPLETED | OUTPATIENT
Start: 2021-05-14 | End: 2021-05-14

## 2021-05-14 RX ORDER — MIRTAZAPINE 15 MG/1
15 TABLET, FILM COATED ORAL
Status: DISCONTINUED | OUTPATIENT
Start: 2021-05-14 | End: 2021-05-16 | Stop reason: HOSPADM

## 2021-05-14 RX ORDER — NICOTINE 21 MG/24HR
1 PATCH, TRANSDERMAL 24 HOURS TRANSDERMAL DAILY
Status: DISCONTINUED | OUTPATIENT
Start: 2021-05-14 | End: 2021-05-16 | Stop reason: HOSPADM

## 2021-05-14 RX ORDER — TOPIRAMATE 25 MG/1
25 TABLET ORAL DAILY
Status: DISCONTINUED | OUTPATIENT
Start: 2021-05-14 | End: 2021-05-14

## 2021-05-14 RX ORDER — NALTREXONE HYDROCHLORIDE 50 MG/1
25 TABLET, FILM COATED ORAL DAILY
Status: DISCONTINUED | OUTPATIENT
Start: 2021-05-15 | End: 2021-05-16 | Stop reason: HOSPADM

## 2021-05-14 RX ORDER — FOLIC ACID 1 MG/1
1 TABLET ORAL DAILY
Status: DISCONTINUED | OUTPATIENT
Start: 2021-05-14 | End: 2021-05-16 | Stop reason: HOSPADM

## 2021-05-14 RX ORDER — PANTOPRAZOLE SODIUM 40 MG/1
40 TABLET, DELAYED RELEASE ORAL
Status: DISCONTINUED | OUTPATIENT
Start: 2021-05-14 | End: 2021-05-16 | Stop reason: HOSPADM

## 2021-05-14 RX ORDER — ONDANSETRON 2 MG/ML
4 INJECTION INTRAMUSCULAR; INTRAVENOUS EVERY 6 HOURS PRN
Status: DISCONTINUED | OUTPATIENT
Start: 2021-05-14 | End: 2021-05-16 | Stop reason: HOSPADM

## 2021-05-14 RX ORDER — TOPIRAMATE 25 MG/1
25 TABLET ORAL
Status: DISCONTINUED | OUTPATIENT
Start: 2021-05-14 | End: 2021-05-16 | Stop reason: HOSPADM

## 2021-05-14 RX ADMIN — Medication 1 PATCH: at 11:41

## 2021-05-14 RX ADMIN — PHENOBARBITAL 64.8 MG: 32.4 TABLET ORAL at 16:32

## 2021-05-14 RX ADMIN — PHENOBARBITAL SODIUM 130 MG: 130 INJECTION INTRAMUSCULAR at 17:26

## 2021-05-14 RX ADMIN — FOLIC ACID 1 MG: 1 TABLET ORAL at 11:42

## 2021-05-14 RX ADMIN — TOPIRAMATE 25 MG: 25 TABLET ORAL at 21:48

## 2021-05-14 RX ADMIN — SODIUM CHLORIDE 1000 ML: 0.9 INJECTION, SOLUTION INTRAVENOUS at 08:41

## 2021-05-14 RX ADMIN — PHENOBARBITAL SODIUM 130 MG: 130 INJECTION INTRAMUSCULAR at 13:02

## 2021-05-14 RX ADMIN — PHENOBARBITAL SODIUM 130 MG: 130 INJECTION INTRAMUSCULAR at 11:43

## 2021-05-14 RX ADMIN — DIAZEPAM 10 MG: 10 INJECTION, SOLUTION INTRAMUSCULAR; INTRAVENOUS at 08:46

## 2021-05-14 RX ADMIN — PHENOBARBITAL 64.8 MG: 32.4 TABLET ORAL at 18:25

## 2021-05-14 RX ADMIN — PHENOBARBITAL 64.8 MG: 32.4 TABLET ORAL at 15:17

## 2021-05-14 RX ADMIN — PANTOPRAZOLE SODIUM 40 MG: 40 TABLET, DELAYED RELEASE ORAL at 11:44

## 2021-05-14 RX ADMIN — MIRTAZAPINE 15 MG: 15 TABLET, FILM COATED ORAL at 21:48

## 2021-05-14 RX ADMIN — PHENOBARBITAL 64.8 MG: 32.4 TABLET ORAL at 13:44

## 2021-05-14 RX ADMIN — SODIUM CHLORIDE 1000 ML: 0.9 INJECTION, SOLUTION INTRAVENOUS at 11:30

## 2021-05-14 RX ADMIN — THIAMINE HCL TAB 100 MG 100 MG: 100 TAB at 11:42

## 2021-05-14 NOTE — ASSESSMENT & PLAN NOTE
· IV fluid bolus  · Treatment of abdominal pain and nausea/ vomiting  · Encourage PO intake  · Start continuous IV fluids if unable to tolerate PO here  · Tolerating p o  diet but still with signs of orthostasis as he gets mildly tachycardic and lightheaded when he stands  Will give 1 L of normal saline to improve volume status and continue to monitor

## 2021-05-14 NOTE — CASE MANAGEMENT
CM met with pt, reviewed role of CM and d/c planning process  Pt reports he is now homeless; was renting a room from his employer but is now unemployed  Pt was working as a  although lost job due to his drinking  Pt reports drinking 1-2 pints of vodka daily since March  First use age 23, last use 5/13/21  Pt reports he was able to maintain 4yrs sobriety through use of outpatient treatment and being in a good relationship  Pt reports he "tried" cocaine only  Pt reports smoking THC in the past, "couple hits" with last use 2020, first use as a teenager  Pt reports he does not eat when he drinks which results in dry heaves when he is in withdrawal  Pt reports he feels anxious and guilty due to his drinking  Pt identifies his brother Dain Walker, sister Lila Keith, and brother in law Christiansen as his sober supports  Pt signed SEE for his brother Dain Walker tel# [phone]  Pt completed and signed Relapse Prevention Plan  Pt agreed to tsumobi; form signed and faxed to [facility]  CM reviewed pt's history of requesting and then refusing inpt alcohol rehab treatment  Pt reports wanting inpt rehab at this time due to knowing his drinking is making him physically sick  Pt reports he never followed up with past recommendations for outpatient treatment at [facility] or [address] [place] [address]  Pt reports wanting to be on Naltrexone  Pt has past inpt psych admission to [facility] [place] [facility] 2020  Inpt alcohol rehab treatment at [facility] August 2020  Pt reports he was at [facility] first then entered psych treatment  Pt denies any outpatient mental health treatment  Legal: Pt reports no current legal involvement  Past involvement with DUI in 2004, 2005, 2012 which resulted in loss of 's license  Pt reports he completed the required classes and had a breathalyzer in his car  Pt reprots being off parole on 12/20/2019  Pt completed HS and two years of college  Pt was working as a    Pt reports he drives and car is currently parked at [facility] near the ED  Pt requesting CM inform security of vehicle in lot  CM contacted [facility], spoke with [address]; pts car has been located and security aware that car maybe parked there for approx 2-3 weeks due to pt's potential for rehab placement  CM notified pt of preference for pt to find someone to move his car  Pt will contact his brother  Pharmacy: [address]  PCP: Dr Matthew Cisneros with [facility]  Pt signed SEE for PCP  PAWSS: 5  Audit: score not completed  UDS: not completed    Pt reports wanting inpt rehab and requesting [address] as first choice but in agreement with any [facility] location  Pt in agreement with [facility] assisting with bed search  Pt also in agreement [facility]  Pt signed SEE for [facility], [facility], [facility]  Call made to [facility] tel# [phone], reviewed request for inpt rehab  CM faxed clinical to [facility]  Awaiting call from clinician to complete phone assessment with pt  Anticipate pt for d/c tomorrow pending inpt rehab bed availability

## 2021-05-14 NOTE — EMTALA/ACUTE CARE TRANSFER
[address] [place] [address]  Dept: [phone]      EMTALA TRANSFER CONSENT    NAME Juaquin Young                                         1970                              MRN [id number]    I have been informed of my rights regarding examination, treatment, and transfer   by Dr Leny Burks MD    Benefits: Specialized equipment and/or services available at the receiving facility (Include comment)________________________(medical detox)    Risks: Potential for delay in receiving treatment, Potential deterioration of medical condition, Loss of IV, Increased discomfort during transfer, Possible worsening of condition or death during transfer      Consent for Transfer:  I acknowledge that my medical condition has been evaluated and explained to me by the emergency department physician or other qualified medical person and/or my attending physician, who has recommended that I be transferred to the service of  Accepting Physician: Dr Bobbi Burnette at [address] [place] [address] Name, [facility] : [address]  The above potential benefits of such transfer, the potential risks associated with such transfer, and the probable risks of not being transferred have been explained to me, and I fully understand them  The doctor has explained that, in my case, the benefits of transfer outweigh the risks  I agree to be transferred  I authorize the performance of emergency medical procedures and treatments upon me in both transit and upon arrival at the receiving facility  Additionally, I authorize the release of any and all medical records to the receiving facility and request they be transported with me, if possible  I understand that the safest mode of transportation during a medical emergency is an ambulance and that the Hospital advocates the use of this mode of transport   Risks of traveling to the receiving facility by car, including absence of medical control, life sustaining equipment, such as oxygen, and medical personnel has been explained to me and I fully understand them  (ONUR CORRECT BOX BELOW)  [  ]  I consent to the stated transfer and to be transported by ambulance/helicopter  [  ]  I consent to the stated transfer, but refuse transportation by ambulance and accept full responsibility for my transportation by car  I understand the risks of non-ambulance transfers and I exonerate the Hospital and its staff from any deterioration in my condition that results from this refusal     X___________________________________________    DATE  21  TIME________  Signature of patient or legally responsible individual signing on patient behalf           RELATIONSHIP TO PATIENT_________________________          Provider Certification    NAME Berenice Wilkins                                         1970                              MRN [id number]    A medical screening exam was performed on the above named patient  Based on the examination:    Condition Necessitating Transfer The primary encounter diagnosis was Alcohol withdrawal syndrome without complication ([facility] )  A diagnosis of Hypertension, unspecified type was also pertinent to this visit      Patient Condition: The patient has been stabilized such that within reasonable medical probability, no material deterioration of the patient condition or the condition of the unborn child(marlene) is likely to result from the transfer    Reason for Transfer: Level of Care needed not available at this facility    Transfer Requirements: [id number]   · Space available and qualified personnel available for treatment as acknowledged by [facility]  · Agreed to accept transfer and to provide appropriate medical treatment as acknowledged by       Dr Janes Reis  · Appropriate medical records of the examination and treatment of the patient are provided at the time of transfer   STAFF INITIAL WHEN COMPLETED _______  · Transfer will be performed by qualified personnel from [facility]  and appropriate transfer equipment as required, including the use of necessary and appropriate life support measures  Provider Certification: I have examined the patient and explained the following risks and benefits of being transferred/refusing transfer to the patient/family:  General risk, such as traffic hazards, adverse weather conditions, rough terrain or turbulence, possible failure of equipment (including vehicle or aircraft), or consequences of actions of persons outside the control of the transport personnel, Unanticipated needs of medical equipment and personnel during transport, Risk of worsening condition      Based on these reasonable risks and benefits to the patient and/or the unborn child(marlene), and based upon the information available at the time of the patients examination, I certify that the medical benefits reasonably to be expected from the provision of appropriate medical treatments at another medical facility outweigh the increasing risks, if any, to the individuals medical condition, and in the case of labor to the unborn child, from effecting the transfer      X____________________________________________ DATE 05/14/21        TIME_______      ORIGINAL - SEND TO MEDICAL RECORDS   COPY - SEND WITH PATIENT DURING TRANSFER

## 2021-05-14 NOTE — ASSESSMENT & PLAN NOTE
· Initiate SEWS protocol  · Treatment for alcohol withdrawal with phenobarbital as per SEWS protocol  · 5/15/21 - alcohol withdrawal has been stabilized  Patient may remain in hospital 1 more night to continue to stabilize blood pressure and electrolytes    · 05/16/21 - patient continues to remain free of withdrawal symptoms  · Patient is cleared for discharge - inpatient rehab placement pending

## 2021-05-14 NOTE — ASSESSMENT & PLAN NOTE
· Will hold home medications for now and assess blood pressure after treatment of withdrawal  · If continued hypertension after treatment for withdrawal is complete, could then restart home medications  · 5/15/21 - Unsure why metoprolol or hydralazine were previously prescribed  No history of coronary artery disease with a normal stress test in recent year; history of BEN  After discussion with Internal Medicine, Dr May Pablo, will proceed with amlodipine 5 mg p o  and advanced to 10 mg if needed    · 05/16/21: BP still remains elevated at 146/110, if BP remains elevated can increase Amlodipine to 10mg tomorrow  · Will continue to monitor

## 2021-05-14 NOTE — CONSULTS
Consultation - 228 [facility] 45 y o  male MRN: [id number]  Unit/Bed#: 5T DETOX 506-01 Encounter: [id number]      Chief Complaint: "Alcohol has affected my mood"    History of Present Illness   Physician Requesting Consult: Joselo Spence MD  Reason for Consult / Principal Problem:  Depressed mood  Meg Wen is a 45 y o  male with a history of Alcohol use disorder (with hx of withdrawal; no seizures or DT's) and Bipolar II disorder who presents with depressed mood  The patient presented to [address] [place] [address], requesting assistance with cessation of drinking, in the setting of alcohol withdrawal  He was admitted to the Detox unit at [facility] and initiated on SEWS protocol, with stabilization of his withdrawal symptoms  The pt was recently admitted to [address] [place] [address] 03/03/21 to 03/06/21 for alcohol withdrawal  vs opioid overdose, where he was intubated and treated in the ICU  The pt refused inpatient rehab at the time  He's also had 2 prior in-patient [facility] admissions, most recently in Aug/2020 at [facility] , but admits to non-compliance with his medications since his discharge  He admits alcohol use since at age 23, with excessive drinking since then  He reports current daily use of alcohol with usually consumption of 1-2 pt of vodka and peppermint schnappes  He admits to a history of withdrawal but denies any history of withdrawal seizures or DTs  He admits to approx 5 admissions to  inpatient drug and alcohol rehab, [facility]  He reports starting Vivitrol IM there, with good control of his alcohol cravings, but admits he did not follow up for his future dose and so relapsed about 1 month after  He reports his longest period of sobriety was 4 yrs ([id number]), which he credits to good assistance from out patient services at [address] [place] [address]       The patient admits to many social issues surrounding his alcohol abuse, including recently loosing his job as a  and becoming homeless as his siblings have kicked him out of their homes  He denies any current legal issues but admits to a history of approximately 5 incarcerations due to DUIs  The patient reports decreased sleep, energy, appetite and concentration  He reports increased guilt about his alcohol use and endorses anhedonia, stating he no longer enjoys playing his drums, working on his cars or spending time with his nieces and nephews like he normally does  He denies any past suicide attempts and denies any current suicidal ideations, intent or plan  He denied any access to weapons  The pt does not endorse any current or recent manic symptoms, and when this writer explained what radhika could look like, he denied every experiencing elevated mood and energy with decreased sleep, goal oriented tasks, risky behavior or hyper-verbal/pressured speech  He denied any hallucinations, paranoia or delusions  The patient expresses motivation in quitting his alcohol use and expresses interest in being discharged to in-patient drug and alcohol rehab  Psychiatric Review Of Systems:  Problems with sleep: yes, decreased  Appetite changes: yes, decreased  Weight changes: no  Low energy/anergy: yes  Low interest/pleasure/anhedonia: yes  Somatic symptoms: no  Anxiety/panic: yes  Radhika: no  Guilt/hopeless: yes  Self injurious behavior/risky behavior: no    Historical Information   Psychiatric medication trial: Vivitrol (IM and PO), Zoloft, Trazodone, Neuronton, Topomax  Inpatient hospitalizations: Twice, 1st at [facility] 2/24/16-2/29/16 and most recently at [facility]  Dionna Sesay "Shruthi" [facility] 8/5/20 - 8/11/20    Suicide attempts: Denies  Violent behavior: Denies  Outpatient treatment: None currently    Substance Abuse History:    Social History     Tobacco Use    Smoking status: Current Every Day Smoker     Packs/day: 0 50     Years: 35 00     Pack years: 17 50     Types: Cigarettes    Smokeless tobacco: Never Used    Tobacco comment: 1 PPD per [place]   Substance Use Topics    Alcohol use: Yes     Frequency: 4 or more times a week     Drinks per session: 10 or more     Binge frequency: Daily or almost daily     Comment: 1-2 pint per day    Drug use: Not Currently     Types: Marijuana      Tobacco use: 1/2 ppd since age 11  Alcohol: since age 23, 1-2 pints vodka with peppermint Schnappes daily  I have assessed this patient for substance use within the past 12 months  History of IP/OP rehabilitation program:  Approximately 5 times inpatient drug and alcohol rehab, 1st in 2008 at [facility] and most recently January 2021 at [address]  Family Psychiatric History:   Patient denies any known family history of psychiatric illness, suicide attempt, or substance abuse    Social History  Education: Associates degree in auto technology  Learning Disabilities: denies  Marital history: Single  Living arrangement: Presently homeless  Occupational History:   with [employer] (patient admits he is likely now fired due to his alcohol use)  Functioning Relationships: good support system; supportive brother who lives in [place] and supportive sister who lives in [place]  Other Pertinent History: Legal: past incarcerations: Approximately 5 times, longest 1 year, all of related to DUIs        Traumatic History:   Abuse: denies  Other Traumatic Events: denies    Past Medical History:   Diagnosis Date    Addiction to drug ([facility] )     Alcohol abuse     [facility]     Anxiety     Arthritis     neck    Bipolar disorder ([facility] )     Depression     Hypertension     Lung disease     31+ yr smoking hx    Psychiatric disorder     Sleep difficulties     Substance abuse ([facility] )     Tobacco abuse        Medical Review Of Systems:  Review of Systems - Negative except as noted in HPI  All other systems were reviewed and were negative    Meds/Allergies   all current active meds have been reviewed, current meds:   Current Facility-Administered Medications   Medication Dose Route Frequency    acetaminophen (TYLENOL) tablet 650 mg  650 mg Oral O9I PRN    folic acid (FOLVITE) tablet 1 mg  1 mg Oral Daily    mirtazapine (REMERON) tablet 15 mg  15 mg Oral HS    [START ON 5/15/2021] naltrexone (REVIA) tablet 25 mg  25 mg Oral Daily    nicotine (NICODERM CQ) 14 mg/24hr TD 24 hr patch 1 patch  1 patch Transdermal Daily    ondansetron (ZOFRAN) injection 4 mg  4 mg Intravenous Q6H PRN    pantoprazole (PROTONIX) EC tablet 40 mg  40 mg Oral Early Morning    PHENobarbital tablet 64 8 mg  64 8 mg Oral Once    thiamine tablet 100 mg  100 mg Oral Daily    topiramate (TOPAMAX) tablet 25 mg  25 mg Oral Daily    and PTA meds:   Prior to Admission Medications   Prescriptions Last Dose Informant Patient Reported?  Taking?   folic acid (FOLVITE) 1 mg tablet Past Month at Unknown time  No Yes   Sig: Take 1 tablet (1 mg total) by mouth daily   hydrALAZINE (APRESOLINE) 10 mg tablet Past Month at Unknown time  No Yes   Sig: Take 1 tablet (10 mg total) by mouth every 8 (eight) hours   loratadine (CLARITIN) 10 mg tablet   No No   Sig: Take 1 tablet (10 mg total) by mouth daily   Patient not taking: Reported on 12/2/2020   metoprolol tartrate (LOPRESSOR) 25 mg tablet Past Month at Unknown time  No Yes   Sig: Take 1 tablet (25 mg total) by mouth every 12 (twelve) hours   nicotine (NICODERM CQ) 14 mg/24hr TD 24 hr patch Past Month at Unknown time  No Yes   Sig: Place 1 patch on the skin daily   nitroglycerin (NITROSTAT) 0 4 mg SL tablet Unknown at Unknown time  No No   Sig: Place 1 tablet (0 4 mg total) under the tongue every 5 (five) minutes as needed for chest pain   Patient not taking: Reported on 12/2/2020   pantoprazole (PROTONIX) 40 mg tablet   No No   Sig: Take 1 tablet (40 mg total) by mouth 2 (two) times a day before meals   Patient not taking: Reported on 12/2/2020   potassium chloride (K-DUR,KLOR-CON) 20 mEq tablet   No No   Sig: Take 1 tablet (20 mEq total) by mouth daily   Patient not taking: Reported on 12/2/2020   simethicone (MYLICON) 80 mg chewable tablet   No No   Sig: Chew 1 tablet (80 mg total) 4 (four) times a day as needed for flatulence   Patient not taking: Reported on 12/2/2020   tamsulosin (FLOMAX) 0 4 mg Past Month at Unknown time  No Yes   Sig: Take 1 capsule (0 4 mg total) by mouth daily with dinner   thiamine 100 MG tablet Past Month at Unknown time  No Yes   Sig: Take 1 tablet (100 mg total) by mouth daily   traZODone (DESYREL) 50 mg tablet Past Month at Unknown time  Yes Yes   Sig: Take 50 mg by mouth daily at bedtime      Facility-Administered Medications: None     No Known Allergies    Objective   Vital signs in last 24 hours:  Temp:  [97 5 °F (36 4 °C)-98 7 °F (37 1 °C)] 98 6 °F (37 °C)  HR:  [] 90  Resp:  [18-19] 18  BP: (127-175)/() 140/103      Mental Status Evaluation:  Appearance:  age appropriate, casually dressed and tattooed   Behavior:  Calm, cooperative   Speech:  normal pitch and normal volume   Mood:  euthymic   Affect:  mood-congruent   Language: naming objects and repeating phrases   Thought Process:  concrete and logical   Thought Content:  No paranoia or delusions  Perceptual Disturbances: No auditory, visual or tactile hallucinations  Patient is not appear to be responding to internal stimuli     Risk Potential: Patient denies any suicidal or homicidal intentions, intent or plan   Sensorium:  person, place, time/date and situation   Cognition:  recent and remote memory grossly intact   Consciousness:  alert and awake    Attention: attention span and concentration were age appropriate   Intellect: within normal limits   Fund of Knowledge: awareness of current events: Fair, past history: Fair and vocabulary: Fair   Insight:  good   Judgment: fair   Muscle Strength and Tone: face, neck and torso within normal limits   Gait/Station: normal gait/station and normal balance   Motor Activity: no abnormal movements       Laboratory results:  I have personally reviewed all pertinent laboratory/tests results  Assessment/Plan     Nely Zaman is a 45 y o  male with a history of alcohol use disorder who is currently being treated on the detox unit[facility], for alcohol withdrawal syndrome  The patient has a history of multiple prior inpatient drug and alcohol rehab admisssions, most recently in January, with relapse 1 month after due to noncompliance with follow-up for his Vivitrol IM  Patient admits has that Vivitrol has helped some in the past to control his cravings and has stated he would like to return to inpatient rehab  He endorses depressive mood and anhedonia with decreased sleep, energy, appetite and concentration, but denies any current suicidal ideations intent or plan  Patient has good support system from his sister and brother, but has recently lost his job and become homeless due to his alcohol abuse  Diagnosis:  Alcohol induced mood disorder    Plan:   · Start Topamax 25 mg q d  for alcohol induced mood disorder, with plan to increase to 25 mg b i d  in 3 days  · Start Remeron 15 mg q h s  for depressed mood with poor appetite and sleep  · Start Naltrexone 25 mg q d  · Continue medical management per toxicology  · Once medically stable, discharge patient to inpatient rehab for alcohol use disorder    Risks, benefits and possible side effects of Medications:   Risks, benefits, and possible side effects of medications were explained to the patient, who verbalizes understanding          Debora Umaña MD

## 2021-05-14 NOTE — ASSESSMENT & PLAN NOTE
· Treatment of alcohol withdrawal as above  · Case management consult  · Has history of alcohol induced mood disorder and MDD, Bipolar Disorder  Patient is agreeable with speaking with Psychiatry  Will consult Psychiatry and appreciate their expertise and recommendations  · Will offer Naltrexone after acute withdrawal treated  · Daily thiamine and folate  · 5/15/21 - currently improved on alcohol withdrawal protocol

## 2021-05-14 NOTE — DISCHARGE INSTR - OTHER ORDERS
HOST #[phone]          [facility] [place]:    [address] [facility],[address]:   1  Obtain a housing voucher at the [address] [place] [address] at [employer] [place]  2  Photo identification will be required  3  Come to the [address] [place] [address] O between 3:30 P  M  and 7:30 P M  (Dinner is served at [employer] P M )    [address]:    [facility]  [facility], [facility]  Medical Clinic Operations: 4th Monday of the month  12:30-1:30 PM     [address]  [address] [facility], [address] [facility]  Enter on the left of the [facility] building off [address] [address] Operations : Every Wednesday of the month 11:00am-2:00 pm     [address], [address] [facility]  Medical Clinic Operations : 1st and 3rd Monday of the month from [address] [facility], [address]  Medical Clinic Operations 2nd and 4th Tuesday of the month from 11:00am-2:00pm     [address] Operations 2nd and 4th Friday of the month from 11:00am-2:00pm  AND every Tuesday 6-8 pm

## 2021-05-14 NOTE — ASSESSMENT & PLAN NOTE
· Daily pantoprazole  · Acetaminophen PRN mild pain  · Will get US RUQ given longstanding history of alcohol use disorder  · Will check lipase with morning labs  · Continue to monitor  · Improved

## 2021-05-14 NOTE — LETTER
May 16, 2021    [facility] Admissions    Patient: Nely Zaman   YOB: 1970   Date of Visit: 5/14/2021       Dear Dr Dinora Choe:        Sincerely,      No name on file          CC: No Recipients

## 2021-05-14 NOTE — PLAN OF CARE
Problem: PAIN - ADULT  Goal: Verbalizes/displays adequate comfort level or baseline comfort level  Description: Interventions:  - Encourage patient to monitor pain and request assistance  - Assess pain using appropriate pain scale  - Administer analgesics based on type and severity of pain and evaluate response  - Implement non-pharmacological measures as appropriate and evaluate response  - Consider cultural and social influences on pain and pain management  - Notify physician/advanced practitioner if interventions unsuccessful or patient reports new pain  Outcome: Progressing     Problem: INFECTION - ADULT  Goal: Absence or prevention of progression during hospitalization  Description: INTERVENTIONS:  - Assess and monitor for signs and symptoms of infection  - Monitor lab/diagnostic results  - Monitor all insertion sites, i e  indwelling lines, tubes, and drains  - Monitor endotracheal if appropriate and nasal secretions for changes in amount and color  - [place] appropriate cooling/warming therapies per order  - Administer medications as ordered  - Instruct and encourage patient and family to use good hand hygiene technique  - Identify and instruct in appropriate isolation precautions for identified infection/condition  Outcome: Progressing  Goal: Absence of fever/infection during neutropenic period  Description: INTERVENTIONS:  - Monitor WBC    Outcome: Progressing     Problem: SAFETY ADULT  Goal: Patient will remain free of falls  Description: INTERVENTIONS:  - Assess patient frequently for physical needs  -  Identify cognitive and physical deficits and behaviors that affect risk of falls    -  [place] fall precautions as indicated by assessment   - Educate patient/family on patient safety including physical limitations  - Instruct patient to call for assistance with activity based on assessment  - Modify environment to reduce risk of injury  - Consider OT/PT consult to assist with strengthening/mobility  Outcome: Progressing  Goal: Maintain or return to baseline ADL function  Description: INTERVENTIONS:  -  Assess patient's ability to carry out ADLs; assess patient's baseline for ADL function and identify physical deficits which impact ability to perform ADLs (bathing, care of mouth/teeth, toileting, grooming, dressing, etc )  - Assess/evaluate cause of self-care deficits   - Assess range of motion  - Assess patient's mobility; develop plan if impaired  - Assess patient's need for assistive devices and provide as appropriate  - Encourage maximum independence but intervene and supervise when necessary  - Involve family in performance of ADLs  - Assess for home care needs following discharge   - Consider OT consult to assist with ADL evaluation and planning for discharge  - Provide patient education as appropriate  Outcome: Progressing  Goal: Maintain or return mobility status to optimal level  Description: INTERVENTIONS:  - Assess patient's baseline mobility status (ambulation, transfers, stairs, etc )    - Identify cognitive and physical deficits and behaviors that affect mobility  - Identify mobility aids required to assist with transfers and/or ambulation (gait belt, sit-to-stand, lift, walker, cane, etc )  - [place] fall precautions as indicated by assessment  - Record patient progress and toleration of activity level on Mobility SBAR; progress patient to next Phase/Stage  - Instruct patient to call for assistance with activity based on assessment  - Consider rehabilitation consult to assist with strengthening/weightbearing, etc   Outcome: Progressing     Problem: DISCHARGE PLANNING  Goal: Discharge to home or other facility with appropriate resources  Description: INTERVENTIONS:  - Identify barriers to discharge w/patient and caregiver  - Arrange for needed discharge resources and transportation as appropriate  - Identify discharge learning needs (meds, wound care, etc )  - Arrange for interpretive services to assist at discharge as needed  - Refer to Case Management Department for coordinating discharge planning if the patient needs post-hospital services based on physician/advanced practitioner order or complex needs related to functional status, cognitive ability, or social support system  Outcome: Progressing     Problem: Knowledge Deficit  Goal: Patient/family/caregiver demonstrates understanding of disease process, treatment plan, medications, and discharge instructions  Description: Complete learning assessment and assess knowledge base    Interventions:  - Provide teaching at level of understanding  - Provide teaching via preferred learning methods  Outcome: Progressing

## 2021-05-14 NOTE — ED PROVIDER NOTES
History  Chief Complaint   Patient presents with    Withdrawal - Alcohol     pt reports feeling "severe alcohol withdrawal", last drink was around 10 pm       HPI     Nely Zaman is a 45 y o  male with history of bipolar disorder, alcoholism, and tobacco use who presents to the ED for alcohol withdrawal  Patient reports long-standing history of alcohol abuse, drinking 2 pints of peppermint schnapps per day for many years  Has gone through detox at home and medical detox before and has since relapsed  Denies history of alcohol withdrawal seizures  Feels like he is starting to get some shakiness in his hands  Also admits to nausea, nervousness, and anxiousness  No headaches, dizziness, lightheadedness  No abdominal pain, vomiting, or diarrhea  No fevers or chills  No chest pain or shortness of breath  Denies hallucinations, SI, or HI  Patient is also hypertensive and tachycardic today  He denies history of hypertension or other medical problems and states that he does not take any medications at home  His home med list in Epic does include metoprolol and hydralazine but he denies taking these at this time  Per chart review, patient was recently admitted at [facility] from 3/3/21 - 3/6/21 for possible EtOH withdrawal seizure vs opioid overdose  Was found with white powder nearby that patient claimed was cocaine  UDS at that time positive for opiates  Last alcohol rehab in Jan 2021 and relapsed 1 month later  Prior to Admission Medications   Prescriptions Last Dose Informant Patient Reported?  Taking?   folic acid (FOLVITE) 1 mg tablet   No No   Sig: Take 1 tablet (1 mg total) by mouth daily   Patient not taking: Reported on 12/2/2020   hydrALAZINE (APRESOLINE) 10 mg tablet   No No   Sig: Take 1 tablet (10 mg total) by mouth every 8 (eight) hours   Patient not taking: Reported on 12/2/2020   loratadine (CLARITIN) 10 mg tablet   No No   Sig: Take 1 tablet (10 mg total) by mouth daily   Patient not taking: Reported on 12/2/2020   metoprolol tartrate (LOPRESSOR) 25 mg tablet   No No   Sig: Take 1 tablet (25 mg total) by mouth every 12 (twelve) hours   Patient not taking: Reported on 11/10/2020   nicotine (NICODERM CQ) 14 mg/24hr TD 24 hr patch   No No   Sig: Place 1 patch on the skin daily   Patient not taking: Reported on 12/2/2020   nitroglycerin (NITROSTAT) 0 4 mg SL tablet   No No   Sig: Place 1 tablet (0 4 mg total) under the tongue every 5 (five) minutes as needed for chest pain   Patient not taking: Reported on 12/2/2020   pantoprazole (PROTONIX) 40 mg tablet   No No   Sig: Take 1 tablet (40 mg total) by mouth 2 (two) times a day before meals   Patient not taking: Reported on 12/2/2020   potassium chloride (K-DUR,KLOR-CON) 20 mEq tablet   No No   Sig: Take 1 tablet (20 mEq total) by mouth daily   Patient not taking: Reported on 12/2/2020   simethicone (MYLICON) 80 mg chewable tablet   No No   Sig: Chew 1 tablet (80 mg total) 4 (four) times a day as needed for flatulence   Patient not taking: Reported on 12/2/2020   tamsulosin (FLOMAX) 0 4 mg   No No   Sig: Take 1 capsule (0 4 mg total) by mouth daily with dinner   thiamine 100 MG tablet   No No   Sig: Take 1 tablet (100 mg total) by mouth daily   Patient not taking: Reported on 12/2/2020   traZODone (DESYREL) 50 mg tablet   Yes No   Sig: Take 50 mg by mouth daily at bedtime      Facility-Administered Medications: None       Past Medical History:   Diagnosis Date    [facility] drug [facility]     BEN (acute kidney injury) ([facility] ) 3/4/2021    Alcohol abuse     Alcoholism [facility]     Anxiety     Arthritis     neck    Bipolar disorder [facility]     Depression     Lung disease     31+ yr smoking hx    Sleep difficulties     Substance abuse [facility]     Tobacco abuse        Past Surgical History:   Procedure Laterality Date    HERNIA REPAIR      WISDOM TOOTH EXTRACTION         Family History   Problem Relation Age of Onset    Heart disease Mother     Stroke Mother  Stroke Father      I have reviewed and agree with the history as documented  E-Cigarette/Vaping    E-Cigarette Use Never User      E-Cigarette/Vaping Substances    Nicotine No     THC No     CBD No     Flavoring No     Other No     Unknown No      Social History     Tobacco Use    Smoking status: Current Every Day Smoker     Packs/day: 0 50     Years: 35 00     Pack years: 17 50     Types: Cigarettes    Smokeless tobacco: Never Used    Tobacco comment: 1 PPD per [place]   Substance Use Topics    Alcohol use: Yes     Frequency: 4 or more times a week     Drinks per session: 10 or more     Binge frequency: Daily or almost daily     Comment: 1/2 pint per week    Drug use: Yes     Types: Marijuana        Review of Systems   Constitutional: Negative for appetite change, chills, diaphoresis, fatigue, fever and unexpected weight change  HENT: Negative for ear pain and sore throat  Eyes: Negative for pain and visual disturbance  Respiratory: Negative for cough, shortness of breath and wheezing  Cardiovascular: Negative for chest pain, palpitations and leg swelling  Gastrointestinal: Positive for nausea  Negative for abdominal pain, constipation, diarrhea and vomiting  Endocrine: Negative for polydipsia and polyuria  Genitourinary: Negative for decreased urine volume, difficulty urinating, dysuria, frequency and hematuria  Musculoskeletal: Negative for arthralgias and back pain  Skin: Negative for color change and rash  Neurological: Positive for tremors (mild hand shakiness)  Negative for dizziness, seizures, syncope, facial asymmetry, speech difficulty, weakness, light-headedness, numbness and headaches  Psychiatric/Behavioral: Negative for behavioral problems, dysphoric mood, hallucinations and suicidal ideas  The patient is nervous/anxious  All other systems reviewed and are negative        Physical Exam  ED Triage Vitals   Temperature Pulse Respirations Blood Pressure SpO2 05/14/21 0729 05/14/21 0728 05/14/21 0728 05/14/21 0728 05/14/21 0728   98 2 °F (36 8 °C) 101 18 (!) 175/100 100 %      Temp src Heart Rate Source Patient Position - Orthostatic VS BP Location FiO2 (%)   -- 05/14/21 0728 -- -- --    Monitor         Pain Score       --                    Orthostatic Vital Signs  Vitals:    05/14/21 0728 05/14/21 0800   BP: (!) 175/100 (!) 169/104   Pulse: 101        Physical Exam  Vitals signs and nursing note reviewed  Constitutional:       Appearance: He is well-developed  HENT:      Head: Normocephalic and atraumatic  Right Ear: External ear normal       Left Ear: External ear normal       Nose: Nose normal       Mouth/Throat:      Mouth: Mucous membranes are moist       Pharynx: Oropharynx is clear  Eyes:      Conjunctiva/sclera: Conjunctivae normal    Neck:      Musculoskeletal: Neck supple  Cardiovascular:      Rate and Rhythm: Regular rhythm  Tachycardia present  Pulses: Normal pulses  Heart sounds: Normal heart sounds  No murmur  Pulmonary:      Effort: Pulmonary effort is normal  No respiratory distress  Breath sounds: Normal breath sounds  No wheezing, rhonchi or rales  Abdominal:      General: Abdomen is flat  Bowel sounds are normal  There is no distension  Palpations: Abdomen is soft  Tenderness: There is no abdominal tenderness  There is no guarding or rebound  Skin:     General: Skin is warm and dry  Neurological:      Mental Status: He is alert  Motor: Tremor present  Comments: Very fine bilateral hand tremor   No asterixis         ED Medications  Medications   sodium chloride 0 9 % bolus 1,000 mL (1,000 mL Intravenous New Bag 5/14/21 0841)   diazepam (VALIUM) injection 10 mg (10 mg Intravenous Given 5/14/21 0846)       Diagnostic Studies  Results Reviewed     Procedure Component Value Units Date/Time    Comprehensive metabolic panel [[id number]] Collected: 05/14/21 0842    Lab Status: Final result Specimen: Blood from Arm, Right Updated: 05/14/21 0936     Sodium 141 mmol/L      Potassium 3 9 mmol/L      Chloride 103 mmol/L      CO2 26 mmol/L      ANION GAP 12 mmol/L      BUN 17 mg/dL      Creatinine 0 80 mg/dL      Glucose 99 mg/dL      Calcium 8 6 mg/dL      AST 18 U/L      ALT 43 U/L      Alkaline Phosphatase 73 U/L      Total Protein 6 9 g/dL      Albumin 3 7 g/dL      Total Bilirubin 0 45 mg/dL      eGFR 103 ml/min/1 73sq m     Narrative:      [place] guidelines for Chronic Kidney Disease (CKD):     Stage 1 with normal or high GFR (GFR > 90 mL/min/1 73 square meters)    Stage 2 Mild CKD (GFR = 60-89 mL/min/1 73 square meters)    Stage 3A Moderate CKD (GFR = 45-59 mL/min/1 73 square meters)    Stage 3B Moderate CKD (GFR = 30-44 mL/min/1 73 square meters)    Stage 4 Severe CKD (GFR = 15-29 mL/min/1 73 square meters)    Stage 5 End Stage CKD (GFR <15 mL/min/1 73 square meters)  Note: GFR calculation is accurate only with a steady state creatinine    Magnesium [[id number]]  (Normal) Collected: 05/14/21 0842    Lab Status: Final result Specimen: Blood from Arm, Right Updated: 05/14/21 0936     Magnesium 1 9 mg/dL     Phosphorus [[id number]]  (Normal) Collected: 05/14/21 0842    Lab Status: Final result Specimen: Blood from Arm, Right Updated: 05/14/21 0936     Phosphorus 3 7 mg/dL     Ethanol [[id number]]  (Abnormal) Collected: 05/14/21 0842    Lab Status: Final result Specimen: Blood from Arm, Right Updated: 05/14/21 0910     Ethanol Lvl 116 mg/dL     CBC and differential [[id number]]  (Abnormal) Collected: 05/14/21 0842    Lab Status: Final result Specimen: Blood from Arm, Right Updated: 05/14/21 0854     WBC 7 94 Thousand/uL      RBC 5 23 Million/uL      Hemoglobin 17 2 g/dL      Hematocrit 48 0 %      MCV 92 fL      MCH 32 9 pg      MCHC 35 8 g/dL      RDW 13 4 %      MPV 8 3 fL      Platelets 780 Thousands/uL      nRBC 0 /100 WBCs      Neutrophils Relative 57 %      Immat GRANS % 1 % Lymphocytes Relative 32 %      Monocytes Relative 7 %      Eosinophils Relative 2 %      Basophils Relative 1 %      Neutrophils Absolute 4 59 Thousands/µL      Immature Grans Absolute 0 04 Thousand/uL      Lymphocytes Absolute 2 51 Thousands/µL      Monocytes Absolute 0 55 Thousand/µL      Eosinophils Absolute 0 19 Thousand/µL      Basophils Absolute 0 06 Thousands/µL     Rapid drug screen, urine [[id number]]     Lab Status: No result Specimen: Urine                  No orders to display         Procedures  Procedures      ED Course  ED Course as of May 14 0937   Fri May 14, 2021   1777 Blood Pressure(!): 175/100   1507 Discussed case with Medical Toxicology      2250 MEDICAL ALCOHOL(!): 116       MDM    Patient presents with alcohol withdrawal symptoms and is looking to enter rehab  Has previously withdrawn from EtOH and possibly had EtOH withdrawal seizure with last admission  Denies drug use but admitted to Cocaine use during last admission  Currently drinks 2 pints Peppermint Schnapps daily  Last drink at 10 PM last night  Current symptoms include anxiousness, mild nausea, tachycardia, and hypertension  EtOH level 116, other labs unremarkable  UDS pending  Patient received 1 L of NS and 10 mg Valium  Case was discussed with Medical Toxicology at [facility]  Patient has been accepted at [address] [place] [address] Detox unit  Patient understands and agrees with plan of care  All questions and concerns addressed        Disposition  Final diagnoses:   Al[facility]   Hypertension, unspecified type     Time reflects when diagnosis was documented in both MDM as applicable and the Disposition within this note     Time User Action Codes Description Comment    5/14/2021  8:33 AM Lolis Wyatt Add [F10 230] Alcohol withdrawal syndrome without complication ([facility] )     7/06/3329  8:33 AM Lolis Wyatt Add [I10] Hypertension, unspecified type       ED Disposition     ED Disposition Condition Date/Time Comment    Transfer to Another Facility-In Network  Fri May 14, 2021  8:58 AM Marvell Severs should be transferred out to [address] [place] [address] Inpatient Detox  Follow-up Information    None         Patient's Medications   Discharge Prescriptions    No medications on file     No discharge procedures on file  PDMP Review       Value Time User    PDMP Reviewed  Yes 3/3/2021 11:55 PM Juwan Hamilton           ED Provider  Attending physically available and evaluated Marvell Severs  I managed the patient along with the ED Attending      Electronically Signed by         Teresita Flores DO  05/14/21 2533

## 2021-05-14 NOTE — H&P
HISTORY & PHYSICAL EXAM  DEPARTMENT OF MEDICAL TOXICOLOGY  LEVEL 4 MEDICAL DETOX UNIT  Claudio Gibson 45 y o  male MRN: [id number]  Unit/Bed#: 5T DETOX 506-01 Encounter: [id number]      Reason for Admission/Principal Problem: Alcohol withdrawal  Admitting Provider: Montana West MD  Attending Provider: Montana West MD   5/14/2021 10:36 AM        * Alcohol withdrawal ([facility] [address] )  Assessment & Plan  · Initiate SEWS protocol  · Treatment for alcohol withdrawal with phenobarbital as per SEWS protocol    Dehydration  Assessment & Plan  · IV fluid bolus  · Treatment of abdominal pain and nausea/ vomiting  · Encourage PO intake  · Start continuous IV fluids if unable to tolerate PO here    Alcohol use disorder, severe, dependence ([facility] [address] )  Assessment & Plan  · Treatment of alcohol withdrawal as above  · Case management consult  · Has history of alcohol induced mood disorder and MDD, Bipolar Disorder  Patient is agreeable with speaking with Psychiatry  Will consult Psychiatry and appreciate their expertise and recommendations    · Will offer Naltrexone after acute withdrawal treated  · Daily thiamine and folate    Abdominal cramping  Assessment & Plan  · Daily pantoprazole  · Acetaminophen PRN mild pain  · Will get US RUQ given longstanding history of alcohol use disorder  · Will check lipase with morning labs  · Continue to monitor    Nausea and vomiting  Assessment & Plan  · Continue to monitor  · Antiemetics PRN  · PO diet as tolerated    Alcohol-induced mood disorder ([facility] [address] )  Assessment & Plan  · Consult Psychiatry    MDD (major depressive disorder), recurrent episode, moderate ([facility] [address] )  Assessment & Plan  · Psychiatry consult, follow-up on recommendations    Bipolar 2 disorder ([facility] [address] ) and MDD   Assessment & Plan  · Psychiatry consult, follow-up on recommendations    Essential hypertension  Assessment & Plan  · Will hold home medications for now and assess blood pressure after treatment of withdrawal  · If continued hypertension after treatment for withdrawal is complete, could then restart home medications    Tobacco abuse  Assessment & Plan  · Nicotine patch  · Encourage cessation          VTE Prophylaxis: Pharmacologic VTE Prophylaxis contraindicated due to Patient ambulatory, low risk for VTE  / reason for no mechanical VTE prophylaxis Patient ambulatory, low risk for VTE   Code Status: Full Code  POLST: POLST form is not discussed and not completed at this time  Anticipated Length of Stay:  Patient will be admitted on an Inpatient basis with an anticipated length of stay of at least 2 midnights  Justification for Hospital Stay: Alcohol withdrawal, dehydration    For any questions or concerns, please Tiger Text the advanced practitioner in the role of \[facility] [place] [facility]\""-DETOX-AP On Call      This patient qualifies for Level IV medically managed intensive inpatient services under the criteria set by the American Society of Addiction Medicine, including dimensions 1-3  The patient is in withdrawal (or is intoxicated with high risk of withdrawal), with severe and unstable medical and/or psychiatric (dual diagnosis) problems, requiring requires 24-hour medical and nursing care and the full resources of a [address] patient to medical detox unit and continue supportive care and stabilization of acute ethanol withdrawal per medical toxicology/detox treatment pathway  Monitor ethanol withdrawal severity via the Severity of Ethanol Withdrawal Scale (SEWS) Q4 hours and then hourly if/when SEWS > 6  Treat withdrawal per pathway and reassess Q30-60 minutes             Mild SEWS Score 1-6  Administer medications* (IV or PO; PO preferred):   If initial SEWS score: diazepam 10mg PO/IV x 1 AND phenobarbital 65 mg PO/IV x 1   If repeat SEWS score 1-6: phenobarbital 65 mg PO/IV q1 hour x 5 doses maximum   Reassessment:    SEWS q1 hour after each dose until SEWS 0 x 2 hours   VS q1 hours (until SEWS 0, then q4 hours)   Notify provider for bedside evaluation if 5-dose maximum is reached, RASS of -3 to -5, or SEWS score escalates to moderate or severe  Moderate SEWS Score 7-12  Administer medications* (IV):   If initial SEWS score: diazepam 10mg IV x 1 AND phenobarbital 260 mg IV x 1   If repeat SEWS score 7-12 or score escalated from mild: phenobarbital 130 mg IV q30 minutes x 5 doses maximum   Reassessment:   SEWS q30 minutes after each dose until SEWS < 7 (then hourly until SEWS 0 x 2 hours)   VS q30 minutes until SEWS < 7 (then hourly until SEWS 0, then q4 hours)   Notify provider for bedside evaluation if 5-dose maximum is reached, RASS of -3 to -5, or SEWS score escalates to severe  Severe SEWS Score ? 13  Administer medications* (IV):   If initial SEWS score: Diazepam 10 mg IV x 1 AND phenobarbital 650 mg IV piggyback x 1 over 15-30 minutes   If repeat SEWS score ? 13 or score escalated from mild or moderate: phenobarbital 130 mg IV q30 minutes x 5 doses maximum   Reassessment:   SEWS q30 minutes after each dose until SEWS < 7 (then hourly until SEWS 0 x 2 hours)    VS q30 minutes until SEWS < 7 (then hourly until SEWS 0, then q4 hours)   Notify provider for bedside evaluation if 5-dose maximum is reached or RASS of -3 to -5   *Hold medications and notify provider if CNS depression, respirations < 10/min, or RASS of -3 to -5  Medications to be administered adjunctively if more than 2 grams of phenobarbital is needed for stabilization of withdrawal; require attending approval     Dexmedetomidine infusion 0 1-1mcg/kg/hr IV infusion, titratable to reduced agitation (Goal: RASS -2)   Ketamine   o Acute agitated delirium: 1-2 mg/kg IV or 4-5 mg/kg IM  o Refractory withdrawal: 0 1-1mg/kg/hr IV infusion, titratable to reduced agitation (Goal: RASS -2)    Further evaluation, screening and treatment:  Evaluate complete metabolic panel, transaminases, INR, and lipase  Assess hepatic ultrasound for any sign of alcoholic liver disease or cirrhosis, and ultimately refer for further hepatic evaluation and care as/if indicated  Additional medications for ethanol associated malnutrition: Thiamine 100 mg IV daily, increase to 500 mg TID for signs/symptoms of Wernicke's Encephalopathy or Wernicke Korsakoff Syndrome   Folic acid 1 mg IV daily   Multivitamin PO daily      Will offer first monthly injection of Naltrexone 380 mg IM, once patient is stabilized, as it has been shown to assist in decreasing cravings for ethanol  Evaluate and treat for coexisting substance use, such as opioids and nicotine  Discuss risk factors for infectious disease, such as history of intravenous drug abuse, and offer hepatitis and HIV screening if indicated  Case management consultation to assist with coordination of subsequent treatment after discharge  HPI: Jose D Murdock is a 45y o  year old male with history of alcohol use disorder who presented to the [facility] emergency department today requesting help for cessation of his drinking in the setting of alcohol withdrawal   The patient states that he has been drinking for about 30 years with only brief period of sobriety  Does have prior history of alcohol withdrawal with attempts to quit drinking  No history of seizures or DTs  Patient states that he drinks 1-2 pt of peppermint schnapps daily  Last drink was at 10:00 p m  Last night  The patient states that over the past several days he has been having mild upper abdominal cramping along with nausea and episodes of dry heaving  He has had difficulty eating due to the symptoms  Has been on pantoprazole in the past but is not currently taking this  Patient actually ran out of all of his medications about one month ago and so has not been taking any of his home medications    The patient states that this morning he had onset of anxiety, tremor, and nausea attributed to alcohol withdrawal and so he presented for help  He does feel that the diazepam he received in the emergency department helped somewhat although he continues to have some anxiety, tremor and nausea  Denies any hallucinations  No chest pain or shortness of breath  No other acute complaints  Patient states that he very infrequently smokes marijuana, otherwise no other history of substance use aside from alcohol  Preferred alcoholic beverage(s):  Peppermint schnapps  Quantity and frequency of alcohol intake: 1-2 pints daily  Use of any ethanol substitutes (toxic alcohols): no  Date/Time of last alcohol intake: 05/13/21  22:00  Current signs and symptoms of ethanol withdrawal: Yes    SEWS Total Score: 11 (5/14/2021 11:00 AM)        Ethanol Withdrawal History  Previous ethanol withdrawal? yes  Prior inpatient treatment for ethanol withdrawal? no  Prior outpatient treatment for ethanol withdrawal? no  History of seizures with prior ethanol withdrawal? no  Prior treatment with naltrexone (Vivitrol)? no  Current treatment with naltrexone (Vivitrol)? no  Other current treatment for ethanol use disorder? no  Co-existing substance use? no    Review of PDMP: no     Social History     Substance and Sexual Activity   Alcohol Use Yes    Frequency: 4 or more times a week    Drinks per session: 10 or more    Binge frequency: Daily or almost daily    Comment: 1-2 pint per day     Social History     Substance and Sexual Activity   Drug Use Not Currently    Types: Marijuana     Social History     Tobacco Use   Smoking Status Current Every Day Smoker    Packs/day: 0 50    Years: 35 00    Pack years: 17 50    Types: Cigarettes   Smokeless Tobacco Never Used   Tobacco Comment    1 PPD per Sean Adas       Review of Systems   Constitutional: Negative for chills and fever  HENT: Negative  Eyes: Negative  Respiratory: Negative for cough and shortness of breath  Cardiovascular: Negative for chest pain and leg swelling  Gastrointestinal: Positive for abdominal pain, nausea and vomiting  Genitourinary: Negative  Musculoskeletal: Negative for arthralgias and myalgias  Skin: Negative  Allergic/Immunologic: Negative  Neurological: Positive for tremors  Negative for seizures  Hematological: Negative  Psychiatric/Behavioral: Negative for suicidal ideas  The patient is nervous/anxious  Historical Information   Past Medical History:   Diagnosis Date    Addiction to drug ([place] [address] )     Alcohol abuse     Alcoholism ([place] [address] )     Anxiety     Arthritis     neck    Bipolar disorder ([place] [address] )     Depression     Hypertension     Lung disease     31+ yr smoking hx    Psychiatric disorder     Sleep difficulties     Substance abuse ([place] [address] )     Tobacco abuse      Past Surgical History:   Procedure Laterality Date    HERNIA REPAIR      WISDOM TOOTH EXTRACTION       Family History   Problem Relation Age of Onset    Heart disease Mother     Stroke Mother     Stroke Father      Social History   Marital Status: Single   Patient Pre-hospital Living Situation: Lives independently  Patient Pre-hospital Level of Mobility: Fully ambulatory  Patient Pre-hospital Diet Restrictions: None    No Known Allergies    Prior to Admission medications    Medication Sig Start Date End Date Taking?  Authorizing Provider   folic acid (FOLVITE) 1 mg tablet Take 1 tablet (1 mg total) by mouth daily 9/22/20  Yes Killian Yap MD   hydrALAZINE (APRESOLINE) 10 mg tablet Take 1 tablet (10 mg total) by mouth every 8 (eight) hours 9/24/20  Yes Darryle Rod, PA-C   metoprolol tartrate (LOPRESSOR) 25 mg tablet Take 1 tablet (25 mg total) by mouth every 12 (twelve) hours 9/24/20  Yes Darryle Rod, PA-C   nicotine (NICODERM CQ) 14 mg/24hr TD 24 hr patch Place 1 patch on the skin daily 11/3/20  Yes Litzy Lambert MD   tamsulosin [facility]) 0 4 mg Take 1 capsule (0 4 mg total) by mouth daily with dinner 9/24/20  Yes Darryle Rod, PA-C   thiamine 100 MG tablet Take 1 tablet (100 mg total) by mouth daily 9/22/20  Yes Jayde Tarango MD   traZODone (DESYREL) 50 mg tablet Take 50 mg by mouth daily at bedtime   Yes Historical Provider, MD   loratadine (CLARITIN) 10 mg tablet Take 1 tablet (10 mg total) by mouth daily  Patient not taking: Reported on 12/2/2020 11/3/20   Brandy Noble MD   nitroglycerin (NITROSTAT) 0 4 mg SL tablet Place 1 tablet (0 4 mg total) under the tongue every 5 (five) minutes as needed for chest pain  Patient not taking: Reported on 12/2/2020 9/22/20   Jayde Tarango MD   pantoprazole (PROTONIX) 40 mg tablet Take 1 tablet (40 mg total) by mouth 2 (two) times a day before meals  Patient not taking: Reported on 12/2/2020 9/24/20   Alondra Mcbride PA-C   potassium chloride (K-DUR,KLOR-CON) 20 mEq tablet Take 1 tablet (20 mEq total) by mouth daily  Patient not taking: Reported on 12/2/2020 9/22/20   Jayde Tarango MD   simethicone (MYLICON) 80 mg chewable tablet Chew 1 tablet (80 mg total) 4 (four) times a day as needed for flatulence  Patient not taking: Reported on 12/2/2020 11/2/20   Brandy Noble MD       Current Facility-Administered Medications   Medication Dose Route Frequency    acetaminophen (TYLENOL) tablet 650 mg  650 mg Oral O7B PRN    folic acid (FOLVITE) tablet 1 mg  1 mg Oral Daily    nicotine (NICODERM CQ) 14 mg/24hr TD 24 hr patch 1 patch  1 patch Transdermal Daily    ondansetron (ZOFRAN) injection 4 mg  4 mg Intravenous Q6H PRN    pantoprazole (PROTONIX) EC tablet 40 mg  40 mg Oral Early Morning    PHENobarbital 130 mg/mL injection 130 mg  130 mg Intravenous Once    sodium chloride 0 9 % bolus 1,000 mL  1,000 mL Intravenous Once    thiamine tablet 100 mg  100 mg Oral Daily       Continuous Infusions:          Objective     No intake or output data in the 24 hours ending 05/14/21 1146    Invasive Devices:   Peripheral IV 05/14/21 Right Antecubital (Active)       Vitals   Vitals:    05/14/21 1039   BP: (!) 144/106   TempSrc: Temporal Pulse: 92   Resp: 18   Patient Position - Orthostatic VS: Sitting   Temp: 98 7 °F (37 1 °C)       Physical Exam  Vitals signs reviewed  Constitutional:       General: He is not in acute distress  Appearance: Normal appearance  HENT:      Head: Normocephalic and atraumatic  Mouth/Throat:      Mouth: Mucous membranes are moist       Pharynx: Oropharynx is clear  Eyes:      Conjunctiva/sclera: Conjunctivae normal       Pupils: Pupils are equal, round, and reactive to light  Neck:      Musculoskeletal: Neck supple  Cardiovascular:      Rate and Rhythm: Regular rhythm  Tachycardia present  Heart sounds: No murmur  No friction rub  No gallop  Pulmonary:      Effort: Pulmonary effort is normal  No respiratory distress  Breath sounds: Normal breath sounds  Abdominal:      General: There is no distension  Palpations: Abdomen is soft  There is no mass  Tenderness: There is no abdominal tenderness  Musculoskeletal: Normal range of motion  General: No swelling  Right lower leg: No edema  Left lower leg: No edema  Skin:     General: Skin is warm and dry  Neurological:      General: No focal deficit present  Mental Status: He is alert and oriented to person, place, and time  Comments: Tremor present in bilateral upper extremities  No ataxia  Normal muscle tone  Psychiatric:         Mood and Affect: Mood normal          Behavior: Behavior normal          Thought Content:  Thought content normal          Data:        Lab Results:  CBC ETOH     Lab Results   Component Value Date    WBC 7 94 05/14/2021    RBC 5 23 05/14/2021    HGB 17 2 (H) 05/14/2021    HCT 48 0 05/14/2021    MCV 92 05/14/2021    MCH 32 9 05/14/2021    MCHC 35 8 05/14/2021    RDW 13 4 05/14/2021     05/14/2021    MPV 8 3 (L) 05/14/2021      Lab Results   Component Value Date    LACTICACID 3 3 (HH) 03/04/2021      CMP UA         Component Value Date/Time    K 3 9 05/14/2021 6555  05/14/2021 0842    CO2 26 05/14/2021 0842    CO2 20 (L) 03/04/2021 0115    BUN 17 05/14/2021 0842    CREATININE 0 80 05/14/2021 0842         Component Value Date/Time    CALCIUM 8 6 05/14/2021 0842    ALKPHOS 73 05/14/2021 0842    AST 18 05/14/2021 0842    ALT 43 05/14/2021 0842      Lab Results   Component Value Date    CLARITYU Clear 03/03/2021    COLORU Yellow 03/03/2021    SPECGRAV >=1 030 03/03/2021    PHUR 6 0 03/03/2021    PHUR 6 0 10/08/2020    GLUCOSEU Negative 03/03/2021    KETONESU Negative 03/03/2021    BLOODU Moderate (A) 03/03/2021    PROTEIN UA 30 (1+) (A) 03/03/2021    NITRITE Negative 03/03/2021    BILIRUBINUR Negative 03/03/2021    UROBILINOGEN 0 2 03/03/2021    LEUKOCYTESUR Negative 03/03/2021    WBCUA None Seen 03/03/2021    RBCUA 0-1 03/03/2021    HYALINE 0-1 (A) 03/03/2021    BACTERIA None Seen 03/03/2021    EPIS None Seen 03/03/2021        Liver Function Test: ASA     Lab Results   Component Value Date    TBILI 0 45 05/14/2021    BILIDIR 0 13 03/03/2021    ALKPHOS 73 05/14/2021    AST 18 05/14/2021    ALT 43 05/14/2021    TP 6 9 05/14/2021    ALB 3 7 05/14/2021      Lab Results   Component Value Date    SALICYLATE <3 (L) 35/00/4675      Troponin APAP     Lab Results   Component Value Date    TROPONINI 0 04 03/03/2021      Lab Results   Component Value Date    ACTMNPHEN <2 (L) 03/03/2021      VBG HCG     No results found for: PHVEN, VEE0ZIQ, PO2VEN, WVJ2MHJ, BEVEN, E5XGHQZCA, N2TIOHO   No results found for: HCGQUANT   ABG Urine Drug Screen     No results found for: PHART, JMR3TNK, PO2ART, QLA3KUI, BEART, W6SURYUYL, O2HGB, SOURC, NAS, VTAC, ACRATE, INSPIREDAIR, PEEP   Lab Results   Component Value Date    AMPMETHUR Negative 03/03/2021    BARBTUR Negative 03/03/2021    BDZUR Negative 03/03/2021    COCAINEUR Negative 03/03/2021    METHADONEUR Negative 03/03/2021    OPIATEUR Positive (A) 03/03/2021    PCPUR Negative 03/03/2021    THCUR Negative 03/03/2021    OXYCODONEUR Negative 03/03/2021      Lactate INR     Lab Results   Component Value Date    LACTICACID 3 3 (HH) 03/04/2021      Lab Results   Component Value Date    INR 0 95 10/31/2020      PTT Protime     Lab Results   Component Value Date/Time    PTT 30 10/31/2020 07:17 PM        Lab Results   Component Value Date/Time    PROTIME 12 8 10/31/2020 07:17 PM            Counseling / Coordination of Care  Total floor / unit time spent today 65 minutes  Greater than 50% of total time was spent with the patient and / or family counseling and / or coordination of care  Minutes of critical care time: 35 minutes  -Critical care time was exclusive of separately billable procedures and teaching time    -Critical care was necessary to treat or prevent imminent or life-threatening deterioration of the following condition: CNS failure/compromise, toxidrome (ethanol withdrawal)  -Critical care time was spent personally by me on the following activities as well as the above as per the course and rest of chart: obtaining history from patient/surrogate, development of a treatment plan, discussions with referring provider(s), evaluation of patient's response to the treatment, examination of the patient, recommending treatments and interventions, review of old charts, ordering/interpreting laboratory studies, ordering/interpreting of radiographic studies  ** Please Note: This note has been constructed using a voice recognition system   **

## 2021-05-14 NOTE — ED ATTENDING ATTESTATION
5/14/2021  Dany ALANIZ MD, saw and evaluated the patient  I have discussed the patient with the resident/non-physician practitioner and agree with the resident's/non-physician practitioner's findings, Plan of Care, and MDM as documented in the resident's/non-physician practitioner's note, except where noted  All available labs and Radiology studies were reviewed  I was present for key portions of any procedure(s) performed by the resident/non-physician practitioner and I was immediately available to provide assistance  At this point I agree with the current assessment done in the Emergency Department  I have conducted an independent evaluation of this patient a history and physical is as follows: This is a 45 y o  old male who presents to the ED for evaluation of alcohol withdrawal   Patient reports wanting to stop drinking because he does not like how it makes him feel  He drinks 2 pints of peppermint schnapps daily  He is looking for inpatient detox the quit alcohol consumption  He feels like he is already withdrawing  Last drink was at 10 p m       VS and nursing notes reviewed  General: Appears in NAD, awake, alert, speaking normally in full sentences  Well-nourished, well-developed  Appears stated age  Head: Normocephalic, atraumatic  Eyes: EOMI  Vision grossly normal  No subconjunctival hemorrhages or occular discharge noted  Symmetrical lids  ENT: Atraumatic external nose and ears  No stridor  Normal phonation  No drooling  Normal swallowing  Tongue fasciculations  Neck: No JVD  FROM  No goiter  CV: No pallor  Normal rate  Lungs: No tachypnea  No respiratory distress  MSK: Moving all extremities equally, no peripheral edema  Skin: Dry, intact  No cyanosis  Neuro: Awake, alert, GCS15  CN II-XII grossly intact  Grossly normal gait  Fine tremors  Psychiatric/Behavioral: Appropriate mood and affect       A/P: This is a 45 y o  male who presents to the ED for evaluation of alcohol withdrawal  Labs, VF, BZD  Tox consult  Anticipate transfer to IP Tox      ED Course       Critical Care Time  Procedures

## 2021-05-15 PROBLEM — E87.6 HYPOKALEMIA: Status: ACTIVE | Noted: 2021-05-15

## 2021-05-15 LAB
ANION GAP SERPL CALCULATED.3IONS-SCNC: 6 MMOL/L (ref 5–14)
BUN SERPL-MCNC: 10 MG/DL (ref 5–25)
CALCIUM SERPL-MCNC: 8.5 MG/DL (ref 8.4–10.2)
CHLORIDE SERPL-SCNC: 104 MMOL/L (ref 97–108)
CO2 SERPL-SCNC: 25 MMOL/L (ref 22–30)
CREAT SERPL-MCNC: 0.71 MG/DL (ref 0.7–1.5)
ERYTHROCYTE [DISTWIDTH] IN BLOOD BY AUTOMATED COUNT: 14.2 %
GFR SERPL CREATININE-BSD FRML MDRD: 109 ML/MIN/1.73SQ M
GLUCOSE SERPL-MCNC: 91 MG/DL (ref 70–99)
HCT VFR BLD AUTO: 46.8 % (ref 41–53)
HGB BLD-MCNC: 16.3 G/DL (ref 13.5–17.5)
LIPASE SERPL-CCNC: 92 U/L (ref 23–300)
MCH RBC QN AUTO: 33.8 PG (ref 26–34)
MCHC RBC AUTO-ENTMCNC: 34.9 G/DL (ref 31–36)
MCV RBC AUTO: 97 FL (ref 80–100)
PLATELET # BLD AUTO: 254 THOUSANDS/UL (ref 150–450)
PMV BLD AUTO: 7 FL (ref 8.9–12.7)
POTASSIUM SERPL-SCNC: 3.3 MMOL/L (ref 3.6–5)
RBC # BLD AUTO: 4.82 MILLION/UL (ref 4.5–5.9)
SODIUM SERPL-SCNC: 135 MMOL/L (ref 137–147)
WBC # BLD AUTO: 7.7 THOUSAND/UL (ref 4.5–11)

## 2021-05-15 PROCEDURE — 80048 BASIC METABOLIC PNL TOTAL CA: CPT | Performed by: EMERGENCY MEDICINE

## 2021-05-15 PROCEDURE — 83690 ASSAY OF LIPASE: CPT | Performed by: EMERGENCY MEDICINE

## 2021-05-15 PROCEDURE — 99231 SBSQ HOSP IP/OBS SF/LOW 25: CPT | Performed by: EMERGENCY MEDICINE

## 2021-05-15 PROCEDURE — 85027 COMPLETE CBC AUTOMATED: CPT | Performed by: EMERGENCY MEDICINE

## 2021-05-15 RX ORDER — POTASSIUM CHLORIDE 20 MEQ/1
40 TABLET, EXTENDED RELEASE ORAL 2 TIMES DAILY
Status: COMPLETED | OUTPATIENT
Start: 2021-05-15 | End: 2021-05-15

## 2021-05-15 RX ORDER — LORAZEPAM 1 MG/1
1 TABLET ORAL ONCE
Status: COMPLETED | OUTPATIENT
Start: 2021-05-15 | End: 2021-05-15

## 2021-05-15 RX ORDER — HYDRALAZINE HYDROCHLORIDE 10 MG/1
10 TABLET, FILM COATED ORAL EVERY 8 HOURS SCHEDULED
Status: DISCONTINUED | OUTPATIENT
Start: 2021-05-15 | End: 2021-05-15

## 2021-05-15 RX ORDER — AMLODIPINE BESYLATE 5 MG/1
5 TABLET ORAL DAILY
Status: DISCONTINUED | OUTPATIENT
Start: 2021-05-15 | End: 2021-05-16 | Stop reason: HOSPADM

## 2021-05-15 RX ADMIN — TOPIRAMATE 25 MG: 25 TABLET ORAL at 21:26

## 2021-05-15 RX ADMIN — NALTREXONE HYDROCHLORIDE 25 MG: 50 TABLET, FILM COATED ORAL at 08:08

## 2021-05-15 RX ADMIN — AMLODIPINE BESYLATE 5 MG: 5 TABLET ORAL at 11:56

## 2021-05-15 RX ADMIN — MIRTAZAPINE 15 MG: 15 TABLET, FILM COATED ORAL at 21:26

## 2021-05-15 RX ADMIN — ENOXAPARIN SODIUM 40 MG: 40 INJECTION SUBCUTANEOUS at 12:08

## 2021-05-15 RX ADMIN — THIAMINE HCL TAB 100 MG 100 MG: 100 TAB at 08:08

## 2021-05-15 RX ADMIN — SODIUM CHLORIDE 1000 ML: 0.9 INJECTION, SOLUTION INTRAVENOUS at 12:08

## 2021-05-15 RX ADMIN — LORAZEPAM 1 MG: 1 TABLET ORAL at 19:55

## 2021-05-15 RX ADMIN — FOLIC ACID 1 MG: 1 TABLET ORAL at 08:09

## 2021-05-15 RX ADMIN — Medication 1 PATCH: at 08:09

## 2021-05-15 RX ADMIN — POTASSIUM CHLORIDE 40 MEQ: 20 TABLET, EXTENDED RELEASE ORAL at 09:34

## 2021-05-15 RX ADMIN — PANTOPRAZOLE SODIUM 40 MG: 40 TABLET, DELAYED RELEASE ORAL at 05:34

## 2021-05-15 RX ADMIN — HYDRALAZINE HYDROCHLORIDE 10 MG: 10 TABLET, FILM COATED ORAL at 05:34

## 2021-05-15 RX ADMIN — POTASSIUM CHLORIDE 40 MEQ: 20 TABLET, EXTENDED RELEASE ORAL at 17:22

## 2021-05-15 NOTE — CASE MANAGEMENT
[facility] has no bed availability throughout the weekend      [facility] called and messaged left     [facility] called and message left

## 2021-05-15 NOTE — QUICK NOTE
The writer personally evaluated the patient who suffered from mood disorder associated was alcohol dependence, and alcohol dependence,   Currently in withdrawal   The writer agreed with consult done by psychiatry resident which is currently pending revision  The patient was in treatment with the writer in 2016 when he suffered from mood disorder secondary to alcohol the use disorder  At that point time topiramate was prescribed to help with mood symptoms  Most recently the patient was treated for similar presentation and Zoloft was prescribed  The writer discussed this medication with the patient today  The patient did not have recollection of effect of topiramate,  but stated that Zoloft did not help his depression  He stated that most of the time when he is not a abusing alcohol his mood has been stable  Denied history of claire hypomania psychosis  At the same time admitted having mood instability associated was withdrawal   Complain about problems with falling asleep and staying asleep, and poor appetite  The patient agreed on combination of Remeron that should help patient's sleep improvement in appetite improvement and also help with 3 symptoms of depression, and low dose of topiramate to address mood instability associated with alcohol withdrawal   Topiramate also may help with overall level of functioning in patient with alcohol use disorder  The patient agreed to take both medications tonight to help with his insomnia and mood instability  He also agreed to restart naltrexone  The writer used to prescribe naltrexone to the patient with no side effects  The patient also had history of taking Vivitrol with positive effect on his life  We discussed that effect of naltrexone will be build up with time  Of weeks a month and that it will not prevent the patient from drinking alcohol but will prevent developing severe cravings before on even after taking a drink    The patient provided his understanding  His insight into the nature of his alcohol use disorder and loss of control over drinking  He express his concerns that drinking alcohol in extents with cravings prevented him from being employed, led to financial and social instability  He is looking forward continuation of his treatment in inpatient rehab  The patient might be a good candidate for 's MAT clinic after he completes his inpatient rehab

## 2021-05-15 NOTE — QUICK NOTE
Contacted by nursing that patient was only to score on SEWS due to elevated blood pressures  Will restart home hydralazine medication and hold additional doses of phenobarbital at this time  Patient now scored 4 0's in a row and can be taken off of SEWS

## 2021-05-15 NOTE — PROGRESS NOTES
PROGRESS NOTE  DEPARTMENT OF MEDICAL TOXICOLOGY  LEVEL 4 MEDICAL DETOX UNIT  Berenice Wilkins 45 y o  male MRN: [id number]  Unit/Bed#: 5T [facility] 506-01 Encounter: [id number]      Reason for Admission/Principal Problem:  Alcohol withdrawal  Rounding Provider: Kristina Thomason DO  Attending Provider: Nell Luther MD   5/14/2021 10:36 AM           Hypokalemia  Assessment & Plan  Continue to monitor and replete as necessary  Dehydration  Assessment & Plan  · IV fluid bolus  · Treatment of abdominal pain and nausea/ vomiting  · Encourage PO intake  · Start continuous IV fluids if unable to tolerate PO here  · Tolerating p o  diet but still with signs of orthostasis as he gets mildly tachycardic and lightheaded when he stands  Will give 1 L of normal saline to improve volume status and continue to monitor  Abdominal cramping  Assessment & Plan  · Daily pantoprazole  · Acetaminophen PRN mild pain  · Will get US RUQ given longstanding history of alcohol use disorder  · Will check lipase with morning labs  · Continue to monitor  · Improved    Nausea and vomiting  Assessment & Plan  · Continue to monitor  · Antiemetics PRN  · PO diet as tolerated  · Improved    Alcohol withdrawal ([address] )  Assessment & Plan  · Initiate SEWS protocol  · Treatment for alcohol withdrawal with phenobarbital as per SEWS protocol  · 5/15/21 - alcohol withdrawal has been stabilized  Patient may remain in hospital 1 more night to continue to stabilize blood pressure and electrolytes  Essential hypertension  Assessment & Plan  · Will hold home medications for now and assess blood pressure after treatment of withdrawal  · If continued hypertension after treatment for withdrawal is complete, could then restart home medications  · 5/15/21 - Unsure why metoprolol or hydralazine were previously prescribed  No history of coronary artery disease with a normal stress test in recent year; history of BEN    After discussion with Internal Medicine, Dr Katherine Velez Norma Sandoval, will proceed with amlodipine 5 mg p o  and advanced to 10 mg if needed  MDD (major depressive disorder), recurrent episode, moderate ([facility] )  Assessment & Plan  · Psychiatry consult, follow-up on recommendations    Tobacco abuse  Assessment & Plan  · Nicotine patch  · Encourage cessation    Alcohol use disorder, severe, dependence ([facility] )  Assessment & Plan  · Treatment of alcohol withdrawal as above  · Case management consult  · Has history of alcohol induced mood disorder and MDD, Bipolar Disorder  Patient is agreeable with speaking with Psychiatry  Will consult Psychiatry and appreciate their expertise and recommendations  · Will offer Naltrexone after acute withdrawal treated  · Daily thiamine and folate  · 5/15/21 - currently improved on alcohol withdrawal protocol  Working to achieve control over blood pressure as it may be underlying and only exacerbated by alcohol withdrawal       Bipolar 2 disorder ([facility] ) and MDD   Assessment & Plan  · Psychiatry consult, follow-up on recommendations    * Alcohol-induced mood disorder ([place] [address] )  Assessment & Plan  · Consult Psychiatry  · Appreciate psychiatry recommendations          VTE Pharmacologic Prophylaxis:   Pharmacologic: Enoxaparin (Lovenox)  Mechanical VTE Prophylaxis in Place: yes    Code Status: Level 1 - Full Code    Patient Centered Rounds: I have performed bedside rounds with nursing staff today  Discussions with Specialists or Other Care Team Provider: JAMAAL     Education and Discussions with Family / Patient:  Patient    Time Spent for Care: 30 minutes  More than 50% of total time spent on counseling and coordination of care as described above  Current Length of Stay: 1 day(s)    Current Patient Status: Inpatient     Certification Statement: The patient will continue to require additional inpatient hospital stay due to Stabilization of blood pressure and electrolytes and dehydration    Discharge Plan:  Likely discharge to rehab tomorrow on May 2021  Subjective:   Patient is feeling much better, denies any tremor, nausea, anxiety  He is ambulating well but gets mildly lightheaded when standing up too quickly  He is tolerating p o  diet  Objective:     Clinical Opiate Withdrawal Scale  Pulse: 85  Heart Rate Source: Monitor  Patient Position - Orthostatic VS: Lying    SEWS Total Score: 0 (5/15/2021  5:12 AM)        Last 24 Hours Medication List:   Current Facility-Administered Medications   Medication Dose Route Frequency Provider Last Rate    acetaminophen  650 mg Oral Q6H PRN Joselo Spence MD      amLODIPine  5 mg Oral Daily Shavonne Yg Du,       enoxaparin  40 mg Subcutaneous Q24H [facility] Shahid Du, DO      folic acid  1 mg Oral Daily Joselo Spence MD      mirtazapine  15 mg Oral HS Barrett Soriano MD      naltrexone  25 mg Oral Daily Barrett Soriano MD      nicotine  1 patch Transdermal Daily Joselo Spence MD      ondansetron  4 mg Intravenous Q6H PRN Joselo Spence MD      pantoprazole  40 mg Oral Early Morning Joselo Spence MD      potassium chloride  40 mEq Oral BID Kathye Holstein, CRNP      sodium chloride  1,000 mL Intravenous Once Hartshorne Poplar, DO      thiamine  100 mg Oral Daily Joselo Spence MD      topiramate  25 mg Oral HS Kristen Vazquez MD           Vitals:   Temp (24hrs), Av 4 °F (36 9 °C), Min:97 8 °F (36 6 °C), Max:99 1 °F (37 3 °C)    Temp:  [97 8 °F (36 6 °C)-99 1 °F (37 3 °C)] 97 8 °F (36 6 °C)  HR:  [80-95] 85  Resp:  [18-20] 20  BP: (140-164)/() 159/109  SpO2:  [96 %-100 %] 98 %  Body mass index is 28 05 kg/m²  Input and Output Summary (last 24 hours): Intake/Output Summary (Last 24 hours) at 5/15/2021 1206  Last data filed at 5/15/2021 1100  Gross per 24 hour   Intake 1800 ml   Output --   Net 1800 ml       Physical Exam:   Physical Exam  Vitals signs and nursing note reviewed  Constitutional:       Appearance: Normal appearance     HENT:      Head: Normocephalic and atraumatic  Mouth/Throat:      Mouth: Mucous membranes are moist    Eyes:      Extraocular Movements: Extraocular movements intact  Pupils: Pupils are equal, round, and reactive to light  Neck:      Musculoskeletal: Normal range of motion  Cardiovascular:      Rate and Rhythm: Normal rate and regular rhythm  Pulmonary:      Effort: Pulmonary effort is normal    Abdominal:      General: Abdomen is flat  Palpations: Abdomen is soft  Tenderness: There is no abdominal tenderness  Musculoskeletal: Normal range of motion  Right lower leg: No edema  Left lower leg: No edema  Skin:     General: Skin is warm and dry  Neurological:      General: No focal deficit present  Mental Status: He is alert and oriented to person, place, and time  Psychiatric:         Mood and Affect: Mood normal          Behavior: Behavior normal          Thought Content: Thought content normal          Judgment: Judgment normal          Additional Data:     Labs:   Results from last 7 days   Lab Units 05/15/21  0514 05/14/21  0842   WBC Thousand/uL 7 70 7 94   HEMOGLOBIN g/dL 16 3 17 2*   HEMATOCRIT % 46 8 48 0   PLATELETS Thousands/uL 254 270   NEUTROS PCT %  --  57   LYMPHS PCT %  --  32   MONOS PCT %  --  7   EOS PCT %  --  2      Results from last 7 days   Lab Units 05/15/21  0514 05/14/21  0842   SODIUM mmol/L 135* 141   POTASSIUM mmol/L 3 3* 3 9   CHLORIDE mmol/L 104 103   CO2 mmol/L 25 26   BUN mg/dL 10 17   CREATININE mg/dL 0 71 0 80   ANION GAP mmol/L 6 12   CALCIUM mg/dL 8 5 8 6   ALBUMIN g/dL  --  3 7   TOTAL BILIRUBIN mg/dL  --  0 45   ALK PHOS U/L  --  73   ALT U/L  --  43   AST U/L  --  18   GLUCOSE RANDOM mg/dL 91 99                              * I Have Reviewed All Lab Data Listed Above  * Additional Pertinent Lab Tests Reviewed: [address] Admission Reviewed      Imaging Studies: I have personally reviewed pertinent reports          Recent Cultures (last 7 days): Today, Patient Was Seen By: Marcine Cowden, DO    ** Please Note: Dictation voice to text software may have been used in the creation of this document   **

## 2021-05-16 VITALS
OXYGEN SATURATION: 100 % | WEIGHT: 206.79 LBS | RESPIRATION RATE: 18 BRPM | TEMPERATURE: 98.8 F | BODY MASS INDEX: 28.01 KG/M2 | HEIGHT: 72 IN | HEART RATE: 99 BPM | DIASTOLIC BLOOD PRESSURE: 99 MMHG | SYSTOLIC BLOOD PRESSURE: 152 MMHG

## 2021-05-16 PROBLEM — F10.939 ALCOHOL WITHDRAWAL (HCC): Status: RESOLVED | Noted: 2021-05-14 | Resolved: 2021-05-16

## 2021-05-16 PROBLEM — R10.9 ABDOMINAL CRAMPING: Status: RESOLVED | Noted: 2021-05-14 | Resolved: 2021-05-16

## 2021-05-16 PROBLEM — E86.0 DEHYDRATION: Status: RESOLVED | Noted: 2021-05-14 | Resolved: 2021-05-16

## 2021-05-16 PROBLEM — F10.239 ALCOHOL WITHDRAWAL (HCC): Status: RESOLVED | Noted: 2021-05-14 | Resolved: 2021-05-16

## 2021-05-16 PROBLEM — E87.6 HYPOKALEMIA: Status: RESOLVED | Noted: 2021-05-15 | Resolved: 2021-05-16

## 2021-05-16 PROBLEM — R11.2 NAUSEA AND VOMITING: Status: RESOLVED | Noted: 2021-05-14 | Resolved: 2021-05-16

## 2021-05-16 LAB
ANION GAP SERPL CALCULATED.3IONS-SCNC: 5 MMOL/L (ref 5–14)
BUN SERPL-MCNC: 7 MG/DL (ref 5–25)
CALCIUM SERPL-MCNC: 9.1 MG/DL (ref 8.4–10.2)
CHLORIDE SERPL-SCNC: 103 MMOL/L (ref 97–108)
CO2 SERPL-SCNC: 27 MMOL/L (ref 22–30)
CREAT SERPL-MCNC: 0.64 MG/DL (ref 0.7–1.5)
ERYTHROCYTE [DISTWIDTH] IN BLOOD BY AUTOMATED COUNT: 13.9 %
GFR SERPL CREATININE-BSD FRML MDRD: 113 ML/MIN/1.73SQ M
GLUCOSE SERPL-MCNC: 100 MG/DL (ref 70–99)
HCT VFR BLD AUTO: 45.6 % (ref 41–53)
HGB BLD-MCNC: 15.8 G/DL (ref 13.5–17.5)
MCH RBC QN AUTO: 33.4 PG (ref 26–34)
MCHC RBC AUTO-ENTMCNC: 34.7 G/DL (ref 31–36)
MCV RBC AUTO: 96 FL (ref 80–100)
PLATELET # BLD AUTO: 248 THOUSANDS/UL (ref 150–450)
PMV BLD AUTO: 7.1 FL (ref 8.9–12.7)
POTASSIUM SERPL-SCNC: 3.7 MMOL/L (ref 3.6–5)
RBC # BLD AUTO: 4.74 MILLION/UL (ref 4.5–5.9)
SODIUM SERPL-SCNC: 135 MMOL/L (ref 137–147)
WBC # BLD AUTO: 7.4 THOUSAND/UL (ref 4.5–11)

## 2021-05-16 PROCEDURE — 99238 HOSP IP/OBS DSCHRG MGMT 30/<: CPT | Performed by: EMERGENCY MEDICINE

## 2021-05-16 PROCEDURE — 80048 BASIC METABOLIC PNL TOTAL CA: CPT | Performed by: EMERGENCY MEDICINE

## 2021-05-16 PROCEDURE — 85027 COMPLETE CBC AUTOMATED: CPT | Performed by: EMERGENCY MEDICINE

## 2021-05-16 PROCEDURE — NC001 PR NO CHARGE: Performed by: EMERGENCY MEDICINE

## 2021-05-16 RX ORDER — MIRTAZAPINE 15 MG/1
15 TABLET, FILM COATED ORAL
Qty: 30 TABLET | Refills: 0 | Status: SHIPPED | OUTPATIENT
Start: 2021-05-16 | End: 2021-06-15

## 2021-05-16 RX ORDER — PANTOPRAZOLE SODIUM 40 MG/1
40 TABLET, DELAYED RELEASE ORAL
Qty: 30 TABLET | Refills: 0 | Status: SHIPPED | OUTPATIENT
Start: 2021-05-17 | End: 2021-06-16

## 2021-05-16 RX ORDER — NICOTINE 21 MG/24HR
1 PATCH, TRANSDERMAL 24 HOURS TRANSDERMAL DAILY
Qty: 28 PATCH | Refills: 0 | Status: SHIPPED | OUTPATIENT
Start: 2021-05-17

## 2021-05-16 RX ORDER — TOPIRAMATE 25 MG/1
25 TABLET ORAL
Qty: 30 TABLET | Refills: 0 | Status: SHIPPED | OUTPATIENT
Start: 2021-05-16 | End: 2021-06-15

## 2021-05-16 RX ORDER — NALTREXONE HYDROCHLORIDE 50 MG/1
25 TABLET, FILM COATED ORAL DAILY
Qty: 15 TABLET | Refills: 0 | Status: SHIPPED | OUTPATIENT
Start: 2021-05-17 | End: 2021-06-16

## 2021-05-16 RX ORDER — AMLODIPINE BESYLATE 5 MG/1
5 TABLET ORAL DAILY
Qty: 30 TABLET | Refills: 0 | Status: SHIPPED | OUTPATIENT
Start: 2021-05-17 | End: 2021-06-16

## 2021-05-16 RX ADMIN — Medication 1 PATCH: at 10:30

## 2021-05-16 RX ADMIN — FOLIC ACID 1 MG: 1 TABLET ORAL at 10:29

## 2021-05-16 RX ADMIN — PANTOPRAZOLE SODIUM 40 MG: 40 TABLET, DELAYED RELEASE ORAL at 05:17

## 2021-05-16 RX ADMIN — THIAMINE HCL TAB 100 MG 100 MG: 100 TAB at 10:29

## 2021-05-16 RX ADMIN — NALTREXONE HYDROCHLORIDE 25 MG: 50 TABLET, FILM COATED ORAL at 10:29

## 2021-05-16 RX ADMIN — ENOXAPARIN SODIUM 40 MG: 40 INJECTION SUBCUTANEOUS at 10:32

## 2021-05-16 RX ADMIN — AMLODIPINE BESYLATE 5 MG: 5 TABLET ORAL at 10:28

## 2021-05-16 NOTE — PROGRESS NOTES
[facility]  Progress Note - Meg Wen 1970, 45 y o  male MRN: [id number]  Unit/Bed#: 5T [facility]OX 506-01 Encounter: [id number]  Primary Care Provider: Darvin Phan DO   Date and time admitted to hospital: 5/14/2021 10:36 AM    Progress Note - Medical Toxicology    Meg Wen 45 y o  male MRN: [id number]  Unit/Bed#: 5T DETOX 506-01 Encounter: [id number]  MEDICAL DETOX UNIT, LEVEL 4  Department of Medical Toxicology  Reason for Admission/Principal Problem: alcohol withdrawal  Rounding Provider: Dayanna Blanchard PA-C, Elisha Link DO         Essential hypertension  Assessment & Plan  · Will hold home medications for now and assess blood pressure after treatment of withdrawal  · If continued hypertension after treatment for withdrawal is complete, could then restart home medications  · 5/15/21 - Unsure why metoprolol or hydralazine were previously prescribed  No history of coronary artery disease with a normal stress test in recent year; history of BEN  After discussion with Internal Medicine, Dr Marleta Ormond, will proceed with amlodipine 5 mg p o  and advanced to 10 mg if needed  · 05/16/21: BP still remains elevated at 146/110, if BP remains elevated can increase Amlodipine to 10mg tomorrow  · Will continue to monitor      MDD (major depressive disorder), recurrent episode, moderate ([address] )  Assessment & Plan  · Psychiatry consult, follow-up on recommendations    Tobacco abuse  Assessment & Plan  · Nicotine patch  · Encourage cessation    Alcohol use disorder, severe, dependence ([address] )  Assessment & Plan  · Treatment of alcohol withdrawal as above  · Case management consult  · Has history of alcohol induced mood disorder and MDD, Bipolar Disorder  Patient is agreeable with speaking with Psychiatry  Will consult Psychiatry and appreciate their expertise and recommendations    · Will offer Naltrexone after acute withdrawal treated  · Daily thiamine and folate  · 5/15/21 - currently improved on alcohol withdrawal protocol  Bipolar 2 disorder [facility]) and MDD   Assessment & Plan  · Psychiatry consult, follow-up on recommendations    * Alcohol-induced mood disorder [facility])  Assessment & Plan  · Appreciate psychiatry recommendations    Hypokalemia-resolved as of 5/16/2021  Assessment & Plan  Continue to monitor and replete as necessary  Dehydration-resolved as of 5/16/2021  Assessment & Plan  · IV fluid bolus  · Treatment of abdominal pain and nausea/ vomiting  · Encourage PO intake  · Start continuous IV fluids if unable to tolerate PO here  · Tolerating p o  diet but still with signs of orthostasis as he gets mildly tachycardic and lightheaded when he stands  Will give 1 L of normal saline to improve volume status and continue to monitor  Abdominal cramping-resolved as of 5/16/2021  Assessment & Plan  · Daily pantoprazole  · Acetaminophen PRN mild pain  · Will get US RUQ given longstanding history of alcohol use disorder  · Will check lipase with morning labs  · Continue to monitor  · Improved    Nausea and vomiting-resolved as of 5/16/2021  Assessment & Plan  · Continue to monitor  · Antiemetics PRN  · PO diet as tolerated  · Improved    Alcohol withdrawal ([facility])-resolved as of 5/16/2021  Assessment & Plan  · Initiate SEWS protocol  · Treatment for alcohol withdrawal with phenobarbital as per SEWS protocol  · 5/15/21 - alcohol withdrawal has been stabilized  Patient may remain in hospital 1 more night to continue to stabilize blood pressure and electrolytes    · 05/16/21 - patient continues to remain free of withdrawal symptoms  · Patient is cleared for discharge - inpatient rehab placement pending      VTE Pharmacologic Prophylaxis:   Pharmacologic: Enoxaparin (Lovenox)  Mechanical VTE Prophylaxis in Place: no    Code Status: Level 1 - Full Code    Patient Centered Rounds: yes    Discussions with Specialists or Other Care Team Provider: Discussed patient with Dr Katerine Landry Education and Discussions with Family / Patient: Answered patients questions to the best of my ability    Time Spent for Care: 30 minutes  More than 50% of total time spent on counseling and coordination of care as described above  Current Length of Stay: 2 day(s)    Current Patient Status: Inpatient     Certification Statement: The patient will continue to require additional inpatient hospital stay due to inpatient rehab placement Discharge Plan: Patient is medically clear for discharge pending placement        Subjective:   Patient states he is feeling well to day  Looking forward to discharge    Objective:     Clinical Opiate Withdrawal Scale  Pulse: 89  Heart Rate Source: Monitor  Patient Position - Orthostatic VS: Lying    SEWS Total Score: 3 (5/15/2021  3:00 PM)        Last 24 Hours Medication List:   Current Facility-Administered Medications   Medication Dose Route Frequency Provider Last Rate    acetaminophen  650 mg Oral Q6H PRN [place] MD      amLODIPine  5 mg Oral Daily Rodri Du DO      enoxaparin  40 mg Subcutaneous Q24H [address] Shahid KAHLIL Du DO      folic acid  1 mg Oral Daily [place] MD      mirtazapine  15 mg Oral HS Deeanna Mortimer, MD      naltrexone  25 mg Oral Daily Deeanna Mortimer, MD      nicotine  1 patch Transdermal Daily [place] MD      ondansetron  4 mg Intravenous Q6H PRN [place] MD      pantoprazole  40 mg Oral Early Morning [place] MD      thiamine  100 mg Oral Daily [place] MD      topiramate  25 mg Oral HS Armaan Huggins MD           Vitals:   Temp (24hrs), Av 9 °F (36 6 °C), Min:97 8 °F (36 6 °C), Max:98 2 °F (36 8 °C)    Temp:  [97 8 °F (36 6 °C)-98 2 °F (36 8 °C)] 97 8 °F (36 6 °C)  HR:  [81-89] 89  Resp:  [18-20] 18  BP: (132-160)/() 146/110  SpO2:  [97 %-100 %] 100 %  Body mass index is 28 05 kg/m²  Input and Output Summary (last 24 hours):     Intake/Output Summary (Last 24 hours) at 2021 [address] filed at 5/15/2021 1701  Gross per 24 hour   Intake 720 ml   Output --   Net 720 ml       Physical Exam:   Physical Exam  Constitutional:       Appearance: Normal appearance  HENT:      Head: Normocephalic and atraumatic  Mouth/Throat:      Mouth: Mucous membranes are moist    Eyes:      General: No scleral icterus  Cardiovascular:      Rate and Rhythm: Normal rate and regular rhythm  Heart sounds: Normal heart sounds  No murmur  Pulmonary:      Effort: Pulmonary effort is normal  No respiratory distress  Breath sounds: Normal breath sounds  No wheezing  Abdominal:      General: Bowel sounds are normal  There is no distension  Palpations: Abdomen is soft  Tenderness: There is no abdominal tenderness  Skin:     General: Skin is warm and dry  Neurological:      General: No focal deficit present  Mental Status: He is alert and oriented to person, place, and time  Psychiatric:         Mood and Affect: Mood normal          Behavior: Behavior normal          Thought Content: Thought content normal          Judgment: Judgment normal          Additional Data:     Labs: keep all most recent labs as listed on admission templates   Results from last 7 days   Lab Units 05/16/21  0517  05/14/21  0842   WBC Thousand/uL 7 40   < > 7 94   HEMOGLOBIN g/dL 15 8   < > 17 2*   HEMATOCRIT % 45 6   < > 48 0   PLATELETS Thousands/uL 248   < > 270   NEUTROS PCT %  --   --  57   LYMPHS PCT %  --   --  32   MONOS PCT %  --   --  7   EOS PCT %  --   --  2    < > = values in this interval not displayed        Results from last 7 days   Lab Units 05/16/21  0517  05/14/21  0842   SODIUM mmol/L 135*   < > 141   POTASSIUM mmol/L 3 7   < > 3 9   CHLORIDE mmol/L 103   < > 103   CO2 mmol/L 27   < > 26   BUN mg/dL 7   < > 17   CREATININE mg/dL 0 64*   < > 0 80   ANION GAP mmol/L 5   < > 12   CALCIUM mg/dL 9 1   < > 8 6   ALBUMIN g/dL  --   --  3 7   TOTAL BILIRUBIN mg/dL  --   --  0 45   ALK PHOS U/L  --   --  73   ALT U/L  --   --  43   AST U/L  --   --  18   GLUCOSE RANDOM mg/dL 100*   < > 99    < > = values in this interval not displayed  * I Have Reviewed All Lab Data Listed Above  * Additional Pertinent Lab Tests Reviewed: All Labs Within Last 24 Hours Reviewed      Imaging Studies: I have personally reviewed pertinent reports  Recent Cultures (last 7 days): Today, Patient Was Seen By: Rosy Yuen PA-C    ** Please Note: Dictation voice to text software may have been used in the creation of this document   **

## 2021-05-16 NOTE — CASE MANAGEMENT
[facility] has been contacted to complete an intake and bed search for inpatient rehab for this patient who is ready for discharge today  Updated clinicals faxed to [facility]

## 2021-05-16 NOTE — UTILIZATION REVIEW
Initial Clinical Review    Admission: Date/Time/Statement:   Admission Orders (From admission, onward)     Ordered        05/14/21 1046  Inpatient Admission  Once                   Orders Placed This Encounter   Procedures    Inpatient Admission     Standing Status:   Standing     Number of Occurrences:   1     Order Specific Question:   Level of Care     Answer:   Med Surg [16]     Order Specific Question:   Estimated length of stay     Answer:   More than 2 Midnights     Order Specific Question:   Certification     Answer:   I certify that inpatient services are medically necessary for this patient for a duration of greater than two midnights  See H&P and MD Progress Notes for additional information about the patient's course of treatment  Initial Presentation: 45year old male, presented to the ED @ [facility],  Transferred to [facility] level of care, via EMS  Admitted as Inpatient due to ETOH Withdrawal     This patient qualifies for Level IV medically managed intensive inpatient services under the criteria set by the American Society of Addiction Medicine, including dimensions 1-3  The patient is in withdrawal (or is intoxicated with high risk of withdrawal), with severe and unstable medical and/or psychiatric (dual diagnosis) problems, requiring requires 24-hour medical and nursing care and the full resources of a licensed hospital       Date: 05/14/2021  Patient states that he drinks 1-2 pt of peppermint schnapps daily  Last drink was at 10:00 p m  Last night  Initiate SEWS protocol  Severity of Ethanol Withdrawal Scale (SEWS) Q4 hours and then hourly if/when SEWS > 6  Treat withdrawal per pathway and reassess Q30-60 minutes  SEWS Total Score: 11 (5/14/2021 11:00 AM)  Continue IV flds  Daily thiamine and folate and Pantoprazole  Continue to [employer]  Check lipase in AM       05/14/2021  Consult Behavioral Health:   Alcohol induced mood disorder    Has recently lost his job and become homeless due to his alcohol abuse  Start Topamax 25 mg q d  for alcohol induced mood disorder, with plan to increase to 25 mg b i d  in 3 days  Start Remeron 15 mg q h s  for depressed mood with poor appetite and sleep  Start Naltrexone 25 mg q d  Once medically stable, discharge patient to inpatient rehab alcohol use disorder  VTE Prophylaxis: Pharmacologic VTE Prophylaxis contraindicated due to Patient ambulatory, low risk for VTE  / reason for no mechanical VTE prophylaxis Patient ambulatory, low risk for VTE  Day 2: 05/15/2021   Continue to monitor and replete electrolytes as necessary  Encourage PO intake  He is ambulating well but gets mildly lightheaded when standing up too quickly  He is tolerating p o  diet    SEWS Total Score: 0 (5/15/2021  5:12 AM)     Triage Vitals [05/14/21 1039]   Temperature Pulse Respirations Blood Pressure SpO2   98 7 °F (37 1 °C) 92 18 (!) 144/106 97 %      Temp Source Heart Rate Source Patient Position - Orthostatic VS BP Location FiO2 (%)   Temporal Monitor Sitting Left arm --      Pain Score       5          Wt Readings from Last 1 Encounters:   05/14/21 93 8 kg (206 lb 12 7 oz)     Additional Vital Signs:   Date/Time  Temp  Pulse  Resp  BP  MAP (mmHg)  SpO2  O2 Device  Patient Position - Orthostatic VS   05/16/21 1504  98 8 °F (37 1 °C)  99  18  152/99  116  100 %  None (Room air)  Lying   05/16/21 1200  98 °F (36 7 °C)  98  18  134/84  --  98 %  None (Room air)  Lying   05/16/21 0800  97 8 °F (36 6 °C)  89  18  146/110Abnormal   --  100 %  None (Room air)  Lying   05/16/21 0509  98 1 °F (36 7 °C)  88  18  160/100  120  97 %  None (Room air)  Lying   05/15/21 2102  97 8 °F (36 6 °C)  87  20  132/89  --  99 %  None (Room air)  Lying   05/15/21 1931  --  88  18  158/100  --  --  --  Sitting   05/15/21 1828  98 2 °F (36 8 °C)  84  20  143/96  --  97 %  None (Room air)  Lying   05/15/21 1608  97 8 °F (36 6 °C)  --  --  --  --  --  --  --   05/15/21 1515  97 8 °F (36 6 °C)  81  18  147/110Abnormal   --  98 %  None (Room air)  Lying   05/15/21 1155  --  85  --  159/109Abnormal   --  --  --  --   05/15/21 0800  97 8 °F (36 6 °C)  80  20  147/102Abnormal   --  98 %  None (Room air)  Lying   05/15/21 0526  --  --  --  158/96  --  --  --  --   05/15/21 0505  98 3 °F (36 8 °C)  80  20  163/123Abnormal   136  100 %  --  Lying   05/14/21 2139  98 3 °F (36 8 °C)  81  20  146/96  112  97 %  None (Room air)  Lying   05/14/21 2023  98 6 °F (37 °C)  84  18  151/83  --  100 %  None (Room air)  Lying   05/14/21 1800  98 5 °F (36 9 °C)  94  18  151/109Abnormal   --  100 %  None (Room air)  Lying   05/14/21 1712  98 4 °F (36 9 °C)  95  18  147/107Abnormal   --  96 %  None (Room air)  Lying   05/14/21 1618  99 1 °F (37 3 °C)  95  18  164/110Abnormal   --  97 %  None (Room air)  Lying   05/14/21 1458  --  --  --  140/103Abnormal   --  --  --  Lying   05/14/21 1455  98 6 °F (37 °C)  90  18  155/108Abnormal   --  98 %  None (Room air)  Lying   05/14/21 1330  98 5 °F (36 9 °C)  84  18  148/108Abnormal   --  98 %  None (Room air)  --   05/14/21 1245  98 1 °F (36 7 °C)  93  --  147/108Abnormal   --  --  --  --   05/14/21 1215  98 1 °F (36 7 °C)  93  19  147/108Abnormal   --  98 %  --  --   05/14/21 1053  --  --  --  --  --  98 %  None (Room air)  --     Date and Time Eye Opening Best Verbal Response Best Motor Response Hyndman Coma Scale Score   05/16/21 0800 4 5 6 15   05/15/21 1931 4 5 6 15   05/15/21 1608 4 5 6 15     05/15/2021 @ 0656  US RUQ:  Hepatomegaly with mildly heterogeneous echotexture, nonspecific   No focal mass detected   No overt liver surface nodularity  Top normal caliber portal vein with hepatopedal flow  Otherwise, unremarkable study       Pertinent Labs/Diagnostic Test Results:     Results from last 7 days   Lab Units 05/16/21  0517 05/15/21  0514 05/14/21  0842   WBC Thousand/uL 7 40 7 70 7 94   HEMOGLOBIN g/dL 15 8 16 3 17 2*   HEMATOCRIT % 45 6 46 8 48 0   PLATELETS Thousands/uL 248 254 270   NEUTROS ABS Thousands/µL  --   --  4 59     Results from last 7 days   Lab Units 05/16/21  0517 05/15/21  0514 05/14/21  0842   SODIUM mmol/L 135* 135* 141   POTASSIUM mmol/L 3 7 3 3* 3 9   CHLORIDE mmol/L 103 104 103   CO2 mmol/L 27 25 26   ANION GAP mmol/L 5 6 12   BUN mg/dL 7 10 17   CREATININE mg/dL 0 64* 0 71 0 80   EGFR ml/min/1 73sq m 113 109 103   CALCIUM mg/dL 9 1 8 5 8 6   MAGNESIUM mg/dL  --   --  1 9   PHOSPHORUS mg/dL  --   --  3 7     Results from last 7 days   Lab Units 05/14/21  0842   AST U/L 18   ALT U/L 43   ALK PHOS U/L 73   TOTAL PROTEIN g/dL 6 9   ALBUMIN g/dL 3 7   TOTAL BILIRUBIN mg/dL 0 45     Results from last 7 days   Lab Units 05/16/21  0517 05/15/21  0514 05/14/21  0842   GLUCOSE RANDOM mg/dL 100* 91 99       Results from last 7 days   Lab Units 05/15/21  0514   LIPASE u/L 92     Results from last 7 days   Lab Units 05/14/21  0842   ETHANOL LVL mg/dL 116*     Past Medical History:   Diagnosis Date    Addiction to drug ([place] [address] )     Alcohol abuse     Alcoholism ([place] [address] )     Anxiety     Arthritis     neck    Bipolar disorder ([place] [address] )     Depression     Hypertension     Lung disease     31+ yr smoking hx    Psychiatric disorder     Sleep difficulties     Substance abuse ([place] [address] )     Tobacco abuse      Present on Admission:   (Resolved) Alcohol withdrawal ([place] [address] )   Alcohol use disorder, severe, dependence (HCC)   (Resolved) Nausea and vomiting   (Resolved) Abdominal cramping   Alcohol-induced mood disorder (HCC)   Essential hypertension   Tobacco abuse   (Resolved) Dehydration      Admitting Diagnosis: Alcohol withdrawal ([place] [address] ) [F10 239]  Age/Sex: 45 y o  male  Admission Orders:  Scheduled Medications:  amLODIPine, 5 mg, Oral, Daily  enoxaparin, 40 mg, Subcutaneous, E44T [address]  folic acid, 1 mg, Oral, Daily  mirtazapine, 15 mg, Oral, HS  naltrexone, 25 mg, Oral, Daily  nicotine, 1 patch, Transdermal, Daily  pantoprazole, 40 mg, Oral, Early Morning  thiamine, 100 mg, Oral, Daily  topiramate, 25 mg, Oral, HS      Continuous IV Infusions:     PRN Meds:  ondansetron, 4 mg, Intravenous, Q6H PRN      Continuous pulse ox  Manish SDCs  IP CONSULT TO CASE MANAGEMENT  IP CONSULT TO CASE MANAGEMENT  IP CONSULT TO PSYCHIATRY    Network Utilization Review Department  ATTENTION: Please call with any questions or concerns to [phone] and carefully listen to the prompts so that you are directed to the right person  All voicemails are confidential   Keith Son all requests for admission clinical reviews, approved or denied determinations and any other requests to dedicated fax number below belonging to the campus where the patient is receiving treatment   List of dedicated fax numbers for the Facilities:  [address] DENIALS (Administrative/Medical Necessity) [phone]   [address] (Maternity/NICU/Pediatrics) [phone]   [address] [place] [facility] [address] [address] [place] [address] [address] Sung [address] [facility] [place] [address] [phone]

## 2021-05-16 NOTE — DISCHARGE INSTRUCTIONS
Alcohol Use Disorder   WHAT YOU NEED TO KNOW:   Alcohol use disorder (AUD) is problem drinking  AUD includes alcohol abuse and alcohol dependence  Alcohol can damage your brain, heart, kidneys, lungs, and liver  Your risk of stroke is greater if you have 5 or more drinks each day  If you are pregnant, you and your baby are at risk for serious health problems  No amount of alcohol is safe during pregnancy  DISCHARGE INSTRUCTIONS:   Call your local emergency number (911 in the [address]) if:   · You have seizures  Call your doctor if:   · Your heart is beating faster than usual     · You have hallucinations  · You cannot remember what happens while you are drinking  · You are anxious and have nausea  · Your hands are shaky and you are sweating heavily  · You have questions or concerns about your condition or care  Follow up with your healthcare provider as directed:  Do not try to stop drinking on your own  Your healthcare provider may need to help you withdraw from alcohol safely  He or she may need to admit you to the hospital  You may also need any of the following:  · Medicines to decrease your craving for alcohol    · Support groups such as Alcoholics Anonymous     · Therapy from a psychiatrist or psychologist     · Admission to an inpatient facility for treatment for severe AUD    For support and more information:   · Substance Abuse and [facility] , [address] [place]  Web Address: https://little com/    · Alcoholihttp://www chavarria info/ Address: [address] [place] [address] Information is for End User's use only and may not be sold, redistributed or otherwise used for commercial purposes  All illustrations and images included in CareNotes® are the copyrighted property of A D A M , Inc  or [address] [place], [place] [address]  The above information is an  only  It is not intended as medical advice for individual conditions or treatments  Talk to your doctor, nurse or pharmacist before following any medical regimen to see if it is safe and effective for you

## 2021-05-16 NOTE — CASE MANAGEMENT
[facility] [address]  [facility]  There is a bed available and on hold  Info faxed per [facility] Request and patient agreement

## 2021-05-16 NOTE — ASSESSMENT & PLAN NOTE
· Will hold home medications for now and assess blood pressure after treatment of withdrawal  · If continued hypertension after treatment for withdrawal is complete, could then restart home medications  · 5/15/21 - Unsure why metoprolol or hydralazine were previously prescribed  No history of coronary artery disease with a normal stress test in recent year; history of BEN  After discussion with Internal Medicine, Dr May Pablo, will proceed with amlodipine 5 mg p o  and advanced to 10 mg if needed    05/16/21:   Follow-up as outpatient for further medication adjustment

## 2021-05-17 NOTE — UTILIZATION REVIEW
Inpatient Admission Authorization Request   NOTIFICATION OF INPATIENT ADMISSION/INPATIENT AUTHORIZATION REQUEST   SERVICING FACILITY:   [address] [place] [address]  Jess [address] , [facility], [address] [place] [address]  Tax ID: [id number]  NPI: [id number]  Place of Service: [facility]patient [facility] [facility]  Place of Service Code: 24     ATTENDING PROVIDER:  Attending Name and NPI#: Kel Susan [[id number]]  Address: Jess Welch  , [facility], [address] [place] [address]  Phone: [phone]     UTILIZATION REVIEW CONTACT:  [facility] Utilization   Network Utilization Review Department  Phone: [phone]  Fax [phone]  Email: [email or web address]     PHYSICIAN ADVISORY SERVICES:  FOR DILL-YE-TQXT REVIEW - MEDICAL NECESSITY DENIAL  Phone: [phone]  Fax: [phone]  Email: [email or web address]     TYPE OF REQUEST:  Inpatient Status     ADMISSION INFORMATION:  ADMISSION DATE/TIME: 5/14/21 1036  PATIENT DIAGNOSIS CODE/DESCRIPTION:  Alcohol withdrawal ([address] ) [F10 239]  DISCHARGE DATE/TIME: 5/16/2021  8:34 PM  DISCHARGE DISPOSITION (IF DISCHARGED): Home/Self Care     IMPORTANT INFORMATION:  Please contact the [facility] directly with any questions or concerns regarding this request  Department voicemails are confidential     Send requests for admission clinical reviews, concurrent reviews, approvals, and administrative denials due to lack of clinical to fax [phone]

## 2021-05-17 NOTE — NURSING NOTE
Elroy reviewed AVS with medication regimen and schedule  He reviewed discharge instructions, voiced understanding  His belongings were provided  He was accompanied by PCA for transport with [facility]

## 2021-07-08 NOTE — ED NOTES
Crisis Lucia Haro) responded now  Crisis is making contact with [facility]OST now        Misha Arias RN  03/20/20 0631
Crisis aware of patient - will contact HOST       Kingston Pillai RN  03/20/20 4961
Faxed consents to Lillian Lilly from [facility], RN  03/20/20   Phyllis 94, RN  03/20/20 0938
Faxed releases to department for patient to sign [phone]      Please fax back to [phone]
Patient given boxed lunch       Bigg Black RN  03/20/20 4457
Provider at bedside       Maria Alejandra Wright RN  03/20/20 0515
Spoke with Adebayo Fernandes at [place] [address] who states that patient has been referred for an inpatient bed, will notify with outcome of referral as soon as possible
Spoke with Herberth Llanos at [facility] who will be preparing releases for patient to sign and faxing them  [facility] will do a phone assessment with patient, once releases are received back 
Spoke with Lucas Jim at [facility], she was given the phone number [phone] to call when ready to speak with patient
Applied

## 2023-02-08 ENCOUNTER — HOSPITAL ENCOUNTER (EMERGENCY)
Facility: HOSPITAL | Age: 53
Discharge: HOME | End: 2023-02-08
Attending: EMERGENCY MEDICINE | Admitting: EMERGENCY MEDICINE

## 2023-02-08 VITALS
TEMPERATURE: 99.6 F | WEIGHT: 224.87 LBS | HEART RATE: 103 BPM | RESPIRATION RATE: 18 BRPM | HEIGHT: 72 IN | OXYGEN SATURATION: 96 % | SYSTOLIC BLOOD PRESSURE: 154 MMHG | BODY MASS INDEX: 30.46 KG/M2 | DIASTOLIC BLOOD PRESSURE: 89 MMHG

## 2023-02-08 DIAGNOSIS — S39.012A STRAIN OF LUMBAR REGION, INITIAL ENCOUNTER: Primary | ICD-10-CM

## 2023-02-08 NOTE — ED PROVIDER NOTES
History  Chief Complaint   Patient presents with   • Back Pain     States of back pain for past few days after lifting something heavy on Sunday  Also needs work to go back to work tomorrow  Not using anything for pain  Patient is a 46 yo M presenting for evaluation of low back pain  Patient says that he hurt his back "lifting something heavy on Sunday "  He says that the symptoms have improved and that he is here for a work note to return to work tomorrow  He denies any radiation of the pain anywhere else  He denies and numbness/tingling/weakness in his extremities, saddle anesthesia, bowel/bladder incontinence, urinary retention  He denies any direct trauma to his back  None       Past Medical History:   Diagnosis Date   • Addiction to drug [facility])    • Alcohol abuse    • Alcoholism ([place] [address] )    • Anxiety    • Arthritis     neck   • Bipolar disorder ([place] [address] )    • Depression    • Hypertension    • Lung disease     31+ yr smoking hx   • Psychiatric disorder    • Sleep difficulties    • Substance abuse ([place] [address] )    • Tobacco abuse        Past Surgical History:   Procedure Laterality Date   • HERNIA REPAIR     • WISDOM TOOTH EXTRACTION         Family History   Problem Relation Age of Onset   • Heart disease Mother    • Stroke Mother    • Stroke Father      I have reviewed and agree with the history as documented      E-Cigarette/Vaping   • E-Cigarette Use Never User      E-Cigarette/Vaping Substances   • Nicotine No    • THC No    • CBD No    • Flavoring No    • Other No    • Unknown No      Social History     Tobacco Use   • Smoking status: Every Day     Packs/day: 0 50     Years: 35 00     Pack years: 17 50     Types: Cigarettes   • Smokeless tobacco: Never   • Tobacco comments:     1 PPD per [facility]   Vaping Use   • Vaping Use: Never used   Substance Use Topics   • Alcohol use: Yes     Comment: 1-2 pint per day   • Drug use: Not Currently     Types: Marijuana       Review of Systems   Constitutional: Negative for chills, fever and unexpected weight change  HENT: Negative for congestion, sore throat and trouble swallowing  Eyes: Negative for pain, discharge and itching  Respiratory: Negative for cough, chest tightness, shortness of breath and wheezing  Cardiovascular: Negative for chest pain, palpitations and leg swelling  Gastrointestinal: Negative for abdominal pain, blood in stool, diarrhea, nausea and vomiting  Endocrine: Negative for polyuria  Genitourinary: Negative for difficulty urinating, dysuria, frequency and hematuria  Musculoskeletal: Positive for back pain  Negative for arthralgias  Skin: Negative for color change and rash  Neurological: Negative for dizziness, syncope, weakness, light-headedness and headaches  Physical Exam  Physical Exam  Vitals and nursing note reviewed  Constitutional:       General: He is not in acute distress  Appearance: He is well-developed  HENT:      Head: Normocephalic and atraumatic  Right Ear: External ear normal       Left Ear: External ear normal       Nose: Nose normal       Mouth/Throat:      Mouth: Mucous membranes are moist       Pharynx: No oropharyngeal exudate or posterior oropharyngeal erythema  Eyes:      Conjunctiva/sclera: Conjunctivae normal       Pupils: Pupils are equal, round, and reactive to light  Cardiovascular:      Rate and Rhythm: Normal rate and regular rhythm  Heart sounds: Normal heart sounds  No murmur heard  No friction rub  No gallop  Pulmonary:      Effort: Pulmonary effort is normal  No respiratory distress  Breath sounds: Normal breath sounds  No wheezing or rales  Abdominal:      General: Bowel sounds are normal  There is no distension  Palpations: Abdomen is soft  Tenderness: There is no abdominal tenderness  There is no guarding  Musculoskeletal:         General: No swelling, tenderness or deformity  Normal range of motion  Cervical back: Normal range of motion  Lymphadenopathy:      Cervical: No cervical adenopathy  Skin:     General: Skin is warm and dry  Neurological:      General: No focal deficit present  Mental Status: He is alert and oriented to person, place, and time  Mental status is at baseline  Cranial Nerves: No cranial nerve deficit  Sensory: No sensory deficit  Motor: No weakness or abnormal muscle tone  Coordination: Coordination normal    Psychiatric:         Behavior: Behavior normal          Vital Signs  ED Triage Vitals [02/08/23 1223]   Temperature Pulse Respirations Blood Pressure SpO2   99 6 °F (37 6 °C) 103 18 154/89 96 %      Temp Source Heart Rate Source Patient Position - Orthostatic VS BP Location FiO2 (%)   Tympanic -- Sitting Left arm --      Pain Score       5           Vitals:    02/08/23 1223   BP: 154/89   Pulse: 103   Patient Position - Orthostatic VS: Sitting         Visual Acuity      ED Medications  Medications - No data to display    Diagnostic Studies  Results Reviewed     None                 No orders to display              Procedures  Procedures         ED Course                                             Medical Decision Making  46 yo M presenting for low back pain  Started after lifting something heavy on Sunday  Non radiating, no red flag symptoms  Pain improving  Requesting note for work  Symptoms consistent with lumbar strain  No direct trauma, no L spine tenderness, no imaging required  Patient declined pain medicine       Strain of lumbar region, initial encounter: acute illness or injury      Disposition  Final diagnoses:   Strain of lumbar region, initial encounter     Time reflects when diagnosis was documented in both MDM as applicable and the Disposition within this note     Time User Action Codes Description Comment    2/8/2023 12:38 PM Carla Neri Add [P22 484A] Strain of lumbar region, initial encounter       ED Disposition     ED Disposition   Discharge    Condition   Stable Date/Time   Wed Feb 8, 2023 12:38 PM    Comment   Katie Villa discharge to home/self care  Follow-up Information     Follow up With Specialties Details Why Contact Info    Alma Luke DO Family Medicine Schedule an appointment as soon as possible for a visit  As needed [address]   [phone]            There are no discharge medications for this patient  No discharge procedures on file      PDMP Review       Value Time User    PDMP Reviewed  Yes 3/3/2021 11:55 PM Redd Shah, [address]           ED Provider  Electronically Signed by           Ruth Matamoros DO  02/08/23 7224

## 2023-02-08 NOTE — Clinical Note
Xander Quintero was seen and treated in our emergency department on 2/8/2023  No restrictions            Diagnosis: low back strain    [place]  may return to work on return date  He may return on this date: 02/09/2023         If you have any questions or concerns, please don't hesitate to call        Steve Anthony, DO    ______________________________           _______________          _______________  Hospital Representative                              Date                                Time

## 2023-03-25 ENCOUNTER — HOSPITAL ENCOUNTER (EMERGENCY)
Facility: HOSPITAL | Age: 53
Discharge: HOME | End: 2023-03-25
Attending: EMERGENCY MEDICINE

## 2023-03-25 ENCOUNTER — APPOINTMENT (EMERGENCY)
Dept: RADIOLOGY | Facility: HOSPITAL | Age: 53
End: 2023-03-25

## 2023-03-25 ENCOUNTER — APPOINTMENT (EMERGENCY)
Dept: CT IMAGING | Facility: HOSPITAL | Age: 53
End: 2023-03-25

## 2023-03-25 VITALS
DIASTOLIC BLOOD PRESSURE: 98 MMHG | RESPIRATION RATE: 18 BRPM | TEMPERATURE: 98 F | OXYGEN SATURATION: 99 % | HEART RATE: 82 BPM | SYSTOLIC BLOOD PRESSURE: 138 MMHG

## 2023-03-25 DIAGNOSIS — R10.9 RIGHT FLANK PAIN: Primary | ICD-10-CM

## 2023-03-25 LAB
BACTERIA UR QL AUTO: ABNORMAL /HPF
BILIRUB UR QL STRIP: ABNORMAL
CLARITY UR: CLEAR
COLOR UR: YELLOW
GLUCOSE UR STRIP-MCNC: NEGATIVE MG/DL
HGB UR QL STRIP.AUTO: NEGATIVE
KETONES UR STRIP-MCNC: ABNORMAL MG/DL
LEUKOCYTE ESTERASE UR QL STRIP: NEGATIVE
NITRITE UR QL STRIP: NEGATIVE
NON-SQ EPI CELLS URNS QL MICRO: ABNORMAL /HPF
PH UR STRIP.AUTO: 6 [PH]
PROT UR STRIP-MCNC: NEGATIVE MG/DL
RBC #/AREA URNS AUTO: ABNORMAL /HPF
SP GR UR STRIP.AUTO: >=1.03
UROBILINOGEN UR QL STRIP.AUTO: 0.2 E.U./DL
WBC #/AREA URNS AUTO: ABNORMAL /HPF

## 2023-03-25 RX ORDER — ACETAMINOPHEN 325 MG/1
650 TABLET ORAL ONCE
Status: COMPLETED | OUTPATIENT
Start: 2023-03-25 | End: 2023-03-25

## 2023-03-25 RX ORDER — KETOROLAC TROMETHAMINE 30 MG/ML
15 INJECTION, SOLUTION INTRAMUSCULAR; INTRAVENOUS ONCE
Status: COMPLETED | OUTPATIENT
Start: 2023-03-25 | End: 2023-03-25

## 2023-03-25 RX ADMIN — KETOROLAC TROMETHAMINE 15 MG: 30 INJECTION, SOLUTION INTRAMUSCULAR at 01:26

## 2023-03-25 RX ADMIN — ACETAMINOPHEN 650 MG: 325 TABLET ORAL at 01:25

## 2023-03-25 NOTE — DISCHARGE INSTRUCTIONS
Recommend Tylenol and ibuprofen as needed for pain, follow-up with primary care physician, return for any worsening symptoms

## 2023-03-25 NOTE — ED PROVIDER NOTES
History  Chief Complaint   Patient presents with   • Back Pain     Mid back pain that started a week ago     69-year-old male presents to the emergency department for evaluation of thoracic back and right flank pain  He reports symptoms started approximately 1 week ago  It has been constant since  Pain is made worse when he looks down or bends over  It is nonradiating  He denies any associated chest pain, abdominal pain, nausea or vomiting  No numbness or tingling  No fevers or chills  No IV drug use  Denies any weakness  Has not tried any medications  No prior surgeries  No urinary symptoms  No history of kidney stones  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  None       Past Medical History:   Diagnosis Date   • Addiction to drug [facility])    • Alcohol abuse    • Alcoholism ([facility] )    • Anxiety    • Arthritis     neck   • Bipolar disorder ([facility] )    • Depression    • Hypertension    • Lung disease     31+ yr smoking hx   • Psychiatric disorder    • Sleep difficulties    • Substance abuse ([place] [address] )    • Tobacco abuse        Past Surgical History:   Procedure Laterality Date   • HERNIA REPAIR     • WISDOM TOOTH EXTRACTION         Family History   Problem Relation Age of Onset   • Heart disease Mother    • Stroke Mother    • Stroke Father      I have reviewed and agree with the history as documented      E-Cigarette/Vaping   • E-Cigarette Use Never User      E-Cigarette/Vaping Substances   • Nicotine No    • THC No    • CBD No    • Flavoring No    • Other No    • Unknown No      Social History     Tobacco Use   • Smoking status: Every Day     Packs/day: 0 50     Years: 35 00     Pack years: 17 50     Types: Cigarettes   • Smokeless tobacco: Never   • Tobacco comments:     1 PPD per [place]   Vaping Use   • Vaping Use: Never used   Substance Use Topics   • Alcohol use: Yes     Comment: 1-2 pint per day   • Drug use: Not Currently     Types: Marijuana       Review of Systems Constitutional: Negative for chills and fever  HENT: Negative for ear pain and sore throat  Eyes: Negative for pain and visual disturbance  Respiratory: Negative for cough and shortness of breath  Cardiovascular: Negative for chest pain and palpitations  Gastrointestinal: Negative for abdominal pain and vomiting  Genitourinary: Positive for flank pain  Negative for dysuria and hematuria  Musculoskeletal: Positive for back pain  Negative for arthralgias  Skin: Negative for color change and rash  Neurological: Negative for seizures and syncope  All other systems reviewed and are negative  Physical Exam  Physical Exam  Vitals and nursing note reviewed  Constitutional:       General: He is not in acute distress  HENT:      Head: Normocephalic and atraumatic  Right Ear: External ear normal       Left Ear: External ear normal       Nose: Nose normal       Mouth/Throat:      Mouth: Mucous membranes are moist    Eyes:      Extraocular Movements: Extraocular movements intact  Conjunctiva/sclera: Conjunctivae normal       Pupils: Pupils are equal, round, and reactive to light  Cardiovascular:      Rate and Rhythm: Normal rate and regular rhythm  Pulses: Normal pulses  Heart sounds: Normal heart sounds  No murmur heard  Pulmonary:      Effort: Pulmonary effort is normal  No respiratory distress  Breath sounds: Normal breath sounds  No stridor  Abdominal:      General: Abdomen is flat  Bowel sounds are normal       Tenderness: There is no abdominal tenderness  There is right CVA tenderness  There is no left CVA tenderness, guarding or rebound  Musculoskeletal:         General: No deformity  Cervical back: Normal range of motion and neck supple  Comments: No midline neck or back tenderness, no step-offs or deformities, patient does have right flank tenderness   Skin:     General: Skin is warm and dry        Capillary Refill: Capillary refill takes less than 2 seconds  Findings: No rash  Neurological:      General: No focal deficit present  Mental Status: He is alert and oriented to person, place, and time  Sensory: No sensory deficit  Motor: No weakness  Gait: Gait normal    Psychiatric:         Mood and Affect: Mood normal          Behavior: Behavior normal          Vital Signs  ED Triage Vitals [03/25/23 0112]   Temperature Pulse Respirations Blood Pressure SpO2   98 °F (36 7 °C) 82 18 138/98 99 %      Temp Source Heart Rate Source Patient Position - Orthostatic VS BP Location FiO2 (%)   Tympanic Monitor Lying Left arm --      Pain Score       8           Vitals:    03/25/23 0112   BP: 138/98   Pulse: 82   Patient Position - Orthostatic VS: Lying         Visual Acuity      ED Medications  Medications   ketorolac (TORADOL) injection 15 mg (15 mg Intramuscular Given 3/25/23 0126)   acetaminophen (TYLENOL) tablet 650 mg (650 mg Oral Given 3/25/23 0125)       Diagnostic Studies  Results Reviewed     Procedure Component Value Units Date/Time    Urinalysis with microscopic [[id number]]  (Abnormal) Collected: 03/25/23 0235    Lab Status: Final result Specimen: Urine, Clean Catch Updated: 03/25/23 0306     Color, UA Yellow     Clarity, UA Clear     Specific Gravity, UA >=1 030     pH, UA 6 0     Leukocytes, UA Negative     Nitrite, UA Negative     Protein, UA Negative mg/dl      Glucose, UA Negative mg/dl      Ketones, UA Trace mg/dl      Urobilinogen, UA 0 2 E U /dl      Bilirubin, UA 1+     Occult Blood, UA Negative     RBC, UA 1-2 /hpf      WBC, UA None Seen /hpf      Epithelial Cells None Seen /hpf      Bacteria, UA None Seen /hpf                  CT renal stone study abdomen pelvis wo contrast   Final Result by Emelia Jenkins MD (03/25 0253)      No acute intra-abdominal abnormality  No free air or free fluid  No hydronephrosis or intrarenal calculus  Normal appendix visualized           Workstation performed: [id number] XR chest 1 view portable   ED Interpretation by Jean Higgins MD (03/25 0214)   No acute cardiopulmonary abnormality                 Procedures  Procedures         ED Course                                             Medical Decision Making  78-year-old male presents the emergency department for evaluation of right flank pain  On exam, he is resting comfortably in bed in no acute distress  He has no midline tenderness  No red flag symptoms  Suspect symptoms likely musculoskeletal in nature  Differential also includes but is not limited to urinary tract infection, ureterolithiasis, pleurisy, pneumonia  We will check urinalysis, renal stone study, and chest x-ray  Will treat symptomatically and reassess  X-ray and CT negative for any acute findings  Urinalysis negative for infection  Symptoms improved following medications  He will be discharged home  He was advised to take over-the-counter anti-inflammatories and to avoid heavy lifting  He was given strict return precautions  Right flank pain: acute illness or injury  Amount and/or Complexity of Data Reviewed  Labs: ordered  Radiology: ordered and independent interpretation performed  Risk  OTC drugs  Prescription drug management  Disposition  Final diagnoses:   Right flank pain     Time reflects when diagnosis was documented in both MDM as applicable and the Disposition within this note     Time User Action Codes Description Comment    3/25/2023  2:15 AM Rivas Mitchell Add [R10 9] Right flank pain       ED Disposition     ED Disposition   Discharge    Condition   Stable    Date/Time   Sat Mar 25, 2023  3:08 AM    Comment   Tilford Hodgkins discharge to home/self care                 Follow-up Information     Follow up With Specialties Details Why [address] Emergency Department Emergency Medicine  If symptoms worsen [address] Soni Valles [address] [place] [address]  Emergency Department, [address] [place] [address] Bianca De Anda, [facility], DO Family Medicine Schedule an appointment as soon as possible for a visit   [address]  [phone]             Patient's Medications    No medications on file       No discharge procedures on file      PDMP Review       Value Time User    PDMP Reviewed  Yes 3/3/2021 11:55 PM Jocelyn Lynn [place]          ED Provider  Electronically Signed by           Tamika Ortega MD  03/25/23 4993
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
## 2024-06-03 ENCOUNTER — APPOINTMENT (OUTPATIENT)
Dept: URGENT CARE | Facility: CLINIC | Age: 54
End: 2024-06-03

## 2024-06-04 ENCOUNTER — HOSPITAL ENCOUNTER (EMERGENCY)
Facility: HOSPITAL | Age: 54
Discharge: HOME | End: 2024-06-04
Attending: EMERGENCY MEDICINE | Admitting: EMERGENCY MEDICINE

## 2024-06-04 VITALS
BODY MASS INDEX: 29.12 KG/M2 | OXYGEN SATURATION: 95 % | WEIGHT: 215 LBS | HEIGHT: 72 IN | SYSTOLIC BLOOD PRESSURE: 153 MMHG | TEMPERATURE: 97.6 F | RESPIRATION RATE: 18 BRPM | DIASTOLIC BLOOD PRESSURE: 99 MMHG | HEART RATE: 80 BPM

## 2024-06-04 DIAGNOSIS — S05.01XA ABRASION OF RIGHT CORNEA, INITIAL ENCOUNTER: Primary | ICD-10-CM

## 2024-06-04 PROCEDURE — 99282 EMERGENCY DEPT VISIT SF MDM: CPT

## 2024-06-04 PROCEDURE — 90715 TDAP VACCINE 7 YRS/> IM: CPT | Performed by: EMERGENCY MEDICINE

## 2024-06-04 PROCEDURE — 90471 IMMUNIZATION ADMIN: CPT

## 2024-06-04 PROCEDURE — 99284 EMERGENCY DEPT VISIT MOD MDM: CPT | Performed by: EMERGENCY MEDICINE

## 2024-06-04 RX ORDER — CIPROFLOXACIN HYDROCHLORIDE 3.5 MG/ML
1 SOLUTION/ DROPS TOPICAL ONCE
Status: COMPLETED | OUTPATIENT
Start: 2024-06-04 | End: 2024-06-04

## 2024-06-04 RX ORDER — TETRACAINE HYDROCHLORIDE 5 MG/ML
2 SOLUTION OPHTHALMIC ONCE
Status: COMPLETED | OUTPATIENT
Start: 2024-06-04 | End: 2024-06-04

## 2024-06-04 RX ORDER — IBUPROFEN 400 MG/1
800 TABLET ORAL ONCE
Status: COMPLETED | OUTPATIENT
Start: 2024-06-04 | End: 2024-06-04

## 2024-06-04 RX ADMIN — FLUORESCEIN SODIUM 1 STRIP: 1 STRIP OPHTHALMIC at 23:26

## 2024-06-04 RX ADMIN — CIPROFLOXACIN HYDROCHLORIDE 1 DROP: 3 SOLUTION/ DROPS OPHTHALMIC at 23:35

## 2024-06-04 RX ADMIN — TETANUS TOXOID, REDUCED DIPHTHERIA TOXOID AND ACELLULAR PERTUSSIS VACCINE, ADSORBED 0.5 ML: 5; 2.5; 8; 8; 2.5 SUSPENSION INTRAMUSCULAR at 23:39

## 2024-06-04 RX ADMIN — TETRACAINE HYDROCHLORIDE 2 DROP: 5 SOLUTION OPHTHALMIC at 23:26

## 2024-06-04 RX ADMIN — IBUPROFEN 800 MG: 400 TABLET, FILM COATED ORAL at 23:35

## 2024-06-04 NOTE — Clinical Note
Elroy Wright was seen and treated in our emergency department on 6/4/2024.                Diagnosis:     Elroy  .    He may return on this date: 06/06/2024    Patient seen in the emergency room on 6/4/2024.  Please excuse from work tomorrow.     If you have any questions or concerns, please don't hesitate to call.      Jase Cobb Jr., DO    ______________________________           _______________          _______________  Hospital Representative                              Date                                Time

## 2024-06-05 NOTE — DISCHARGE INSTRUCTIONS
Do not wear any contacts for the next 5 days.    Use cool compresses to your eye 4-6 times a day for 10 to 15 minutes at a time.    Use Cipro drops 1 drop in the right eye every 4-6 hours while awake for 4 to 5 days.    You should consider following up with an eye doctor for an evaluation to make sure that this clears up.  I would recommend you calling the number provided for a follow-up appointment.

## 2024-06-05 NOTE — ED PROVIDER NOTES
History  Chief Complaint   Patient presents with    Eye Problem     Right eye redness; states around 1800 tonight that he felt that something is in his eye; states that every time he blink he feels a scratch     53-year-old male presents emergency room complaining of pain and a foreign body sensation to the right eye that started after taking his contacts out today.  Patient notes that he works as a  and states that he does not believe anything got in his eye at work and his symptoms only started 6:00 this evening after he took his contact lenses out.  Patient notes that his eye became red and his eyelid slightly swollen.  Patient denies any visual status changes and is here for evaluation.  Patient's last tetanus status is unknown.        None       Past Medical History:   Diagnosis Date    Addiction to drug (HCC)     Alcohol abuse     Alcoholism (H[facility])     Anxiety     Arthritis     neck    Bipolar disorder (HCC)     Depression     Hypertension     Lung disease     31+ yr smoking hx    Psychiatric disorder     Sleep difficulties     Substance abuse (H[facility])     Tobacco abuse        Past Surgical History:   Procedure Laterality Date    HERNIA REPAIR      WISDOM TOOTH EXTRACTION         Family History   Problem Relation Age of Onset    Heart disease Mother     Stroke Mother     Stroke Father      I have reviewed and agree with the history as documented.    E-Cigarette/Vaping    E-Cigarette Use Never User      E-Cigarette/Vaping Substances    Nicotine No     THC No     CBD No     Flavoring No     Other No     Unknown No      Social History     Tobacco Use    Smoking status: Every Day     Current packs/day: 0.50     Average packs/day: 0.5 packs/day for 35.0 years (17.5 ttl pk-yrs)     Types: Cigarettes    Smokeless tobacco: Never    Tobacco comments:     1 PPD per [place]   Vaping Use    Vaping status: Never Used   Substance Use Topics    Alcohol use: Not Currently     Comment: 1-2 pint per day    Drug use:  Not Currently     Types: Marijuana       Review of Systems   Constitutional:  Negative for chills and fever.   HENT:  Negative for ear pain and sore throat.    Eyes:  Positive for pain, discharge and redness. Negative for photophobia and visual disturbance.   Respiratory:  Negative for cough and shortness of breath.    Cardiovascular:  Negative for chest pain and palpitations.   Gastrointestinal:  Negative for abdominal pain and vomiting.   Genitourinary:  Negative for dysuria and hematuria.   Musculoskeletal:  Negative for arthralgias and back pain.   Skin:  Negative for color change and rash.   All other systems reviewed and are negative.      Physical Exam  Physical Exam  Vitals and nursing note reviewed.   Constitutional:       General: He is not in acute distress.     Appearance: He is well-developed.   HENT:      Head: Normocephalic and atraumatic.   Eyes:      General:         Right eye: Discharge present.      Extraocular Movements: Extraocular movements intact.      Pupils: Pupils are equal, round, and reactive to light.      Comments: Patient's right eye shows both bulbar and palpebral conjunctival injection.  There is no evidence of foreign body with lid eversion.  Patient's visual acuity is unaffected.  With fluorescein added to the eye, there is a punctate lesion noted at roughly 7-8 o'clock on the cornea.  This does not appear to be a foreign body under magnification.  This could be the beginning of a corneal ulcer or possibly a corneal abrasion.  Patient denies using his contact lenses for more than a day at a time as they are disposables.  Patient has no evidence of hyphema or other abnormalities on exam.   Cardiovascular:      Rate and Rhythm: Normal rate and regular rhythm.      Heart sounds: No murmur heard.  Pulmonary:      Effort: Pulmonary effort is normal. No respiratory distress.      Breath sounds: Normal breath sounds.   Abdominal:      Palpations: Abdomen is soft.      Tenderness: There is  no abdominal tenderness.   Musculoskeletal:      Cervical back: Neck supple.   Skin:     General: Skin is warm and dry.      Capillary Refill: Capillary refill takes less than 2 seconds.   Neurological:      Mental Status: He is alert.   Psychiatric:         Mood and Affect: Mood normal.         Vital Signs  ED Triage Vitals [06/04/24 2317]   Temperature Pulse Respirations Blood Pressure SpO2   97.6 °F (36.4 °C) 80 18 153/99 95 %      Temp src Heart Rate Source Patient Position - Orthostatic VS BP Location FiO2 (%)   -- -- -- -- --      Pain Score       9           Vitals:    06/04/24 2317   BP: 153/99   Pulse: 80         Visual Acuity  Visual Acuity      Flowsheet Row Most Recent Value   Visual acuity R eye is 20/20   Visual acuity Left eye is 20/20   Visual acuity in both eyes is 20/20   Wearing corrective eyewear/lenses? Yes            ED Medications  Medications   tetanus-diphtheria-acellular pertussis (BOOSTRIX) IM injection 0.5 mL (has no administration in time range)   fluorescein sodium sterile ophthalmic strip 1 strip (1 strip Right Eye Given 6/4/24 2326)   tetracaine 0.5 % ophthalmic solution 2 drop (2 drops Right Eye Given 6/4/24 2326)   ciprofloxacin (CILOXAN) 0.3 % ophthalmic solution 1 drop (1 drop Right Eye Given 6/4/24 2335)   ibuprofen (MOTRIN) tablet 800 mg (800 mg Oral Given 6/4/24 2335)       Diagnostic Studies  Results Reviewed       None                   No orders to display              Procedures  Procedures         ED Course  ED Course as of 06/04/24 2338 Tue Jun 04, 2024 2334 Visual acuity is 20/20 in each eye.  As well as 20/20 OU.                                             Medical Decision Making  Patient is a 53-year-old male presenting to the emergency department due to a foreign body sensation in his right eye after taking his contact lens out at 6:00.  The patient had no symptoms prior to that.  Patient came to the ER due to pain and watering of the eye.  The patient denies  using his contact lenses more than a day at a time as they are daily disposables.  Differential diagnosis based on my evaluation is conjunctivitis, viral versus bacterial, foreign body, corneal abrasion or potentially ulcer.  The patient's eye was examined by myself and found to show a punctate uptake at roughly 7-8 o'clock on the right eye.  This appears to not be a foreign body under magnification but may represent an early ulcer or possibly a small corneal abrasion.  The patient is placed on Cipro ophthalmic drops, and will be referred to ophthalmology for repeat evaluation.  Patient also had his tetanus status updated.  Told not to wear his contact lenses for 5 days.    Patient discharged.    Risk  Prescription drug management.             Disposition  Final diagnoses:   Abrasion of right cornea, initial encounter     Time reflects when diagnosis was documented in both MDM as applicable and the Disposition within this note       Time User Action Codes Description Comment    6/4/2024 11:33 PM Jase Cobb Add [S05.01XA] Abrasion of right cornea, initial encounter           ED Disposition       ED Disposition   Discharge    Condition   Stable    Date/Time   Tue Jun 4, 2024 11:33 PM    Comment   Elroy Wright discharge to home/self care.                   Follow-up Information       Follow up With Specialties Details Why Contact Info    Mckay Cardona MD Ophthalmology On 6/7/2024  [address] [place] [address]  [phone]              Patient's Medications    No medications on file       No discharge procedures on file.    PDMP Review         Value Time User    PDMP Reviewed  Yes 3/3/2021 11:55 PM JEMAL Haas            ED Provider  Electronically Signed by             Jase Cobb Jr.,   06/04/24 5815

## 2024-07-22 ENCOUNTER — HOSPITAL ENCOUNTER (EMERGENCY)
Facility: HOSPITAL | Age: 54
Discharge: HOME | End: 2024-07-22
Attending: EMERGENCY MEDICINE

## 2024-07-22 VITALS
WEIGHT: 215 LBS | SYSTOLIC BLOOD PRESSURE: 140 MMHG | OXYGEN SATURATION: 95 % | RESPIRATION RATE: 18 BRPM | TEMPERATURE: 98.2 F | HEIGHT: 72 IN | BODY MASS INDEX: 29.12 KG/M2 | HEART RATE: 87 BPM | DIASTOLIC BLOOD PRESSURE: 97 MMHG

## 2024-07-22 DIAGNOSIS — M62.838 TRAPEZIUS MUSCLE SPASM: Primary | ICD-10-CM

## 2024-07-22 PROCEDURE — 96372 THER/PROPH/DIAG INJ SC/IM: CPT

## 2024-07-22 PROCEDURE — 99284 EMERGENCY DEPT VISIT MOD MDM: CPT | Performed by: EMERGENCY MEDICINE

## 2024-07-22 PROCEDURE — 99283 EMERGENCY DEPT VISIT LOW MDM: CPT

## 2024-07-22 RX ORDER — ACETAMINOPHEN 325 MG/1
650 TABLET ORAL ONCE
Status: COMPLETED | OUTPATIENT
Start: 2024-07-22 | End: 2024-07-22

## 2024-07-22 RX ORDER — LIDOCAINE 50 MG/G
1 PATCH TOPICAL ONCE
Status: DISCONTINUED | OUTPATIENT
Start: 2024-07-22 | End: 2024-07-22 | Stop reason: HOSPADM

## 2024-07-22 RX ORDER — METHOCARBAMOL 500 MG/1
500 TABLET, FILM COATED ORAL 2 TIMES DAILY
Qty: 10 TABLET | Refills: 0 | Status: SHIPPED | OUTPATIENT
Start: 2024-07-22 | End: 2024-07-27

## 2024-07-22 RX ORDER — KETOROLAC TROMETHAMINE 30 MG/ML
15 INJECTION, SOLUTION INTRAMUSCULAR; INTRAVENOUS ONCE
Status: COMPLETED | OUTPATIENT
Start: 2024-07-22 | End: 2024-07-22

## 2024-07-22 RX ADMIN — ACETAMINOPHEN 650 MG: 325 TABLET ORAL at 10:50

## 2024-07-22 RX ADMIN — LIDOCAINE 1 PATCH: 50 PATCH CUTANEOUS at 10:50

## 2024-07-22 RX ADMIN — KETOROLAC TROMETHAMINE 15 MG: 30 INJECTION, SOLUTION INTRAMUSCULAR; INTRAVENOUS at 10:50

## 2024-07-22 NOTE — DISCHARGE INSTRUCTIONS
Take Robaxin twice daily as needed for left neck muscle spasm. Do not take before driving, as this medicine makes you sleepy.  Follow-up with a Primary Doctor later this week or next week.   Return to ED in the event of severe left neck pain involving left arm weakness/numbness, fall, head injury, loss of consciousness.

## 2024-07-22 NOTE — Clinical Note
Elroy Wright was seen and treated in our emergency department on 7/22/2024.    No restrictions            Diagnosis: left trapezius muscle spasm    Elroy  may return to work on return date.    He may return on this date: 07/23/2024         If you have any questions or concerns, please don't hesitate to call.      Naina De La Rosa PA-C    ______________________________           _______________          _______________  Hospital Representative                              Date                                Time

## 2024-07-22 NOTE — ED PROVIDER NOTES
History  Chief Complaint   Patient presents with    Neck Pain     Left sided lateral neck pain that began last Monday. Painful to move.      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  Patient reports washing his face last Monday, 1 week ago, when noticing a cramping and stabbing pain on the posterior left side of his neck, radiating occasionally down the left upper trapezius area.  Patient reports that the pain has persisted over the weekend and improved mildly this morning.  Patient has not attempted any medications or palliative factors to relieve his pain.  Patient denies prior history of neck pain or neck surgery.  Patient denies recent or history of trauma to the neck or head, and also denies chest pain, shortness of breath, numbness/tingling or weakness of the upper extremities.  Patient has no known drug allergies.          None       Past Medical History:   Diagnosis Date    Addiction to drug (HCC)     Alcohol abuse     Alcoholism (HCC)     Anxiety     Arthritis     neck    Bipolar disorder (HCC)     Depression     Hypertension     Lung disease     31+ yr smoking hx    Psychiatric disorder     Sleep difficulties     Substance abuse (HCC)     Tobacco abuse        Past Surgical History:   Procedure Laterality Date    HERNIA REPAIR      WISDOM TOOTH EXTRACTION         Family History   Problem Relation Age of Onset    Heart disease Mother     Stroke Mother     Stroke Father      I have reviewed and agree with the history as documented.    E-Cigarette/Vaping    E-Cigarette Use Never User      E-Cigarette/Vaping Substances    Nicotine No     THC No     CBD No     Flavoring No     Other No     Unknown No      Social History     Tobacco Use    Smoking status: Every Day     Current packs/day: 0.50     Average packs/day: 0.5 packs/day for 35.0 years (17.5 ttl pk-yrs)     Types: Cigarettes    Smokeless tobacco: Never     Tobacco comments:     1 PPD per [facility]   Vaping Use    Vaping status: Never Used   Substance Use Topics    Alcohol use: Not Currently     Comment: 1-2 pint per day    Drug use: Not Currently     Types: Marijuana       Review of Systems   Constitutional:  Negative for appetite change, chills, diaphoresis, fatigue and fever.   HENT:  Negative for congestion, sore throat and trouble swallowing.    Eyes:  Negative for visual disturbance.   Respiratory:  Negative for cough and shortness of breath.    Cardiovascular:  Negative for chest pain and palpitations.   Gastrointestinal:  Negative for abdominal pain, nausea and vomiting.   Musculoskeletal:  Positive for neck pain (Left side). Negative for back pain.   Skin:  Negative for pallor, rash and wound.   Neurological:  Negative for dizziness, syncope, weakness, light-headedness, numbness and headaches.       Physical Exam  Physical Exam  Constitutional:       General: He is not in acute distress.     Appearance: Normal appearance. He is normal weight. He is not ill-appearing, toxic-appearing or diaphoretic.   HENT:      Head: Normocephalic and atraumatic.      Nose: No rhinorrhea.      Mouth/Throat:      Mouth: Mucous membranes are moist.   Eyes:      Extraocular Movements: Extraocular movements intact.      Conjunctiva/sclera: Conjunctivae normal.      Pupils: Pupils are equal, round, and reactive to light.   Cardiovascular:      Rate and Rhythm: Normal rate and regular rhythm.      Pulses: Normal pulses.      Heart sounds: Normal heart sounds. No murmur heard.  Pulmonary:      Effort: Pulmonary effort is normal. No respiratory distress.      Breath sounds: Normal breath sounds. No wheezing, rhonchi or rales.   Chest:      Chest wall: No tenderness.   Abdominal:      General: Bowel sounds are normal.      Palpations: Abdomen is soft.      Tenderness: There is no abdominal tenderness.   Musculoskeletal:         General: No tenderness.      Cervical back: Normal range of  motion. No rigidity. Tenderness: Mild tenderness and palpable spasm left upper trapezius muscle.     Right lower leg: No edema.      Left lower leg: No edema.   Skin:     General: Skin is warm and dry.      Capillary Refill: Capillary refill takes less than 2 seconds.      Coloration: Skin is not pale.      Findings: No bruising, erythema, lesion or rash.   Neurological:      Mental Status: He is alert and oriented to person, place, and time.         Vital Signs  ED Triage Vitals [07/22/24 0959]   Temperature Pulse Respirations Blood Pressure SpO2   98.2 °F (36.8 °C) 87 20 (!) 175/101 95 %      Temp Source Heart Rate Source Patient Position - Orthostatic VS BP Location FiO2 (%)   Temporal Monitor Lying Left arm --      Pain Score       5           Vitals:    07/22/24 0959 07/22/24 1045   BP: (!) 175/101 140/97   Pulse: 87 87   Patient Position - Orthostatic VS: Lying Sitting         Visual Acuity      ED Medications  Medications   acetaminophen (TYLENOL) tablet 650 mg (650 mg Oral Given 7/22/24 1050)   ketorolac (TORADOL) injection 15 mg (15 mg Intramuscular Given 7/22/24 1050)       Diagnostic Studies  Results Reviewed       None                   No orders to display              Procedures  Procedures         ED Course                                 SBIRT 22yo+      Flowsheet Row Most Recent Value   Initial Alcohol Screen: US AUDIT-C     1. How often do you have a drink containing alcohol? 0 Filed at: 07/22/2024 1025   2. How many drinks containing alcohol do you have on a typical day you are drinking?  0 Filed at: 07/22/2024 1025   3a. Male UNDER 65: How often do you have five or more drinks on one occasion? 0 Filed at: 07/22/2024 1025   3b. FEMALE Any Age, or MALE 65+: How often do you have 4 or more drinks on one occassion? 0 Filed at: 07/22/2024 1025   Audit-C Score 0 Filed at: 07/22/2024 1025   ABIOLA: How many times in the past year have you...    Used an illegal drug or used a prescription medication for  non-medical reasons? Never Filed at: 07/22/2024 1025                      Medical Decision Making  INITIAL EVALUATION: Patient presented independently to the ED with complaint of left-sided posterior neck pain.  Vital signs within normal limits.  Patient in no apparent distress.  History notable for no traumatic neck injury in the past or recently.  Physical exam notable for normal neurologic exam, without weakness or sensory deficit of upper extremities, and palpable muscle spasm of the left upper trapezius muscle.  Initial impression is left upper trapezius muscle spasm, differential diagnosis including muscle strain, worsening degenerative disease of cervical spine.  Will initiate treatment with Lidoderm patch, Tylenol, Toradol and warm compress.    SUMMARY: Patient reported significant symptomatic relief following treatment in ED.  Patient amenable to discharge home with follow-up with PCP.  Patient prescribed Robaxin 500 mg twice daily as needed neck pain.  Patient instructed to use warm compress followed by manual massage and stretching to alleviate pain, and to use Robaxin as needed for persisting muscle tightness.  Patient educated that he should not take this medicine before driving, and educated about reasons to return to the emergency room.  Questions answered according to patient's satisfaction.  Per patient request, work note provided for today.  Patient discharged home in stable condition.    Risk  OTC drugs.  Prescription drug management.                 Disposition  Final diagnoses:   Trapezius muscle spasm     Time reflects when diagnosis was documented in both MDM as applicable and the Disposition within this note       Time User Action Codes Description Comment    7/22/2024 10:19 AM Naina De La Rosa Add [M62.838] Trapezius muscle spasm           ED Disposition       ED Disposition   Discharge    Condition   Stable    Date/Time   Mon Jul 22, 2024 1047    Comment   Elroy Wright discharge to home/self  care.                   Follow-up Information       Follow up With Specialties Details Why Contact Info Additional Information    [facility] Schedule an appointment as soon as possible for a visit   [address] [facility] [address] [phone] [facility], [address], [place], [place], [address], [phone]            Discharge Medication List as of 7/22/2024 11:15 AM        START taking these medications    Details   methocarbamol (ROBAXIN) 500 mg tablet Take 1 tablet (500 mg total) by mouth 2 (two) times a day for 5 days, Starting Mon 7/22/2024, Until Sat 7/27/2024, Normal                 PDMP Review         Value Time User    PDMP Reviewed  Yes 3/3/2021 11:55 PM JEMAL Haas            ED Provider  Electronically Signed by             Naina De La Rosa PA-C  07/22/24 7355

## 2024-07-22 NOTE — ED ATTENDING ATTESTATION
"7/22/2024  I, Ilya Melissa DO, saw and evaluated the patient. I have discussed the patient with the resident/non-physician practitioner and agree with the resident's/non-physician practitioner's findings, Plan of Care, and MDM as documented in the resident's/non-physician practitioner's note, except where noted. All available labs and Radiology studies were reviewed.  I was present for key portions of any procedure(s) performed by the resident/non-physician practitioner and I was immediately available to provide assistance.       At this point I agree with the current assessment done in the Emergency Department.  I have conducted an independent evaluation of this patient a history and physical is as follows:    Patient presenting for neck pain.  He says he was \"washing his face\" last Monday and he developed cramping/shootinh pain in left neck into trapezius.  Symptoms improved this morning.  He says he is in need of a work note.  No arm numbness/tingling/weakness, no stroke like symptoms. No trauma.      Physical Exam  Vitals and nursing note reviewed.   Constitutional:       General: He is not in acute distress.     Appearance: He is well-developed.   HENT:      Head: Normocephalic and atraumatic.      Right Ear: External ear normal.      Left Ear: External ear normal.   Eyes:      Conjunctiva/sclera: Conjunctivae normal.      Pupils: Pupils are equal, round, and reactive to light.   Cardiovascular:      Rate and Rhythm: Normal rate and regular rhythm.      Heart sounds: Normal heart sounds. No murmur heard.     No friction rub. No gallop.   Pulmonary:      Effort: Pulmonary effort is normal. No respiratory distress.      Breath sounds: Normal breath sounds. No wheezing or rales.   Abdominal:      General: Bowel sounds are normal. There is no distension.      Palpations: Abdomen is soft.      Tenderness: There is no abdominal tenderness. There is no guarding.   Musculoskeletal:         General: No deformity. " Normal range of motion.        Arms:       Cervical back: Normal range of motion. Tenderness present.   Lymphadenopathy:      Cervical: No cervical adenopathy.   Skin:     General: Skin is warm and dry.   Neurological:      General: No focal deficit present.      Mental Status: He is alert and oriented to person, place, and time. Mental status is at baseline.      Cranial Nerves: No cranial nerve deficit.      Sensory: No sensory deficit.      Motor: No weakness or abnormal muscle tone.   Psychiatric:         Behavior: Behavior normal.          Pain is all paraspinal in nature and tender of the left trap.    Plan:  Tylenol, Toradol, Lidoderm patch.  Will give script for robaxin.      ED Course         Critical Care Time  Procedures

## 2024-09-21 ENCOUNTER — HOSPITAL ENCOUNTER (EMERGENCY)
Facility: HOSPITAL | Age: 54
Discharge: HOME | End: 2024-09-21
Attending: EMERGENCY MEDICINE

## 2024-09-21 VITALS
OXYGEN SATURATION: 99 % | SYSTOLIC BLOOD PRESSURE: 166 MMHG | RESPIRATION RATE: 20 BRPM | HEART RATE: 92 BPM | TEMPERATURE: 99.2 F | DIASTOLIC BLOOD PRESSURE: 109 MMHG

## 2024-09-21 DIAGNOSIS — R09.81 CONGESTION OF NASAL SINUS: Primary | ICD-10-CM

## 2024-09-21 PROCEDURE — 99283 EMERGENCY DEPT VISIT LOW MDM: CPT | Performed by: EMERGENCY MEDICINE

## 2024-09-21 PROCEDURE — 99282 EMERGENCY DEPT VISIT SF MDM: CPT

## 2024-09-21 RX ORDER — LORATADINE 10 MG/1
10 TABLET ORAL ONCE
Status: COMPLETED | OUTPATIENT
Start: 2024-09-21 | End: 2024-09-21

## 2024-09-21 RX ORDER — FLUTICASONE PROPIONATE 50 MCG
1 SPRAY, SUSPENSION (ML) NASAL DAILY
Status: DISCONTINUED | OUTPATIENT
Start: 2024-09-21 | End: 2024-09-21 | Stop reason: HOSPADM

## 2024-09-21 RX ORDER — FLUTICASONE PROPIONATE 50 MCG
1 SPRAY, SUSPENSION (ML) NASAL DAILY
Qty: 16 G | Refills: 0 | Status: SHIPPED | OUTPATIENT
Start: 2024-09-21

## 2024-09-21 RX ORDER — LORATADINE 10 MG/1
10 TABLET ORAL DAILY
Qty: 20 TABLET | Refills: 0 | Status: SHIPPED | OUTPATIENT
Start: 2024-09-21

## 2024-09-21 RX ADMIN — FLUTICASONE PROPIONATE 1 SPRAY: 50 SPRAY, METERED NASAL at 11:02

## 2024-09-21 RX ADMIN — LORATADINE 10 MG: 10 TABLET ORAL at 11:02

## 2024-09-21 NOTE — ED PROVIDER NOTES
1. Congestion of nasal sinus      ED Disposition       ED Disposition   Discharge    Condition   Stable    Date/Time   Sat Sep 21, 2024 10:50 AM    Comment   Elroy Wright discharge to home/self care.                   Assessment & Plan       Medical Decision Making  Patient presents for nasal congestion.  Plan to start on Flonase and Claritin with ENT follow-up as needed.    Risk  OTC drugs.                     Medications   fluticasone (FLONASE) 50 mcg/act nasal spray 1 spray (1 spray Each Nare Given 9/21/24 1102)   loratadine (CLARITIN) tablet 10 mg (10 mg Oral Given 9/21/24 1102)       History of Present Illness       Patient is a 53-year-old male without pertinent medical history presents for evaluation of nasal congestion.  He feels like his right nose has been stuffed up for the past month.  He has been taking some Tylenol without much improvement.  He otherwise denies rhinorrhea, sore throat, cough.        Review of Systems   Constitutional:  Negative for fever.   HENT:  Positive for congestion. Negative for sore throat.    Eyes:  Negative for photophobia.   Respiratory:  Negative for shortness of breath.    Cardiovascular:  Negative for chest pain.   Gastrointestinal:  Negative for abdominal pain.   Genitourinary:  Negative for dysuria.   Musculoskeletal:  Negative for back pain.   Skin:  Negative for rash.   Neurological:  Negative for light-headedness.   Hematological:  Negative for adenopathy.   Psychiatric/Behavioral:  Negative for agitation.    All other systems reviewed and are negative.          Objective     ED Triage Vitals [09/21/24 1047]   Temperature Pulse Blood Pressure Respirations SpO2 Patient Position - Orthostatic VS   99.2 °F (37.3 °C) 92 (!) 166/109 20 99 % --      Temp src Heart Rate Source BP Location FiO2 (%) Pain Score    -- -- -- -- No Pain        Physical Exam  Vitals reviewed.   Constitutional:       General: He is not in acute distress.     Appearance: He is well-developed.   HENT:       Head: Normocephalic.   Eyes:      Pupils: Pupils are equal, round, and reactive to light.   Cardiovascular:      Rate and Rhythm: Normal rate and regular rhythm.      Heart sounds: Normal heart sounds. No murmur heard.     No friction rub. No gallop.   Pulmonary:      Effort: Pulmonary effort is normal.      Breath sounds: Normal breath sounds.   Abdominal:      General: Bowel sounds are normal. There is no distension.      Palpations: Abdomen is soft.      Tenderness: There is no abdominal tenderness. There is no guarding.   Musculoskeletal:         General: Normal range of motion.      Cervical back: Normal range of motion and neck supple.   Skin:     Capillary Refill: Capillary refill takes less than 2 seconds.   Neurological:      Mental Status: He is alert and oriented to person, place, and time.      Cranial Nerves: No cranial nerve deficit.      Sensory: No sensory deficit.      Motor: No abnormal muscle tone.   Psychiatric:         Behavior: Behavior normal.         Thought Content: Thought content normal.         Judgment: Judgment normal.         Labs Reviewed - No data to display  No orders to display       Procedures    ED Medication and Procedure Management   Prior to Admission Medications   Prescriptions Last Dose Informant Patient Reported? Taking?   methocarbamol (ROBAXIN) 500 mg tablet   No No   Sig: Take 1 tablet (500 mg total) by mouth 2 (two) times a day for 5 days      Facility-Administered Medications: None     Discharge Medication List as of 9/21/2024 10:50 AM        START taking these medications    Details   fluticasone (FLONASE) 50 mcg/act nasal spray 1 spray into each nostril daily, Starting Sat 9/21/2024, Normal      loratadine (CLARITIN) 10 mg tablet Take 1 tablet (10 mg total) by mouth daily, Starting Sat 9/21/2024, Normal           CONTINUE these medications which have NOT CHANGED    Details   methocarbamol (ROBAXIN) 500 mg tablet Take 1 tablet (500 mg total) by mouth 2 (two)  times a day for 5 days, Starting Mon 7/22/2024, Until Sat 7/27/2024, Normal           No discharge procedures on file.     Guy Mcaky MD  09/21/24 112

## 2024-11-25 ENCOUNTER — APPOINTMENT (EMERGENCY)
Dept: RADIOLOGY | Facility: HOSPITAL | Age: 54
End: 2024-11-25

## 2024-11-25 ENCOUNTER — HOSPITAL ENCOUNTER (EMERGENCY)
Facility: HOSPITAL | Age: 54
Discharge: HOME | End: 2024-11-25
Attending: EMERGENCY MEDICINE | Admitting: EMERGENCY MEDICINE

## 2024-11-25 VITALS
SYSTOLIC BLOOD PRESSURE: 131 MMHG | OXYGEN SATURATION: 97 % | TEMPERATURE: 98.4 F | DIASTOLIC BLOOD PRESSURE: 90 MMHG | HEART RATE: 53 BPM | RESPIRATION RATE: 18 BRPM

## 2024-11-25 DIAGNOSIS — S83.91XA RIGHT KNEE SPRAIN: Primary | ICD-10-CM

## 2024-11-25 PROCEDURE — 99284 EMERGENCY DEPT VISIT MOD MDM: CPT

## 2024-11-25 PROCEDURE — 96372 THER/PROPH/DIAG INJ SC/IM: CPT

## 2024-11-25 PROCEDURE — 99284 EMERGENCY DEPT VISIT MOD MDM: CPT | Performed by: EMERGENCY MEDICINE

## 2024-11-25 PROCEDURE — 73564 X-RAY EXAM KNEE 4 OR MORE: CPT

## 2024-11-25 RX ORDER — IBUPROFEN 200 MG
600 TABLET ORAL EVERY 6 HOURS PRN
Qty: 30 TABLET | Refills: 0 | Status: SHIPPED | OUTPATIENT
Start: 2024-11-25

## 2024-11-25 RX ORDER — KETOROLAC TROMETHAMINE 30 MG/ML
15 INJECTION, SOLUTION INTRAMUSCULAR; INTRAVENOUS ONCE
Status: COMPLETED | OUTPATIENT
Start: 2024-11-25 | End: 2024-11-25

## 2024-11-25 RX ADMIN — KETOROLAC TROMETHAMINE 15 MG: 30 INJECTION, SOLUTION INTRAMUSCULAR at 08:33

## 2024-11-25 NOTE — DISCHARGE INSTRUCTIONS
Your xray was reassuring  Take 600mg ibuprofen every 6 hours if needed for pain  Return if symptoms worsen or if new symptoms develop

## 2024-11-25 NOTE — ED PROVIDER NOTES
ED Disposition       None          Assessment & Plan       Medical Decision Making  53-year-old male presents after a fall 2 days ago injuring his right knee.  He notes pain on the superior surface of his right knee worse with ambulation and bending.    On exam he has some mild soft tissue swelling/effusion.  He has pain in the suprapatellar area however he has good range of motion with no joint instability.  No tibial plateau tenderness and able to raise his heel off of his bed with a straight leg.  He has no erythema warmth pain out of proportion or symptoms suggest infectious etiology.  Will get x-ray to rule out occult fracture although clinically unlikely, give Toradol for pain        Amount and/or Complexity of Data Reviewed  Radiology: ordered.    Risk  Prescription drug management.             Medications   ketorolac (TORADOL) injection 15 mg (has no administration in time range)       ED Risk Strat Scores                                               History of Present Illness       Chief Complaint   Patient presents with    Fall    Knee Injury     Right knee injury post fall that occurred on sat. Twisted        Past Medical History:   Diagnosis Date    Addiction to drug (HCC)     Alcohol abuse     Alcoholism (HCC)     Anxiety     Arthritis     neck    Bipolar disorder (HCC)     Depression     Hypertension     Lung disease     31+ yr smoking hx    Psychiatric disorder     Sleep difficulties     Substance abuse (HCC)     Tobacco abuse       Past Surgical History:   Procedure Laterality Date    HERNIA REPAIR      WISDOM TOOTH EXTRACTION        Family History   Problem Relation Age of Onset    Heart disease Mother     Stroke Mother     Stroke Father       Social History     Tobacco Use    Smoking status: Every Day     Current packs/day: 0.50     Average packs/day: 0.5 packs/day for 35.0 years (17.5 ttl pk-yrs)     Types: Cigarettes    Smokeless tobacco: Never    Tobacco comments:     1 PPD per [facility]    Vaping Use    Vaping status: Never Used   Substance Use Topics    Alcohol use: Not Currently     Comment: 1-2 pint per day    Drug use: Not Currently     Types: Marijuana      E-Cigarette/Vaping    E-Cigarette Use Never User       E-Cigarette/Vaping Substances    Nicotine No     THC No     CBD No     Flavoring No     Other No     Unknown No       I have reviewed and agree with the history as documented.       Fall      Review of Systems        Objective       ED Triage Vitals   Temperature Pulse Blood Pressure Respirations SpO2 Patient Position - Orthostatic VS   11/25/24 0806 11/25/24 0806 11/25/24 0806 11/25/24 0806 11/25/24 0806 --   98.4 °F (36.9 °C) (!) 53 131/90 18 97 %       Temp Source Heart Rate Source BP Location FiO2 (%) Pain Score    11/25/24 0806 11/25/24 0806 11/25/24 0806 -- 11/25/24 0808    Tympanic Monitor Left arm  10 - Worst Possible Pain      Vitals      Date and Time Temp Pulse SpO2 Resp BP Pain Score FACES Pain Rating User   11/25/24 0808 -- -- -- -- -- 10 - Worst Possible Pain -- JCS   11/25/24 0806 98.4 °F (36.9 °C) 53 97 % 18 131/90 -- -- AC            Physical Exam  Vitals and nursing note reviewed.   Constitutional:       General: He is not in acute distress.     Appearance: Normal appearance.   HENT:      Head: Normocephalic and atraumatic.      Nose: Nose normal.   Eyes:      General:         Right eye: No discharge.         Left eye: No discharge.   Cardiovascular:      Rate and Rhythm: Normal rate and regular rhythm.   Pulmonary:      Effort: Pulmonary effort is normal. No respiratory distress.   Abdominal:      General: Abdomen is flat. There is no distension.   Musculoskeletal:         General: No deformity. Normal range of motion.      Comments: Right-sided knee pain swelling, limited flexion and extension, mild effusion.  No erythema warmth pain out of proportion.   Skin:     General: Skin is warm.      Findings: No rash.   Neurological:      Mental Status: He is alert and  oriented to person, place, and time.      Motor: No weakness.   Psychiatric:         Mood and Affect: Mood normal.         Behavior: Behavior normal.         Results Reviewed       None            XR knee 4+ vw right injury    (Results Pending)       Procedures    ED Medication and Procedure Management   Prior to Admission Medications   Prescriptions Last Dose Informant Patient Reported? Taking?   fluticasone (FLONASE) 50 mcg/act nasal spray   No No   Si spray into each nostril daily   loratadine (CLARITIN) 10 mg tablet   No No   Sig: Take 1 tablet (10 mg total) by mouth daily   methocarbamol (ROBAXIN) 500 mg tablet   No No   Sig: Take 1 tablet (500 mg total) by mouth 2 (two) times a day for 5 days      Facility-Administered Medications: None     Patient's Medications   Discharge Prescriptions    No medications on file     No discharge procedures on file.  ED SEPSIS DOCUMENTATION            Leonel Chua,   [phone]

## 2025-03-10 ENCOUNTER — TELEPHONE (OUTPATIENT)
Age: 55
End: 2025-03-10

## 2025-03-10 NOTE — TELEPHONE ENCOUNTER
New Patient    Appointment Scheduling  What office location does the patient prefer?: [facility]  What is the reason for the patient's appointment?: Prostate Check  Have patient records been requested?: N  If No, are the records showing in Epic: Y    Appointment Details  Date: 3/21  Time:    11:40 AM  Location:   [place]  Provider:  Ed[facility]  Does the appointment need further review? N      HISTORY  Is the patient having active symptoms? If so, describe symptoms: Frequent urination  Has the patient had any previous Urologist(s)?: [facility] in 2021  Was the patient seen in the ED?: N  Has the patient had any outside testing done?: N  Does patient have Imaging/Lab Results: N  Have you had Cancer: N    INSURANCE   Have you confirmed Patient's insurance? [employer]  Is the insurance accepted?  Patient states that it is.  Is the insurance active? Patient states that it is. Patient doesn't have physical card and was unable to provide additional insurance info. Will bring to appt.

## 2025-03-21 ENCOUNTER — OFFICE VISIT (OUTPATIENT)
Dept: UROLOGY | Facility: CLINIC | Age: 55
End: 2025-03-21
Payer: COMMERCIAL

## 2025-03-21 VITALS
DIASTOLIC BLOOD PRESSURE: 74 MMHG | RESPIRATION RATE: 16 BRPM | TEMPERATURE: 98 F | HEIGHT: 72 IN | HEART RATE: 70 BPM | SYSTOLIC BLOOD PRESSURE: 140 MMHG | OXYGEN SATURATION: 98 % | WEIGHT: 207.6 LBS | BODY MASS INDEX: 28.12 KG/M2

## 2025-03-21 DIAGNOSIS — R35.0 URINARY FREQUENCY: Primary | ICD-10-CM

## 2025-03-21 DIAGNOSIS — Z00.00 ADULT WELLNESS VISIT: ICD-10-CM

## 2025-03-21 DIAGNOSIS — Z12.5 SCREENING FOR PROSTATE CANCER: ICD-10-CM

## 2025-03-21 PROCEDURE — 99203 OFFICE O/P NEW LOW 30 MIN: CPT

## 2025-03-21 RX ORDER — TOLTERODINE 2 MG/1
2 CAPSULE, EXTENDED RELEASE ORAL DAILY
Qty: 30 CAPSULE | Refills: 6 | Status: SHIPPED | OUTPATIENT
Start: 2025-03-21

## 2025-03-21 NOTE — PROGRESS NOTES
Name: Elroy Wright      : 1970      MRN: [id number]  Encounter Provider: JEMAL Bradshaw  Encounter Date: 3/21/2025   Encounter department: [facility] [place]    :  Assessment & Plan  Urinary frequency  Symptoms have been ongoing for approximately 2 years and reports frequency upwards of every 2 hours during the daytime and at night.  Denies any incontinence.  Random bladder scan today showed estimated volume of 41 mL.  I had a lengthy discussion with him today regarding the potential causes of his urinary frequency.  Based on his reported symptoms and rectal exam findings today I suspect his symptoms are more related to an overactive bladder component as opposed to BPH.  I cannot entirely exclude the possibility of BPH though.  We discussed options to trial medication for OAB versus BPH.  After lengthy discussion regarding both medication options he has elected to trial something for OAB.  I am starting him on Detrol LA 2 mg daily.  As he does not have a PCP I am also ordering baseline labs to include CBC, BMP, and A1c.  He was provided referral to establish care with PCP.  Follow-up in 3 months.    Orders:    tolterodine (DETROL LA) 2 mg 24 hr capsule; Take 1 capsule (2 mg total) by mouth daily    CBC and differential    Basic metabolic panel; Future    Hemoglobin A1C; Future    PSA, Total Screen; Future    Screening for prostate cancer  Negative family history of prostate cancer  Rectal exam was benign without significant prostatomegaly.  Estimated gland size between 30 to 35 g.  Orders placed for updated PSA and we will review them at his follow-up.    Orders:    PSA, Total Screen; Future    Adult wellness visit    Orders:    Ambulatory Referral to Family Practice; Future        History of Present Illness     New patient to office with PMHx significant for hypertension, depression, alcohol abuse disorder, bipolar    Patient presents today for evaluation of urinary frequency  upwards of every 2 hours which has been ongoing for the past 2 years.  This is also occurring at nighttime. He feels his urinary stream is adequate. He report feeling of incomplete bladder emptying. Denies any dysuria, gross hematuria, abdominal pain, or flank pain. Voided prior to arrival today, random bladder scan showed estimated volume of 41 mL.    Patient states that he recently got health insurance which she has not had for several years now.  He does not currently follow with a family doctor.  He denies any family history or personal history of diabetes.    He primarily drinks water about 1 quart to 1 gallon per day.  Denies any alcohol intake.  He does drink 1 cup of coffee in the morning.    Prostate cancer screening: Negative family history of prostate cancer screening. PSA 0.8 as of 1/29/2021.                Review of Systems   Constitutional:  Negative for chills and fever.   HENT:  Negative for congestion and sore throat.    Respiratory:  Negative for cough and shortness of breath.    Cardiovascular:  Negative for chest pain and leg swelling.   Gastrointestinal:  Negative for abdominal pain, constipation and diarrhea.   Genitourinary:  Positive for frequency. Negative for difficulty urinating, dysuria, hematuria and urgency.   Musculoskeletal:  Negative for back pain and gait problem.   Skin:  Negative for wound.   Allergic/Immunologic: Negative for immunocompromised state.   Hematological:  Does not bruise/bleed easily.     Objective   There were no vitals taken for this visit.    Physical Exam  Vitals and nursing note reviewed.   Constitutional:       General: He is not in acute distress.     Appearance: He is well-developed.   HENT:      Head: Normocephalic and atraumatic.   Eyes:      Conjunctiva/sclera: Conjunctivae normal.   Cardiovascular:      Rate and Rhythm: Normal rate and regular rhythm.      Heart sounds: No murmur heard.  Pulmonary:      Effort: Pulmonary effort is normal. No respiratory  distress.      Breath sounds: Normal breath sounds.   Abdominal:      Palpations: Abdomen is soft.      Tenderness: There is no abdominal tenderness.   Genitourinary:     Comments: Rectal exam reveals normal tone, no masses and his prostate is smooth, symmetric, and benign. No nodules.    Musculoskeletal:         General: No swelling.      Cervical back: Neck supple.   Skin:     General: Skin is warm and dry.      Capillary Refill: Capillary refill takes less than 2 seconds.   Neurological:      Mental Status: He is alert.   Psychiatric:         Mood and Affect: Mood normal.           Imaging:          Please Note:  Voice dictation software has been used to create this document. There may be inadvertent transcriptions errors.     JEMAL Bradshaw 03/21/25

## 2025-03-26 VITALS
HEIGHT: 72 IN | TEMPERATURE: 97.6 F | WEIGHT: 205.8 LBS | BODY MASS INDEX: 27.87 KG/M2 | HEART RATE: 88 BPM | OXYGEN SATURATION: 96 %

## 2025-03-26 DIAGNOSIS — M17.11 PRIMARY OSTEOARTHRITIS OF ONE KNEE, RIGHT: ICD-10-CM

## 2025-03-26 DIAGNOSIS — M25.461 EFFUSION OF RIGHT KNEE: Primary | ICD-10-CM

## 2025-03-26 PROCEDURE — 20610 DRAIN/INJ JOINT/BURSA W/O US: CPT | Performed by: FAMILY MEDICINE

## 2025-03-26 PROCEDURE — 99204 OFFICE O/P NEW MOD 45 MIN: CPT | Performed by: FAMILY MEDICINE

## 2025-03-26 RX ORDER — BUPIVACAINE HYDROCHLORIDE 5 MG/ML
3.5 INJECTION, SOLUTION PERINEURAL
Status: COMPLETED | OUTPATIENT
Start: 2025-03-26 | End: 2025-03-26

## 2025-03-26 RX ORDER — TRIAMCINOLONE ACETONIDE 40 MG/ML
40 INJECTION, SUSPENSION INTRA-ARTICULAR; INTRAMUSCULAR
Status: COMPLETED | OUTPATIENT
Start: 2025-03-26 | End: 2025-03-26

## 2025-03-26 RX ORDER — MELOXICAM 7.5 MG/1
7.5 TABLET ORAL DAILY PRN
Qty: 30 TABLET | Refills: 1 | Status: SHIPPED | OUTPATIENT
Start: 2025-03-26

## 2025-03-26 RX ADMIN — BUPIVACAINE HYDROCHLORIDE 3.5 ML: 5 INJECTION, SOLUTION PERINEURAL at 09:15

## 2025-03-26 RX ADMIN — TRIAMCINOLONE ACETONIDE 40 MG: 40 INJECTION, SUSPENSION INTRA-ARTICULAR; INTRAMUSCULAR at 09:15

## 2025-03-26 NOTE — PROGRESS NOTES
[facility] [address] [place]  [facility] [place] [address] [phone]      Assessment:  1. Effusion of right knee  -     Ambulatory Referral to Physical Therapy; Future  -     meloxicam (Mobic) 7.5 mg tablet; Take 1 tablet (7.5 mg total) by mouth daily as needed for moderate pain  2. Primary osteoarthritis of one knee, right  -     Ambulatory Referral to Physical Therapy; Future  -     meloxicam (Mobic) 7.5 mg tablet; Take 1 tablet (7.5 mg total) by mouth daily as needed for moderate pain    Assessment & Plan  Effusion of right knee    Orders:    Ambulatory Referral to Physical Therapy; Future    meloxicam (Mobic) 7.5 mg tablet; Take 1 tablet (7.5 mg total) by mouth daily as needed for moderate pain    Primary osteoarthritis of one knee, right    Orders:    Ambulatory Referral to Physical Therapy; Future    meloxicam (Mobic) 7.5 mg tablet; Take 1 tablet (7.5 mg total) by mouth daily as needed for moderate pain      Patient Instructions   F/u as needed  Begin physical therapy  Icing/heat/OTC pain meds as needed.  R knee aspiration/steroid injection given    Plan:  Patient Instructions   F/u as needed  Begin physical therapy  Icing/heat/OTC pain meds as needed.  R knee aspiration/steroid injection given     Return if symptoms worsen or fail to improve.    Chief Complaint:  Chief Complaint   Patient presents with    Right Knee - Pain       Subjective:   HPI    Patient ID: Elroy Wright is a 54 y.o. male     Here c/o R knee pain  Week before thanksgiving slipped on deck, R knee gave out and twisted her R knee.  Seen in ER. Note reviewed.  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  Swelling  No locking or giving out  Sharp shooting pain  Pain walking a little  Can't bend all the way.    XR KNEE 4+ VW RIGHT INJURY     INDICATION: knee pain after fall 2 days ago, suprapatella, doubt  fracture. + effusion.     COMPARISON: None     FINDINGS:     No acute fracture or dislocation.     Small joint effusion.     No significant degenerative changes. Patellar enthesophyte.     No lytic or blastic osseous lesion.     Mild soft tissue swelling in the region of the quadriceps tendon.     IMPRESSION:     No acute osseous abnormality.     Small joint effusion. Mild soft tissue swelling in the region of the quadriceps tendon.    Review of Systems   Constitutional:  Negative for fatigue and fever.   Respiratory:  Negative for shortness of breath.    Cardiovascular:  Negative for chest pain.   Gastrointestinal:  Negative for abdominal pain and nausea.   Genitourinary:  Negative for dysuria.   Musculoskeletal:  Positive for arthralgias, gait problem and joint swelling.   Skin:  Negative for rash and wound.   Neurological:  Negative for weakness and headaches.       Objective:  Vitals:  Pulse 88   Temp 97.6 °F (36.4 °C) (Tympanic)   Ht 6' (1.829 m)   Wt 93.4 kg (205 lb 12.8 oz)   SpO2 96%   BMI 27.91 kg/m²     The following portions of the patient's history were reviewed and updated as appropriate: allergies, current medications, past family history, past medical history, past social history, past surgical history, and problem list.    Physical exam:  Physical Exam  Constitutional:       Appearance: Normal appearance. He is normal weight.   Eyes:      Extraocular Movements: Extraocular movements intact.   Pulmonary:      Effort: Pulmonary effort is normal.   Musculoskeletal:      Cervical back: Normal range of motion.      Right knee: Effusion present.      Instability Tests: Medial Malathi test negative and lateral Malathi test negative.   Skin:     General: Skin is warm and dry.   Neurological:      General: No focal deficit present.      Mental Status: He is alert and oriented to person, place, and time. Mental status is at baseline.   Psychiatric:         Mood and Affect: Mood normal.         Behavior:  "Behavior normal.         Thought Content: Thought content normal.         Judgment: Judgment normal.       Right Knee Exam     Tenderness   The patient is experiencing no tenderness.     Range of Motion   Extension:  normal   Flexion:  abnormal     Tests   Malathi:  Medial - negative Lateral - negative  Varus: negative Valgus: negative  Patellar apprehension: negative    Other   Swelling: moderate  Effusion: effusion present          Observations     Right Knee   Positive for effusion.     Large joint arthrocentesis: R knee  [place] Protocol:  procedure performed by consultantConsent: Verbal consent obtained.  Risks and benefits: risks, benefits and alternatives were discussed  Consent given by: patient  Time out: Immediately prior to procedure a \"time out\" was called to verify the correct patient, procedure, equipment, support staff and site/side marked as required.  Timeout called at: 3/26/2025 9:47 AM.  Site marked: the operative site was marked  Supporting Documentation  Indications: pain   Procedure Details  Location: knee - R knee  Preparation: Patient was prepped and draped in the usual sterile fashion  Needle size: 25 G  Ultrasound guidance: no  Approach: anterolateral  Medications administered: 40 mg triamcinolone acetonide 40 mg/mL; 3.5 mL bupivacaine 0.5 %    Aspirate amount: 25 mL  Aspirate: clear and yellow  Patient tolerance: patient tolerated the procedure well with no immediate complications  Dressing:  Sterile dressing applied            I have personally reviewed pertinent films in PACS and my interpretation is XR R knee- mild effusion . Mild OA. No fx.      Geronimo Umaña MD   "

## 2025-03-26 NOTE — PATIENT INSTRUCTIONS
F/u as needed  Begin physical therapy  Icing/heat/OTC pain meds as needed.  R knee aspiration/steroid injection given